# Patient Record
Sex: FEMALE | Race: WHITE | NOT HISPANIC OR LATINO | Employment: OTHER | URBAN - METROPOLITAN AREA
[De-identification: names, ages, dates, MRNs, and addresses within clinical notes are randomized per-mention and may not be internally consistent; named-entity substitution may affect disease eponyms.]

---

## 2023-04-14 DIAGNOSIS — I42.8 NON-ISCHEMIC CARDIOMYOPATHY (MULTI): Primary | ICD-10-CM

## 2023-04-14 RX ORDER — FUROSEMIDE 20 MG/1
TABLET ORAL
Qty: 90 TABLET | Refills: 1 | Status: SHIPPED | OUTPATIENT
Start: 2023-04-14 | End: 2023-10-11

## 2023-05-15 ENCOUNTER — LAB (OUTPATIENT)
Dept: LAB | Facility: LAB | Age: 88
End: 2023-05-15
Payer: MEDICARE

## 2023-05-15 ENCOUNTER — OFFICE VISIT (OUTPATIENT)
Dept: PRIMARY CARE | Facility: CLINIC | Age: 88
End: 2023-05-15
Payer: MEDICARE

## 2023-05-15 VITALS
HEIGHT: 64 IN | SYSTOLIC BLOOD PRESSURE: 140 MMHG | DIASTOLIC BLOOD PRESSURE: 58 MMHG | WEIGHT: 139 LBS | HEART RATE: 70 BPM | BODY MASS INDEX: 23.73 KG/M2

## 2023-05-15 DIAGNOSIS — B35.1 ONYCHOMYCOSIS: ICD-10-CM

## 2023-05-15 DIAGNOSIS — E78.5 DYSLIPIDEMIA, GOAL LDL BELOW 70: ICD-10-CM

## 2023-05-15 DIAGNOSIS — Z00.00 ROUTINE GENERAL MEDICAL EXAMINATION AT HEALTH CARE FACILITY: Primary | ICD-10-CM

## 2023-05-15 DIAGNOSIS — E78.00 PURE HYPERCHOLESTEROLEMIA: ICD-10-CM

## 2023-05-15 DIAGNOSIS — M80.08XA AGE-RELATED OSTEOPOROSIS WITH CURRENT PATHOL FRACTURE OF VERTEBRA, INITIAL ENCOUNTER (MULTI): ICD-10-CM

## 2023-05-15 DIAGNOSIS — D45 POLYCYTHEMIA VERA (MULTI): ICD-10-CM

## 2023-05-15 DIAGNOSIS — I72.2 ANEURYSM ARTERY, RENAL (CMS-HCC): ICD-10-CM

## 2023-05-15 DIAGNOSIS — I42.8 NONISCHEMIC CARDIOMYOPATHY (MULTI): ICD-10-CM

## 2023-05-15 DIAGNOSIS — R25.2 LEG CRAMPS: ICD-10-CM

## 2023-05-15 DIAGNOSIS — I44.2 COMPLETE ATRIOVENTRICULAR BLOCK (MULTI): ICD-10-CM

## 2023-05-15 DIAGNOSIS — I10 PRIMARY HYPERTENSION: ICD-10-CM

## 2023-05-15 PROBLEM — I25.2 HISTORY OF HEART ATTACK: Status: ACTIVE | Noted: 2023-05-15

## 2023-05-15 PROBLEM — G47.00 INSOMNIA: Status: ACTIVE | Noted: 2023-05-15

## 2023-05-15 PROBLEM — G47.33 OSA (OBSTRUCTIVE SLEEP APNEA): Status: ACTIVE | Noted: 2023-05-15

## 2023-05-15 PROBLEM — M48.061 LUMBAR CANAL STENOSIS: Status: ACTIVE | Noted: 2023-05-15

## 2023-05-15 PROBLEM — Z66 DNR NO CODE (DO NOT RESUSCITATE): Status: ACTIVE | Noted: 2023-05-15

## 2023-05-15 PROBLEM — Z95.0 BIVENTRICULAR CARDIAC PACEMAKER IN SITU: Status: ACTIVE | Noted: 2023-05-15

## 2023-05-15 PROBLEM — G47.8 UNREFRESHED BY SLEEP: Status: ACTIVE | Noted: 2023-05-15

## 2023-05-15 PROBLEM — Z95.810 BIVENTRICULAR IMPLANTABLE CARDIOVERTER-DEFIBRILLATOR (ICD) IN SITU: Status: ACTIVE | Noted: 2023-05-15

## 2023-05-15 PROBLEM — M25.571 ACUTE RIGHT ANKLE PAIN: Status: ACTIVE | Noted: 2023-05-15

## 2023-05-15 PROBLEM — K22.70 BARRETT'S ESOPHAGUS: Status: ACTIVE | Noted: 2023-05-15

## 2023-05-15 PROBLEM — L29.9 PRURITIC DISORDER: Status: ACTIVE | Noted: 2023-05-15

## 2023-05-15 PROBLEM — R20.0 NUMBNESS: Status: ACTIVE | Noted: 2023-05-15

## 2023-05-15 PROBLEM — M79.606 LEG PAIN: Status: ACTIVE | Noted: 2023-05-15

## 2023-05-15 PROBLEM — H69.92 DYSFUNCTION OF LEFT EUSTACHIAN TUBE: Status: ACTIVE | Noted: 2023-05-15

## 2023-05-15 PROBLEM — S93.401A SPRAIN OF RIGHT ANKLE: Status: ACTIVE | Noted: 2023-05-15

## 2023-05-15 PROBLEM — G62.9 PERIPHERAL NEUROPATHY: Status: ACTIVE | Noted: 2023-05-15

## 2023-05-15 PROBLEM — G25.81 RLS (RESTLESS LEGS SYNDROME): Status: ACTIVE | Noted: 2023-05-15

## 2023-05-15 PROBLEM — R06.3 CHEYNE-STOKES RESPIRATION: Status: ACTIVE | Noted: 2023-05-15

## 2023-05-15 PROBLEM — M47.812 DEGENERATIVE JOINT DISEASE OF CERVICAL SPINE: Status: ACTIVE | Noted: 2023-05-15

## 2023-05-15 PROBLEM — G89.4 CHRONIC PAIN SYNDROME: Status: ACTIVE | Noted: 2023-05-15

## 2023-05-15 PROBLEM — R39.9 SYMPTOMS INVOLVING URINARY SYSTEM: Status: ACTIVE | Noted: 2023-05-15

## 2023-05-15 PROBLEM — S32.000A LUMBAR COMPRESSION FRACTURE (MULTI): Status: ACTIVE | Noted: 2023-05-15

## 2023-05-15 PROBLEM — E04.2 NONTOXIC MULTINODULAR GOITER: Status: ACTIVE | Noted: 2023-05-15

## 2023-05-15 PROBLEM — M48.062 NEUROGENIC CLAUDICATION DUE TO LUMBAR SPINAL STENOSIS: Status: ACTIVE | Noted: 2023-05-15

## 2023-05-15 PROBLEM — Z95.0 S/P CARDIAC PACEMAKER PROCEDURE: Status: ACTIVE | Noted: 2023-05-15

## 2023-05-15 PROBLEM — N95.2 POSTMENOPAUSAL ATROPHIC VAGINITIS: Status: ACTIVE | Noted: 2023-05-15

## 2023-05-15 PROBLEM — R53.83 FATIGUE: Status: ACTIVE | Noted: 2023-05-15

## 2023-05-15 PROBLEM — G24.5 BLEPHAROSPASM: Status: ACTIVE | Noted: 2023-05-15

## 2023-05-15 PROBLEM — G51.0 FACIAL NERVE PARESIS: Status: ACTIVE | Noted: 2023-05-15

## 2023-05-15 PROBLEM — G89.29 NECK PAIN, CHRONIC: Status: ACTIVE | Noted: 2023-05-15

## 2023-05-15 PROBLEM — B35.6 TINEA CRURIS: Status: ACTIVE | Noted: 2023-05-15

## 2023-05-15 PROBLEM — M79.605 PAIN OF LEFT LOWER EXTREMITY: Status: ACTIVE | Noted: 2023-05-15

## 2023-05-15 PROBLEM — M54.16 LUMBAR RADICULITIS: Status: ACTIVE | Noted: 2023-05-15

## 2023-05-15 PROBLEM — I44.7 LBBB (LEFT BUNDLE BRANCH BLOCK): Status: ACTIVE | Noted: 2023-05-15

## 2023-05-15 PROBLEM — I35.0 MODERATE AORTIC STENOSIS: Status: ACTIVE | Noted: 2023-05-15

## 2023-05-15 PROBLEM — N39.0 ACUTE LOWER UTI: Status: ACTIVE | Noted: 2023-05-15

## 2023-05-15 PROBLEM — J30.89 OTHER ALLERGIC RHINITIS: Status: ACTIVE | Noted: 2023-05-15

## 2023-05-15 PROBLEM — E55.9 VITAMIN D DEFICIENCY: Status: ACTIVE | Noted: 2023-05-15

## 2023-05-15 PROBLEM — H90.6 MIXED CONDUCTIVE AND SENSORINEURAL HEARING LOSS, BILATERAL: Status: ACTIVE | Noted: 2023-05-15

## 2023-05-15 PROBLEM — M25.69 BACK STIFFNESS: Status: ACTIVE | Noted: 2023-05-15

## 2023-05-15 PROBLEM — K21.9 GERD WITHOUT ESOPHAGITIS: Status: ACTIVE | Noted: 2023-05-15

## 2023-05-15 PROBLEM — R51.9 HEADACHE: Status: ACTIVE | Noted: 2023-05-15

## 2023-05-15 PROBLEM — H40.9 GLAUCOMA: Status: ACTIVE | Noted: 2023-05-15

## 2023-05-15 PROBLEM — F51.04 CHRONIC INSOMNIA: Status: ACTIVE | Noted: 2023-05-15

## 2023-05-15 PROBLEM — R25.8: Status: ACTIVE | Noted: 2023-05-15

## 2023-05-15 PROBLEM — Z86.03 H/O POLYCYTHEMIA VERA: Status: ACTIVE | Noted: 2023-05-15

## 2023-05-15 PROBLEM — M54.2 NECK PAIN, CHRONIC: Status: ACTIVE | Noted: 2023-05-15

## 2023-05-15 PROBLEM — T82.110A PACEMAKER LEAD MALFUNCTION: Status: ACTIVE | Noted: 2023-05-15

## 2023-05-15 PROBLEM — I35.8 AORTIC VALVE SCLEROSIS: Status: ACTIVE | Noted: 2023-05-15

## 2023-05-15 PROBLEM — R01.1 HEART MURMUR, SYSTOLIC: Status: ACTIVE | Noted: 2023-05-15

## 2023-05-15 PROBLEM — M47.816 LUMBAR SPONDYLOSIS: Status: ACTIVE | Noted: 2023-05-15

## 2023-05-15 PROBLEM — M17.9 KNEE OSTEOARTHRITIS: Status: ACTIVE | Noted: 2023-05-15

## 2023-05-15 PROBLEM — Z86.69 HISTORY OF BELL'S PALSY: Status: ACTIVE | Noted: 2023-05-15

## 2023-05-15 PROBLEM — M79.673 HEEL PAIN: Status: ACTIVE | Noted: 2023-05-15

## 2023-05-15 PROBLEM — S32.020A COMPRESSION FRACTURE OF L2 LUMBAR VERTEBRA (MULTI): Status: ACTIVE | Noted: 2023-05-15

## 2023-05-15 PROBLEM — M81.0 OSTEOPOROSIS: Status: ACTIVE | Noted: 2023-05-15

## 2023-05-15 PROBLEM — J30.2 SEASONAL ALLERGIES: Status: ACTIVE | Noted: 2023-05-15

## 2023-05-15 PROBLEM — G47.31 COMPLEX SLEEP APNEA SYNDROME: Status: ACTIVE | Noted: 2023-05-15

## 2023-05-15 PROBLEM — G47.39 COMPLEX SLEEP APNEA SYNDROME: Status: ACTIVE | Noted: 2023-05-15

## 2023-05-15 PROBLEM — N32.81 OVERACTIVE BLADDER: Status: ACTIVE | Noted: 2023-05-15

## 2023-05-15 PROBLEM — M79.604 PAIN OF RIGHT LOWER EXTREMITY: Status: ACTIVE | Noted: 2023-05-15

## 2023-05-15 PROBLEM — G47.30 SLEEP APNEA: Status: ACTIVE | Noted: 2023-05-15

## 2023-05-15 PROBLEM — K22.5 ZENKER'S DIVERTICULUM: Status: ACTIVE | Noted: 2023-05-15

## 2023-05-15 PROBLEM — I83.813 VARICOSE VEINS OF BOTH LOWER EXTREMITIES WITH PAIN: Status: ACTIVE | Noted: 2023-05-15

## 2023-05-15 PROBLEM — M53.82 WEAKNESS OF NECK: Status: ACTIVE | Noted: 2023-05-15

## 2023-05-15 PROBLEM — R53.1 WEAKNESS GENERALIZED: Status: ACTIVE | Noted: 2023-05-15

## 2023-05-15 PROBLEM — M25.50 ARTHRALGIA OF MULTIPLE SITES: Status: ACTIVE | Noted: 2023-05-15

## 2023-05-15 PROBLEM — M54.9 BACK PAIN: Status: ACTIVE | Noted: 2023-05-15

## 2023-05-15 PROBLEM — J30.0 VASOMOTOR RHINITIS: Status: ACTIVE | Noted: 2023-05-15

## 2023-05-15 PROBLEM — R06.02 SOB (SHORTNESS OF BREATH): Status: ACTIVE | Noted: 2023-05-15

## 2023-05-15 PROBLEM — R13.10 DYSPHAGIA: Status: ACTIVE | Noted: 2023-05-15

## 2023-05-15 PROBLEM — Q89.2 THYROGLOSSAL DUCT CYST: Status: ACTIVE | Noted: 2023-05-15

## 2023-05-15 PROBLEM — H90.3 ASYMMETRIC SNHL (SENSORINEURAL HEARING LOSS): Status: ACTIVE | Noted: 2023-05-15

## 2023-05-15 PROBLEM — N63.20 LEFT BREAST MASS: Status: ACTIVE | Noted: 2023-05-15

## 2023-05-15 PROBLEM — E78.2: Status: ACTIVE | Noted: 2023-05-15

## 2023-05-15 PROBLEM — F32.A DEPRESSION: Status: ACTIVE | Noted: 2023-05-15

## 2023-05-15 PROBLEM — I50.22 CHRONIC SYSTOLIC HEART FAILURE (MULTI): Status: ACTIVE | Noted: 2023-05-15

## 2023-05-15 PROBLEM — H91.90 HEARING IMPAIRMENT: Status: ACTIVE | Noted: 2023-05-15

## 2023-05-15 LAB
ALANINE AMINOTRANSFERASE (SGPT) (U/L) IN SER/PLAS: 16 U/L (ref 7–45)
ALBUMIN (G/DL) IN SER/PLAS: 4.1 G/DL (ref 3.4–5)
ALKALINE PHOSPHATASE (U/L) IN SER/PLAS: 51 U/L (ref 33–136)
ANION GAP IN SER/PLAS: 16 MMOL/L (ref 10–20)
ASPARTATE AMINOTRANSFERASE (SGOT) (U/L) IN SER/PLAS: 25 U/L (ref 9–39)
BILIRUBIN TOTAL (MG/DL) IN SER/PLAS: 0.6 MG/DL (ref 0–1.2)
CALCIUM (MG/DL) IN SER/PLAS: 9.9 MG/DL (ref 8.6–10.3)
CARBON DIOXIDE, TOTAL (MMOL/L) IN SER/PLAS: 26 MMOL/L (ref 21–32)
CHLORIDE (MMOL/L) IN SER/PLAS: 104 MMOL/L (ref 98–107)
CHOLESTEROL (MG/DL) IN SER/PLAS: 150 MG/DL (ref 0–199)
CHOLESTEROL IN HDL (MG/DL) IN SER/PLAS: 44.1 MG/DL
CHOLESTEROL/HDL RATIO: 3.4
CREATINE KINASE (U/L) IN SER/PLAS: 70 U/L (ref 0–215)
CREATININE (MG/DL) IN SER/PLAS: 0.84 MG/DL (ref 0.5–1.05)
GFR FEMALE: 67 ML/MIN/1.73M2
GLUCOSE (MG/DL) IN SER/PLAS: 100 MG/DL (ref 74–99)
LDL: 77 MG/DL (ref 0–99)
MAGNESIUM (MG/DL) IN SER/PLAS: 2.04 MG/DL (ref 1.6–2.4)
POTASSIUM (MMOL/L) IN SER/PLAS: 4.6 MMOL/L (ref 3.5–5.3)
PROTEIN TOTAL: 6.7 G/DL (ref 6.4–8.2)
SODIUM (MMOL/L) IN SER/PLAS: 141 MMOL/L (ref 136–145)
THYROTROPIN (MIU/L) IN SER/PLAS BY DETECTION LIMIT <= 0.05 MIU/L: 1.48 MIU/L (ref 0.44–3.98)
TRIGLYCERIDE (MG/DL) IN SER/PLAS: 146 MG/DL (ref 0–149)
UREA NITROGEN (MG/DL) IN SER/PLAS: 34 MG/DL (ref 6–23)
VLDL: 29 MG/DL (ref 0–40)

## 2023-05-15 PROCEDURE — 3077F SYST BP >= 140 MM HG: CPT | Performed by: INTERNAL MEDICINE

## 2023-05-15 PROCEDURE — 1157F ADVNC CARE PLAN IN RCRD: CPT | Performed by: INTERNAL MEDICINE

## 2023-05-15 PROCEDURE — 84443 ASSAY THYROID STIM HORMONE: CPT

## 2023-05-15 PROCEDURE — 36415 COLL VENOUS BLD VENIPUNCTURE: CPT

## 2023-05-15 PROCEDURE — 1170F FXNL STATUS ASSESSED: CPT | Performed by: INTERNAL MEDICINE

## 2023-05-15 PROCEDURE — 3078F DIAST BP <80 MM HG: CPT | Performed by: INTERNAL MEDICINE

## 2023-05-15 PROCEDURE — 1159F MED LIST DOCD IN RCRD: CPT | Performed by: INTERNAL MEDICINE

## 2023-05-15 PROCEDURE — 1036F TOBACCO NON-USER: CPT | Performed by: INTERNAL MEDICINE

## 2023-05-15 PROCEDURE — 80053 COMPREHEN METABOLIC PANEL: CPT

## 2023-05-15 PROCEDURE — 80061 LIPID PANEL: CPT

## 2023-05-15 PROCEDURE — 99214 OFFICE O/P EST MOD 30 MIN: CPT | Performed by: INTERNAL MEDICINE

## 2023-05-15 PROCEDURE — 82550 ASSAY OF CK (CPK): CPT

## 2023-05-15 PROCEDURE — 83735 ASSAY OF MAGNESIUM: CPT

## 2023-05-15 PROCEDURE — G0439 PPPS, SUBSEQ VISIT: HCPCS | Performed by: INTERNAL MEDICINE

## 2023-05-15 RX ORDER — LATANOPROST 50 UG/ML
2 SOLUTION/ DROPS OPHTHALMIC NIGHTLY
COMMUNITY
Start: 2022-08-17

## 2023-05-15 RX ORDER — OMEGA-3 FATTY ACIDS/FISH OIL 340-1000MG
1 CAPSULE ORAL DAILY
COMMUNITY

## 2023-05-15 RX ORDER — MELATONIN 10 MG
CAPSULE ORAL
COMMUNITY
End: 2023-12-06 | Stop reason: ALTCHOICE

## 2023-05-15 RX ORDER — IPRATROPIUM BROMIDE 42 UG/1
2 SPRAY, METERED NASAL 3 TIMES DAILY
Status: ON HOLD | COMMUNITY
Start: 2020-05-15 | End: 2023-12-08 | Stop reason: ALTCHOICE

## 2023-05-15 RX ORDER — DENOSUMAB 60 MG/ML
INJECTION SUBCUTANEOUS
Status: ON HOLD | COMMUNITY
Start: 2019-02-05 | End: 2023-12-08 | Stop reason: ALTCHOICE

## 2023-05-15 RX ORDER — ROSUVASTATIN CALCIUM 20 MG/1
1 TABLET, COATED ORAL DAILY
COMMUNITY
Start: 2018-06-01 | End: 2023-05-23

## 2023-05-15 RX ORDER — LIDOCAINE 50 MG/G
PATCH TOPICAL
COMMUNITY
Start: 2020-01-06 | End: 2023-08-21

## 2023-05-15 RX ORDER — METOPROLOL SUCCINATE 25 MG/1
1 TABLET, EXTENDED RELEASE ORAL DAILY
COMMUNITY
Start: 2016-08-24 | End: 2023-10-10

## 2023-05-15 RX ORDER — ACETAMINOPHEN 500 MG
500 TABLET ORAL EVERY 6 HOURS
COMMUNITY
Start: 2022-04-27

## 2023-05-15 RX ORDER — SPIRONOLACTONE 25 MG/1
1 TABLET ORAL DAILY
COMMUNITY
Start: 2020-12-14 | End: 2023-12-05

## 2023-05-15 RX ORDER — ARTIFICIAL TEARS 1; 2; 3 MG/ML; MG/ML; MG/ML
SOLUTION/ DROPS OPHTHALMIC 4 TIMES DAILY
COMMUNITY

## 2023-05-15 RX ORDER — GINSENG 100 MG
1 CAPSULE ORAL DAILY
COMMUNITY

## 2023-05-15 RX ORDER — MULTIVIT-MIN/IRON/FOLIC ACID/K 18-600-40
1000 CAPSULE ORAL DAILY
COMMUNITY

## 2023-05-15 RX ORDER — OMEPRAZOLE 20 MG/1
CAPSULE, DELAYED RELEASE ORAL
COMMUNITY
Start: 2021-04-01 | End: 2023-08-01

## 2023-05-15 RX ORDER — LOSARTAN POTASSIUM 25 MG/1
1 TABLET ORAL DAILY
COMMUNITY
Start: 2022-04-27 | End: 2023-10-03

## 2023-05-15 RX ORDER — VIT C/E/ZN/COPPR/LUTEIN/ZEAXAN 250MG-90MG
50 CAPSULE ORAL DAILY
COMMUNITY
Start: 2018-02-27

## 2023-05-15 ASSESSMENT — ACTIVITIES OF DAILY LIVING (ADL)
TAKING_MEDICATION: INDEPENDENT
DOING_HOUSEWORK: TOTAL CARE
DRESSING: INDEPENDENT
GROCERY_SHOPPING: TOTAL CARE
MANAGING_FINANCES: INDEPENDENT
BATHING: INDEPENDENT

## 2023-05-15 ASSESSMENT — PATIENT HEALTH QUESTIONNAIRE - PHQ9
1. LITTLE INTEREST OR PLEASURE IN DOING THINGS: NOT AT ALL
2. FEELING DOWN, DEPRESSED OR HOPELESS: NOT AT ALL
SUM OF ALL RESPONSES TO PHQ9 QUESTIONS 1 AND 2: 0

## 2023-05-15 NOTE — PATIENT INSTRUCTIONS
Hypertension, bp is well controlled    Cholesterol, get fasting labs    Leg cramps- discussed that being on the water pill can make this worse but you need diuretic due to history of heart failure  Will check magnesium level    Osteoporosis, on prolia- every 6 months, bone density due in the fall, will do at same time as mammogram, in December    For nail fungus, try vicks on the nail nightly and cover, continue until the nail grows out normally    Pain management, get second opinion on options from Dr. Mj Stevens, call [phone] to schedule    Recheck in 6months

## 2023-05-15 NOTE — PROGRESS NOTES
Subjective   Patient ID: Neris Mccall is a 88 y.o. female who presents for Medicare Annual Wellness Visit Subsequent.  Still having a lot of pain  Had a 3rd injection about 9 months ago, helped only for a few days.   After she is up for a few minutes, her back hurts and her knees and left heel hurt a lot.   Had an injection in her knee, now her right knee gives out on her.     Pt is concerned her mag level is low because she gets rest leg cramps    Breathing has been good  Gets occasional chest pain, doesn't last and happens when she is sitting.   Bowels are regular  She has fungus on her toenails,         Review of Systems    Objective     Visit Vitals  /58   Pulse 70        Physical Exam  Constitutional:       Appearance: Normal appearance.   Neck:      Vascular: No carotid bruit.   Cardiovascular:      Rate and Rhythm: Normal rate and regular rhythm.      Heart sounds: Murmur (grade II rusb systolic) heard.   Pulmonary:      Effort: Pulmonary effort is normal.      Breath sounds: Normal breath sounds. No wheezing.   Musculoskeletal:      Right lower leg: No edema.      Left lower leg: No edema.   Lymphadenopathy:      Cervical: No cervical adenopathy.   Skin:     Coloration: Skin is not jaundiced or pale.   Neurological:      Mental Status: She is alert.         Assessment/Plan     Problem List Items Addressed This Visit    None          Patient Instructions   Hypertension, bp is well controlled    Cholesterol, get fasting labs    Leg cramps- discussed that being on the water pill can make this worse but you need diuretic due to history of heart failure  Will check magnesium level    Osteoporosis, on prolia- every 6 months, bone density due in the fall, will do at same time as mammogram, in December    For nail fungus, try vicks on the nail nightly and cover, continue until the nail grows out normally    Pain management, get second opinion on options from Dr. Mj Stevens, call [phone] to  schedule    Recheck in 6months      There are no Patient Instructions on file for this visit.    For all testing, if you have not received results within 5 business days, please call the office.    All results will be available as well on [email or web address]. Make sure you have signed up.

## 2023-05-15 NOTE — PROGRESS NOTES
"Subjective   Patient ID: Neris Mccall is a 88 y.o. female who presents for Medicare Annual Wellness Visit Subsequent.    HPI     Review of Systems    Objective   /58   Pulse 70   Ht 1.626 m (5' 4\")   Wt 63 kg (139 lb)   BMI 23.86 kg/m²     Physical Exam    Assessment/Plan          "

## 2023-05-18 DIAGNOSIS — G89.29 NECK PAIN, CHRONIC: ICD-10-CM

## 2023-05-18 DIAGNOSIS — R25.2 LEG CRAMPS: ICD-10-CM

## 2023-05-18 DIAGNOSIS — M54.2 NECK PAIN, CHRONIC: ICD-10-CM

## 2023-05-23 DIAGNOSIS — E78.00 PURE HYPERCHOLESTEROLEMIA: Primary | ICD-10-CM

## 2023-05-23 RX ORDER — ROSUVASTATIN CALCIUM 20 MG/1
TABLET, COATED ORAL
Qty: 90 TABLET | Refills: 3 | Status: SHIPPED | OUTPATIENT
Start: 2023-05-23 | End: 2024-05-17

## 2023-07-31 DIAGNOSIS — K22.719 BARRETT'S ESOPHAGUS WITH DYSPLASIA: Primary | ICD-10-CM

## 2023-08-01 RX ORDER — OMEPRAZOLE 20 MG/1
CAPSULE, DELAYED RELEASE ORAL
Qty: 90 CAPSULE | Refills: 3 | Status: SHIPPED | OUTPATIENT
Start: 2023-08-01 | End: 2024-01-14

## 2023-08-21 DIAGNOSIS — M80.08XA AGE-RELATED OSTEOPOROSIS WITH CURRENT PATHOL FRACTURE OF VERTEBRA, INITIAL ENCOUNTER (MULTI): ICD-10-CM

## 2023-08-21 RX ORDER — LIDOCAINE 50 MG/G
PATCH TOPICAL
Qty: 90 PATCH | Refills: 3 | Status: SHIPPED | OUTPATIENT
Start: 2023-08-21

## 2023-08-21 NOTE — TELEPHONE ENCOUNTER
Requested Prescriptions     Pending Prescriptions Disp Refills    lidocaine (Lidoderm) 5 % patch [Pharmacy Med Name: LIDOCAINE PATCH 1'S 5%] 90 patch 3     Sig: APPLY 1 PATCH TO THE AFFECTED AREA AND LEAVE IN PLACE FOR 12 HOURS, THEN REMOVE AND LEAVE OFF FOR 12 HOURS

## 2023-10-03 DIAGNOSIS — I50.22 CHRONIC SYSTOLIC HEART FAILURE (MULTI): Primary | ICD-10-CM

## 2023-10-03 RX ORDER — LOSARTAN POTASSIUM 25 MG/1
25 TABLET ORAL DAILY
Qty: 90 TABLET | Refills: 3 | Status: SHIPPED | OUTPATIENT
Start: 2023-10-03

## 2023-10-06 DIAGNOSIS — I10 ESSENTIAL HYPERTENSION: Primary | ICD-10-CM

## 2023-10-10 RX ORDER — METOPROLOL SUCCINATE 25 MG/1
25 TABLET, EXTENDED RELEASE ORAL DAILY
Qty: 90 TABLET | Refills: 3 | Status: SHIPPED | OUTPATIENT
Start: 2023-10-10 | End: 2024-04-09 | Stop reason: SDUPTHER

## 2023-10-11 DIAGNOSIS — I42.8 NON-ISCHEMIC CARDIOMYOPATHY (MULTI): ICD-10-CM

## 2023-10-11 RX ORDER — FUROSEMIDE 20 MG/1
TABLET ORAL
Qty: 90 TABLET | Refills: 3 | Status: SHIPPED | OUTPATIENT
Start: 2023-10-11

## 2023-10-12 ENCOUNTER — PREP FOR PROCEDURE (OUTPATIENT)
Dept: PAIN MEDICINE | Facility: CLINIC | Age: 88
End: 2023-10-12
Payer: MEDICARE

## 2023-10-12 DIAGNOSIS — M48.062 SPINAL STENOSIS, LUMBAR REGION, WITH NEUROGENIC CLAUDICATION: Primary | ICD-10-CM

## 2023-10-12 RX ORDER — CEFAZOLIN SODIUM 2 G/100ML
2 INJECTION, SOLUTION INTRAVENOUS ONCE
Status: CANCELLED | OUTPATIENT
Start: 2023-12-08 | End: 2023-10-12

## 2023-11-09 PROBLEM — M48.062 SPINAL STENOSIS, LUMBAR REGION, WITH NEUROGENIC CLAUDICATION: Status: ACTIVE | Noted: 2023-10-12

## 2023-11-10 ASSESSMENT — ENCOUNTER SYMPTOMS
HEADACHES: 0
HYPERTENSION: 1
ORTHOPNEA: 0
PALPITATIONS: 0
SHORTNESS OF BREATH: 0
PND: 0
NECK PAIN: 0
SWEATS: 0
BLURRED VISION: 0

## 2023-11-13 ENCOUNTER — LAB (OUTPATIENT)
Dept: LAB | Facility: LAB | Age: 88
End: 2023-11-13
Payer: MEDICARE

## 2023-11-13 ENCOUNTER — OFFICE VISIT (OUTPATIENT)
Dept: PRIMARY CARE | Facility: CLINIC | Age: 88
End: 2023-11-13
Payer: MEDICARE

## 2023-11-13 VITALS
HEART RATE: 75 BPM | WEIGHT: 140 LBS | BODY MASS INDEX: 23.9 KG/M2 | HEIGHT: 64 IN | SYSTOLIC BLOOD PRESSURE: 139 MMHG | DIASTOLIC BLOOD PRESSURE: 74 MMHG

## 2023-11-13 DIAGNOSIS — Z12.11 ENCOUNTER FOR SCREENING FOR MALIGNANT NEOPLASM OF COLON: ICD-10-CM

## 2023-11-13 DIAGNOSIS — L60.8 GREEN NAILS: ICD-10-CM

## 2023-11-13 DIAGNOSIS — E55.9 VITAMIN D DEFICIENCY: ICD-10-CM

## 2023-11-13 DIAGNOSIS — E26.1 SECONDARY HYPERALDOSTERONISM (MULTI): ICD-10-CM

## 2023-11-13 DIAGNOSIS — Z12.31 VISIT FOR SCREENING MAMMOGRAM: Primary | ICD-10-CM

## 2023-11-13 DIAGNOSIS — M81.0 POSTMENOPAUSAL BONE LOSS: ICD-10-CM

## 2023-11-13 DIAGNOSIS — E78.00 PURE HYPERCHOLESTEROLEMIA: ICD-10-CM

## 2023-11-13 DIAGNOSIS — N39.0 ACUTE LOWER UTI: ICD-10-CM

## 2023-11-13 PROBLEM — Z96.652 STATUS POST LEFT KNEE REPLACEMENT: Status: ACTIVE | Noted: 2023-11-13

## 2023-11-13 PROBLEM — M47.817 SPONDYLOSIS WITHOUT MYELOPATHY OR RADICULOPATHY, LUMBOSACRAL REGION: Status: ACTIVE | Noted: 2023-11-13

## 2023-11-13 LAB
25(OH)D3 SERPL-MCNC: 75 NG/ML (ref 30–100)
ALBUMIN SERPL BCP-MCNC: 4.5 G/DL (ref 3.4–5)
ALP SERPL-CCNC: 46 U/L (ref 33–136)
ALT SERPL W P-5'-P-CCNC: 15 U/L (ref 7–45)
ANION GAP SERPL CALC-SCNC: 15 MMOL/L (ref 10–20)
AST SERPL W P-5'-P-CCNC: 26 U/L (ref 9–39)
BASOPHILS # BLD AUTO: 0.08 X10*3/UL (ref 0–0.1)
BASOPHILS NFR BLD AUTO: 1 %
BILIRUB SERPL-MCNC: 0.6 MG/DL (ref 0–1.2)
BUN SERPL-MCNC: 46 MG/DL (ref 6–23)
CALCIUM SERPL-MCNC: 9.9 MG/DL (ref 8.6–10.3)
CHLORIDE SERPL-SCNC: 105 MMOL/L (ref 98–107)
CHOLEST SERPL-MCNC: 153 MG/DL (ref 0–199)
CHOLESTEROL/HDL RATIO: 3.4
CK SERPL-CCNC: 99 U/L (ref 0–215)
CO2 SERPL-SCNC: 25 MMOL/L (ref 21–32)
CREAT SERPL-MCNC: 1.03 MG/DL (ref 0.5–1.05)
EOSINOPHIL # BLD AUTO: 0.11 X10*3/UL (ref 0–0.4)
EOSINOPHIL NFR BLD AUTO: 1.4 %
ERYTHROCYTE [DISTWIDTH] IN BLOOD BY AUTOMATED COUNT: 12 % (ref 11.5–14.5)
GFR SERPL CREATININE-BSD FRML MDRD: 52 ML/MIN/1.73M*2
GLUCOSE SERPL-MCNC: 95 MG/DL (ref 74–99)
HCT VFR BLD AUTO: 44.4 % (ref 36–46)
HDLC SERPL-MCNC: 44.8 MG/DL
HGB BLD-MCNC: 14.5 G/DL (ref 12–16)
IMM GRANULOCYTES # BLD AUTO: 0.03 X10*3/UL (ref 0–0.5)
IMM GRANULOCYTES NFR BLD AUTO: 0.4 % (ref 0–0.9)
LDLC SERPL CALC-MCNC: 77 MG/DL
LYMPHOCYTES # BLD AUTO: 2.38 X10*3/UL (ref 0.8–3)
LYMPHOCYTES NFR BLD AUTO: 30.2 %
MCH RBC QN AUTO: 32.2 PG (ref 26–34)
MCHC RBC AUTO-ENTMCNC: 32.7 G/DL (ref 32–36)
MCV RBC AUTO: 99 FL (ref 80–100)
MONOCYTES # BLD AUTO: 0.71 X10*3/UL (ref 0.05–0.8)
MONOCYTES NFR BLD AUTO: 9 %
NEUTROPHILS # BLD AUTO: 4.56 X10*3/UL (ref 1.6–5.5)
NEUTROPHILS NFR BLD AUTO: 58 %
NON HDL CHOLESTEROL: 108 MG/DL (ref 0–149)
NRBC BLD-RTO: 0 /100 WBCS (ref 0–0)
PLATELET # BLD AUTO: 229 X10*3/UL (ref 150–450)
POC APPEARANCE, URINE: CLEAR
POC BILIRUBIN, URINE: NEGATIVE
POC BLOOD, URINE: ABNORMAL
POC COLOR, URINE: YELLOW
POC GLUCOSE, URINE: NEGATIVE MG/DL
POC KETONES, URINE: NEGATIVE MG/DL
POC LEUKOCYTES, URINE: ABNORMAL
POC NITRITE,URINE: POSITIVE
POC PH, URINE: 5.5 PH
POC PROTEIN, URINE: NEGATIVE MG/DL
POC SPECIFIC GRAVITY, URINE: 1.01
POC UROBILINOGEN, URINE: 0.2 EU/DL
POTASSIUM SERPL-SCNC: 4.6 MMOL/L (ref 3.5–5.3)
PROT SERPL-MCNC: 6.9 G/DL (ref 6.4–8.2)
RBC # BLD AUTO: 4.5 X10*6/UL (ref 4–5.2)
SODIUM SERPL-SCNC: 140 MMOL/L (ref 136–145)
TRIGL SERPL-MCNC: 158 MG/DL (ref 0–149)
TSH SERPL-ACNC: 0.93 MIU/L (ref 0.44–3.98)
VLDL: 32 MG/DL (ref 0–40)
WBC # BLD AUTO: 7.9 X10*3/UL (ref 4.4–11.3)

## 2023-11-13 PROCEDURE — 84443 ASSAY THYROID STIM HORMONE: CPT

## 2023-11-13 PROCEDURE — 87186 SC STD MICRODIL/AGAR DIL: CPT

## 2023-11-13 PROCEDURE — 87086 URINE CULTURE/COLONY COUNT: CPT

## 2023-11-13 PROCEDURE — 81001 URINALYSIS AUTO W/SCOPE: CPT

## 2023-11-13 PROCEDURE — 36415 COLL VENOUS BLD VENIPUNCTURE: CPT

## 2023-11-13 PROCEDURE — 1160F RVW MEDS BY RX/DR IN RCRD: CPT | Performed by: INTERNAL MEDICINE

## 2023-11-13 PROCEDURE — 3078F DIAST BP <80 MM HG: CPT | Performed by: INTERNAL MEDICINE

## 2023-11-13 PROCEDURE — 82306 VITAMIN D 25 HYDROXY: CPT

## 2023-11-13 PROCEDURE — 99214 OFFICE O/P EST MOD 30 MIN: CPT | Performed by: INTERNAL MEDICINE

## 2023-11-13 PROCEDURE — 80061 LIPID PANEL: CPT

## 2023-11-13 PROCEDURE — 1159F MED LIST DOCD IN RCRD: CPT | Performed by: INTERNAL MEDICINE

## 2023-11-13 PROCEDURE — 1036F TOBACCO NON-USER: CPT | Performed by: INTERNAL MEDICINE

## 2023-11-13 PROCEDURE — 81003 URINALYSIS AUTO W/O SCOPE: CPT | Performed by: INTERNAL MEDICINE

## 2023-11-13 PROCEDURE — 80053 COMPREHEN METABOLIC PANEL: CPT

## 2023-11-13 PROCEDURE — 85025 COMPLETE CBC W/AUTO DIFF WBC: CPT

## 2023-11-13 PROCEDURE — 82550 ASSAY OF CK (CPK): CPT

## 2023-11-13 PROCEDURE — 3075F SYST BP GE 130 - 139MM HG: CPT | Performed by: INTERNAL MEDICINE

## 2023-11-13 PROCEDURE — 82274 ASSAY TEST FOR BLOOD FECAL: CPT

## 2023-11-13 RX ORDER — BEVACIZUMAB 100 MG/4ML
INJECTION, SOLUTION INTRAVENOUS
COMMUNITY
Start: 2022-08-17 | End: 2023-12-06 | Stop reason: ALTCHOICE

## 2023-11-13 ASSESSMENT — ENCOUNTER SYMPTOMS
BLURRED VISION: 0
HEADACHES: 0
PND: 0
PALPITATIONS: 0
SWEATS: 0
ORTHOPNEA: 0
NECK PAIN: 0
HYPERTENSION: 1
SHORTNESS OF BREATH: 0

## 2023-11-13 NOTE — PATIENT INSTRUCTIONS
Hypertension, bp is well controlled    Cholesterol, get fasting labs    Osteoporosis, on prolia- every 6 months, bone density due in the fall, will do at same time as mammogram, in December, call [phone] to schedule mammogram and bone density    For green nail syndrome, did not clear with clorox/water mix, I need to call the pharmacy and ssee if they will do acetic acid in 70% alcohol mix for you to put under the nail.    Pain management, seeing Dr. Stevens    History of polyps, will do annual stool test, given today    Recommend RSV/Arexvy shot at the pharmacy    Weak heart and aortic stenosis, valve issue, last echo looked better    Recheck in 6months

## 2023-11-13 NOTE — PROGRESS NOTES
I spoke to mother and gave her the negative chest xray results. I will start albuterol MDI 2-4 puffs via spacer every 4 hours as needed for severe cough or wheezing. Prescription sent electronically to the pharmacy on file. Mother will call or return to the clinic if his cough has not resolved in one week.   Subjective   Patient ID: Neris Mccall is a 88 y.o. female who presents for 6 month follow up.    She had a back injection and it didn't help,   She is getting MILD procedure, in December, her pain is getting worse.   She takes acetominophen, wants to know how much she can take  Takes APAP 500 and ibu 200 3 times a day, not sure if it is helping    Her right thumb nail is green , she dipped in full strength clorox for 2 months and not better  She had surgery on that thumb, there was a growth, they took of her nail in the past.     How can she tell if she has a UTI, she is having a lot of leakage, she has been leaking for a long time, it leaks all the time, not with cough or sneeze, but when she needs to get to the bathroom  Is occasionally constipated, but regular,   Nocturia, none, she is dry during the day. In the day time she urinates more than every 2 hours.     Her breathing is okay, but after she does a lot of work, she gets winded but she thinks it is from her pain, is fine once she sits down.     No cp or pressure    Hypertension  This is a chronic problem. The current episode started more than 1 year ago. The problem has been resolved since onset. The problem is controlled. Associated symptoms include malaise/fatigue. Pertinent negatives include no anxiety, blurred vision, chest pain, headaches, neck pain, orthopnea, palpitations, peripheral edema, PND, shortness of breath or sweats. Agents associated with hypertension include NSAIDs. Risk factors for coronary artery disease include dyslipidemia and family history. There are no compliance problems.         Review of Systems   Constitutional:  Positive for malaise/fatigue.   Eyes:  Negative for blurred vision.   Respiratory:  Negative for shortness of breath.    Cardiovascular:  Negative for chest pain, palpitations, orthopnea and PND.   Musculoskeletal:  Negative for neck pain.   Neurological:  Negative for headaches.       Objective   /73   Pulse  "75   Ht 1.626 m (5' 4\")   Wt 63.5 kg (140 lb)   BMI 24.03 kg/m²     Physical Exam  Constitutional:       Appearance: Normal appearance.   Neck:      Vascular: Carotid bruit (radiated from murmur/chest) present.   Cardiovascular:      Rate and Rhythm: Normal rate and regular rhythm.      Heart sounds: Murmur (grade III rusb/syst, s2 preserved) heard.   Pulmonary:      Effort: Pulmonary effort is normal.      Breath sounds: Normal breath sounds.   Musculoskeletal:      Right lower leg: No edema.      Left lower leg: No edema.   Skin:     Comments: Right thumb nail green distal 2/3rds distal nail lifted   Neurological:      Mental Status: She is alert and oriented to person, place, and time.   Psychiatric:         Mood and Affect: Mood normal.         Assessment/Plan          Patient Instructions   Hypertension, bp is well controlled    Cholesterol, get fasting labs    Osteoporosis, on prolia- every 6 months, bone density due in the fall, will do at same time as mammogram, in December, call [phone] to schedule mammogram and bone density    For green nail syndrome, did not clear with clorox/water mix, I need to call the pharmacy and ssee if they will do acetic acid in 70% alcohol mix for you to put under the nail.    Pain management, seeing Dr. Stevens    History of polyps, will do annual stool test, given today    Recommend RSV/Arexvy shot at the pharmacy    Weak heart and aortic stenosis, valve issue, last echo looked better    Recheck in 6months    "

## 2023-11-13 NOTE — PROGRESS NOTES
"Subjective   Patient ID: Neris Mccall is a 88 y.o. female who presents for 6 month follow up.    HPI     Review of Systems    Objective   /73   Pulse 75   Ht 1.626 m (5' 4\")   Wt 63.5 kg (140 lb)   BMI 24.03 kg/m²     Physical Exam    Assessment/Plan          "

## 2023-11-14 DIAGNOSIS — L60.8 GREEN NAILS: Primary | ICD-10-CM

## 2023-11-14 LAB
APPEARANCE UR: ABNORMAL
BILIRUB UR STRIP.AUTO-MCNC: NEGATIVE MG/DL
COLOR UR: YELLOW
GLUCOSE UR STRIP.AUTO-MCNC: NEGATIVE MG/DL
KETONES UR STRIP.AUTO-MCNC: NEGATIVE MG/DL
LEUKOCYTE ESTERASE UR QL STRIP.AUTO: ABNORMAL
MUCOUS THREADS #/AREA URNS AUTO: ABNORMAL /LPF
NITRITE UR QL STRIP.AUTO: POSITIVE
PH UR STRIP.AUTO: 5 [PH]
PROT UR STRIP.AUTO-MCNC: NEGATIVE MG/DL
RBC # UR STRIP.AUTO: NEGATIVE /UL
RBC #/AREA URNS AUTO: ABNORMAL /HPF
SP GR UR STRIP.AUTO: 1.01
UROBILINOGEN UR STRIP.AUTO-MCNC: <2 MG/DL
WBC #/AREA URNS AUTO: >50 /HPF

## 2023-11-14 RX ORDER — GENTAMICIN SULFATE 3 MG/ML
SOLUTION/ DROPS OPHTHALMIC
Qty: 15 ML | Refills: 0 | Status: SHIPPED | OUTPATIENT
Start: 2023-11-14

## 2023-11-14 RX ORDER — GENTAMICIN SULFATE 3 MG/ML
SOLUTION/ DROPS OPHTHALMIC
Qty: 15 ML | Refills: 0 | Status: SHIPPED | OUTPATIENT
Start: 2023-11-14 | End: 2023-11-14 | Stop reason: SDUPTHER

## 2023-11-16 LAB — BACTERIA UR CULT: ABNORMAL

## 2023-11-25 LAB — HEMOCCULT STL QL IA: NEGATIVE

## 2023-11-28 DIAGNOSIS — N39.0 ACUTE LOWER UTI: Primary | ICD-10-CM

## 2023-11-28 RX ORDER — NITROFURANTOIN 25; 75 MG/1; MG/1
100 CAPSULE ORAL 2 TIMES DAILY
Qty: 14 CAPSULE | Refills: 0 | Status: SHIPPED | OUTPATIENT
Start: 2023-11-28 | End: 2023-12-05

## 2023-12-05 ENCOUNTER — HOSPITAL ENCOUNTER (OUTPATIENT)
Dept: CARDIOLOGY | Facility: CLINIC | Age: 88
Discharge: HOME | End: 2023-12-05
Payer: MEDICARE

## 2023-12-05 DIAGNOSIS — Z95.0 PACEMAKER: ICD-10-CM

## 2023-12-05 DIAGNOSIS — I44.2 ATRIOVENTRICULAR BLOCK, COMPLETE (MULTI): ICD-10-CM

## 2023-12-05 DIAGNOSIS — I50.22 CHRONIC SYSTOLIC HEART FAILURE (MULTI): Primary | ICD-10-CM

## 2023-12-05 PROCEDURE — 93294 REM INTERROG EVL PM/LDLS PM: CPT | Performed by: INTERNAL MEDICINE

## 2023-12-05 PROCEDURE — 93296 REM INTERROG EVL PM/IDS: CPT

## 2023-12-05 RX ORDER — SPIRONOLACTONE 25 MG/1
25 TABLET ORAL DAILY
Qty: 90 TABLET | Refills: 1 | Status: SHIPPED | OUTPATIENT
Start: 2023-12-05 | End: 2024-06-03

## 2023-12-06 ENCOUNTER — PRE-ADMISSION TESTING (OUTPATIENT)
Dept: PREADMISSION TESTING | Facility: HOSPITAL | Age: 88
End: 2023-12-06
Payer: MEDICARE

## 2023-12-06 VITALS
OXYGEN SATURATION: 95 % | DIASTOLIC BLOOD PRESSURE: 74 MMHG | WEIGHT: 141.09 LBS | BODY MASS INDEX: 24.09 KG/M2 | RESPIRATION RATE: 16 BRPM | HEART RATE: 48 BPM | TEMPERATURE: 97.3 F | SYSTOLIC BLOOD PRESSURE: 149 MMHG | HEIGHT: 64 IN

## 2023-12-06 DIAGNOSIS — Z01.818 PREOPERATIVE TESTING: Primary | ICD-10-CM

## 2023-12-06 PROCEDURE — 87081 CULTURE SCREEN ONLY: CPT | Mod: TRILAB

## 2023-12-06 RX ORDER — TROLAMINE SALICYLATE 10 G/100G
1 CREAM TOPICAL AS NEEDED
COMMUNITY

## 2023-12-06 RX ORDER — MELATONIN 5 MG
5 CAPSULE ORAL NIGHTLY
COMMUNITY

## 2023-12-06 RX ORDER — AFLIBERCEPT 40 MG/ML
2 INJECTION, SOLUTION INTRAVITREAL ONCE
COMMUNITY

## 2023-12-06 RX ORDER — IBUPROFEN 200 MG
200 TABLET ORAL 4 TIMES DAILY
COMMUNITY

## 2023-12-06 RX ORDER — VITAMIN E (DL,TOCOPHERYL ACET) 45 MG/0.25
1 DROPS ORAL DAILY
COMMUNITY

## 2023-12-06 ASSESSMENT — CHADS2 SCORE
DIABETES: NO
PRIOR STROKE OR TIA OR THROMBOEMBOLISM: NO
CHADS2 SCORE: 3
AGE GREATER THAN OR EQUAL TO 75: YES
CHF: YES
HYPERTENSION: YES

## 2023-12-06 ASSESSMENT — PAIN - FUNCTIONAL ASSESSMENT: PAIN_FUNCTIONAL_ASSESSMENT: 0-10

## 2023-12-06 ASSESSMENT — DUKE ACTIVITY SCORE INDEX (DASI)
TOTAL_SCORE: 9.95
CAN YOU PARTICIPATE IN STRENOUS SPORTS LIKE SWIMMING, SINGLES TENNIS, FOOTBALL, BASKETBALL, OR SKIING: NO
CAN YOU RUN A SHORT DISTANCE: NO
CAN YOU DO YARD WORK LIKE RAKING LEAVES, WEEDING OR PUSHING A MOWER: NO
CAN YOU DO LIGHT WORK AROUND THE HOUSE LIKE DUSTING OR WASHING DISHES: YES
DASI METS SCORE: 4
CAN YOU DO MODERATE WORK AROUND THE HOUSE LIKE VACUUMING, SWEEPING FLOORS OR CARRYING GROCERIES: NO
CAN YOU PARTICIPATE IN MODERATE RECREATIONAL ACTIVITIES LIKE GOLF, BOWLING, DANCING, DOUBLES TENNIS OR THROWING A BASEBALL OR FOOTBALL: NO
CAN YOU CLIMB A FLIGHT OF STAIRS OR WALK UP A HILL: NO
CAN YOU TAKE CARE OF YOURSELF (EAT, DRESS, BATHE, OR USE TOILET): YES
CAN YOU HAVE SEXUAL RELATIONS: NO
CAN YOU WALK A BLOCK OR TWO ON LEVEL GROUND: YES
CAN YOU WALK INDOORS, SUCH AS AROUND YOUR HOUSE: YES
CAN YOU DO HEAVY WORK AROUND THE HOUSE LIKE SCRUBBING FLOORS OR LIFTING AND MOVING HEAVY FURNITURE: NO

## 2023-12-06 ASSESSMENT — PAIN SCALES - GENERAL
PAINLEVEL_OUTOF10: 3
PAINLEVEL_OUTOF10: 3

## 2023-12-06 ASSESSMENT — ENCOUNTER SYMPTOMS
BACK PAIN: 1
ENDOCRINE NEGATIVE: 1
PSYCHIATRIC NEGATIVE: 1
RESPIRATORY NEGATIVE: 1
FACIAL ASYMMETRY: 1
ACTIVITY CHANGE: 1
GASTROINTESTINAL NEGATIVE: 1
EYES NEGATIVE: 1
CARDIOVASCULAR NEGATIVE: 1

## 2023-12-06 ASSESSMENT — PAIN DESCRIPTION - DESCRIPTORS: DESCRIPTORS: BURNING

## 2023-12-06 NOTE — H&P (VIEW-ONLY)
"CPM/PAT Evaluation       Name: Neris Mccall (Neris Mccall)  /Age: 1935/88 y.o.     In-Person       Chief Complaint: Spinal stenosis, lumbar region, with neurogenic claudication     HPI  An 88-year-old female with multiple comorbidities and spinal stenosis, lumbar region with neurogenic claudication here with her family (Nisa).  History of chronic back pain for \"many years\" that became much more severe since sustaining a compression fracture 3/2022.  Pain radiates from lower back down bilateral hips and legs and increases with standing and ambulating with her walker.  Symptoms interfere with sleep and ADLs.  Endorses bilateral lower extremity tingling and burning. Denies fever, chills, or loss of bladder or bowel function.  She is scheduled for mild L3/4, L4/5 lumbar laminectomy endoscopy.  Past Medical History:   Diagnosis Date    Acute upper respiratory infection, unspecified 01/15/2018    Acute URI    Aneurysm of unspecified site (CMS/[facility])     Aneurysm    Arrhythmia 1975    Arthritis     BiPAP (biphasic positive airway pressure) dependence     Cataract     Chronic pain disorder 1960    Coronary artery disease     CPAP (continuous positive airway pressure) dependence     Dependence on other enabling machines and devices     CPAP (continuous positive airway pressure) dependence    Dysphagia     Fractures     GERD (gastroesophageal reflux disease) 1985    No longer problem    Glaucoma     Hearing aid worn 1985    Heart disease 1980    Heart failure (CMS/[facility]) 2019    Heart valve disease     History of blood transfusion     HL (hearing loss) 1970    Hypertension 1972    Low back pain 1950    Lumbar disc disease 2016    Old myocardial infarction     History of myocardial infarction    Other nail disorders 2017    Green nails    Pacemaker 2019    Personal history of diseases of the skin and subcutaneous tissue 2016    History of folliculitis    Personal history " of other diseases of the circulatory system 05/16/2022    History of intermittent claudication    Personal history of other diseases of the circulatory system     History of high blood pressure    Personal history of other diseases of the musculoskeletal system and connective tissue     History of arthritis    Personal history of other diseases of the nervous system and sense organs     History of sleep apnea    Personal history of other endocrine, nutritional and metabolic disease     History of high cholesterol    Personal history of other venous thrombosis and embolism     H/O blood clots    Personal history of transient ischemic attack (TIA), and cerebral infarction without residual deficits     History of stroke    Platelet disorder (CMS/[facility]) 1998    PCV    Pregnant 1957    Scoliosis 2016    Secondary polycythemia     Polycythemia    Shingles 1996    Sleep apnea     Spinal stenosis, lumbar region with neurogenic claudication 11/01/2022    Neurogenic claudication due to lumbar spinal stenosis    Stroke (CMS/[facility]) 1967    Urinary tract infection 2019    Vision loss 2017       Past Surgical History:   Procedure Laterality Date    BLEPHAROPLASTY  2002    COLONOSCOPY  2014    CORRECTION HAMMER TOE  2004    DILATION AND CURETTAGE OF UTERUS  04/20/2016    Dilation And Curettage    FRACTURE SURGERY  2015    INSERT / REPLACE / REMOVE PACEMAKER  2019    KNEE SURGERY  04/20/2016    Knee Surgery Left    ORIF WRIST FRACTURE  1955    OTHER SURGICAL HISTORY  04/20/2016    Ear Surgery    OTHER SURGICAL HISTORY  04/20/2016    Hammertoe Operation Left Toe No. ___    OTHER SURGICAL HISTORY  07/29/2022    Pacemaker insertion    ROTATOR CUFF REPAIR  04/20/2016    Rotator Cuff Repair    STAPEDECTOMY  1979    TOE SURGERY  2004    TONSILLECTOMY  04/20/2016    Tonsillectomy    UPPER GASTROINTESTINAL ENDOSCOPY  2013    VARICOSE VEIN SURGERY  04/20/2016    Varicose Vein Ligation         Allergies   Allergen Reactions    Sulfa (Sulfonamide  Antibiotics) Other and Rash    Cephalexin Diarrhea    Duloxetine Diarrhea and Other     Reaction from Community: Diarrhea    House Dust Other    Tramadol Hallucinations and Other     Reaction from Community: Hallucinations       Current Outpatient Medications   Medication Sig Dispense Refill    acetaminophen (Tylenol) 500 mg tablet Take 1 tablet (500 mg) by mouth every 6 hours.      aflibercept (Eylea) 2 mg/0.05 mL intra-ocular injection Administer 0.05 mL (2 mg) into the left eye 1 time. EVERY 10 WEEKS. NO DOSE LISTED      artificial tears, dextran-hypomel-glycerin, 0.1-0.3-0.2 % ophthalmic solution Administer into affected eye(s) 4 times a day.      ascorbic acid, vitamin C, 500 mg capsule Take 1,000 mg by mouth once daily.      aspirin 81 mg capsule Take 81 mg by mouth once daily.      calcium carb and citrate-vitD3 (Citracal-D3 Slow Release) 600 mg-12.5 mcg (500 unit) tablet extended release Take 1 tablet by mouth once daily.      cholecalciferol (Vitamin D-3) 25 MCG (1000 UT) capsule Take 2 capsules (50 mcg) by mouth once daily.      cranberry extract 200 mg capsule Take 1 tablet by mouth once daily. 30,000MG      cyanocobalamin (Vitamin B-12) 50 mcg tablet Take 1 tablet (50 mcg) by mouth once daily.      denosumab (Prolia) 60 mg/mL syringe Inject under the skin.      furosemide (Lasix) 20 mg tablet TAKE 1 TABLET DAILY 90 tablet 3    gentamicin (Garamycin) 0.3 % ophthalmic solution Apply 1 drop under the nail and 1 drop on the nail topically twice daily and cover twice daily for 4 weeks (Patient taking differently: Apply 1 drop under the nail and 1 drop on the nail topically twice daily and cover twice daily for 4 weeks  **FOR NAIL FUNGUS NOT EYES**) 15 mL 0    ibuprofen 200 mg tablet Take 1 tablet (200 mg) by mouth 4 times a day.      ipratropium (Atrovent) 42 mcg (0.06 %) nasal spray Administer 2 sprays into affected nostril(s) 3 times a day.      latanoprost (Xalatan) 0.005 % ophthalmic solution Administer 2  drops into the left eye once daily at bedtime.      lidocaine (Lidoderm) 5 % patch APPLY 1 PATCH TO THE AFFECTED AREA AND LEAVE IN PLACE FOR 12 HOURS, THEN REMOVE AND LEAVE OFF FOR 12 HOURS 90 patch 3    losartan (Cozaar) 25 mg tablet TAKE 1 TABLET DAILY 90 tablet 3    melatonin 5 mg capsule Take 1 capsule (5 mg) by mouth once daily at bedtime.      metoprolol succinate XL (Toprol-XL) 25 mg 24 hr tablet TAKE 1 TABLET DAILY 90 tablet 3    multivit,calc,mins/iron/folic (WOMEN'S ONE DAILY ORAL) Take by mouth.      omega-3 fatty acids-fish oil (Fish OiL) 340-1,000 mg capsule Take by mouth.      omeprazole (PriLOSEC) 20 mg DR capsule  (Patient taking differently: Take 1 capsule (20 mg) by mouth 2 times a day.) 90 capsule 3    rosuvastatin (Crestor) 20 mg tablet TAKE 1 TABLET DAILY 90 tablet 3    spironolactone (Aldactone) 25 mg tablet TAKE 1 TABLET DAILY 90 tablet 1    trolamine salicylate (Aspercreme) 10 % cream Apply 1 Application topically if needed for muscle/joint pain.      vit A/vit C/vit E/zinc/copper (PRESERVISION AREDS ORAL) Take 1 tablet by mouth 2 times a day.       No current facility-administered medications for this visit.          Review of Systems   Constitutional:  Positive for activity change.   HENT: Negative.     Eyes: Negative.    Respiratory: Negative.     Cardiovascular: Negative.         History of heart murmur, left chest pacemaker   Gastrointestinal: Negative.    Endocrine: Negative.    Genitourinary: Negative.    Musculoskeletal:  Positive for back pain and gait problem.   Skin: Negative.    Neurological:  Positive for facial asymmetry (Left-sided facial droop s/p Bell's palsy).   Psychiatric/Behavioral: Negative.          Physical Exam  Vitals reviewed.   HENT:      Head: Normocephalic and atraumatic.      Ears:      Comments: Bilateral hearing aids     Mouth/Throat:      Mouth: Mucous membranes are moist.      Comments: Upper and lower dentures  Eyes:      Pupils: Pupils are equal, round,  "and reactive to light.      Comments: Glasses   Cardiovascular:      Rate and Rhythm: Normal rate and regular rhythm.      Heart sounds: Murmur (Grade III/IV) heard.      Comments: HR 62 auscultated  Pulmonary:      Effort: Pulmonary effort is normal.      Breath sounds: Normal breath sounds.   Abdominal:      Palpations: Abdomen is soft.   Musculoskeletal:      Cervical back: Normal range of motion.      Comments: Chronic back pain, hunched gait, uses walker   Skin:     General: Skin is warm and dry.      Comments: Left chest pacemaker present   Neurological:      Mental Status: She is alert and oriented to person, place, and time.   Psychiatric:         Mood and Affect: Mood normal.          PAT AIRWAY:   Airway:     Mallampati::  II    Neck ROM::  Full   upper dentures and lower dentures      /74   Pulse (!) 48   Temp 36.3 °C (97.3 °F) (Temporal)   Resp 16   Ht 1.626 m (5' 4\")   Wt 64 kg (141 lb 1.5 oz)   SpO2 95%   BMI 24.22 kg/m²     Stop Bang Score 4     CHADS 2 score: 5.9%  DASI score: 9.95  METS score: 4  Revised cardiac risk index: 6.6%  ASA III  ARISCAT 1.6% score 16      Assessment and Plan:     Spinal stenosis, lumbar region, with neurogenic claudication Plan: Mild L3/4, L4/5 lumbar laminectomy endoscopy  Central sleep apnea wears CPAP  History of heart block status post pacemaker [place] placement 3/13/2019  Hypertension  Dyslipidemia  Bell's palsy in mid 20s-residual left facial droop present  History of CVA  History of aneurysm  History of blood clots  Arthritis  History of MI  Glaucoma  CHF/cardiomyopathy  Chronic back pain  Hard of hearing        "

## 2023-12-06 NOTE — PREPROCEDURE INSTRUCTIONS
Medication List            Accurate as of December 6, 2023  2:56 PM. Always use your most recent med list.              Some of the medications listed here do not show instructions, such as how often to take the medication. Ask your doctor or nurse how to use these medications.  Specifically ask about this and similar medications: omeprazole (PriLOSEC) 20 mg DR capsule       acetaminophen 500 mg tablet  Commonly known as: Tylenol  Notes to patient: CONTINUE     artificial tears (dextran-hypomel-glycerin) 0.1-0.3-0.2 % ophthalmic solution  Notes to patient: CONTINUE     ascorbic acid (vitamin C) 500 mg capsule  Notes to patient: STOP TODAY     aspirin 81 mg capsule  Notes to patient: STOP TODAY     cholecalciferol 25 MCG (1000 UT) capsule  Commonly known as: Vitamin D-3  Notes to patient: STOP TODAY     Citracal-D3 Slow Release 600 mg-12.5 mcg (500 unit) tablet extended release  Generic drug: calcium carb and citrate-vitD3  Notes to patient: STOP TODAY     cranberry extract 200 mg capsule  Notes to patient: STOP TODAY     cyanocobalamin 50 mcg tablet  Commonly known as: Vitamin B-12  Notes to patient: STOP TODAY     Eylea 2 mg/0.05 mL intra-ocular injection  Generic drug: aflibercept     Fish OiL 340-1,000 mg capsule  Generic drug: omega-3 fatty acids-fish oil  Notes to patient: STOP TODAY     furosemide 20 mg tablet  Commonly known as: Lasix  TAKE 1 TABLET DAILY  Medication Adjustments for Surgery: Take morning of surgery with sip of water, no other fluids     gentamicin 0.3 % ophthalmic solution  Commonly known as: Garamycin  Apply 1 drop under the nail and 1 drop on the nail topically twice daily and cover twice daily for 4 weeks  Notes to patient: CONTINUE     ibuprofen 200 mg tablet  Notes to patient: STOP TODAY     ipratropium 42 mcg (0.06 %) nasal spray  Commonly known as: Atrovent  Notes to patient: CONTINUE     latanoprost 0.005 % ophthalmic solution  Commonly known as: Xalatan  Notes to patient: Take  evening dose before surgery.     lidocaine 5 % patch  Commonly known as: Lidoderm  APPLY 1 PATCH TO THE AFFECTED AREA AND LEAVE IN PLACE FOR 12 HOURS, THEN REMOVE AND LEAVE OFF FOR 12 HOURS  Notes to patient: STOP TODAY     losartan 25 mg tablet  Commonly known as: Cozaar  TAKE 1 TABLET DAILY  Medication Adjustments for Surgery: Stop 1 day before surgery     melatonin 5 mg capsule  Notes to patient: STOP TODAY     metoprolol succinate XL 25 mg 24 hr tablet  Commonly known as: Toprol-XL  TAKE 1 TABLET DAILY  Medication Adjustments for Surgery: Take morning of surgery with sip of water, no other fluids     omeprazole 20 mg DR capsule  Commonly known as: PriLOSEC  __________________________  Medication Adjustments for Surgery: Take morning of surgery with sip of water, no other fluids     PRESERVISION AREDS ORAL  Notes to patient: STOP TODAY     Prolia 60 mg/mL syringe  Generic drug: denosumab     rosuvastatin 20 mg tablet  Commonly known as: Crestor  TAKE 1 TABLET DAILY  Notes to patient: Take evening dose before surgery.     spironolactone 25 mg tablet  Commonly known as: Aldactone  TAKE 1 TABLET DAILY  Medication Adjustments for Surgery: Take morning of surgery with sip of water, no other fluids     trolamine salicylate 10 % cream  Commonly known as: Aspercreme  Notes to patient: STOP TODAY     WOMEN'S ONE DAILY ORAL  Notes to patient: STOP TODAY                     PAT DISCHARGE INSTRUCTIONS    Please call the Same Day Surgery (SDS) Department of the hospital where your procedure will be performed after 2:00 PM the day before your surgery. If you are scheduled on a Monday, or a Tuesday following a Monday holiday, you will need to call on the last business day prior to your surgery.    [facility]  [id number] [place] [address], [address] [phone]    [address] [address] [phone]    [facility]  [address] [place]  [address]  [phone]    Please let your surgeon know if:      You develop any open sores, shingles, burning or painful urination as these may increase your risk of an infection.   You no longer wish to have the surgery.   Any other personal circumstances change that may lead to the need to cancel or defer this surgery-such as being sick or getting admitted to any hospital within one week of your planned procedure.    Your contact details change, such as a change of address or phone number.    Starting now:     Please DO NOT drink alcohol or smoke for 24 hours before surgery. It is well known that quitting smoking can make a huge difference to your health and recovery from surgery. The longer you abstain from smoking, the better your chances of a healthy recovery. If you need help with quitting, call 4-800QUIT-NOW to be connected to a trained counselor who will discuss the best methods to help you quit.     Before your surgery:    Please stop all supplements 7 days prior to surgery. Or as directed by your surgeon.   Please stop taking NSAID pain medicine such as Advil and Motrin 7 days before surgery.    If you develop any fever, cough, cold, rashes, cuts, scratches, scrapes, urinary symptoms or infection anywhere on your body (including teeth and gums) prior to surgery, please call your surgeon’s office as soon as possible. This may require treatment to reduce the chance of cancellation on the day of surgery.    The day before your surgery:   DIET- Do not eat any food after MIDNIGHT. May have 10 ounces of CLEAR LIQUIDS until TWO HOURS before your arrival time. This includes water, black tea or coffee (no milk ir cream), apple juice and electrolyte drinks (Gatorade). May chew gum until TWO hours before your surgery time.   Get a good night’s rest.  Use the special soap for bathing if you have been instructed to use one.    Scheduled surgery times may change and you will be notified if this occurs - please check your  personal voicemail for any updates.     On the morning of surgery:   Wear comfortable, loose fitting clothes which open in the front. Please do not wear moisturizers, creams, lotions, makeup or perfume.    Please bring with you to surgery:   Photo ID and insurance card   Current list of medicines and allergies   Pacemaker/ Defibrillator/Heart stent cards   CPAP machine and mask    Slings/ splints/ crutches   A copy of your complete advanced directive/DHPOA.    Please do NOT bring with you to surgery:   All jewelry and valuables should be left at home.   Prosthetic devices such as contact lenses, hearing aids, dentures, eyelash extensions, hairpins and body piercings must be removed prior to going in to the surgical suite.    After outpatient surgery:   A responsible adult MUST accompany you at the time of discharge and stay with you for 24 hours after your surgery. You may NOT drive yourself home after surgery.    Do not drive, operate machinery, make critical decisions or do activities that require co-ordination or balance until after a night’s sleep.   Do not drink alcoholic beverages for 24 hours.   Instructions for resuming your medications will be provided by your surgeon.    CALL YOUR DOCTOR AFTER SURGERY IF YOU HAVE:     Chills and/or a fever of 101° F or higher.    Redness, swelling, pus or drainage from your surgical wound or a bad smell from the wound.    Lightheadedness, fainting or confusion.    Persistent vomiting (throwing up) and are not able to eat or drink for 12 hours.    Three or more loose, watery bowel movements in 24 hours (diarrhea).   Difficulty or pain while urinating( after non-urological surgery)    Pain and swelling in your legs, especially if it is only on one side.    Difficulty breathing or are breathing faster than normal.    Any new concerning symptoms.     Home Preoperative Antibacterial Shower    What is a home preoperative antibacterial shower?  This shower is a way of cleaning  the skin with a germ killing solution before surgery. The solution contains chlorhexidine, commonly known as CHG. CHG is a skin cleanser with germ killing ability. Let your doctor know if you are allergic to chlorhexidine.      Why do I need to take a preoperative antibacterial shower?  Skin is not sterile. It is best to try to make your skin as free of germs as possible before surgery. Proper cleansing with a germ killing soap before surgery can lower the number of germs on your skin. This helps to reduce the risk of infection at the surgical site. Following the instructions listed below will help you prepare your skin for surgery.    How do I use the solution?      Steps: Begin using your CHG soap FIVE DAYS BEFORE your scheduled surgery on __________________________________________________.  First, wash and rinse your hair using the CHG soap. Keep CHG away soap away from ear canals and eyes.  Rinse completely, do not condition. Hair extensions should be removed.  Wash your face with your normal soap and rinse.  Apply the CHG solution to a clean wet washcloth. Turn the water off or move away from the water spray to avoid premature rinsing of the CHG soap as you are applying.  Firmly lather your entire body from neck down. Do not use on face.  Pay special attention to the areas(s) where your incision(s) will be located unless they are on your face.  Avoid scrubbing your skin too hard.  The important point is to have the CHG soap sit on your skin for 3 minutes.  DO NOT wash with regular soap after you have used the CHG soap solution.  Pat yourself dry with a clean, freshly laundered towel.  DO NOT apply powders, deodorants or lotions.  Dress in clean, freshly laundered night clothes.  Be sure to sleep with clean, freshly laundered sheets.  Be aware that CHG will cause stains on fabrics; if you wash them with bleach after use. Rinse your washcloth and other linens that have contact with CHG completely. Use only  non-chlorine detergents to launder the items used.  The morning of surgery is the fifth day. Repeat the above steps and dress in clean comfortable clothing.      Who should I call if I have any questions regarding the use of CHG soap?  Call the [facility] at [phone] if you have any questions.     Patient Information: Oral/Dental Rinse    What is oral/dental rinse?  It is a mouthwash. It is a way of cleaning the mouth with a germ killing solution before your surgery. The solution contains chlorhexidine, commonly know as CHG.  It is used inside the mouth to kill bacteria known as Staphylococcus aureus.  Let your doctor know if you are allergic to chlorhexidine.    Why do I need to use CHG oral/dental rinse?  The CHG oral/dental rinse helps to kill bacteria in your mouth known as Staphylococcus aureus. This reduces the risk of infection at the surgical site.    Using your CHG oral/dental rinse.  STEPS: use your CHG oral/dental rinse after you brush your teeth the night before (at bedtime) and the morning of your surgery. Follow the directions on your prescription label.  Use the cap on the container to measure 15ml (fill cap to fill line)  Swish (gargle if you can) the mouthwash in your mouth for at least 30 seconds, (do not swallow) spit out.  After you use your CHG rinse, do not rinse your mouth with water, drink or eat. Please refer to prescription label for the appropriate time to resume oral intake.    What side effects might I have using the CHG oral/dental rinse?  CHG rinse will stick to the plaque on the teeth. Brush and floss just before use. Teeth brushing will help avoid staining of plaque during use.    Who should I contact if I have questions about the CHG oral/dental rinse?  Please call [facility], [facility] at [phone] if you have any  questions.

## 2023-12-06 NOTE — CPM/PAT H&P
"CPM/PAT Evaluation       Name: Neris Mccall (Neris Mccall)  /Age: 1935/88 y.o.     In-Person       Chief Complaint: Spinal stenosis, lumbar region, with neurogenic claudication     HPI  An 88-year-old female with multiple comorbidities and spinal stenosis, lumbar region with neurogenic claudication here with her family (Nisa).  History of chronic back pain for \"many years\" that became much more severe since sustaining a compression fracture 3/2022.  Pain radiates from lower back down bilateral hips and legs and increases with standing and ambulating with her walker.  Symptoms interfere with sleep and ADLs.  Endorses bilateral lower extremity tingling and burning. Denies fever, chills, or loss of bladder or bowel function.  She is scheduled for mild L3/4, L4/5 lumbar laminectomy endoscopy.  Past Medical History:   Diagnosis Date    Acute upper respiratory infection, unspecified 01/15/2018    Acute URI    Aneurysm of unspecified site (CMS/[facility])     Aneurysm    Arrhythmia 1975    Arthritis     BiPAP (biphasic positive airway pressure) dependence     Cataract     Chronic pain disorder 1960    Coronary artery disease     CPAP (continuous positive airway pressure) dependence     Dependence on other enabling machines and devices     CPAP (continuous positive airway pressure) dependence    Dysphagia     Fractures     GERD (gastroesophageal reflux disease) 1985    No longer problem    Glaucoma     Hearing aid worn 1985    Heart disease 1980    Heart failure (CMS/[facility]) 2019    Heart valve disease     History of blood transfusion     HL (hearing loss) 1970    Hypertension 1972    Low back pain 1950    Lumbar disc disease 2016    Old myocardial infarction     History of myocardial infarction    Other nail disorders 2017    Green nails    Pacemaker 2019    Personal history of diseases of the skin and subcutaneous tissue 2016    History of folliculitis    Personal history " of other diseases of the circulatory system 05/16/2022    History of intermittent claudication    Personal history of other diseases of the circulatory system     History of high blood pressure    Personal history of other diseases of the musculoskeletal system and connective tissue     History of arthritis    Personal history of other diseases of the nervous system and sense organs     History of sleep apnea    Personal history of other endocrine, nutritional and metabolic disease     History of high cholesterol    Personal history of other venous thrombosis and embolism     H/O blood clots    Personal history of transient ischemic attack (TIA), and cerebral infarction without residual deficits     History of stroke    Platelet disorder (CMS/[facility]) 1998    PCV    Pregnant 1957    Scoliosis 2016    Secondary polycythemia     Polycythemia    Shingles 1996    Sleep apnea     Spinal stenosis, lumbar region with neurogenic claudication 11/01/2022    Neurogenic claudication due to lumbar spinal stenosis    Stroke (CMS/[facility]) 1967    Urinary tract infection 2019    Vision loss 2017       Past Surgical History:   Procedure Laterality Date    BLEPHAROPLASTY  2002    COLONOSCOPY  2014    CORRECTION HAMMER TOE  2004    DILATION AND CURETTAGE OF UTERUS  04/20/2016    Dilation And Curettage    FRACTURE SURGERY  2015    INSERT / REPLACE / REMOVE PACEMAKER  2019    KNEE SURGERY  04/20/2016    Knee Surgery Left    ORIF WRIST FRACTURE  1955    OTHER SURGICAL HISTORY  04/20/2016    Ear Surgery    OTHER SURGICAL HISTORY  04/20/2016    Hammertoe Operation Left Toe No. ___    OTHER SURGICAL HISTORY  07/29/2022    Pacemaker insertion    ROTATOR CUFF REPAIR  04/20/2016    Rotator Cuff Repair    STAPEDECTOMY  1979    TOE SURGERY  2004    TONSILLECTOMY  04/20/2016    Tonsillectomy    UPPER GASTROINTESTINAL ENDOSCOPY  2013    VARICOSE VEIN SURGERY  04/20/2016    Varicose Vein Ligation         Allergies   Allergen Reactions    Sulfa (Sulfonamide  Antibiotics) Other and Rash    Cephalexin Diarrhea    Duloxetine Diarrhea and Other     Reaction from Community: Diarrhea    House Dust Other    Tramadol Hallucinations and Other     Reaction from Community: Hallucinations       Current Outpatient Medications   Medication Sig Dispense Refill    acetaminophen (Tylenol) 500 mg tablet Take 1 tablet (500 mg) by mouth every 6 hours.      aflibercept (Eylea) 2 mg/0.05 mL intra-ocular injection Administer 0.05 mL (2 mg) into the left eye 1 time. EVERY 10 WEEKS. NO DOSE LISTED      artificial tears, dextran-hypomel-glycerin, 0.1-0.3-0.2 % ophthalmic solution Administer into affected eye(s) 4 times a day.      ascorbic acid, vitamin C, 500 mg capsule Take 1,000 mg by mouth once daily.      aspirin 81 mg capsule Take 81 mg by mouth once daily.      calcium carb and citrate-vitD3 (Citracal-D3 Slow Release) 600 mg-12.5 mcg (500 unit) tablet extended release Take 1 tablet by mouth once daily.      cholecalciferol (Vitamin D-3) 25 MCG (1000 UT) capsule Take 2 capsules (50 mcg) by mouth once daily.      cranberry extract 200 mg capsule Take 1 tablet by mouth once daily. 30,000MG      cyanocobalamin (Vitamin B-12) 50 mcg tablet Take 1 tablet (50 mcg) by mouth once daily.      denosumab (Prolia) 60 mg/mL syringe Inject under the skin.      furosemide (Lasix) 20 mg tablet TAKE 1 TABLET DAILY 90 tablet 3    gentamicin (Garamycin) 0.3 % ophthalmic solution Apply 1 drop under the nail and 1 drop on the nail topically twice daily and cover twice daily for 4 weeks (Patient taking differently: Apply 1 drop under the nail and 1 drop on the nail topically twice daily and cover twice daily for 4 weeks  **FOR NAIL FUNGUS NOT EYES**) 15 mL 0    ibuprofen 200 mg tablet Take 1 tablet (200 mg) by mouth 4 times a day.      ipratropium (Atrovent) 42 mcg (0.06 %) nasal spray Administer 2 sprays into affected nostril(s) 3 times a day.      latanoprost (Xalatan) 0.005 % ophthalmic solution Administer 2  drops into the left eye once daily at bedtime.      lidocaine (Lidoderm) 5 % patch APPLY 1 PATCH TO THE AFFECTED AREA AND LEAVE IN PLACE FOR 12 HOURS, THEN REMOVE AND LEAVE OFF FOR 12 HOURS 90 patch 3    losartan (Cozaar) 25 mg tablet TAKE 1 TABLET DAILY 90 tablet 3    melatonin 5 mg capsule Take 1 capsule (5 mg) by mouth once daily at bedtime.      metoprolol succinate XL (Toprol-XL) 25 mg 24 hr tablet TAKE 1 TABLET DAILY 90 tablet 3    multivit,calc,mins/iron/folic (WOMEN'S ONE DAILY ORAL) Take by mouth.      omega-3 fatty acids-fish oil (Fish OiL) 340-1,000 mg capsule Take by mouth.      omeprazole (PriLOSEC) 20 mg DR capsule  (Patient taking differently: Take 1 capsule (20 mg) by mouth 2 times a day.) 90 capsule 3    rosuvastatin (Crestor) 20 mg tablet TAKE 1 TABLET DAILY 90 tablet 3    spironolactone (Aldactone) 25 mg tablet TAKE 1 TABLET DAILY 90 tablet 1    trolamine salicylate (Aspercreme) 10 % cream Apply 1 Application topically if needed for muscle/joint pain.      vit A/vit C/vit E/zinc/copper (PRESERVISION AREDS ORAL) Take 1 tablet by mouth 2 times a day.       No current facility-administered medications for this visit.          Review of Systems   Constitutional:  Positive for activity change.   HENT: Negative.     Eyes: Negative.    Respiratory: Negative.     Cardiovascular: Negative.         History of heart murmur, left chest pacemaker   Gastrointestinal: Negative.    Endocrine: Negative.    Genitourinary: Negative.    Musculoskeletal:  Positive for back pain and gait problem.   Skin: Negative.    Neurological:  Positive for facial asymmetry (Left-sided facial droop s/p Bell's palsy).   Psychiatric/Behavioral: Negative.          Physical Exam  Vitals reviewed.   HENT:      Head: Normocephalic and atraumatic.      Ears:      Comments: Bilateral hearing aids     Mouth/Throat:      Mouth: Mucous membranes are moist.      Comments: Upper and lower dentures  Eyes:      Pupils: Pupils are equal, round,  "and reactive to light.      Comments: Glasses   Cardiovascular:      Rate and Rhythm: Normal rate and regular rhythm.      Heart sounds: Murmur (Grade III/IV) heard.      Comments: HR 62 auscultated  Pulmonary:      Effort: Pulmonary effort is normal.      Breath sounds: Normal breath sounds.   Abdominal:      Palpations: Abdomen is soft.   Musculoskeletal:      Cervical back: Normal range of motion.      Comments: Chronic back pain, hunched gait, uses walker   Skin:     General: Skin is warm and dry.      Comments: Left chest pacemaker present   Neurological:      Mental Status: She is alert and oriented to person, place, and time.   Psychiatric:         Mood and Affect: Mood normal.          PAT AIRWAY:   Airway:     Mallampati::  II    Neck ROM::  Full   upper dentures and lower dentures      /74   Pulse (!) 48   Temp 36.3 °C (97.3 °F) (Temporal)   Resp 16   Ht 1.626 m (5' 4\")   Wt 64 kg (141 lb 1.5 oz)   SpO2 95%   BMI 24.22 kg/m²     Stop Bang Score 4     CHADS 2 score: 5.9%  DASI score: 9.95  METS score: 4  Revised cardiac risk index: 6.6%  ASA III  ARISCAT 1.6% score 16      Assessment and Plan:     Spinal stenosis, lumbar region, with neurogenic claudication Plan: Mild L3/4, L4/5 lumbar laminectomy endoscopy  Central sleep apnea wears CPAP  History of heart block status post pacemaker [place] placement 3/13/2019  Hypertension  Dyslipidemia  Bell's palsy in mid 20s-residual left facial droop present  History of CVA  History of aneurysm  History of blood clots  Arthritis  History of MI  Glaucoma  CHF/cardiomyopathy  Chronic back pain  Hard of hearing        "

## 2023-12-07 PROBLEM — G89.29 OTHER CHRONIC PAIN: Status: ACTIVE | Noted: 2023-12-07

## 2023-12-07 RX ORDER — HYDRALAZINE HYDROCHLORIDE 10 MG/1
10 TABLET, FILM COATED ORAL
Status: ON HOLD | COMMUNITY
End: 2023-12-08 | Stop reason: ALTCHOICE

## 2023-12-07 RX ORDER — EPINEPHRINE 0.22MG
100 AEROSOL WITH ADAPTER (ML) INHALATION DAILY
Status: ON HOLD | COMMUNITY
End: 2023-12-08 | Stop reason: ALTCHOICE

## 2023-12-07 RX ORDER — CELECOXIB 100 MG/1
100 CAPSULE ORAL 2 TIMES DAILY
Status: ON HOLD | COMMUNITY
Start: 2018-12-14 | End: 2023-12-08 | Stop reason: ALTCHOICE

## 2023-12-07 RX ORDER — NABUMETONE 750 MG/1
750 TABLET, FILM COATED ORAL
Status: ON HOLD | COMMUNITY
End: 2023-12-08 | Stop reason: ALTCHOICE

## 2023-12-07 RX ORDER — SERTRALINE HYDROCHLORIDE 50 MG/1
50 TABLET, FILM COATED ORAL
Status: ON HOLD | COMMUNITY
End: 2023-12-08 | Stop reason: ALTCHOICE

## 2023-12-07 RX ORDER — METHYLPREDNISOLONE 4 MG/1
TABLET ORAL
Status: ON HOLD | COMMUNITY
End: 2023-12-08 | Stop reason: ALTCHOICE

## 2023-12-07 RX ORDER — MELOXICAM 7.5 MG/1
7.5 TABLET ORAL
Status: ON HOLD | COMMUNITY
End: 2023-12-08 | Stop reason: ALTCHOICE

## 2023-12-07 RX ORDER — CHLORHEXIDINE GLUCONATE ORAL RINSE 1.2 MG/ML
SOLUTION DENTAL
Qty: 473 ML | Refills: 0 | Status: SHIPPED | OUTPATIENT
Start: 2023-12-07 | End: 2024-01-04 | Stop reason: WASHOUT

## 2023-12-07 RX ORDER — RABEPRAZOLE SODIUM 20 MG/1
20 TABLET, DELAYED RELEASE ORAL
Status: ON HOLD | COMMUNITY
End: 2023-12-08 | Stop reason: ALTCHOICE

## 2023-12-07 RX ORDER — LIFITEGRAST 50 MG/ML
1 SOLUTION/ DROPS OPHTHALMIC 2 TIMES DAILY
Status: ON HOLD | COMMUNITY
End: 2023-12-08 | Stop reason: ALTCHOICE

## 2023-12-07 RX ORDER — FLUTICASONE PROPIONATE 50 MCG
SPRAY, SUSPENSION (ML) NASAL
Status: ON HOLD | COMMUNITY
End: 2023-12-08 | Stop reason: ALTCHOICE

## 2023-12-07 RX ORDER — PREDNISOLONE ACETATE 10 MG/ML
SUSPENSION/ DROPS OPHTHALMIC
Status: ON HOLD | COMMUNITY
End: 2023-12-08 | Stop reason: ALTCHOICE

## 2023-12-07 RX ORDER — AMITRIPTYLINE HYDROCHLORIDE 10 MG/1
10 TABLET, FILM COATED ORAL NIGHTLY
Status: ON HOLD | COMMUNITY
End: 2023-12-08 | Stop reason: ALTCHOICE

## 2023-12-07 RX ORDER — ISOSORBIDE MONONITRATE 30 MG/1
30 TABLET, EXTENDED RELEASE ORAL
Status: ON HOLD | COMMUNITY
End: 2023-12-08 | Stop reason: ALTCHOICE

## 2023-12-07 RX ORDER — GABAPENTIN 100 MG/1
CAPSULE ORAL
Status: ON HOLD | COMMUNITY
Start: 2022-11-01 | End: 2023-12-08 | Stop reason: ALTCHOICE

## 2023-12-07 RX ORDER — CALCIUM CARBONATE 200(500)MG
1 TABLET,CHEWABLE ORAL DAILY
COMMUNITY
Start: 2020-01-06

## 2023-12-07 RX ORDER — DULOXETIN HYDROCHLORIDE 20 MG/1
20 CAPSULE, DELAYED RELEASE ORAL
Status: ON HOLD | COMMUNITY
End: 2023-12-08 | Stop reason: ALTCHOICE

## 2023-12-07 RX ORDER — CHOLECALCIFEROL (VITAMIN D3) 125 MCG
1 CAPSULE ORAL EVERY MORNING
Status: ON HOLD | COMMUNITY
End: 2023-12-08 | Stop reason: ALTCHOICE

## 2023-12-07 RX ORDER — OXYCODONE AND ACETAMINOPHEN 5; 325 MG/1; MG/1
1 TABLET ORAL
Status: ON HOLD | COMMUNITY
Start: 2022-03-21 | End: 2023-12-08 | Stop reason: ALTCHOICE

## 2023-12-08 ENCOUNTER — HOSPITAL ENCOUNTER (OUTPATIENT)
Facility: HOSPITAL | Age: 88
Setting detail: OUTPATIENT SURGERY
Discharge: HOME | End: 2023-12-08
Attending: ANESTHESIOLOGY | Admitting: ANESTHESIOLOGY
Payer: MEDICARE

## 2023-12-08 ENCOUNTER — APPOINTMENT (OUTPATIENT)
Dept: RADIOLOGY | Facility: HOSPITAL | Age: 88
End: 2023-12-08
Payer: MEDICARE

## 2023-12-08 ENCOUNTER — ANESTHESIA (OUTPATIENT)
Dept: OPERATING ROOM | Facility: HOSPITAL | Age: 88
End: 2023-12-08
Payer: MEDICARE

## 2023-12-08 ENCOUNTER — ANESTHESIA EVENT (OUTPATIENT)
Dept: OPERATING ROOM | Facility: HOSPITAL | Age: 88
End: 2023-12-08
Payer: MEDICARE

## 2023-12-08 VITALS
HEART RATE: 62 BPM | DIASTOLIC BLOOD PRESSURE: 61 MMHG | TEMPERATURE: 97.2 F | RESPIRATION RATE: 20 BRPM | OXYGEN SATURATION: 96 % | SYSTOLIC BLOOD PRESSURE: 120 MMHG

## 2023-12-08 DIAGNOSIS — M48.062 SPINAL STENOSIS, LUMBAR REGION, WITH NEUROGENIC CLAUDICATION: Primary | ICD-10-CM

## 2023-12-08 LAB
GLUCOSE BLD MANUAL STRIP-MCNC: 104 MG/DL (ref 74–99)
STAPHYLOCOCCUS SPEC CULT: ABNORMAL

## 2023-12-08 PROCEDURE — 7100000010 HC PHASE TWO TIME - EACH INCREMENTAL 1 MINUTE: Performed by: ANESTHESIOLOGY

## 2023-12-08 PROCEDURE — 3600000003 HC OR TIME - INITIAL BASE CHARGE - PROCEDURE LEVEL THREE: Performed by: ANESTHESIOLOGY

## 2023-12-08 PROCEDURE — 2500000004 HC RX 250 GENERAL PHARMACY W/ HCPCS (ALT 636 FOR OP/ED): Performed by: ANESTHESIOLOGY

## 2023-12-08 PROCEDURE — 7100000001 HC RECOVERY ROOM TIME - INITIAL BASE CHARGE: Performed by: ANESTHESIOLOGY

## 2023-12-08 PROCEDURE — 3600000008 HC OR TIME - EACH INCREMENTAL 1 MINUTE - PROCEDURE LEVEL THREE: Performed by: ANESTHESIOLOGY

## 2023-12-08 PROCEDURE — 7100000002 HC RECOVERY ROOM TIME - EACH INCREMENTAL 1 MINUTE: Performed by: ANESTHESIOLOGY

## 2023-12-08 PROCEDURE — 3700000001 HC GENERAL ANESTHESIA TIME - INITIAL BASE CHARGE: Performed by: ANESTHESIOLOGY

## 2023-12-08 PROCEDURE — 7100000009 HC PHASE TWO TIME - INITIAL BASE CHARGE: Performed by: ANESTHESIOLOGY

## 2023-12-08 PROCEDURE — 3700000002 HC GENERAL ANESTHESIA TIME - EACH INCREMENTAL 1 MINUTE: Performed by: ANESTHESIOLOGY

## 2023-12-08 PROCEDURE — 2720000007 HC OR 272 NO HCPCS: Performed by: ANESTHESIOLOGY

## 2023-12-08 PROCEDURE — 0275T PR PERC LAMINO-/LAMINECTOMY INDIR IMAG GUIDE LUMBAR: CPT | Performed by: ANESTHESIOLOGY

## 2023-12-08 PROCEDURE — 76000 FLUOROSCOPY <1 HR PHYS/QHP: CPT

## 2023-12-08 PROCEDURE — 82947 ASSAY GLUCOSE BLOOD QUANT: CPT

## 2023-12-08 PROCEDURE — 2500000005 HC RX 250 GENERAL PHARMACY W/O HCPCS: Performed by: ANESTHESIOLOGY

## 2023-12-08 RX ORDER — FENTANYL CITRATE 50 UG/ML
INJECTION, SOLUTION INTRAMUSCULAR; INTRAVENOUS AS NEEDED
Status: DISCONTINUED | OUTPATIENT
Start: 2023-12-08 | End: 2023-12-08

## 2023-12-08 RX ORDER — ONDANSETRON HYDROCHLORIDE 2 MG/ML
4 INJECTION, SOLUTION INTRAVENOUS ONCE AS NEEDED
Status: DISCONTINUED | OUTPATIENT
Start: 2023-12-08 | End: 2023-12-08 | Stop reason: HOSPADM

## 2023-12-08 RX ORDER — BUPIVACAINE HYDROCHLORIDE 2.5 MG/ML
INJECTION, SOLUTION INFILTRATION; PERINEURAL AS NEEDED
Status: DISCONTINUED | OUTPATIENT
Start: 2023-12-08 | End: 2023-12-08 | Stop reason: HOSPADM

## 2023-12-08 RX ORDER — BUPIVACAINE HYDROCHLORIDE 5 MG/ML
INJECTION, SOLUTION PERINEURAL AS NEEDED
Status: DISCONTINUED | OUTPATIENT
Start: 2023-12-08 | End: 2023-12-08 | Stop reason: HOSPADM

## 2023-12-08 RX ORDER — PROPOFOL 10 MG/ML
INJECTION, EMULSION INTRAVENOUS AS NEEDED
Status: DISCONTINUED | OUTPATIENT
Start: 2023-12-08 | End: 2023-12-08

## 2023-12-08 RX ORDER — SODIUM CHLORIDE, SODIUM LACTATE, POTASSIUM CHLORIDE, CALCIUM CHLORIDE 600; 310; 30; 20 MG/100ML; MG/100ML; MG/100ML; MG/100ML
100 INJECTION, SOLUTION INTRAVENOUS CONTINUOUS
Status: DISCONTINUED | OUTPATIENT
Start: 2023-12-08 | End: 2023-12-08 | Stop reason: HOSPADM

## 2023-12-08 RX ORDER — HYDRALAZINE HYDROCHLORIDE 20 MG/ML
5 INJECTION INTRAMUSCULAR; INTRAVENOUS EVERY 30 MIN PRN
Status: DISCONTINUED | OUTPATIENT
Start: 2023-12-08 | End: 2023-12-08 | Stop reason: HOSPADM

## 2023-12-08 RX ORDER — CEFAZOLIN SODIUM 2 G/100ML
2 INJECTION, SOLUTION INTRAVENOUS ONCE
Status: COMPLETED | OUTPATIENT
Start: 2023-12-08 | End: 2023-12-08

## 2023-12-08 RX ORDER — ALBUTEROL SULFATE 0.83 MG/ML
2.5 SOLUTION RESPIRATORY (INHALATION) ONCE AS NEEDED
Status: DISCONTINUED | OUTPATIENT
Start: 2023-12-08 | End: 2023-12-08 | Stop reason: HOSPADM

## 2023-12-08 RX ORDER — MIDAZOLAM HYDROCHLORIDE 1 MG/ML
1 INJECTION, SOLUTION INTRAMUSCULAR; INTRAVENOUS ONCE AS NEEDED
Status: DISCONTINUED | OUTPATIENT
Start: 2023-12-08 | End: 2023-12-08 | Stop reason: HOSPADM

## 2023-12-08 RX ORDER — FENTANYL CITRATE 50 UG/ML
25 INJECTION, SOLUTION INTRAMUSCULAR; INTRAVENOUS EVERY 5 MIN PRN
Status: DISCONTINUED | OUTPATIENT
Start: 2023-12-08 | End: 2023-12-08 | Stop reason: HOSPADM

## 2023-12-08 RX ORDER — LIDOCAINE HYDROCHLORIDE 10 MG/ML
INJECTION INFILTRATION; PERINEURAL AS NEEDED
Status: DISCONTINUED | OUTPATIENT
Start: 2023-12-08 | End: 2023-12-08 | Stop reason: HOSPADM

## 2023-12-08 RX ORDER — FENTANYL CITRATE 50 UG/ML
50 INJECTION, SOLUTION INTRAMUSCULAR; INTRAVENOUS EVERY 5 MIN PRN
Status: DISCONTINUED | OUTPATIENT
Start: 2023-12-08 | End: 2023-12-08 | Stop reason: HOSPADM

## 2023-12-08 RX ADMIN — FENTANYL CITRATE 25 MCG: 50 INJECTION INTRAMUSCULAR; INTRAVENOUS at 10:23

## 2023-12-08 RX ADMIN — FENTANYL CITRATE 50 MCG: 0.05 INJECTION, SOLUTION INTRAMUSCULAR; INTRAVENOUS at 09:21

## 2023-12-08 RX ADMIN — CEFAZOLIN SODIUM 2 G: 2 INJECTION, SOLUTION INTRAVENOUS at 09:22

## 2023-12-08 RX ADMIN — PROPOFOL 25 MG: 10 INJECTION, EMULSION INTRAVENOUS at 09:21

## 2023-12-08 SDOH — HEALTH STABILITY: MENTAL HEALTH: CURRENT SMOKER: 0

## 2023-12-08 ASSESSMENT — PAIN - FUNCTIONAL ASSESSMENT
PAIN_FUNCTIONAL_ASSESSMENT: 0-10
PAIN_FUNCTIONAL_ASSESSMENT: FLACC (FACE, LEGS, ACTIVITY, CRY, CONSOLABILITY)
PAIN_FUNCTIONAL_ASSESSMENT: 0-10

## 2023-12-08 ASSESSMENT — COLUMBIA-SUICIDE SEVERITY RATING SCALE - C-SSRS
6. HAVE YOU EVER DONE ANYTHING, STARTED TO DO ANYTHING, OR PREPARED TO DO ANYTHING TO END YOUR LIFE?: NO
1. IN THE PAST MONTH, HAVE YOU WISHED YOU WERE DEAD OR WISHED YOU COULD GO TO SLEEP AND NOT WAKE UP?: NO
2. HAVE YOU ACTUALLY HAD ANY THOUGHTS OF KILLING YOURSELF?: NO

## 2023-12-08 ASSESSMENT — PAIN SCALES - GENERAL
PAINLEVEL_OUTOF10: 0 - NO PAIN
PAINLEVEL_OUTOF10: 0 - NO PAIN
PAINLEVEL_OUTOF10: 3
PAINLEVEL_OUTOF10: 2
PAINLEVEL_OUTOF10: 6
PAIN_LEVEL: 2
PAINLEVEL_OUTOF10: 0 - NO PAIN

## 2023-12-08 NOTE — ANESTHESIA POSTPROCEDURE EVALUATION
Patient: Neris Mccall    Procedure Summary       Date: 12/08/23 Room / Location: [facility] OR 02 / [facility] OR    Anesthesia Start: 0916 Anesthesia Stop: 1311    Procedure: MILD L3/4 L4/5 Diagnosis:       Spinal stenosis, lumbar region, with neurogenic claudication      (Spinal stenosis, lumbar region, with neurogenic claudication [M48.062])    Surgeons: Mj Stevens MD Responsible Provider: Ford Swanson MD    Anesthesia Type: MAC ASA Status: 3            Anesthesia Type: MAC    Vitals Value Taken Time   /61 12/08/23 1056   Temp 36.2 °C (97.2 °F) 12/08/23 1050   Pulse 65 12/08/23 1056   Resp 17 12/08/23 1056   SpO2 93 % 12/08/23 1056   Vitals shown include unvalidated device data.    Anesthesia Post Evaluation    Patient location during evaluation: PACU  Patient participation: complete - patient participated  Level of consciousness: sleepy but conscious  Pain score: 2  Pain management: adequate  Multimodal analgesia pain management approach  Airway patency: patent  Cardiovascular status: acceptable  Respiratory status: acceptable  Hydration status: acceptable  Postoperative Nausea and Vomiting: none        There were no known notable events for this encounter.

## 2023-12-08 NOTE — POST-PROCEDURE NOTE
PT ARRIVED TO [facility] VIA CART FROM PACU IN NO ACUTE DISTRESS.   1100 UP TO BATHROOM WITH ONE ASSIST TO VOID  TOLERATED WELL.. CPAP WENT HOME WITH PT

## 2023-12-08 NOTE — OP NOTE
MILD L3/4 L4/5 Operative Note     Date: 2023  OR Location: [facility]    Name: Neris Mccall, : 1935, Age: 88 y.o., MRN: [id number], Sex: female    Diagnosis  Pre-op Diagnosis     * Spinal stenosis, lumbar region, with neurogenic claudication [M48.062] Post-op Diagnosis     * Spinal stenosis, lumbar region, with neurogenic claudication [M48.062]     Procedures  MILD L3/4 L4/5  0275T - UT PERC LAMINO-/LAMINECTOMY INDIR IMAG GUIDE LUMBAR    UT PERC LAMINO-/LAMINECTOMY INDIR IMAG GUIDE LUMBAR [0275T]  Surgeons      * Mj Stevens - Primary    Resident/Fellow/Other Assistant:  Surgeon(s) and Role:    Procedure Summary  Anesthesia: Monitor Anesthesia Care  ASA: III  Anesthesia Staff: Anesthesiologist: Ford Swanson MD  Estimated Blood Loss: 5mL  Intra-op Medications: * No intraprocedure medications in log *           Anesthesia Record               Intraprocedure I/O Totals       None           Specimen: No specimens collected     Staff:   Circulator: LINDA Shaikh; Smith Saldaña RN  Scrub Person: Petra Savage         Drains and/or Catheters: * None in log *    Tourniquet Times:         Implants:     Findings:     Indications: Neris Mccall is an 88 y.o. female who is having surgery for Spinal stenosis, lumbar region, with neurogenic claudication [M48.062].     The patient was seen in the preoperative area. The risks, benefits, complications, treatment options, non-operative alternatives, expected recovery and outcomes were discussed with the patient. The possibilities of reaction to medication, pulmonary aspiration, injury to surrounding structures, bleeding, recurrent infection, the need for additional procedures, failure to diagnose a condition, and creating a complication requiring transfusion or operation were discussed with the patient. The patient concurred with the proposed plan, giving informed consent.  The site of surgery was properly noted/marked if necessary per policy. The patient has  been actively warmed in preoperative area. Preoperative antibiotics are indicated were given within 1 hour of incision.  Venous thrombosis prophylaxis have been ordered including bilateral sequential compression devices    Procedure Details: The patient was brought back to operating room to the [facility] where she was placed prone onto the procedure room table to pillar through abdomen to decrease lumbar lordosis.  Following that surgical timeout was completed, perioperative antibiotics were infused, monitored anesthesia care was initiated.  Under fluoroscopic guidance in the AP view the L3-L4-L5 vertebral bodies were identified.  The intended surgical puncture site was then marked out to the skin.  A total volume of 10 mL of a 50-50 mixture of 0.5% bupivacaine with 1% lidocaine was injected via 25-gauge needle.  Following that a small skin puncture was made with 11 blade scalpel.  After that using a trocar and introducer from the mild kit was introduced to land on the right L5 lamina.  Targeting L4-5 level.  Under fluoroscopic guidance in the contralateral oblique view a bone rongeur was then introduced and decompression of the inferior and superior lamina was completed.  After satisfactory decompression of the lamina was completed a tissue sculptor was advanced through the introducer and additional decompression of the ligamentum flavum was completed until satisfactory.  Following that everything was brought out to the skin level again.  The trocar and introducer were then advanced onto the left L5 lamina.  The trocar was removed.  A bone rongeur was advanced and decompression of the inferior and superior lamina was completed.  After adequate decompression was completed a tissue sculptor was advanced through the introducer and decompression of the ligamentum flavum was completed until satisfactory.  Everything was then brought out to the tissue level again and the trocar and  introducer were then advanced to ride on the right L4 lamina just lateral to the spinous process.  In the contralateral oblique view a bone rongeur was then advanced forward and decompression of the inferior and superior lamina was completed until satisfactory.  A tissue sculptor was then advanced through the introducer and decompression of the flavum was completed.  Until satisfactory.  Everything was brought out to the skin level again and the trocar and introducer were then advanced to the left side left L4 lamina targeting the L3-4 level.  In the contralateral oblique view a bone rongeur was advanced through the introducer and decompression of the inferior and superior lamina were then completed until satisfactory.  After that a tissue sculptor was then advanced through the introducer decompression of the ligamentum flavum was completed until satisfactory.  Everything was brought out to the skin level again and removed from the body.  Excessive blood was removed from the wound.  A small amount of Exofin was placed over the puncture site.  A 2 x 2 and Tegaderm was a final dressing.  The patient was then woken up in stable condition where she was taken out to recovery room or vital signs were monitored, pain was controlled, patient was then be discharged home with her family.  Complications:  None; patient tolerated the procedure well.    Disposition: PACU - hemodynamically stable.  Condition: stable         Additional Details:     Attending Attestation: I was present and scrubbed for the entire procedure.    Mj Stevens  Phone Number: [phone]

## 2023-12-08 NOTE — ANESTHESIA PREPROCEDURE EVALUATION
Patient: Neris Mccall    Procedure Information       Date/Time: 12/08/23 0930    Procedure: MILD L3/4 L4/5 - C-ARM, VERTOS    Location: [facility] / [facility]    Surgeons: Mj Stevens MD            Relevant Problems   Cardiovascular   (+) Aortic valve sclerosis   (+) Biventricular cardiac pacemaker in situ   (+) Chronic systolic heart failure (CMS/HCC)   (+) Complete atrioventricular block (CMS/HCC)   (+) Heart murmur, systolic   (+) Hyperlipidemia   (+) Hypertension   (+) LBBB (left bundle branch block)   (+) Moderate aortic stenosis      Endocrine   (+) Nontoxic multinodular goiter      GI   (+) GERD without esophagitis      /Renal   (+) Acute lower UTI      Neuro/Psych   (+) Depression   (+) Facial nerve paresis   (+) Lumbar radiculitis   (+) Peripheral neuropathy      Pulmonary   (+) Complex sleep apnea syndrome   (+) LISA (obstructive sleep apnea)      Hematology   (+) Polycythemia vera (CMS/HCC)      Musculoskeletal   (+) Chronic pain syndrome   (+) Degenerative joint disease of cervical spine   (+) Knee osteoarthritis   (+) Lumbar canal stenosis   (+) Lumbar spondylosis   (+) Neck pain, chronic   (+) Spinal stenosis, lumbar region, with neurogenic claudication   (+) Spondylosis without myelopathy or radiculopathy, lumbosacral region      Eyes, Ears, Nose, and Throat   (+) Asymmetric SNHL (sensorineural hearing loss)   (+) Glaucoma   (+) Hearing impairment   (+) Mixed conductive and sensorineural hearing loss, bilateral      Infectious Disease   (+) Acute lower UTI   (+) Tinea cruris      Other   (+) Polycythemia vera (CMS/HCC)     Past Medical History:   Diagnosis Date    Acute upper respiratory infection, unspecified 01/15/2018    Acute URI    Aneurysm of unspecified site (CMS/HCC)     Aneurysm    Arrhythmia 1975    Arthritis 2005    BiPAP (biphasic positive airway pressure) dependence 2023    Cataract 2005    Chronic pain disorder 1960    Coronary artery disease 2021    CPAP (continuous positive  airway pressure) dependence 2014    Dependence on other enabling machines and devices     CPAP (continuous positive airway pressure) dependence    Dysphagia 2021    Fractures 1955    GERD (gastroesophageal reflux disease) 1985    No longer problem    Glaucoma 2021    Hearing aid worn 1985    Heart disease 1980    Heart failure (CMS/[facility]) 2019    Heart valve disease 2021    History of blood transfusion 1958    HL (hearing loss) 1970    Hypertension 1972    Low back pain 1950    Lumbar disc disease 2016    Old myocardial infarction     History of myocardial infarction    Other nail disorders 03/14/2017    Green nails    Pacemaker 2019    Personal history of diseases of the skin and subcutaneous tissue 12/14/2016    History of folliculitis    Personal history of other diseases of the circulatory system 05/16/2022    History of intermittent claudication    Personal history of other diseases of the circulatory system     History of high blood pressure    Personal history of other diseases of the musculoskeletal system and connective tissue     History of arthritis    Personal history of other diseases of the nervous system and sense organs     History of sleep apnea    Personal history of other endocrine, nutritional and metabolic disease     History of high cholesterol    Personal history of other venous thrombosis and embolism     H/O blood clots    Personal history of transient ischemic attack (TIA), and cerebral infarction without residual deficits     History of stroke    Platelet disorder (CMS/[facility]) 1998    PCV    Pregnant 1957    Scoliosis 2016    Secondary polycythemia     Polycythemia    Shingles 1996    Sleep apnea     Spinal stenosis, lumbar region with neurogenic claudication 11/01/2022    Neurogenic claudication due to lumbar spinal stenosis    Stroke (CMS/[facility]) 1967    Urinary tract infection 2019    Vision loss 2017       Clinical information reviewed:   Tobacco  Allergies  Meds   Med Hx  Surg Hx   Fam  Hx  Soc Hx        NPO Detail:  NPO/Void Status  Date of Last Liquid: 12/07/23  Time of Last Liquid: 2300  Date of Last Solid: 12/07/23  Time of Last Solid: 1800  Time of Last Void: 0730         Physical Exam    Airway  Mallampati: II  TM distance: >3 FB  Neck ROM: full     Cardiovascular - normal exam     Dental - normal exam     Pulmonary - normal exam     Abdominal - normal exam             Anesthesia Plan    ASA 3     general     The patient is not a current smoker.  Patient was not previously instructed to abstain from smoking on day of procedure.  Patient did not smoke on day of procedure.  Education provided regarding risk of obstructive sleep apnea.  intravenous induction   Postoperative administration of opioids is intended.  Trial extubation is planned.  Anesthetic plan and risks discussed with patient.  Use of blood products discussed with patient who consented to blood products.    Plan discussed with CAA, attending and CRNA.

## 2023-12-12 ENCOUNTER — HOSPITAL ENCOUNTER (OUTPATIENT)
Dept: CARDIOLOGY | Facility: CLINIC | Age: 88
Discharge: HOME | End: 2023-12-12
Payer: MEDICARE

## 2023-12-12 DIAGNOSIS — I44.2 CHB (COMPLETE HEART BLOCK) (MULTI): ICD-10-CM

## 2023-12-14 ENCOUNTER — HOSPITAL ENCOUNTER (OUTPATIENT)
Dept: RADIOLOGY | Facility: HOSPITAL | Age: 88
Discharge: HOME | End: 2023-12-14
Payer: MEDICARE

## 2023-12-14 DIAGNOSIS — Z12.31 VISIT FOR SCREENING MAMMOGRAM: ICD-10-CM

## 2023-12-14 DIAGNOSIS — M81.0 POSTMENOPAUSAL BONE LOSS: ICD-10-CM

## 2023-12-14 PROCEDURE — 77063 BREAST TOMOSYNTHESIS BI: CPT | Performed by: RADIOLOGY

## 2023-12-14 PROCEDURE — 77085 DXA BONE DENSITY AXL VRT FX: CPT

## 2023-12-14 PROCEDURE — 77063 BREAST TOMOSYNTHESIS BI: CPT

## 2023-12-14 PROCEDURE — 77085 DXA BONE DENSITY AXL VRT FX: CPT | Performed by: RADIOLOGY

## 2023-12-14 PROCEDURE — 77067 SCR MAMMO BI INCL CAD: CPT | Performed by: RADIOLOGY

## 2023-12-18 ENCOUNTER — APPOINTMENT (OUTPATIENT)
Dept: PAIN MEDICINE | Facility: CLINIC | Age: 88
End: 2023-12-18
Payer: MEDICARE

## 2023-12-18 ENCOUNTER — HOSPITAL ENCOUNTER (OUTPATIENT)
Dept: CARDIOLOGY | Facility: CLINIC | Age: 88
Discharge: HOME | End: 2023-12-18
Payer: MEDICARE

## 2023-12-18 DIAGNOSIS — I44.2 CHB (COMPLETE HEART BLOCK) (MULTI): ICD-10-CM

## 2024-01-01 DIAGNOSIS — J30.0 VASOMOTOR RHINITIS: Primary | ICD-10-CM

## 2024-01-02 RX ORDER — IPRATROPIUM BROMIDE 42 UG/1
SPRAY, METERED NASAL
Qty: 45 ML | Refills: 7 | Status: SHIPPED | OUTPATIENT
Start: 2024-01-02

## 2024-01-04 ENCOUNTER — OFFICE VISIT (OUTPATIENT)
Dept: PAIN MEDICINE | Facility: CLINIC | Age: 89
End: 2024-01-04
Payer: MEDICARE

## 2024-01-04 VITALS
SYSTOLIC BLOOD PRESSURE: 122 MMHG | HEIGHT: 64 IN | WEIGHT: 137 LBS | BODY MASS INDEX: 23.39 KG/M2 | RESPIRATION RATE: 16 BRPM | DIASTOLIC BLOOD PRESSURE: 82 MMHG

## 2024-01-04 DIAGNOSIS — M17.11 PRIMARY OSTEOARTHRITIS OF RIGHT KNEE: ICD-10-CM

## 2024-01-04 DIAGNOSIS — M48.062 NEUROGENIC CLAUDICATION DUE TO LUMBAR SPINAL STENOSIS: Primary | ICD-10-CM

## 2024-01-04 DIAGNOSIS — G89.29 OTHER CHRONIC PAIN: ICD-10-CM

## 2024-01-04 PROCEDURE — 1159F MED LIST DOCD IN RCRD: CPT | Performed by: ANESTHESIOLOGY

## 2024-01-04 PROCEDURE — 3079F DIAST BP 80-89 MM HG: CPT | Performed by: ANESTHESIOLOGY

## 2024-01-04 PROCEDURE — 99214 OFFICE O/P EST MOD 30 MIN: CPT | Performed by: ANESTHESIOLOGY

## 2024-01-04 PROCEDURE — 3074F SYST BP LT 130 MM HG: CPT | Performed by: ANESTHESIOLOGY

## 2024-01-04 PROCEDURE — 1036F TOBACCO NON-USER: CPT | Performed by: ANESTHESIOLOGY

## 2024-01-04 PROCEDURE — 1160F RVW MEDS BY RX/DR IN RCRD: CPT | Performed by: ANESTHESIOLOGY

## 2024-01-04 PROCEDURE — 1125F AMNT PAIN NOTED PAIN PRSNT: CPT | Performed by: ANESTHESIOLOGY

## 2024-01-04 RX ORDER — GABAPENTIN 300 MG/1
300 CAPSULE ORAL NIGHTLY
Qty: 14 CAPSULE | Refills: 0 | Status: SHIPPED | OUTPATIENT
Start: 2024-01-04 | End: 2024-03-14 | Stop reason: ALTCHOICE

## 2024-01-04 RX ORDER — DICLOFENAC SODIUM 10 MG/G
4 GEL TOPICAL 4 TIMES DAILY
Qty: 480 G | Refills: 0 | Status: SHIPPED | OUTPATIENT
Start: 2024-01-04 | End: 2024-02-03

## 2024-01-04 ASSESSMENT — ENCOUNTER SYMPTOMS
ALLERGIC/IMMUNOLOGIC NEGATIVE: 1
HEMATOLOGIC/LYMPHATIC NEGATIVE: 1
NEUROLOGICAL NEGATIVE: 1
CARDIOVASCULAR NEGATIVE: 1
CONSTITUTIONAL NEGATIVE: 1
RESPIRATORY NEGATIVE: 1
ENDOCRINE NEGATIVE: 1
EYES NEGATIVE: 1
PSYCHIATRIC NEGATIVE: 1
MUSCULOSKELETAL NEGATIVE: 1
GASTROINTESTINAL NEGATIVE: 1

## 2024-01-04 ASSESSMENT — PAIN SCALES - GENERAL: PAINLEVEL_OUTOF10: 5 - MODERATE PAIN

## 2024-01-04 ASSESSMENT — PATIENT HEALTH QUESTIONNAIRE - PHQ9
1. LITTLE INTEREST OR PLEASURE IN DOING THINGS: NOT AT ALL
SUM OF ALL RESPONSES TO PHQ9 QUESTIONS 1 AND 2: 0
2. FEELING DOWN, DEPRESSED OR HOPELESS: NOT AT ALL

## 2024-01-04 ASSESSMENT — PAIN - FUNCTIONAL ASSESSMENT: PAIN_FUNCTIONAL_ASSESSMENT: 0-10

## 2024-01-04 ASSESSMENT — LIFESTYLE VARIABLES: TOTAL SCORE: 0

## 2024-01-04 NOTE — PROGRESS NOTES
Subjective   Patient ID: Neris Mccall is a 88 y.o. female who presents for No chief complaint on file..  HPI  Patient here today for 1 month follow-up from her L3-4 and L4-5 mild procedure.  Procedure was completed on 12/8/2023.  Patient reports that she had some procedure tenderness after the procedure and that got better.  She has not noticed any changes in her back pain at this time.  She is reporting more knee now.  She states that the knee pain is the worse for her at this time.  She states that the knee pain will make her wake up at night.  She had a left TKA 5 years ago and she has had severe pain in the knee since.  The pain is present all the time and will wake her up at night.  She will also have pain in the left heal and calf at night as well.  She would like to know what options there are to help her with her knee pain so she can get a better night sleep.  She is been using melatonin 5 mg at night.  She would like to know if there is any topicals or injections or ablations that could also be helpful for her.  Review of Systems   Constitutional: Negative.    HENT: Negative.     Eyes: Negative.    Respiratory: Negative.     Cardiovascular: Negative.    Gastrointestinal: Negative.    Endocrine: Negative.    Genitourinary: Negative.    Musculoskeletal: Negative.    Skin: Negative.    Allergic/Immunologic: Negative.    Neurological: Negative.    Hematological: Negative.    Psychiatric/Behavioral: Negative.         Objective   Physical Exam  Vitals and nursing note reviewed.   Constitutional:       Appearance: Normal appearance.   HENT:      Head: Normocephalic and atraumatic.      Right Ear: Ear canal and external ear normal.      Left Ear: Ear canal and external ear normal.      Nose: Nose normal.      Mouth/Throat:      Mouth: Mucous membranes are moist.      Pharynx: Oropharynx is clear.   Eyes:      Conjunctiva/sclera: Conjunctivae normal.      Pupils: Pupils are equal, round, and reactive to light.    Cardiovascular:      Rate and Rhythm: Normal rate.   Pulmonary:      Effort: Pulmonary effort is normal. No respiratory distress.   Musculoskeletal:      Cervical back: Normal range of motion and neck supple.      Lumbar back: Tenderness present. Decreased range of motion.      Right knee: Crepitus present. Decreased range of motion. Tenderness present over the medial joint line.      Left knee: Decreased range of motion. Tenderness (medial Infra-patellar area over infrapatellar saphenous nerve) present.        Legs:    Skin:     General: Skin is warm and dry.   Neurological:      Mental Status: She is alert and oriented to person, place, and time.      Motor: Weakness present.      Gait: Gait abnormal.      Comments: Patient in wheelchair today.   Psychiatric:         Mood and Affect: Mood normal.         Thought Content: Thought content normal.         Assessment/Plan   Problem List Items Addressed This Visit             ICD-10-CM       Musculoskeletal and Injuries    Neurogenic claudication due to lumbar spinal stenosis - Primary M48.062       Neuro    Other chronic pain G89.29          I nice discussion with the patient today our plan will be as follows.    Radiology: All available imaging was reviewed today.    Physically: Continue home exercise program.    Psychologically: No issues at this time.    Medication: I will start the patient on gabapentin 300 mg nightly for the next 2 weeks to see if this helps with her sleep pain issues that seem to be partially neuropathic.  A PDMP report was checked today, and was consistent with reported prescribing.  I will start the patient on diclofenac 1% topical for her right primary osteoarthritis of the right knee.    Duration: Greater than 1 year.    Intervention: Patient approximately 3 weeks out from mild procedure.  She does not perceive any change in her pain however she does notice that her knee pain has now become worse than her back pain.  This may be evidence  that she is getting some back pain relief.  We will follow-up in 2 months when she is 3 months out.    We did discuss different options for her left knee.  I do.  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  Her physical exam would indicate this.  We could do diagnostic block versus radiofrequency ablation versus peripheral nerve stimulation.   is a medicine  Mj Stevens MD 01/04/24 9:10 AM

## 2024-01-12 DIAGNOSIS — K22.719 BARRETT'S ESOPHAGUS WITH DYSPLASIA: ICD-10-CM

## 2024-01-14 RX ORDER — OMEPRAZOLE 20 MG/1
CAPSULE, DELAYED RELEASE ORAL
Qty: 180 CAPSULE | Refills: 3 | Status: SHIPPED | OUTPATIENT
Start: 2024-01-14

## 2024-01-22 ENCOUNTER — HOSPITAL ENCOUNTER (OUTPATIENT)
Dept: CARDIOLOGY | Facility: CLINIC | Age: 89
Discharge: HOME | End: 2024-01-22
Payer: MEDICARE

## 2024-01-22 DIAGNOSIS — I44.2 CHB (COMPLETE HEART BLOCK) (MULTI): ICD-10-CM

## 2024-02-07 ENCOUNTER — OFFICE VISIT (OUTPATIENT)
Dept: SLEEP MEDICINE | Facility: CLINIC | Age: 89
End: 2024-02-07
Payer: MEDICARE

## 2024-02-07 VITALS
SYSTOLIC BLOOD PRESSURE: 125 MMHG | BODY MASS INDEX: 23.82 KG/M2 | HEART RATE: 68 BPM | HEIGHT: 64 IN | WEIGHT: 139.5 LBS | OXYGEN SATURATION: 98 % | RESPIRATION RATE: 16 BRPM | DIASTOLIC BLOOD PRESSURE: 76 MMHG

## 2024-02-07 DIAGNOSIS — J30.2 SEASONAL ALLERGIES: ICD-10-CM

## 2024-02-07 DIAGNOSIS — I50.22 CHRONIC SYSTOLIC HEART FAILURE (MULTI): ICD-10-CM

## 2024-02-07 DIAGNOSIS — I10 PRIMARY HYPERTENSION: ICD-10-CM

## 2024-02-07 DIAGNOSIS — G47.33 OSA TREATED WITH BIPAP: Primary | ICD-10-CM

## 2024-02-07 DIAGNOSIS — G47.39 TREATMENT-EMERGENT CENTRAL SLEEP APNEA: ICD-10-CM

## 2024-02-07 DIAGNOSIS — R06.3 CENTRAL SLEEP APNEA DUE TO CHEYNE-STOKES RESPIRATION: ICD-10-CM

## 2024-02-07 PROCEDURE — 1160F RVW MEDS BY RX/DR IN RCRD: CPT | Performed by: INTERNAL MEDICINE

## 2024-02-07 PROCEDURE — 1159F MED LIST DOCD IN RCRD: CPT | Performed by: INTERNAL MEDICINE

## 2024-02-07 PROCEDURE — 99214 OFFICE O/P EST MOD 30 MIN: CPT | Performed by: INTERNAL MEDICINE

## 2024-02-07 PROCEDURE — 3074F SYST BP LT 130 MM HG: CPT | Performed by: INTERNAL MEDICINE

## 2024-02-07 PROCEDURE — 1036F TOBACCO NON-USER: CPT | Performed by: INTERNAL MEDICINE

## 2024-02-07 PROCEDURE — 1157F ADVNC CARE PLAN IN RCRD: CPT | Performed by: INTERNAL MEDICINE

## 2024-02-07 PROCEDURE — 3078F DIAST BP <80 MM HG: CPT | Performed by: INTERNAL MEDICINE

## 2024-02-07 PROCEDURE — 1125F AMNT PAIN NOTED PAIN PRSNT: CPT | Performed by: INTERNAL MEDICINE

## 2024-02-07 ASSESSMENT — SLEEP AND FATIGUE QUESTIONNAIRES
SITING INACTIVE IN A PUBLIC PLACE LIKE A CLASS ROOM OR A MOVIE THEATER: WOULD NEVER DOZE
HOW LIKELY ARE YOU TO NOD OFF OR FALL ASLEEP WHILE LYING DOWN TO REST IN THE AFTERNOON WHEN CIRCUMSTANCES PERMIT: WOULD NEVER DOZE
HOW LIKELY ARE YOU TO NOD OFF OR FALL ASLEEP WHEN YOU ARE A PASSENGER IN A CAR FOR AN HOUR WITHOUT A BREAK: WOULD NEVER DOZE
HOW LIKELY ARE YOU TO NOD OFF OR FALL ASLEEP WHILE WATCHING TV: SLIGHT CHANCE OF DOZING
HOW LIKELY ARE YOU TO NOD OFF OR FALL ASLEEP WHILE SITTING QUIETLY AFTER LUNCH WITHOUT ALCOHOL: WOULD NEVER DOZE
HOW LIKELY ARE YOU TO NOD OFF OR FALL ASLEEP WHILE SITTING AND READING: SLIGHT CHANCE OF DOZING
HOW LIKELY ARE YOU TO NOD OFF OR FALL ASLEEP WHILE SITTING AND TALKING TO SOMEONE: WOULD NEVER DOZE
ESS-CHAD TOTAL SCORE: 2
HOW LIKELY ARE YOU TO NOD OFF OR FALL ASLEEP IN A CAR, WHILE STOPPED FOR A FEW MINUTES IN TRAFFIC: WOULD NEVER DOZE

## 2024-02-07 ASSESSMENT — PATIENT HEALTH QUESTIONNAIRE - PHQ9
2. FEELING DOWN, DEPRESSED OR HOPELESS: NOT AT ALL
SUM OF ALL RESPONSES TO PHQ9 QUESTIONS 1 AND 2: 0
1. LITTLE INTEREST OR PLEASURE IN DOING THINGS: NOT AT ALL

## 2024-02-07 ASSESSMENT — PAIN SCALES - GENERAL: PAINLEVEL: 4

## 2024-02-07 NOTE — PROGRESS NOTES
[facility]  FOLLOW-UP VISIT NOTE    PCP: Leighann Giron DO    HISTORY OF PRESENT ILLNESS     Patient ID: Neris Mccall is a 89 y.o. female who presents for follow-up of LISA on PAP, yearly checkup.     Patient is here accompanied by son Alyson who adds to history.  To review, patient's medical history is notable for HTN, GERD, Blue's esophagus, LBBB, systolic HF (TTE 2/2021: LVEF 35%), s/p AICD implant, aortic stenosis, s/p MI, stroke, Bell's palsy, osteoporosis, chronic neck and back pain, knee osteoarthritis, polycythemia vera, and LISA on CPAP.     Interval History  Patient was last seen in 2/8/2023. Plan was to continue PAP and follow-up yearly.  Since last visit, patient has been using machine every night. Current mask interface being used is full face mask. Per records, patient is getting supplies thru [employer]  Patient denies machine problems, mask leak, air hunger, aerophagia, dry mouth, skin irritation, and nasal congestion.   Complains of dry mouth, nasal congestion, and skin irritation from mask.  The following are patient's perceived benefits of PAP: no snoring on PAP and better quality of sleep.    RLS Follow-up:   Denies    SLEEP STUDY HISTORY (personally reviewed raw data such as interpretation report, data sheet, hypnogram, and titration table if available and applicable)  split-night PSG 8/11/2014: AHI 28.9, supine AHI 33.3, non-supine AHI 27.7, SpO2 patrick 86% CPAP was started at 4-14 cm H2O then switched to BPAP due to persistent respiratory events. BPAP was started at 18/14 cm H2O and titrated to 20/16 cm H2O. No optimal pressures noted. Cheyne-Mchugh breathing pattern noted during titration only.   PAP titration 1/9/2015: CPAP was started at 4-10 cm H2O and switched to BPAP due to persistent respiratory events. BPAP was started at 8/4 cm H2O and gradually titrated to 16/12 cm H2O. No optimal pressures noted even on BiPAP due to central apneas;  "hence, ASV was started at EPAP 10 cm H2O and pressure support 3 to 15 cm H2O. Again, Cheyne-Mchugh respiration was noted during the entire titration study. Residual AHI on ASV was 0, while SpO2 patrick was 92%  PSG 3/11/2020: AHI 10.5, REM AHI 0, supine AHI 57.3, REM index 8.4, RDI 18.9, SPO2 patrick 83%, PLM index 14.3. Status post pacemaker implant. ASV was prescribed last 2015 in [place] but was discontinued due to LVEF of 25%.  PAP titration 11/2/2022: BPAP-S/T was started at 10/6 cm H2O with BUR 12. BUR was increased to 14 to promote sleep. Max pressure tested was 17/13 cm H2O. No central apneas seen during the entire study. Patient slept non-supine all the time. At BPAP-S/T 14/10 cm H2O and BUR 14, RDI 0, REM RDI 0, SpO2 patrick 93%.     SLEEP-WAKE SCHEDULE  Bedtime:  10:30 PM daily  Subjective sleep latency: 15 minutes  Number of awakenings: 1-2 times per night spontaneously due to leg pain  Nocturia: No  Falls back asleep in 2 hours, sometimes she don't   Final wake time:  7 AM daily  Out of bed time:  7 AM daily  Shift work: No   Naps: once daily after breakfast she doze off and on for 1 hour  Feels rested after a nap: No   Average sleep duration (excluding naps): 5-8 hours    SLEEP ENVIRONMENT  Sleep location: bed  Sleep status: sleeps alone  Preferred sleep position: side    SOCIAL HISTORY  Smoking: no  ETOH: no  Marijuana: no  Caffeine: no  Sleep aids: no    WEIGHT: stable    Claustrophobia: No     Active Problems, Allergy List, Medication List, and PMH/PSH/FH/Social Hx have been reviewed and reconciled in chart. No significant changes unless documented in the pertinent chart section. Updates made when necessary.     PHYSICAL EXAM     VITAL SIGNS: /76   Pulse 68   Resp 16   Ht 1.626 m (5' 4\")   Wt 63.3 kg (139 lb 8 oz)   SpO2 98%   BMI 23.95 kg/m²     NECK CIRCUMFERENCE: not measured    Today ESS: 2  Last visit ESS: 0  ANTONIO: 4    Physical Exam  Constitutional: Awake, not in distress  Head: " normocephalic, atraumatic  Skin: Warm, no rash  Neuro: No tremors, moves all extremities  Psych: alert and oriented to time, place, and person    RESULTS/DATA     Ferritin (ug/L)   Date Value   03/04/2020 144   11/05/2019 57   06/04/2019 45       Bicarbonate   Date Value Ref Range Status   11/13/2023 25 21 - 32 mmol/L Final       PAP Adherence  A PAP adherence data was obtained and reviewed personally today in clinic. (see scanned document in EPIC)      ASSESSMENT/PLAN     Neris Mccall is a 89 y.o. female who returns in [facility] for the following problems:    OBSTRUCTIVE SLEEP APNEA, MILD positional (PSG AHI 10.5 )  TREATMENT-EMERGENT CENTRAL SLEEP APNEA with CHEYNE-PALACIOS BREATHING  - Now on BPAP-S/T 12/8 cm H2O and BUR 14.   - PAP adherence data reviewed today. Usage days 29/30, days> 4 hours at 97%, residual AHI 3.3.   - Doing well with improved LISA symptoms.   - Patient had history of chronic CHF (TTE 2021 showed LVEF 35%)  - Continue current machine settings. Continue using machine every night all night. Order placed to renew PAP supplies.   - Continue supportive management as follows: Lose weight. Stay off your back when sleeping. Avoid smoking, alcohol, and sedating medications if applicable. Don't drive when sleepy.    SEASONAL ALLERGIES   - Continue fluticasone nasal spray 2 sprays per nostril once daily 1 hour before bedtime.    HYPERTENSION  - BP stable today.  - Continue anti-hypertensive medications per PCP.  - Supportive management: low salt DASH diet (less than 2000 mg sodium intake daily), moderate intensity aerobic exercise at least 30 minutes 5 days per week, reduce stress, quit smoking, limit alcohol, lose weight, and monitor BP once daily  - PCP following. Defer management to PCP      All of patient's questions were answered. She verbalizes understanding and agreement with my assessment and plan. Refer to after-visit-summary (AVS) for patient education and if  applicable, for more details on troubleshooting issues with PAP usage, proper cleaning instructions of PAP supplies, and usual recommended replacement schedule for each of the PAP supplies.     Follow-up in 1 year.

## 2024-02-21 PROBLEM — R06.3 CHEYNE-STOKES RESPIRATION: Status: RESOLVED | Noted: 2023-05-15 | Resolved: 2024-02-21

## 2024-02-21 PROBLEM — R06.02 SOB (SHORTNESS OF BREATH): Status: RESOLVED | Noted: 2023-05-15 | Resolved: 2024-02-21

## 2024-02-21 PROBLEM — G25.81 RLS (RESTLESS LEGS SYNDROME): Status: RESOLVED | Noted: 2023-05-15 | Resolved: 2024-02-21

## 2024-02-21 PROBLEM — G47.39 TREATMENT-EMERGENT CENTRAL SLEEP APNEA: Status: ACTIVE | Noted: 2024-02-21

## 2024-02-21 PROBLEM — G47.8 UNREFRESHED BY SLEEP: Status: RESOLVED | Noted: 2023-05-15 | Resolved: 2024-02-21

## 2024-02-21 PROBLEM — R20.0 NUMBNESS: Status: RESOLVED | Noted: 2023-05-15 | Resolved: 2024-02-21

## 2024-02-21 PROBLEM — M47.816 LUMBAR SPONDYLOSIS: Status: RESOLVED | Noted: 2023-05-15 | Resolved: 2024-02-21

## 2024-02-21 PROBLEM — M54.16 LUMBAR RADICULITIS: Status: RESOLVED | Noted: 2023-05-15 | Resolved: 2024-02-21

## 2024-02-21 PROBLEM — G89.29 OTHER CHRONIC PAIN: Status: RESOLVED | Noted: 2023-12-07 | Resolved: 2024-02-21

## 2024-02-21 PROBLEM — G47.31 COMPLEX SLEEP APNEA SYNDROME: Status: RESOLVED | Noted: 2023-05-15 | Resolved: 2024-02-21

## 2024-02-21 PROBLEM — G47.30 SLEEP APNEA: Status: RESOLVED | Noted: 2023-05-15 | Resolved: 2024-02-21

## 2024-02-21 PROBLEM — J30.89 OTHER ALLERGIC RHINITIS: Status: RESOLVED | Noted: 2023-05-15 | Resolved: 2024-02-21

## 2024-02-21 PROBLEM — G47.39 COMPLEX SLEEP APNEA SYNDROME: Status: RESOLVED | Noted: 2023-05-15 | Resolved: 2024-02-21

## 2024-02-21 PROBLEM — G47.00 INSOMNIA: Status: RESOLVED | Noted: 2023-05-15 | Resolved: 2024-02-21

## 2024-02-22 NOTE — PATIENT INSTRUCTIONS
"Thank you for coming to the Sleep Medicine [facility] today! Your sleep medicine provider today was: Jackie Lyn MD Below is a summary of your treatment plan, patient education, other important information, and our contact numbers.      TREATMENT PLAN     - Continue current machine settings. Continue using machine every night all night. Order placed to renew PAP supplies.   - Please read the \"Patient Education\" section below for more detailed information. Try implementing tips, reminders, strategies, and supportive management.   - If not yet done, please sign up for Tutor to make a future schedule, send prescription requests, or send messages.    Follow-up Appointment:   Follow-up in 1 year.    PATIENT EDUCATION     Obstructive Sleep Apnea (LSIA) is a sleep disorder where your upper airway muscles relax during sleep and the airway intermittently and repetitively narrows and collapses leading to partially blocked airway (hypopnea) or completely blocked airway (apnea) which, in turn, can disrupt breathing in sleep, lower oxygen levels while you sleep and cause night time wakings. Because both apnea and hypopnea may cause higher carbon dioxide or low oxygen levels, untreated LISA can lead to heart arrhythmia, elevation of blood pressure, and make it harder for the body to consolidate memory and facilitate metabolism (leading to higher blood sugars at night). Frequent partial arousals occur during sleep resulting in sleep deprivation and daytime sleepiness. LISA is associated with an increased risk of cardiovascular disease, stroke, hypertension, and insulin resistance. Moreover, untreated LISA with excessive daytime sleepiness can increase the risk of motor vehicular accidents.    Some conservative strategies for LISA regardless of LISA severity are:   Positional therapy - Avoid sleeping on your back.   Healthy diet and regular exercise to optimize weight is highly encouraged.   Avoid alcohol late in the evening and " sedative-hypnotics as these substances can make sleep apnea worse.   Improve breathing through the nose with intranasal steroid spray, saline rinse, or antihistamines    Safety: Avoid driving vehicle and operating heavy equipment while sleepy. Drowsy driving may lead to life-threatening motor vehicle accidents. A person driving while sleepy is 5 times more likely to have an accident. If you feel sleepy, pull over and take a short power nap (sleep for less than 30 minutes). Otherwise, ask somebody to drive you.    Treatment options for sleep apnea include weight management, positional therapy, Positive Airway Therapy (PAP) therapy, oral appliance therapy, hypoglossal nerve stimulator (Inspire) and select airway surgeries.    Annual Reminders About Your Sleep Apnea    Your sleep apnea is well controlled based on reviewing your PAP Data Report.     General Reminders:  Continue current machine settings. Continue using machine every night. Need at least 4 hours daily usage.   Remember to clean your mask, tubings, and water chamber regularly as instructed.   Know the replacement schedule of your supplies/ accessories and contact your DME (durable medical equipment) provider if you are due for them.   Avoid driving or operating heavy machinery when drowsy. A person driving while sleepy is 5 times more likely to have an accident. If you feel sleepy, pull over and take a short power nap (sleep for less than 30 minutes). Otherwise, ask somebody to drive you.    [facility] or calling our office if you have any of the following symptoms:  Snoring or stopping breathing while using the machine  Recurrence of fatigue, sleepiness, insomnia, and other symptoms you had prior using machine  Persistent or worsening fatigue or sleepiness despite regular use of machine  Issues tolerating the machine like bloating sensation, air hunger, too much hot air, too much pressure, taking off mask without recall in the middle  of sleep, etc.     Other conservative measures to improve sleep apnea:  Losing weight can lead to some improvement of LISA which means you will need lower pressures in machine to control your LISA. In some patients, they don't need to use the machine after bariatric surgery. Hence, consider medical and/or surgical weight loss especially if your BMI is more than 35.  Avoiding alcohol, sedative-hypnotics, and sedating medications is imperative as these substances can worsen snoring and sleep apnea  If you have nasal congestion or seasonal allergies, improving your breathing through the nose is critical for treating sleep apnea, tolerating CPAP, and improving your sleep; hence, using intranasal steroid spray like Flonase might help. Make sure you know the proper way to use it.  Stay off your back when sleeping.    Common issues with PAP machine:  Mask gets dislodged when turning to the side: Consider getting a CPAP pillow or switching to a mask with hose on top.     Dry mouth:  Your machine has built-in humidifier that heats up the air to prevent dry mouth. It can be adjusted to your comfort. You can try that first and increase setting one level one night at a time to check which setting is comfortable and effective in lessening dry mouth. In some patients with heated hose, adjusting tube temperature to make air warmer can improve dry mouth. If dry mouth persists despite adjusting humidity or tube temperature setting, may apply OTC Biotene gel over the gums at bedtime.  If Biotene gel is not effective, consider trying XEROSTOM gel from [employer].  Also, eliminate or reduce dose of medications that can cause dry mouth if possible. Lastly, may try getting a separate room humidifier machine.    Airleaks: Please call DME as they may need to adjust your mask or refit you with a different kind or different size of mask. In addition, you can ask DME for tips on getting a good mask seal and mask fit.     Difficulty tolerating  "the mask: Contact your DME to try a different kind of mask and/or call office to get a referral to Sleep Psychologist for CPAP desensitization. CPAP desensitization technique is a set of strategies that helps patient cope with claustrophobia and anxiety related to wearing mask. Alternatively, we can do a daytime mini-sleep study called PAP-nap trial wherein you will try on different kinds of mask and the sleep technician will try different pressure settings on CPAP and BPAP machines to see which specific pressure is tolerable and comfortable for you.     Water droplets or moisture within the hose and/or mask: This is called rain-out and it is caused by condensation of too much heated humidity on the cooler walls of the hose. If you have rain-out, turn down humidity settings or get a heated hose. If you already have a heated hose, turn up the \"tube temperature\" of the heated hose. Alternatively, if you don't want to get a heated hose or warmer air, may wrap the CPAP hose with stockings to keep it somewhat warm. Also, you need to place the machine on the floor and lower the hose so that water won't travel upward towards your mask.     Maintaining your CPAP/BPAP device:    The humidification chamber (aka water tank or water chamber) needs to be filled with distilled water to prevent buildup of white deposits in the future. If you cannot find distilled water, you can use tap water but expect to have white deposits buildup seen after prolonged use with tap water. If you start seeing white deposits on the water chamber, you can clean it by filling it with equal parts of distilled white vinegar and water. Let the vinegar-water mixture sit for 2 hours, and then rinse it with running tap water. Clean with soap and water then let it dry.     You should try to keep your machine clean in order to work well. Here are some tips to clean PAP supplies / accessories:    Clean the humidification chamber (aka water tank) as well as " your mask and tubing at least once a week with soap and water.   Alternatively, you can fill a sink or basin with warm water and add a little mild detergent, like Ivory dish soap. Gently wipe your supplies with the soapy water to free all the oils and dirt that may have collected. Once that's done, rinse these items with clean water until the soap is gone and let them air dry. You can hang your tubing over the curtain surekha in your bathroom so that it dries.  The mask insert (part of the mask that has contact with your skin) needs to be cleaned with soap and water daily. Another option is to wipe them down with CPAP wipes or baby wipes.    You should replace your mask and tubing frequently in order to prevent bacteria buildup, machine damage, and mask seal issues. The older the mask and hose, the high likelihood that there is bacteria buildup in it especially if they are not cleaned regularly. Dirty filters damage machines because build-up of dust and contaminants can cause machine to over-heat, and in time, damage the motor of machine. Cushions lose their seal over time as most masks are made of plastic and silicone while headgear is made of neoprene. These materials will break down with age and frequent use. Here is the recommended replacement schedule for PAP supplies / accessories:    Twice a month- disposable filters and cushions for nasal mask or nasal pillows.  Once a month- cushion for full face mask  Every 3 months- mask with headgear and PAP tubing (standard or heated hose)  Every 6 months- reusable filter, water chamber, and chin strap     Other useful information:    Some people do not put water in the tank while other people prefers to put water in the tank to prevent mouth dryness. Try to experiment to determine which is more comfortable for you.   In general, new machines have 2 years warranty on parts while health insurance allows you to have a new machine once every 5 years.     You can also go to the  following EDUCATION WEBSITES for further information:   American Academy of Sleep Medicine http://sleepeducation.org  National Sleep Foundation: https://sleepfoundation.org  American Sleep Apnea Association: https://www.sleepapnea.org (for patients with sleep apnea)  Narcolepsy Network: https://www.narcolepsynetwork.org (for patients with narcolepsy)  [employer]: https://www.Artillerycolepsy.org (for patients with narcolepsy)  Hypersomnia Foundation: https://www.hypersomniafoundation.org (for patients with idiopathic hypersomnia)  RLS foundation: https://www.rls.org (for patients with restless leg syndrome)    IMPORTANT INFORMATION     Call 911 for medical emergencies.  Our offices are generally open from Monday-Friday, 8 am - 5 pm.   There are no supporting services by either the sleep doctors or their staff on weekends and Holidays, or after 5 PM on weekdays.   If you need to get in touch with me, you may either call my office number or you can use [employer] [facility] [employer].  If a referral for a test, for CPAP, or for another specialist was made, and you have not heard about scheduling this within a week, please call scheduling at [phone]).  If you are unable to make your appointment for clinic or an overnight study, kindly call the office or sleep testing center at least 48 hours in advance to cancel and reschedule.  If you are on CPAP, please bring your device's card and/or the device to each clinic appointment.   In case of problems with PAP machine or mask interface, please contact your [employer] (Durable Medical Equipment) company first. [employer] is the company who provides you the machine and/or PAP supplies.       PRESCRIPTIONS     We require 7 days advanced notice for prescription refills. If we do not receive the request in this time, we cannot guarantee that your medication will be refilled in time.    IMPORTANT PHONE NUMBERS     Sleep Medicine Clinic Fax: [phone]  Appointments (for Pediatric Sleep Clinic):  [phone] - option 1  Appointments (for Adult Sleep Clinic): [phone] - option 2  Appointments (For Sleep Studies): [phone] - option 3  Behavioral Sleep Medicine: [phone]  Sleep Surgery: [phone]  Nutrition Service: [phone]  Weight management clinics with endocrinology: [phone]  Bariatric Services: [phone] (includes weight loss medications and weight loss surgery)  [facility]: [phone] (offers holistic approaches to weight management)  ENT (Otolaryngology): [phone]  Headache Clinic (Neurology): [phone]  Neurology: [phone]  Psychiatry: [phone]  Pulmonary Function Testing (PFT) Center: [phone]  Pulmonary Medicine: [phone]  [employer]): [phone]      OUR SLEEP TESTING LOCATIONS     Our team will contact you to schedule your sleep study, however, you can contact us as follow:  Main Phone Line (scheduling only): [phone]), option 3  Adult and Pediatric Locations  [facility] (6 years and older): [facility] - 4th floor ([address] [place] [address]) After hours line: [phone]  [facility] (Main campus: All ages): [facility], 6th floor. After hours line: [phone]   [place] (5 years and older; younger considered on case-by-case basis): [address]; [address], [address]. Scheduling  After hours line: [phone]   [place] (6 years and older): [address]; [address]; [address]   [place] (6 years and older): [address] [facility], [address]  After hours line: [phone]   [facility] (13 years and older) in [place]: [address] [facility], 2nd floor  After hours line: [phone]   [place] (13 year and older): [address]  After hours line: [phone]     Adult Only Locations:   [place] (18 years and older): [address] [place] [address], 2nd floor   [place] (18  "years and older): [address] [facility]; 4th floor  After hours line: [phone]  [facility] (18 years and older) at [place]: [address] [facility]  After hours line: [phone]     CONTACTING YOUR SLEEP MEDICINE PROVIDER AND SLEEP TEAM      For issues with your machine or mask interface, please call your DME provider first. [employer] stands for durable medical company. DME is the company who provides you the machine and/or PAP supplies / accessories.   To schedule, cancel, or reschedule SLEEP STUDY APPOINTMENTS, please call the Main Phone Line at [phone] - option 3.   To schedule, cancel, or reschedule CLINIC APPOINTMENTS, you can do it in \"[facility]\", call [phone] to speak with my  (Roseann Quintana), or call the Main Phone Line at [phone] - option 2  For CLINICAL QUESTIONS or MEDICATION REFILLS, please call direct line for Adult Sleep Nurses at [phone].   Lastly, you can also send a message directly to your provider through \"My Chart\", which is the email service through your  Records Account: [email or web address]       Here at [facility], we wish you a restful sleep!    "

## 2024-03-11 ENCOUNTER — OFFICE VISIT (OUTPATIENT)
Dept: HEMATOLOGY/ONCOLOGY | Facility: CLINIC | Age: 89
End: 2024-03-11
Payer: MEDICARE

## 2024-03-11 ENCOUNTER — LAB (OUTPATIENT)
Dept: LAB | Facility: HOSPITAL | Age: 89
End: 2024-03-11
Payer: MEDICARE

## 2024-03-11 ENCOUNTER — INFUSION (OUTPATIENT)
Dept: HEMATOLOGY/ONCOLOGY | Facility: CLINIC | Age: 89
End: 2024-03-11
Payer: MEDICARE

## 2024-03-11 VITALS
DIASTOLIC BLOOD PRESSURE: 77 MMHG | WEIGHT: 139.77 LBS | RESPIRATION RATE: 16 BRPM | HEART RATE: 89 BPM | SYSTOLIC BLOOD PRESSURE: 124 MMHG | TEMPERATURE: 97.3 F | OXYGEN SATURATION: 95 % | BODY MASS INDEX: 23.86 KG/M2 | HEIGHT: 64 IN

## 2024-03-11 DIAGNOSIS — D75.1 SECONDARY POLYCYTHEMIA: ICD-10-CM

## 2024-03-11 DIAGNOSIS — M81.0 AGE-RELATED OSTEOPOROSIS WITHOUT CURRENT PATHOLOGICAL FRACTURE: Primary | ICD-10-CM

## 2024-03-11 DIAGNOSIS — M81.0 AGE-RELATED OSTEOPOROSIS WITHOUT CURRENT PATHOLOGICAL FRACTURE: ICD-10-CM

## 2024-03-11 DIAGNOSIS — Z86.03 H/O POLYCYTHEMIA VERA: Primary | ICD-10-CM

## 2024-03-11 LAB
BASOPHILS # BLD AUTO: 0.02 X10*3/UL (ref 0–0.1)
BASOPHILS NFR BLD AUTO: 0.3 %
EOSINOPHIL # BLD AUTO: 0.13 X10*3/UL (ref 0–0.4)
EOSINOPHIL NFR BLD AUTO: 1.7 %
ERYTHROCYTE [DISTWIDTH] IN BLOOD BY AUTOMATED COUNT: 11.7 % (ref 11.5–14.5)
HCT VFR BLD AUTO: 42.1 % (ref 36–46)
HGB BLD-MCNC: 14 G/DL (ref 12–16)
IMM GRANULOCYTES # BLD AUTO: 0.01 X10*3/UL (ref 0–0.5)
IMM GRANULOCYTES NFR BLD AUTO: 0.1 % (ref 0–0.9)
LYMPHOCYTES # BLD AUTO: 1.72 X10*3/UL (ref 0.8–3)
LYMPHOCYTES NFR BLD AUTO: 22.5 %
MCH RBC QN AUTO: 31.7 PG (ref 26–34)
MCHC RBC AUTO-ENTMCNC: 33.3 G/DL (ref 32–36)
MCV RBC AUTO: 96 FL (ref 80–100)
MONOCYTES # BLD AUTO: 0.81 X10*3/UL (ref 0.05–0.8)
MONOCYTES NFR BLD AUTO: 10.6 %
NEUTROPHILS # BLD AUTO: 4.95 X10*3/UL (ref 1.6–5.5)
NEUTROPHILS NFR BLD AUTO: 64.8 %
PLATELET # BLD AUTO: 229 X10*3/UL (ref 150–450)
RBC # BLD AUTO: 4.41 X10*6/UL (ref 4–5.2)
WBC # BLD AUTO: 7.6 X10*3/UL (ref 4.4–11.3)

## 2024-03-11 PROCEDURE — 99213 OFFICE O/P EST LOW 20 MIN: CPT | Performed by: PHYSICIAN ASSISTANT

## 2024-03-11 PROCEDURE — 1160F RVW MEDS BY RX/DR IN RCRD: CPT | Performed by: PHYSICIAN ASSISTANT

## 2024-03-11 PROCEDURE — 85025 COMPLETE CBC W/AUTO DIFF WBC: CPT

## 2024-03-11 PROCEDURE — 1159F MED LIST DOCD IN RCRD: CPT | Performed by: PHYSICIAN ASSISTANT

## 2024-03-11 PROCEDURE — 3078F DIAST BP <80 MM HG: CPT | Performed by: PHYSICIAN ASSISTANT

## 2024-03-11 PROCEDURE — 1036F TOBACCO NON-USER: CPT | Performed by: PHYSICIAN ASSISTANT

## 2024-03-11 PROCEDURE — 3074F SYST BP LT 130 MM HG: CPT | Performed by: PHYSICIAN ASSISTANT

## 2024-03-11 PROCEDURE — 1125F AMNT PAIN NOTED PAIN PRSNT: CPT | Performed by: PHYSICIAN ASSISTANT

## 2024-03-11 PROCEDURE — 1157F ADVNC CARE PLAN IN RCRD: CPT | Performed by: PHYSICIAN ASSISTANT

## 2024-03-11 PROCEDURE — 36415 COLL VENOUS BLD VENIPUNCTURE: CPT

## 2024-03-11 RX ORDER — FAMOTIDINE 10 MG/ML
20 INJECTION INTRAVENOUS ONCE AS NEEDED
Status: CANCELLED | OUTPATIENT
Start: 2024-03-11

## 2024-03-11 RX ORDER — HEPARIN SODIUM,PORCINE/PF 10 UNIT/ML
50 SYRINGE (ML) INTRAVENOUS AS NEEDED
OUTPATIENT
Start: 2024-03-11

## 2024-03-11 RX ORDER — ALBUTEROL SULFATE 0.83 MG/ML
3 SOLUTION RESPIRATORY (INHALATION) AS NEEDED
Status: CANCELLED | OUTPATIENT
Start: 2024-03-11

## 2024-03-11 RX ORDER — HEPARIN 100 UNIT/ML
500 SYRINGE INTRAVENOUS AS NEEDED
OUTPATIENT
Start: 2024-03-11

## 2024-03-11 RX ORDER — DIPHENHYDRAMINE HYDROCHLORIDE 50 MG/ML
50 INJECTION INTRAMUSCULAR; INTRAVENOUS AS NEEDED
Status: CANCELLED | OUTPATIENT
Start: 2024-03-11

## 2024-03-11 RX ORDER — EPINEPHRINE 0.3 MG/.3ML
0.3 INJECTION SUBCUTANEOUS EVERY 5 MIN PRN
Status: CANCELLED | OUTPATIENT
Start: 2024-03-11

## 2024-03-11 ASSESSMENT — PAIN SCALES - GENERAL: PAINLEVEL: 3

## 2024-03-11 NOTE — PROGRESS NOTES
Visit Type: Follow Up Visit      Cancer History:   Treatment Synopsis:    #1) history of polycythemia; non-smoker. Had been off and on phlebotomy sessions over the years. recently established care with us. Had briefly been on Hydrea in  the 1990s. JAK2 negative. Normal EPO level with us.     #2) osteoporosis.        History of Present Illness:   Patient presents for follow up of polycythemia. On assessment, reports arthritic pain. Otherwise, she is doing well. She denies fever, chills, night sweats, weight  loss, bleeding, or any other complaints at this time.     Review of Systems:   All of the systems have been reviewed and are negative for complaints except what is stated in the HPI and/or past medical history.    Allergies and Intolerances:       Allergies:         sulfa drugs: Drug Category, Rash, Active         tramadol: Drug, Unknown, Active         duloxetine: Drug, Unknown, Active         Cymbalta: Drug, Unknown, Active         Maxim, White: Environment, Unknown, Active         Dust: Environment, Unknown, Active     Current Outpatient Medications on File Prior to Visit   Medication Sig Dispense Refill    acetaminophen (Tylenol) 500 mg tablet Take 1 tablet (500 mg) by mouth every 6 hours.      aflibercept (Eylea) 2 mg/0.05 mL intra-ocular injection Administer 0.05 mL (2 mg) into the left eye 1 time. EVERY 10 WEEKS. NO DOSE LISTED      artificial tears, dextran-hypomel-glycerin, 0.1-0.3-0.2 % ophthalmic solution Administer into affected eye(s) 4 times a day.      ascorbic acid, vitamin C, 500 mg capsule Take 1,000 mg by mouth once daily.      aspirin 81 mg capsule Take 81 mg by mouth once daily.      calcium carb and citrate-vitD3 (Citracal-D3 Slow Release) 600 mg-12.5 mcg (500 unit) tablet extended release Take 1 tablet by mouth once daily.      calcium carbonate (Tums) 200 mg calcium chewable tablet Chew 1 tablet (500 mg) once daily.      cholecalciferol (Vitamin D-3) 25 MCG (1000 UT) capsule Take 2 capsules  (50 mcg) by mouth once daily.      cranberry extract 200 mg capsule Take 1 tablet by mouth once daily. 30,000MG      cyanocobalamin (Vitamin B-12) 50 mcg tablet Take 1 tablet (50 mcg) by mouth once daily.      furosemide (Lasix) 20 mg tablet TAKE 1 TABLET DAILY 90 tablet 3    gabapentin (Neurontin) 300 mg capsule Take 1 capsule (300 mg) by mouth once daily at bedtime for 14 days. 14 capsule 0    gentamicin (Garamycin) 0.3 % ophthalmic solution Apply 1 drop under the nail and 1 drop on the nail topically twice daily and cover twice daily for 4 weeks (Patient taking differently: Apply 1 drop under the nail and 1 drop on the nail topically twice daily and cover twice daily for 4 weeks  **FOR NAIL FUNGUS NOT EYES**) 15 mL 0    ibuprofen 200 mg tablet Take 1 tablet (200 mg) by mouth 4 times a day.      ipratropium (Atrovent) 42 mcg (0.06 %) nasal spray USE 2 SPRAYS IN EACH NOSTRIL TWO TO THREE TIMES DAILY 45 mL 7    latanoprost (Xalatan) 0.005 % ophthalmic solution Administer 2 drops into the left eye once daily at bedtime.      lidocaine (Lidoderm) 5 % patch APPLY 1 PATCH TO THE AFFECTED AREA AND LEAVE IN PLACE FOR 12 HOURS, THEN REMOVE AND LEAVE OFF FOR 12 HOURS 90 patch 3    losartan (Cozaar) 25 mg tablet TAKE 1 TABLET DAILY 90 tablet 3    melatonin 5 mg capsule Take 1 capsule (5 mg) by mouth once daily at bedtime.      metoprolol succinate XL (Toprol-XL) 25 mg 24 hr tablet TAKE 1 TABLET DAILY 90 tablet 3    multivit,calc,mins/iron/folic (WOMEN'S ONE DAILY ORAL) Take by mouth.      omega-3 fatty acids-fish oil (Fish OiL) 340-1,000 mg capsule Take by mouth.      omeprazole (PriLOSEC) 20 mg DR capsule TAKE 1 CAPSULE TWICE A DAY 30 MINUTES BEFORE MEALS 180 capsule 3    rosuvastatin (Crestor) 20 mg tablet TAKE 1 TABLET DAILY 90 tablet 3    spironolactone (Aldactone) 25 mg tablet TAKE 1 TABLET DAILY 90 tablet 1    trolamine salicylate (Aspercreme) 10 % cream Apply 1 Application topically if needed for muscle/joint pain.    "    No current facility-administered medications on file prior to visit.           12/8/2023    10:35 AM 12/8/2023    10:50 AM 12/8/2023    10:58 AM 12/8/2023    11:47 AM 1/4/2024     9:21 AM 2/7/2024    11:26 AM 3/11/2024     9:53 AM   Vitals   Systolic 130 126 120  122 125    Diastolic 70 66 61  82 76    Heart Rate 66 64 64 62  68    Temp  36.2 °C (97.2 °F)        Resp 20 15 20 20 16 16    Height (in)     1.626 m (5' 4\") 1.626 m (5' 4\") 1.616 m (5' 3.62\")   Weight (lb)     137 139.5 139.77   BMI     23.52 kg/m2 23.95 kg/m2 24.28 kg/m2   BSA (m2)     1.67 m2 1.69 m2 1.69 m2   Visit Report     Report Report Report      Physical Exam:   Constitutional: alert, awake and oriented, not in acute distress   HEENT: moist mucous membranes, normal nose   Neck: supple, no lymphadenopathy   EYES: PERRL, EOM intact, conjunctiva normal  Skin: no jaundice, rash or erythema  Neurological: AAOx3, no gross focal deficit   Psychiatric: normal mood and behavior        Assessment and Plan:    Patient with history of polycythemia. 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     Currently on PRN phlebotomy sessions with us, with goal to keep HCT below 48%.  Does not need parameter for phlebo today.     Prolia every 6 months done by PCP.     F/U w/PCP     RTC in 1 year for Phlebo     Patient verbalized understanding, and all her questions were answered to her satisfaction    20 min spent with patient greater than 50 % of which was spent in consultation and coordination of care.        "

## 2024-03-14 ENCOUNTER — OFFICE VISIT (OUTPATIENT)
Dept: PAIN MEDICINE | Facility: CLINIC | Age: 89
End: 2024-03-14
Payer: MEDICARE

## 2024-03-14 VITALS
SYSTOLIC BLOOD PRESSURE: 118 MMHG | WEIGHT: 139 LBS | RESPIRATION RATE: 16 BRPM | HEART RATE: 71 BPM | OXYGEN SATURATION: 95 % | HEIGHT: 64 IN | BODY MASS INDEX: 23.73 KG/M2 | DIASTOLIC BLOOD PRESSURE: 66 MMHG

## 2024-03-14 DIAGNOSIS — M48.062 SPINAL STENOSIS, LUMBAR REGION, WITH NEUROGENIC CLAUDICATION: Primary | ICD-10-CM

## 2024-03-14 DIAGNOSIS — M47.817 SPONDYLOSIS WITHOUT MYELOPATHY OR RADICULOPATHY, LUMBOSACRAL REGION: ICD-10-CM

## 2024-03-14 DIAGNOSIS — G89.29 OTHER CHRONIC PAIN: ICD-10-CM

## 2024-03-14 PROCEDURE — 99214 OFFICE O/P EST MOD 30 MIN: CPT | Performed by: ANESTHESIOLOGY

## 2024-03-14 PROCEDURE — 1125F AMNT PAIN NOTED PAIN PRSNT: CPT | Performed by: ANESTHESIOLOGY

## 2024-03-14 PROCEDURE — 1157F ADVNC CARE PLAN IN RCRD: CPT | Performed by: ANESTHESIOLOGY

## 2024-03-14 PROCEDURE — 1160F RVW MEDS BY RX/DR IN RCRD: CPT | Performed by: ANESTHESIOLOGY

## 2024-03-14 PROCEDURE — 3078F DIAST BP <80 MM HG: CPT | Performed by: ANESTHESIOLOGY

## 2024-03-14 PROCEDURE — 1159F MED LIST DOCD IN RCRD: CPT | Performed by: ANESTHESIOLOGY

## 2024-03-14 PROCEDURE — 3074F SYST BP LT 130 MM HG: CPT | Performed by: ANESTHESIOLOGY

## 2024-03-14 PROCEDURE — 1036F TOBACCO NON-USER: CPT | Performed by: ANESTHESIOLOGY

## 2024-03-14 RX ORDER — GABAPENTIN 100 MG/1
100 CAPSULE ORAL 2 TIMES DAILY
Qty: 60 CAPSULE | Refills: 0 | Status: SHIPPED | OUTPATIENT
Start: 2024-03-14 | End: 2024-04-05

## 2024-03-14 ASSESSMENT — ENCOUNTER SYMPTOMS
CARDIOVASCULAR NEGATIVE: 1
ENDOCRINE NEGATIVE: 1
HEMATOLOGIC/LYMPHATIC NEGATIVE: 1
PSYCHIATRIC NEGATIVE: 1
RESPIRATORY NEGATIVE: 1
WEAKNESS: 1
BACK PAIN: 1
GASTROINTESTINAL NEGATIVE: 1
EYES NEGATIVE: 1
CONSTITUTIONAL NEGATIVE: 1
ALLERGIC/IMMUNOLOGIC NEGATIVE: 1

## 2024-03-14 ASSESSMENT — PATIENT HEALTH QUESTIONNAIRE - PHQ9
2. FEELING DOWN, DEPRESSED OR HOPELESS: NOT AT ALL
1. LITTLE INTEREST OR PLEASURE IN DOING THINGS: NOT AT ALL
SUM OF ALL RESPONSES TO PHQ9 QUESTIONS 1 AND 2: 0

## 2024-03-14 ASSESSMENT — PAIN - FUNCTIONAL ASSESSMENT: PAIN_FUNCTIONAL_ASSESSMENT: 0-10

## 2024-03-14 ASSESSMENT — PAIN DESCRIPTION - DESCRIPTORS: DESCRIPTORS: ACHING;TINGLING

## 2024-03-14 ASSESSMENT — PAIN SCALES - GENERAL: PAINLEVEL_OUTOF10: 4

## 2024-03-14 NOTE — PROGRESS NOTES
Subjective   Patient ID: Neris Mccall is a 89 y.o. female who presents for Back Pain.  Back Pain  Associated symptoms include weakness.     Patient here today for follow up today.  She states that her back pain is getting worse for her.  She also will have leg pain now with sleeping at night she will get pain and numbness in her legs.  She can only stand for about 15 min at a time before she has to sit down.    She was tried on gabapentin 100 mg at night and it was too sleepy and she live independently.  She tried the topical for her knees and it was not helpful for her.  She is struggling as she does live independently.  She suffers with a lot of pain.  She states that she tries to get up every hour and walk through her apartment so she can maintain her strength.  She has been tried on Elavil and duloxetine in the past which gave her side effects.  She has been tried on tramadol and trazodone which made her hallucinate.  Review of Systems   Constitutional: Negative.    HENT: Negative.     Eyes: Negative.    Respiratory: Negative.     Cardiovascular: Negative.    Gastrointestinal: Negative.    Endocrine: Negative.    Genitourinary: Negative.    Musculoskeletal:  Positive for back pain and gait problem.   Skin: Negative.    Allergic/Immunologic: Negative.    Neurological:  Positive for weakness.   Hematological: Negative.    Psychiatric/Behavioral: Negative.         Objective   Physical Exam  Vitals and nursing note reviewed.   Constitutional:       Appearance: Normal appearance.   HENT:      Head: Normocephalic and atraumatic.      Right Ear: Ear canal and external ear normal.      Left Ear: Ear canal and external ear normal.      Nose: Nose normal.      Mouth/Throat:      Mouth: Mucous membranes are moist.      Pharynx: Oropharynx is clear.   Eyes:      Conjunctiva/sclera: Conjunctivae normal.      Pupils: Pupils are equal, round, and reactive to light.   Cardiovascular:      Rate and Rhythm: Normal rate.    Pulmonary:      Effort: Pulmonary effort is normal. No respiratory distress.   Musculoskeletal:      Cervical back: Normal range of motion and neck supple.      Lumbar back: Tenderness present. Decreased range of motion.      Right knee: Crepitus present. Decreased range of motion. Tenderness present over the medial joint line.      Left knee: Decreased range of motion. Tenderness (medial Infra-patellar area over infrapatellar saphenous nerve) present.        Legs:    Skin:     General: Skin is warm and dry.   Neurological:      Mental Status: She is alert and oriented to person, place, and time.      Motor: Weakness present.      Gait: Gait abnormal.      Comments: Patient in wheelchair today.   Psychiatric:         Mood and Affect: Mood normal.         Thought Content: Thought content normal.         Assessment/Plan   Problem List Items Addressed This Visit             ICD-10-CM       Neuro    Spinal stenosis, lumbar region, with neurogenic claudication - Primary M48.062    Relevant Medications    gabapentin (Neurontin) 100 mg capsule    DULoxetine 30 mg capsule, delayed rel sprinkle    Spondylosis without myelopathy or radiculopathy, lumbosacral region M47.817    Relevant Medications    gabapentin (Neurontin) 100 mg capsule    DULoxetine 30 mg capsule, delayed rel sprinkle     Other Visit Diagnoses         Codes    Other chronic pain     G89.29          I nice discussion with the patient today our plan will be as follows.    Radiology: All available imaging was reviewed today.     Physically: Patient will work with integrative medicine.    Psychologically:  No issues at this time.     Medication: We will drop the dose of the medication to Gabapentin to 100 mg twice per day.   A PDMP report was checked today, and was consistent with reported prescribing.      Duration:  > 1 year    Intervention: At this time there are no additional interventions to offer the patient.  She suffers with severe spinal stenosis  along with degenerative spondylosis and scoliosis.  Mild procedure was not effective for her.         Mj Stevens MD 03/14/24 11:25 AM

## 2024-03-18 ENCOUNTER — HOSPITAL ENCOUNTER (OUTPATIENT)
Dept: CARDIOLOGY | Facility: CLINIC | Age: 89
Discharge: HOME | End: 2024-03-18
Payer: MEDICARE

## 2024-03-18 DIAGNOSIS — I44.2 CHB (COMPLETE HEART BLOCK) (MULTI): ICD-10-CM

## 2024-03-18 PROCEDURE — 93296 REM INTERROG EVL PM/IDS: CPT

## 2024-03-25 ENCOUNTER — INFUSION (OUTPATIENT)
Dept: HEMATOLOGY/ONCOLOGY | Facility: CLINIC | Age: 89
End: 2024-03-25
Payer: MEDICARE

## 2024-03-25 ENCOUNTER — TELEPHONE (OUTPATIENT)
Dept: PAIN MEDICINE | Facility: CLINIC | Age: 89
End: 2024-03-25

## 2024-03-25 VITALS
DIASTOLIC BLOOD PRESSURE: 72 MMHG | HEIGHT: 64 IN | BODY MASS INDEX: 23.81 KG/M2 | SYSTOLIC BLOOD PRESSURE: 115 MMHG | OXYGEN SATURATION: 95 % | RESPIRATION RATE: 17 BRPM | TEMPERATURE: 98.1 F | HEART RATE: 72 BPM | WEIGHT: 139.44 LBS

## 2024-03-25 DIAGNOSIS — M81.0 AGE-RELATED OSTEOPOROSIS WITHOUT CURRENT PATHOLOGICAL FRACTURE: ICD-10-CM

## 2024-03-25 DIAGNOSIS — M48.062 SPINAL STENOSIS, LUMBAR REGION, WITH NEUROGENIC CLAUDICATION: Primary | ICD-10-CM

## 2024-03-25 DIAGNOSIS — D75.1 SECONDARY POLYCYTHEMIA: ICD-10-CM

## 2024-03-25 LAB
ALBUMIN SERPL BCP-MCNC: 4 G/DL (ref 3.4–5)
ALP SERPL-CCNC: 52 U/L (ref 33–136)
ALT SERPL W P-5'-P-CCNC: 15 U/L (ref 7–45)
ANION GAP SERPL CALC-SCNC: 13 MMOL/L (ref 10–20)
AST SERPL W P-5'-P-CCNC: 24 U/L (ref 9–39)
BILIRUB SERPL-MCNC: 0.6 MG/DL (ref 0–1.2)
BUN SERPL-MCNC: 56 MG/DL (ref 6–23)
CALCIUM SERPL-MCNC: 10.3 MG/DL (ref 8.6–10.3)
CHLORIDE SERPL-SCNC: 104 MMOL/L (ref 98–107)
CO2 SERPL-SCNC: 26 MMOL/L (ref 21–32)
CREAT SERPL-MCNC: 1.09 MG/DL (ref 0.5–1.05)
EGFRCR SERPLBLD CKD-EPI 2021: 49 ML/MIN/1.73M*2
GLUCOSE SERPL-MCNC: 92 MG/DL (ref 74–99)
POTASSIUM SERPL-SCNC: 4.1 MMOL/L (ref 3.5–5.3)
PROT SERPL-MCNC: 6.7 G/DL (ref 6.4–8.2)
SODIUM SERPL-SCNC: 139 MMOL/L (ref 136–145)

## 2024-03-25 PROCEDURE — 96372 THER/PROPH/DIAG INJ SC/IM: CPT

## 2024-03-25 PROCEDURE — 2500000004 HC RX 250 GENERAL PHARMACY W/ HCPCS (ALT 636 FOR OP/ED): Mod: JZ,JG | Performed by: INTERNAL MEDICINE

## 2024-03-25 PROCEDURE — 96372 THER/PROPH/DIAG INJ SC/IM: CPT | Performed by: INTERNAL MEDICINE

## 2024-03-25 RX ORDER — DIPHENHYDRAMINE HYDROCHLORIDE 50 MG/ML
50 INJECTION INTRAMUSCULAR; INTRAVENOUS AS NEEDED
OUTPATIENT
Start: 2024-09-07

## 2024-03-25 RX ORDER — DIPHENHYDRAMINE HYDROCHLORIDE 50 MG/ML
50 INJECTION INTRAMUSCULAR; INTRAVENOUS AS NEEDED
Status: DISCONTINUED | OUTPATIENT
Start: 2024-03-25 | End: 2024-03-25 | Stop reason: HOSPADM

## 2024-03-25 RX ORDER — FAMOTIDINE 10 MG/ML
20 INJECTION INTRAVENOUS ONCE AS NEEDED
Status: DISCONTINUED | OUTPATIENT
Start: 2024-03-25 | End: 2024-03-25 | Stop reason: HOSPADM

## 2024-03-25 RX ORDER — ALBUTEROL SULFATE 0.83 MG/ML
3 SOLUTION RESPIRATORY (INHALATION) AS NEEDED
OUTPATIENT
Start: 2024-09-07

## 2024-03-25 RX ORDER — EPINEPHRINE 0.3 MG/.3ML
0.3 INJECTION SUBCUTANEOUS EVERY 5 MIN PRN
Status: DISCONTINUED | OUTPATIENT
Start: 2024-03-25 | End: 2024-03-25 | Stop reason: HOSPADM

## 2024-03-25 RX ORDER — ALBUTEROL SULFATE 0.83 MG/ML
3 SOLUTION RESPIRATORY (INHALATION) AS NEEDED
Status: DISCONTINUED | OUTPATIENT
Start: 2024-03-25 | End: 2024-03-25 | Stop reason: HOSPADM

## 2024-03-25 RX ORDER — EPINEPHRINE 0.3 MG/.3ML
0.3 INJECTION SUBCUTANEOUS EVERY 5 MIN PRN
OUTPATIENT
Start: 2024-09-07

## 2024-03-25 RX ORDER — FAMOTIDINE 10 MG/ML
20 INJECTION INTRAVENOUS ONCE AS NEEDED
OUTPATIENT
Start: 2024-09-07

## 2024-03-25 RX ADMIN — DENOSUMAB 60 MG: 60 INJECTION SUBCUTANEOUS at 10:33

## 2024-03-25 ASSESSMENT — PAIN SCALES - GENERAL: PAINLEVEL: 5

## 2024-03-25 NOTE — TELEPHONE ENCOUNTER
Pt called stating she saw Dr. Stevens on 3/14/24 and they had discussed an xray of her spine.  At that time the pt declined the xray but now would like to move forward with an xray of her spine.  If agreeable please place an order for an xray.  Pt has a follow up with Dr. Stevens on 4/29/24.  Thanks!

## 2024-03-27 ENCOUNTER — HOSPITAL ENCOUNTER (OUTPATIENT)
Dept: RADIOLOGY | Facility: HOSPITAL | Age: 89
Discharge: HOME | End: 2024-03-27
Payer: MEDICARE

## 2024-03-27 DIAGNOSIS — M48.062 SPINAL STENOSIS, LUMBAR REGION, WITH NEUROGENIC CLAUDICATION: ICD-10-CM

## 2024-03-27 PROCEDURE — 72110 X-RAY EXAM L-2 SPINE 4/>VWS: CPT | Performed by: RADIOLOGY

## 2024-03-27 PROCEDURE — 72120 X-RAY BEND ONLY L-S SPINE: CPT

## 2024-04-05 DIAGNOSIS — M48.062 SPINAL STENOSIS, LUMBAR REGION, WITH NEUROGENIC CLAUDICATION: ICD-10-CM

## 2024-04-05 DIAGNOSIS — M47.817 SPONDYLOSIS WITHOUT MYELOPATHY OR RADICULOPATHY, LUMBOSACRAL REGION: ICD-10-CM

## 2024-04-05 RX ORDER — GABAPENTIN 100 MG/1
100 CAPSULE ORAL 2 TIMES DAILY
Qty: 60 CAPSULE | Refills: 3 | Status: SHIPPED | OUTPATIENT
Start: 2024-04-05 | End: 2024-05-29 | Stop reason: WASHOUT

## 2024-04-09 DIAGNOSIS — I10 ESSENTIAL HYPERTENSION: ICD-10-CM

## 2024-04-09 RX ORDER — METOPROLOL SUCCINATE 25 MG/1
25 TABLET, EXTENDED RELEASE ORAL DAILY
Qty: 90 TABLET | Refills: 1 | Status: SHIPPED | OUTPATIENT
Start: 2024-04-09

## 2024-04-29 ENCOUNTER — OFFICE VISIT (OUTPATIENT)
Dept: PAIN MEDICINE | Facility: CLINIC | Age: 89
End: 2024-04-29
Payer: MEDICARE

## 2024-04-29 ENCOUNTER — HOME HEALTH ADMISSION (OUTPATIENT)
Dept: HOME HEALTH SERVICES | Facility: HOME HEALTH | Age: 89
End: 2024-04-29
Payer: MEDICARE

## 2024-04-29 VITALS
OXYGEN SATURATION: 93 % | WEIGHT: 139 LBS | HEIGHT: 63 IN | BODY MASS INDEX: 24.63 KG/M2 | RESPIRATION RATE: 18 BRPM | SYSTOLIC BLOOD PRESSURE: 127 MMHG | DIASTOLIC BLOOD PRESSURE: 67 MMHG | HEART RATE: 68 BPM

## 2024-04-29 DIAGNOSIS — M51.36 DDD (DEGENERATIVE DISC DISEASE), LUMBAR: ICD-10-CM

## 2024-04-29 DIAGNOSIS — M48.062 SPINAL STENOSIS, LUMBAR REGION, WITH NEUROGENIC CLAUDICATION: Primary | ICD-10-CM

## 2024-04-29 DIAGNOSIS — M47.817 SPONDYLOSIS WITHOUT MYELOPATHY OR RADICULOPATHY, LUMBOSACRAL REGION: ICD-10-CM

## 2024-04-29 DIAGNOSIS — G89.29 OTHER CHRONIC PAIN: ICD-10-CM

## 2024-04-29 PROCEDURE — 99213 OFFICE O/P EST LOW 20 MIN: CPT | Performed by: ANESTHESIOLOGY

## 2024-04-29 PROCEDURE — 1159F MED LIST DOCD IN RCRD: CPT | Performed by: ANESTHESIOLOGY

## 2024-04-29 PROCEDURE — 3074F SYST BP LT 130 MM HG: CPT | Performed by: ANESTHESIOLOGY

## 2024-04-29 PROCEDURE — 1157F ADVNC CARE PLAN IN RCRD: CPT | Performed by: ANESTHESIOLOGY

## 2024-04-29 PROCEDURE — 1125F AMNT PAIN NOTED PAIN PRSNT: CPT | Performed by: ANESTHESIOLOGY

## 2024-04-29 PROCEDURE — 3078F DIAST BP <80 MM HG: CPT | Performed by: ANESTHESIOLOGY

## 2024-04-29 PROCEDURE — 1036F TOBACCO NON-USER: CPT | Performed by: ANESTHESIOLOGY

## 2024-04-29 PROCEDURE — 1160F RVW MEDS BY RX/DR IN RCRD: CPT | Performed by: ANESTHESIOLOGY

## 2024-04-29 ASSESSMENT — ENCOUNTER SYMPTOMS
CARDIOVASCULAR NEGATIVE: 1
GASTROINTESTINAL NEGATIVE: 1
CONSTITUTIONAL NEGATIVE: 1
HEMATOLOGIC/LYMPHATIC NEGATIVE: 1
PSYCHIATRIC NEGATIVE: 1
ALLERGIC/IMMUNOLOGIC NEGATIVE: 1
BACK PAIN: 1
ENDOCRINE NEGATIVE: 1
WEAKNESS: 1
RESPIRATORY NEGATIVE: 1
EYES NEGATIVE: 1

## 2024-04-29 ASSESSMENT — LIFESTYLE VARIABLES
HOW OFTEN DO YOU HAVE SIX OR MORE DRINKS ON ONE OCCASION: NEVER
SKIP TO QUESTIONS 9-10: 1
AUDIT-C TOTAL SCORE: 0
HOW OFTEN DO YOU HAVE A DRINK CONTAINING ALCOHOL: NEVER
HOW MANY STANDARD DRINKS CONTAINING ALCOHOL DO YOU HAVE ON A TYPICAL DAY: PATIENT DOES NOT DRINK

## 2024-04-29 ASSESSMENT — PAIN SCALES - GENERAL
PAINLEVEL_OUTOF10: 4
PAINLEVEL: 4

## 2024-04-29 ASSESSMENT — PATIENT HEALTH QUESTIONNAIRE - PHQ9
SUM OF ALL RESPONSES TO PHQ9 QUESTIONS 1 & 2: 0
2. FEELING DOWN, DEPRESSED OR HOPELESS: NOT AT ALL
1. LITTLE INTEREST OR PLEASURE IN DOING THINGS: NOT AT ALL

## 2024-04-29 ASSESSMENT — PAIN - FUNCTIONAL ASSESSMENT: PAIN_FUNCTIONAL_ASSESSMENT: 0-10

## 2024-04-29 NOTE — PROGRESS NOTES
Subjective   Patient ID: Neris Mccall is a 89 y.o. female who presents for Back Pain (Back and legs ).  Back Pain  Associated symptoms include weakness.   Patient here today for follow up today.  She was not able to tolerate the gabapentin as she had some neurocognitive deficits.  She rates her pain.  She continues to have severe pain with standing and walking.  Sleeping at night she will get severe knee pain that radiates down which will wake her up.  She spends most of her day sitting in a recliner.  She questions about physical therapy and wearing a back brace.  She questions about whether there are any other medications that can be helpful for her.  She wants to stay away from opioids as she does not tolerate them well.  She wonders if there is any other procedures that could help her.  She states that the moment she stands up she starts getting significant back pain.    Review of Systems   Constitutional: Negative.    HENT: Negative.     Eyes: Negative.    Respiratory: Negative.     Cardiovascular: Negative.    Gastrointestinal: Negative.    Endocrine: Negative.    Genitourinary: Negative.    Musculoskeletal:  Positive for back pain and gait problem.   Skin: Negative.    Allergic/Immunologic: Negative.    Neurological:  Positive for weakness.   Hematological: Negative.    Psychiatric/Behavioral: Negative.         Objective   Physical Exam  Vitals and nursing note reviewed.   Constitutional:       Appearance: Normal appearance.   HENT:      Head: Normocephalic and atraumatic.      Right Ear: Ear canal and external ear normal.      Left Ear: Ear canal and external ear normal.      Nose: Nose normal.      Mouth/Throat:      Mouth: Mucous membranes are moist.      Pharynx: Oropharynx is clear.   Eyes:      Conjunctiva/sclera: Conjunctivae normal.      Pupils: Pupils are equal, round, and reactive to light.   Cardiovascular:      Rate and Rhythm: Normal rate.   Pulmonary:      Effort: Pulmonary effort is normal. No  respiratory distress.   Musculoskeletal:      Cervical back: Normal range of motion and neck supple.      Lumbar back: Tenderness present. Decreased range of motion.      Right knee: Crepitus present. Decreased range of motion. Tenderness present over the medial joint line.      Left knee: Decreased range of motion. Tenderness (medial Infra-patellar area over infrapatellar saphenous nerve) present.        Legs:    Skin:     General: Skin is warm and dry.   Neurological:      Mental Status: She is alert and oriented to person, place, and time.      Motor: Weakness present.      Gait: Gait abnormal.      Comments: Patient in wheelchair today.   Psychiatric:         Mood and Affect: Mood normal.         Thought Content: Thought content normal.         Assessment/Plan   Problem List Items Addressed This Visit             ICD-10-CM    Spinal stenosis, lumbar region, with neurogenic claudication - Primary M48.062    Spondylosis without myelopathy or radiculopathy, lumbosacral region M47.817     Other Visit Diagnoses         Codes    Other chronic pain     G89.29    DDD (degenerative disc disease), lumbar     M51.36          I nice discussion with the patient today our plan will be as follows.    Radiology: I went over the patient's lumbar CT scan she does suffer with moderate central canal stenosis at L3-4 and L4-5.  There is moderate scoliosis.    Physically:  Patient to start [facility] PT for her back pain and for gait stability.      Psychologically:  No issues at this time.      Medication: Patient did not tolerate gabapentin she is also failed pregabalin as well as duloxetine.  She currently is on anti-inflammatories.  I did go over her most recent comprehensive blood panel and she does have slightly decreased kidney function which she should talk to her primary care doctor about.    Duration: Than 1 year.    Intervention: The patient has tried and failed numerous different interventions including epidural steroid  injections and the mild procedure.  We did briefly discuss the Vertiflex treatment but would be be a much more aggressive treatment.  There may be limitations secondary to her scoliosis.         Mj Stevens MD 04/29/24 1:55 PM

## 2024-05-07 ENCOUNTER — HOME CARE VISIT (OUTPATIENT)
Dept: HOME HEALTH SERVICES | Facility: HOME HEALTH | Age: 89
End: 2024-05-07
Payer: MEDICARE

## 2024-05-07 VITALS
WEIGHT: 135 LBS | SYSTOLIC BLOOD PRESSURE: 100 MMHG | HEART RATE: 74 BPM | RESPIRATION RATE: 18 BRPM | DIASTOLIC BLOOD PRESSURE: 50 MMHG | BODY MASS INDEX: 23.05 KG/M2 | TEMPERATURE: 98.4 F | OXYGEN SATURATION: 95 % | HEIGHT: 64 IN

## 2024-05-07 PROCEDURE — G0151 HHCP-SERV OF PT,EA 15 MIN: HCPCS | Mod: HHH

## 2024-05-07 PROCEDURE — 0023 HH SOC

## 2024-05-07 PROCEDURE — 1090000002 HH PPS REVENUE DEBIT

## 2024-05-07 PROCEDURE — 1090000001 HH PPS REVENUE CREDIT

## 2024-05-07 PROCEDURE — 169592 NO-PAY CLAIM PROCEDURE

## 2024-05-07 SDOH — HEALTH STABILITY: PHYSICAL HEALTH: EXERCISE TYPE: SEATED

## 2024-05-07 SDOH — HEALTH STABILITY: PHYSICAL HEALTH: PHYSICAL EXERCISE: SEATED

## 2024-05-07 SDOH — HEALTH STABILITY: PHYSICAL HEALTH: PHYSICAL EXERCISE: 10

## 2024-05-07 SDOH — HEALTH STABILITY: PHYSICAL HEALTH: EXERCISE ACTIVITY: AP

## 2024-05-07 SDOH — HEALTH STABILITY: PHYSICAL HEALTH: EXERCISE ACTIVITIES SETS: 2

## 2024-05-07 ASSESSMENT — ENCOUNTER SYMPTOMS
PAIN LOCATION - PAIN QUALITY: ACHE
HIGHEST PAIN SEVERITY IN PAST 24 HOURS: 6/10
PAIN LOCATION: RIGHT LEG
PAIN LOCATION - RELIEVING FACTORS: SITTING
OCCASIONAL FEELINGS OF UNSTEADINESS: 1
PAIN LOCATION - RELIEVING FACTORS: SITTING
PAIN LOCATION - PAIN FREQUENCY: INTERMITTENT
PAIN LOCATION - EXACERBATING FACTORS: WALKING
PAIN LOCATION - EXACERBATING FACTORS: WALKING
PAIN LOCATION - PAIN SEVERITY: 4/10
PAIN LOCATION - PAIN QUALITY: ACHE
PAIN LOCATION - PAIN SEVERITY: 4/10
LOWEST PAIN SEVERITY IN PAST 24 HOURS: 0/10
PAIN LOCATION - PAIN FREQUENCY: CONSTANT
PAIN LOCATION - PAIN QUALITY: ACHE
CLAUDICATION: 1
PAIN LOCATION: LEFT LEG
PAIN LOCATION - RELIEVING FACTORS: SITTING
PAIN LOCATION - PAIN FREQUENCY: CONSTANT
SUBJECTIVE PAIN PROGRESSION: UNCHANGED
PERSON REPORTING PAIN: PATIENT
PAIN LOCATION: BACK
PAIN: 1
PAIN LOCATION - PAIN SEVERITY: 4/10

## 2024-05-07 ASSESSMENT — ACTIVITIES OF DAILY LIVING (ADL)
PHYSICAL TRANSFERS ASSESSED: 1
AMBULATION ASSISTANCE: 1
OASIS_M1830: 03
AMBULATION ASSISTANCE ON FLAT SURFACES: 1
AMBULATION ASSISTANCE: SUPERVISION
CURRENT_FUNCTION: SUPERVISION
ENTERING_EXITING_HOME: MINIMUM ASSIST

## 2024-05-08 PROCEDURE — 1090000001 HH PPS REVENUE CREDIT

## 2024-05-08 PROCEDURE — 1090000002 HH PPS REVENUE DEBIT

## 2024-05-09 PROCEDURE — 1090000001 HH PPS REVENUE CREDIT

## 2024-05-09 PROCEDURE — 1090000002 HH PPS REVENUE DEBIT

## 2024-05-10 DIAGNOSIS — E78.5 DYSLIPIDEMIA, GOAL LDL BELOW 70: ICD-10-CM

## 2024-05-10 DIAGNOSIS — E55.9 VITAMIN D DEFICIENCY: ICD-10-CM

## 2024-05-10 DIAGNOSIS — E78.00 PURE HYPERCHOLESTEROLEMIA: ICD-10-CM

## 2024-05-10 DIAGNOSIS — R53.83 OTHER FATIGUE: ICD-10-CM

## 2024-05-10 DIAGNOSIS — I10 PRIMARY HYPERTENSION: ICD-10-CM

## 2024-05-10 PROCEDURE — 1090000002 HH PPS REVENUE DEBIT

## 2024-05-10 PROCEDURE — 1090000001 HH PPS REVENUE CREDIT

## 2024-05-10 NOTE — PROGRESS NOTES
Orders Placed This Encounter   Procedures    Vitamin D 25-Hydroxy,Total (for eval of Vitamin D levels)     Standing Status:   Future     Standing Expiration Date:   5/10/2025     Order Specific Question:   This patient has an active home care or hospice episode. Should this order be routed to home care or hospice?     Answer:   Yes     Order Specific Question:   Release result to MyChart     Answer:   Immediate [1]    Tsh With Reflex To Free T4 If Abnormal     Standing Status:   Future     Standing Expiration Date:   5/10/2025     Order Specific Question:   This patient has an active home care or hospice episode. Should this order be routed to home care or hospice?     Answer:   Yes     Order Specific Question:   Release result to MyChart     Answer:   Immediate [1]    Lipid panel     Standing Status:   Future     Standing Expiration Date:   5/10/2025     Order Specific Question:   This patient has an active home care or hospice episode. Should this order be routed to home care or hospice?     Answer:   Yes     Order Specific Question:   Release result to MyChart     Answer:   Immediate [1]    CK     Standing Status:   Future     Standing Expiration Date:   5/10/2025     Order Specific Question:   This patient has an active home care or hospice episode. Should this order be routed to home care or hospice?     Answer:   Yes     Order Specific Question:   Release result to MyChart     Answer:   Immediate [1]    Comprehensive metabolic panel     Standing Status:   Future     Standing Expiration Date:   5/10/2025     Order Specific Question:   This patient has an active home care or hospice episode. Should this order be routed to home care or hospice?     Answer:   Yes     Order Specific Question:   Release result to MyChart     Answer:   Immediate [1]    CBC and Auto Differential     Standing Status:   Future     Standing Expiration Date:   5/10/2025     Order Specific Question:   This patient has an active home care  or hospice episode. Should this order be routed to home care or hospice?     Answer:   Yes     Order Specific Question:   Release result to [facility]     Answer:   Immediate [1]

## 2024-05-11 PROCEDURE — 1090000001 HH PPS REVENUE CREDIT

## 2024-05-11 PROCEDURE — 1090000002 HH PPS REVENUE DEBIT

## 2024-05-12 PROCEDURE — 1090000002 HH PPS REVENUE DEBIT

## 2024-05-12 PROCEDURE — 1090000001 HH PPS REVENUE CREDIT

## 2024-05-13 PROCEDURE — 1090000001 HH PPS REVENUE CREDIT

## 2024-05-13 PROCEDURE — 1090000002 HH PPS REVENUE DEBIT

## 2024-05-14 ENCOUNTER — OFFICE VISIT (OUTPATIENT)
Dept: PRIMARY CARE | Facility: CLINIC | Age: 89
End: 2024-05-14
Payer: MEDICARE

## 2024-05-14 ENCOUNTER — HOME CARE VISIT (OUTPATIENT)
Dept: HOME HEALTH SERVICES | Facility: HOME HEALTH | Age: 89
End: 2024-05-14
Payer: MEDICARE

## 2024-05-14 VITALS
DIASTOLIC BLOOD PRESSURE: 59 MMHG | SYSTOLIC BLOOD PRESSURE: 127 MMHG | WEIGHT: 135 LBS | BODY MASS INDEX: 23.05 KG/M2 | HEIGHT: 64 IN | HEART RATE: 71 BPM

## 2024-05-14 VITALS — HEART RATE: 75 BPM | OXYGEN SATURATION: 93 % | TEMPERATURE: 98.4 F

## 2024-05-14 DIAGNOSIS — D45 POLYCYTHEMIA VERA (MULTI): ICD-10-CM

## 2024-05-14 DIAGNOSIS — I44.2 COMPLETE ATRIOVENTRICULAR BLOCK (MULTI): ICD-10-CM

## 2024-05-14 DIAGNOSIS — E78.5 DYSLIPIDEMIA, GOAL LDL BELOW 70: ICD-10-CM

## 2024-05-14 DIAGNOSIS — Z79.899 MEDICATION MANAGEMENT: ICD-10-CM

## 2024-05-14 DIAGNOSIS — N18.31 STAGE 3A CHRONIC KIDNEY DISEASE (MULTI): ICD-10-CM

## 2024-05-14 DIAGNOSIS — E26.1 SECONDARY HYPERALDOSTERONISM (MULTI): ICD-10-CM

## 2024-05-14 DIAGNOSIS — F51.04 CHRONIC INSOMNIA: ICD-10-CM

## 2024-05-14 DIAGNOSIS — Z00.00 ROUTINE GENERAL MEDICAL EXAMINATION AT HEALTH CARE FACILITY: Primary | ICD-10-CM

## 2024-05-14 DIAGNOSIS — D75.1 SECONDARY POLYCYTHEMIA: ICD-10-CM

## 2024-05-14 DIAGNOSIS — I72.2 ANEURYSM ARTERY, RENAL (CMS-HCC): ICD-10-CM

## 2024-05-14 DIAGNOSIS — H35.3221 EXUDATIVE AGE-RELATED MACULAR DEGENERATION, LEFT EYE, WITH ACTIVE CHOROIDAL NEOVASCULARIZATION (MULTI): ICD-10-CM

## 2024-05-14 DIAGNOSIS — R71.8 OTHER ABNORMALITY OF RED BLOOD CELLS: ICD-10-CM

## 2024-05-14 PROBLEM — L73.9 FOLLICULITIS: Status: ACTIVE | Noted: 2024-05-14

## 2024-05-14 PROBLEM — Z86.79 HISTORY OF HYPERTENSION: Status: ACTIVE | Noted: 2024-05-14

## 2024-05-14 PROBLEM — R25.2 CRAMPS OF LOWER EXTREMITY: Status: ACTIVE | Noted: 2023-05-15

## 2024-05-14 PROBLEM — Z99.89 DEPENDENCE ON CONTINUOUS POSITIVE AIRWAY PRESSURE VENTILATION: Status: ACTIVE | Noted: 2024-05-14

## 2024-05-14 PROBLEM — Z86.73 HISTORY OF CEREBROVASCULAR ACCIDENT: Status: ACTIVE | Noted: 2024-05-14

## 2024-05-14 LAB
AMPHETAMINES UR QL SCN: NORMAL
BARBITURATES UR QL SCN: NORMAL
BZE UR QL SCN: NORMAL
CANNABINOIDS UR QL SCN: NORMAL
CREAT UR-MCNC: 53 MG/DL (ref 20–320)
PCP UR QL SCN: NORMAL

## 2024-05-14 PROCEDURE — 1124F ACP DISCUSS-NO DSCNMKR DOCD: CPT | Performed by: INTERNAL MEDICINE

## 2024-05-14 PROCEDURE — G0439 PPPS, SUBSEQ VISIT: HCPCS | Performed by: INTERNAL MEDICINE

## 2024-05-14 PROCEDURE — 80354 DRUG SCREENING FENTANYL: CPT

## 2024-05-14 PROCEDURE — 1036F TOBACCO NON-USER: CPT | Performed by: INTERNAL MEDICINE

## 2024-05-14 PROCEDURE — 99214 OFFICE O/P EST MOD 30 MIN: CPT | Performed by: INTERNAL MEDICINE

## 2024-05-14 PROCEDURE — 80358 DRUG SCREENING METHADONE: CPT

## 2024-05-14 PROCEDURE — 1157F ADVNC CARE PLAN IN RCRD: CPT | Performed by: INTERNAL MEDICINE

## 2024-05-14 PROCEDURE — 80365 DRUG SCREENING OXYCODONE: CPT

## 2024-05-14 PROCEDURE — 82570 ASSAY OF URINE CREATININE: CPT

## 2024-05-14 PROCEDURE — 80307 DRUG TEST PRSMV CHEM ANLYZR: CPT

## 2024-05-14 PROCEDURE — 80373 DRUG SCREENING TRAMADOL: CPT

## 2024-05-14 PROCEDURE — 80346 BENZODIAZEPINES1-12: CPT

## 2024-05-14 PROCEDURE — 3074F SYST BP LT 130 MM HG: CPT | Performed by: INTERNAL MEDICINE

## 2024-05-14 PROCEDURE — 1090000001 HH PPS REVENUE CREDIT

## 2024-05-14 PROCEDURE — 3078F DIAST BP <80 MM HG: CPT | Performed by: INTERNAL MEDICINE

## 2024-05-14 PROCEDURE — 80368 SEDATIVE HYPNOTICS: CPT

## 2024-05-14 PROCEDURE — 1170F FXNL STATUS ASSESSED: CPT | Performed by: INTERNAL MEDICINE

## 2024-05-14 PROCEDURE — G0151 HHCP-SERV OF PT,EA 15 MIN: HCPCS | Mod: HHH

## 2024-05-14 PROCEDURE — 1159F MED LIST DOCD IN RCRD: CPT | Performed by: INTERNAL MEDICINE

## 2024-05-14 PROCEDURE — 1090000002 HH PPS REVENUE DEBIT

## 2024-05-14 PROCEDURE — 80361 OPIATES 1 OR MORE: CPT

## 2024-05-14 PROCEDURE — 1160F RVW MEDS BY RX/DR IN RCRD: CPT | Performed by: INTERNAL MEDICINE

## 2024-05-14 RX ORDER — ZOLPIDEM TARTRATE 5 MG/1
5 TABLET ORAL NIGHTLY PRN
Qty: 30 TABLET | Refills: 0 | Status: ON HOLD | OUTPATIENT
Start: 2024-05-14 | End: 2024-06-04 | Stop reason: SDUPTHER

## 2024-05-14 ASSESSMENT — ACTIVITIES OF DAILY LIVING (ADL)
TAKING_MEDICATION: INDEPENDENT
DOING_HOUSEWORK: INDEPENDENT
DRESSING: INDEPENDENT
MANAGING_FINANCES: INDEPENDENT
BATHING: INDEPENDENT
GROCERY_SHOPPING: INDEPENDENT

## 2024-05-14 ASSESSMENT — ENCOUNTER SYMPTOMS
PAIN LOCATION - PAIN SEVERITY: 4/10
PERSON REPORTING PAIN: PATIENT
HIGHEST PAIN SEVERITY IN PAST 24 HOURS: 4/10
PAIN LOCATION: BACK
PAIN LOCATION - PAIN QUALITY: ACHE
PAIN: 1

## 2024-05-14 NOTE — PROGRESS NOTES
"Subjective   Reason for Visit: Neris Mccall is an 89 y.o. female here for a Medicare Wellness visit.     Past Medical, Surgical, and Family History reviewed and updated in chart.    Reviewed all medications by prescribing practitioner or clinical pharmacist (such as prescriptions, OTCs, herbal therapies and supplements) and documented in the medical record.    She is tired all the time,   She does not sleep well at night  He spoke with Dr. Stevens about it  She takes melatonin 5mg it puts her to sleep but does not keep her to sleep,   Her legs still bother her at  night and during the day as well.   Legs are not restless.   Had a procedure and tried gabapentin, but doesn't feel she can safely take care of herself   It was not helping the pain/calista  She is getting home PT  Has a bipap machine and now gets a sore nose.   She does get a pressure in her chest, and feels like she cannot breath , in the daytime, sees cardiology. She gets when just sitting at the computer.   No acid taste in her mouth. No chest pressure when laying down.           Patient Care Team:  Leighann Giron DO as PCP - General  Leighann Giron, DO as PCP - [employer] PCP  Leighann Giron DO     Review of Systems    Objective   Vitals:  /59   Pulse 71   Ht 1.626 m (5' 4\")   Wt 61.2 kg (135 lb)   BMI 23.17 kg/m²       Physical Exam  Constitutional:       Appearance: Normal appearance.   Neck:      Vascular: Carotid bruit (slight b/l radiating from heart) present.   Cardiovascular:      Rate and Rhythm: Normal rate and regular rhythm.      Heart sounds: Murmur (grade II RUSB) heard.   Pulmonary:      Effort: Pulmonary effort is normal.      Breath sounds: Normal breath sounds.   Musculoskeletal:      Right lower leg: No edema.      Left lower leg: No edema.   Neurological:      Mental Status: She is alert and oriented to person, place, and time.         Assessment/Plan   Problem List Items Addressed This Visit  "   None  Visit Diagnoses       Routine general medical examination at health care facility    -  Primary            Chronic insomnia, due to chronic pain  Did not tolerate calista, amitriptyline, trazodone, cymbalta, narcotics  Trial of ambien 5mg 1/2 tab at bedtime (okay with melatonin) and if not sleeping increase to 1 tab  CSA and UDS done today    Hypertension, is controlled    Dyslipidemia, ldl goal under 70 due to CAD  On rosuva 20mg daily, get labs    CKD, d/w pt and family, likely due to lasix, due to bun/cr ratio high, less likely due to ibu    Chronic pain- no better with recent procedure  Taking tylenol/ibu 200 3 times a day  Pt not sure if helping, okay to hold to see if adding any pain relief, lft and cre stable.   Did not tolerate narcotics  Working with PT    LISA< on bipap, getting pressure /pain on nasal bridge, pt to call sleep doc to see if a different mask would prevent this. Requires full face mask.     Vaccines all current    Osteoporosis,   Vit d levels have been good  Getting prolia every 6 months.     Mamm 12/23 normal    Recheck in 6months

## 2024-05-14 NOTE — PROGRESS NOTES
Subjective   Patient ID: Neris Mccall is a 89 y.o. female who presents for Medicare Annual Wellness Visit Subsequent.    HPI     Review of Systems    Objective   There were no vitals taken for this visit.    Physical Exam    Assessment/Plan

## 2024-05-14 NOTE — PATIENT INSTRUCTIONS
Get fasting labs to check cholesterol, etc    Chest pressure and shortness of breath at rest, not with exertion or laying down, oxygen level good  Could be heart related and seeing cardiology tomorrow, but could be related to anxiety (since not exertional or when laying),     Chronic insomnia but side effects on elavil, trazodone and gabapentin in the past  Will do trial of ambien/zolpidem 5mg at bedtime, start with 1/2 tab at bedtime and can take with melatonin, if not sleeping still and not groggy in the morning, increase to 1 tablet, 5mg at bedtime,   Will do controlled substance agreement today and urine screen, as will need if going to be on long term ambien.    Recheck in 6 months and as needed

## 2024-05-14 NOTE — ASSESSMENT & PLAN NOTE
Has been long standing, did renal artery ultrasound in 2022, but could not visualize and pt had pain from exam. With lower GFR, would avoid dye test for this since has had for over 20 year. Bp and cholesterol control

## 2024-05-15 ENCOUNTER — OFFICE VISIT (OUTPATIENT)
Dept: CARDIOLOGY | Facility: HOSPITAL | Age: 89
End: 2024-05-15
Payer: MEDICARE

## 2024-05-15 VITALS
WEIGHT: 136.47 LBS | HEART RATE: 75 BPM | BODY MASS INDEX: 23.42 KG/M2 | DIASTOLIC BLOOD PRESSURE: 76 MMHG | OXYGEN SATURATION: 96 % | SYSTOLIC BLOOD PRESSURE: 129 MMHG

## 2024-05-15 DIAGNOSIS — E78.5 HYPERLIPIDEMIA, UNSPECIFIED HYPERLIPIDEMIA TYPE: ICD-10-CM

## 2024-05-15 DIAGNOSIS — I10 HYPERTENSION, UNSPECIFIED TYPE: ICD-10-CM

## 2024-05-15 DIAGNOSIS — I35.0 AORTIC VALVE STENOSIS, ETIOLOGY OF CARDIAC VALVE DISEASE UNSPECIFIED: Primary | ICD-10-CM

## 2024-05-15 PROCEDURE — 3074F SYST BP LT 130 MM HG: CPT | Performed by: NURSE PRACTITIONER

## 2024-05-15 PROCEDURE — 1159F MED LIST DOCD IN RCRD: CPT | Performed by: NURSE PRACTITIONER

## 2024-05-15 PROCEDURE — 1160F RVW MEDS BY RX/DR IN RCRD: CPT | Performed by: NURSE PRACTITIONER

## 2024-05-15 PROCEDURE — 99213 OFFICE O/P EST LOW 20 MIN: CPT | Performed by: NURSE PRACTITIONER

## 2024-05-15 PROCEDURE — 1157F ADVNC CARE PLAN IN RCRD: CPT | Performed by: NURSE PRACTITIONER

## 2024-05-15 PROCEDURE — 1090000002 HH PPS REVENUE DEBIT

## 2024-05-15 PROCEDURE — 1036F TOBACCO NON-USER: CPT | Performed by: NURSE PRACTITIONER

## 2024-05-15 PROCEDURE — 1090000001 HH PPS REVENUE CREDIT

## 2024-05-15 PROCEDURE — 3078F DIAST BP <80 MM HG: CPT | Performed by: NURSE PRACTITIONER

## 2024-05-15 ASSESSMENT — ENCOUNTER SYMPTOMS
PSYCHIATRIC NEGATIVE: 1
BACK PAIN: 1
ENDOCRINE NEGATIVE: 1
MYALGIAS: 1
HEMATOLOGIC/LYMPHATIC NEGATIVE: 1
RESPIRATORY NEGATIVE: 1
EYES NEGATIVE: 1
GASTROINTESTINAL NEGATIVE: 1
NEUROLOGICAL NEGATIVE: 1
DEPRESSION: 0
CONSTITUTIONAL NEGATIVE: 1

## 2024-05-15 NOTE — PROGRESS NOTES
Referred by Dr. Estevez ref. provider found for Chest Heaviness (States intermittent for 2 wks.) and Fatigue (Worsening.)     History Of Present Illness:    Neris Mccall is a very pleasant 89 year old female with a history of moderate aortic stenosis, moderate CAD (Cath 2018), HFrEF, and LBBB, she is here for a a follow up visit. Complains of back and knee pain, feels the pain is getting worse. Complains of intermittent chest heaviness occurs at any time. Denies shortness of breath or heart palpitations.     Review of Systems   Constitutional: Negative.   HENT: Negative.     Eyes: Negative.    Cardiovascular:  Positive for chest pain.   Respiratory: Negative.     Endocrine: Negative.    Hematologic/Lymphatic: Negative.    Skin: Negative.    Musculoskeletal:  Positive for arthritis, back pain, muscle weakness and myalgias.   Gastrointestinal: Negative.    Neurological: Negative.    Psychiatric/Behavioral: Negative.        Past Medical History:  She has a past medical history of Acute upper respiratory infection, unspecified (01/15/2018), Aneurysm of unspecified site (CMS-HCC), Arrhythmia (1975), Arthritis (2005), BiPAP (biphasic positive airway pressure) dependence (2023), Cataract (2005), Chronic pain disorder (1960), Coronary artery disease (2021), CPAP (continuous positive airway pressure) dependence (2014), Dependence on other enabling machines and devices, Dysphagia (2021), Fractures (1955), GERD (gastroesophageal reflux disease) (1985), Glaucoma (2021), Hearing aid worn (1985), Heart disease (1980), Heart failure (Multi) (2019), Heart valve disease (2021), History of blood transfusion (1958), HL (hearing loss) (1970), Hypertension (1972), Low back pain (1950), Lumbar disc disease (2016), Old myocardial infarction, Other nail disorders (03/14/2017), Pacemaker (2019), Personal history of diseases of the skin and subcutaneous tissue (12/14/2016), Personal history of other diseases of the circulatory system  (05/16/2022), Personal history of other diseases of the circulatory system, Personal history of other diseases of the musculoskeletal system and connective tissue, Personal history of other diseases of the nervous system and sense organs, Personal history of other endocrine, nutritional and metabolic disease, Personal history of other venous thrombosis and embolism, Personal history of transient ischemic attack (TIA), and cerebral infarction without residual deficits, Platelet disorder (Multi) (1998), Pregnant ([facility]) (1957), Scoliosis (2016), Secondary polycythemia, Shingles (1996), Sleep apnea, Spinal stenosis, lumbar region with neurogenic claudication (11/01/2022), Stroke (Multi) (1967), Urinary tract infection (2019), and Vision loss (2017).    She has no past medical history of ADD (attention deficit disorder), ADHD (attention deficit hyperactivity disorder), ALKA (acute kidney injury) (CMS-HCC), Anxiety, Autism ([facility]), Autoimmune disorder (Multi), Biliary disease, Bipolar disorder (Multi), Bladder cancer (Multi), BPH (benign prostatic hyperplasia), Cancer of neurologic system ([facility]), Celiac disease ([facility]), Cerebral aneurysm ([facility]), Cerebral palsy (Multi), Cerebral vascular accident (Multi), Cervical cancer (Multi), Cervical disc disease, Cholelithiasis, Chronic kidney disease, Cirrhosis (Multi), CKD (chronic kidney disease), Cognitive decline, Cognitive disorder, Colon polyp, Colorectal cancer (Multi), Crohn's disease (Multi), Dementia (Multi), Diverticulosis, Dizziness, Dysfunctional uterine bleeding, Endometrial cancer (Multi), Esophageal cancer (Multi), Esophageal disease, ESRD (end stage renal disease) (Multi), Fibroid, Fibromyalgia, primary, Gastric cancer (Multi), Gender dysphoria, Gestational diabetes ([facility]), Gestational hypertension ([facility]), GI (gastrointestinal bleed), Hemodialysis status (CMS-HCC), Hernia, internal, Hiatal hernia, History of peritoneal dialysis, HIV disease (Multi),  Hydrocephalus (Multi), Immunocompromised (Multi), Intellectual disability, Irritable bowel syndrome, Liver disease, Lupus (Multi), Mastocytosis, MS (multiple sclerosis) (Multi), Muscular dystrophy (Multi), Myasthenia gravis (Multi), Myoneural disorder (Multi), Myotonia, VILLANUEVA (nonalcoholic steatohepatitis), Nephrolithiasis, Neuromuscular disorder (Multi), Ovarian cancer (Multi), Pancreatic cancer (Multi), Pancreatitis (HHS-HCC), Pelvic mass, Peptic ulcer disease, Personal history of other mental and behavioral disorders, Polycystic ovarian disease, Prematurity (HHS-HCC), Prostate cancer (Multi), PTSD (post-traumatic stress disorder), Pyelonephritis, Renal cancer (Multi), Renal disease due to hypertension, Schizophrenia (Multi), Seizure disorder (Multi), Substance addiction (Multi), Syncope, Thoracic spinal cord injury (Multi), TIA (transient ischemic attack), Ulcerative colitis (Multi), Uterine cancer (Multi), or Vertigo.    Past Surgical History:  She has a past surgical history that includes Dilation and curettage of uterus (04/20/2016); Other surgical history (04/20/2016); Varicose vein surgery (04/20/2016); Other surgical history (04/20/2016); Rotator cuff repair (04/20/2016); Tonsillectomy (04/20/2016); Knee surgery (04/20/2016); Other surgical history (07/29/2022); Fracture surgery (2015); Insert / replace / remove pacemaker (2019); Stapedectomy (1979); Colonoscopy (2014); Upper gastrointestinal endoscopy (2013); ORIF wrist fracture (1955); Toe Surgery (2004); Blepharoplasty (2002); Correction hammer toe (2004); and Back surgery.      Social History:  She reports that she has never smoked. She has never used smokeless tobacco. She reports that she does not currently use alcohol. She reports that she does not use drugs.    Family History:  Family History   Problem Relation Name Age of Onset    Cancer Mother GIBSON SALINAS     Hypertension Sister KELSIE RENTERIA     Breast cancer Sister KELSIE RENTERIA 87    Coronary  artery disease Brother TIN SALINAS     Hearing loss Brother SERINA SALINAS     Vision loss Brother SERINA SALINAS     Heart disease Brother AUDREY SALINAS     Breast cancer Paternal Grandmother  30        Allergies:  Duloxetine, Sulfa (sulfonamide antibiotics), Atenolol, Cephalexin, Cholestyramine, Gemfibrozil, House dust, Niacin, Pravastatin, Tramadol, and Tree pollen-white susanna    Outpatient Medications:  Current Outpatient Medications   Medication Instructions    acetaminophen (TYLENOL) 500 mg, oral, Every 6 hours    artificial tears, dextran-hypomel-glycerin, 0.1-0.3-0.2 % ophthalmic solution ophthalmic (eye), 4 times daily    ascorbic acid (vitamin C) 1,000 mg, oral, Daily    aspirin 81 mg, oral, Daily    calcium carb and citrate-vitD3 (Citracal-D3 Slow Release) 600 mg-12.5 mcg (500 unit) tablet extended release 1 tablet, oral, Daily    calcium carbonate (Tums) 200 mg calcium chewable tablet 1 tablet, oral, Daily    cholecalciferol (VITAMIN D-3) 50 mcg, oral, Daily    cranberry extract 200 mg capsule 1 tablet, oral, Daily, 30,000MG    cyanocobalamin (Vitamin B-12) 50 mcg tablet 1 tablet, oral, Daily    Eylea 2 mg, Left Eye, Once, EVERY 10 WEEKS. NO DOSE LISTED    furosemide (Lasix) 20 mg tablet TAKE 1 TABLET DAILY    gabapentin (NEURONTIN) 100 mg, oral, 2 times daily    gentamicin (Garamycin) 0.3 % ophthalmic solution Apply 1 drop under the nail and 1 drop on the nail topically twice daily and cover twice daily for 4 weeks    ibuprofen 200 mg, oral, 4 times daily    ipratropium (Atrovent) 42 mcg (0.06 %) nasal spray USE 2 SPRAYS IN EACH NOSTRIL TWO TO THREE TIMES DAILY    latanoprost (Xalatan) 0.005 % ophthalmic solution 2 drops, Left Eye, Nightly    lidocaine (Lidoderm) 5 % patch APPLY 1 PATCH TO THE AFFECTED AREA AND LEAVE IN PLACE FOR 12 HOURS, THEN REMOVE AND LEAVE OFF FOR 12 HOURS    losartan (COZAAR) 25 mg, oral, Daily    melatonin 5 mg, oral, Nightly    metoprolol succinate XL (TOPROL-XL) 25 mg, oral, Daily     multivit,calc,mins/iron/folic (WOMEN'S ONE DAILY ORAL) 1 tablet, oral, Daily    omega-3 fatty acids-fish oil (Fish OiL) 340-1,000 mg capsule 1 capsule, oral, Daily    omeprazole (PriLOSEC) 20 mg DR capsule TAKE 1 CAPSULE TWICE A DAY 30 MINUTES BEFORE MEALS    rosuvastatin (Crestor) 20 mg tablet TAKE 1 TABLET DAILY    spironolactone (ALDACTONE) 25 mg, oral, Daily    trolamine salicylate (Aspercreme) 10 % cream 1 Application, Topical, As needed    zolpidem (AMBIEN) 5 mg, oral, Nightly PRN        Last Recorded Vitals:  Vitals:    05/15/24 1508   BP: 129/76   Pulse: 75   SpO2: 96%   Weight: 61.9 kg (136 lb 7.4 oz)       Physical Exam:  Physical Exam  Vitals reviewed.   HENT:      Head: Normocephalic.      Nose: Nose normal.   Eyes:      Pupils: Pupils are equal, round, and reactive to light.   Cardiovascular:      Rate and Rhythm: Normal rate and regular rhythm. 3/6 DEREK   Pulmonary:      Effort: Pulmonary effort is normal.      Breath sounds: Normal breath sounds.   Abdominal:      General: Abdomen is flat.      Palpations: Abdomen is soft.   Musculoskeletal:         General: Normal range of motion.      Cervical back: Normal range of motion.   Skin:     General: Skin is warm and dry.   Neurological:      General: No focal deficit present.      Mental Status: He is alert and oriented to person, place, and time.   Psychiatric:         Mood and Affect: Mood normal.            Last Labs:  CBC -  Lab Results   Component Value Date    WBC 7.6 03/11/2024    HGB 14.0 03/11/2024    HCT 42.1 03/11/2024    MCV 96 03/11/2024     03/11/2024       CMP -  Lab Results   Component Value Date    CALCIUM 10.3 03/25/2024    PHOS 3.0 03/21/2022    PROT 6.7 03/25/2024    ALBUMIN 4.0 03/25/2024    AST 24 03/25/2024    ALT 15 03/25/2024    ALKPHOS 52 03/25/2024    BILITOT 0.6 03/25/2024       LIPID PANEL -   Lab Results   Component Value Date    CHOL 153 11/13/2023    TRIG 158 (H) 11/13/2023    HDL 44.8 11/13/2023    CHHDL 3.4  11/13/2023    LDLF 77 05/15/2023    VLDL 32 11/13/2023    NHDL 108 11/13/2023       RENAL FUNCTION PANEL -   Lab Results   Component Value Date    GLUCOSE 92 03/25/2024     03/25/2024    K 4.1 03/25/2024     03/25/2024    CO2 26 03/25/2024    ANIONGAP 13 03/25/2024    BUN 56 (H) 03/25/2024    CREATININE 1.09 (H) 03/25/2024    CALCIUM 10.3 03/25/2024    PHOS 3.0 03/21/2022    ALBUMIN 4.0 03/25/2024        Lab Results   Component Value Date    BNP 88 12/20/2021    HGBA1C 5.7 (A) 12/20/2021       Last Cardiology Tests:  ECG:    Echo:  Echocardiogram 12/21/2021  1. The left ventricular systolic function is moderately decreased with a 35-40% estimated ejection fraction.  2. Kamrar is abnormal.  3. Abnormal septal motion consistent with RV pacemaker.  4. No left ventricular thrombus visualized.  5. RVSP within normal limits.  6. Mild to moderate aortic valve stenosis.  7. There is mild to moderate aortic valve regurgitation.  8. There is global hypokinesis of the left ventricle with minor regional variations.        Echocardiogram 12/27/2019   1. The left ventricular systolic function is severely decreased with a 25-30%  estimated ejection fraction.  2. Spectral Doppler shows a pseudonormal pattern of left ventricular diastolic  filling.  3. There is eccentric left ventricular hypertrophy.  4. The left atrium is enlarged.  5. Severe mitral valve regurgitation.  6. Moderately elevated right ventricular systolic pressure.  7. Moderate aortic valve stenosis.  8. There is mild aortic valve regurgitation.  9. The left ventricular cavity size is severely dilated.  10. There is global hypokinesis of the left ventricle with minor regional  variations.     Echocardiogram 7/31/2019  1. The left ventricular systolic function is severely decreased with a 25%  estimated ejection fraction.  2. Multiple segmental abnormalities exist. See findings.  3. Spectral Doppler shows an abnormal pattern of left ventricular  diastolic  filling.  4. There is moderate concentric left ventricular hypertrophy.  5. The left atrium is moderately dilated.  6. Moderate mitral valve regurgitation.  7. Moderate aortic valve stenosis.  8. There is mild aortic valve regurgitation.       Cath:  Left heart cath 8/13/2018    1. Left main: no significant angiographic CAD.   2. LAD: 60% mid-vessel focal stenosis. Lesion is 65% by OCT, FFR = 0.82.   3. LCx: no significant angiographic CAD.   4. RCA: 40-50% diffuse mid-vessel disease.   5. RA 1mmHg, RV 28/3, PA 26/10 (18), PCWP 13mmHg.   6. Tanna CI 2.7 l/min/m2.  Stress Test:  Nuclear Stress Test 12/21/2021  1. Small to moderate-sized territory of prior completed infarction  involving the apex where there is no evidence of associated ischemia.  2. Moderate-sized territory of prior completed infarction involving  the mid through distal inferior wall without evidence of significant  associated ischemia.  3. Normal perfusion best preserved throughout the remainder of the LV  myocardium where there is no evidence of ischemia or infarction.  4. Mild LV dilation.  5. Apical dyskinesis and inferior wall hypokinesis superimposed more  mild global hypokinesis with an LV EF estimated at 28-31%.  1. Indeterminate exercise ECG for inducible ischemia.  2. No clinical evidence for ischemia at a maximal infusion.  3. Nuclear image results are reported separately.  4. The adequate level of stress was achieved.    Cardiac Imaging:        Assessment/Plan   Mrs. Mccall is a very pleasant 89 year old female with a history of moderate aortic stenosis, moderate CAD (cath 2018), HFrEF, LBBB s/p CRT-p, she complains of intermittent chest pain. She will have an echocardiogram to evaluate her aortic stenosis. We did discuss a stress test she declined at this time. Continues to struggle with back pain and arthritis pain that limits her mobility. She is euvolemic on exam. Heart rate and blood pressure are well controlled today.       Plan   -call with any questions   -continue Crestor and Spironolactone   -continue Aspirin, Lasix, Losartan and Metoprolol   -follow up in October  -repeat echocardiogram   -will call to review the results of the echocardiogram         JOHN Khan-CNP

## 2024-05-16 ENCOUNTER — HOME CARE VISIT (OUTPATIENT)
Dept: HOME HEALTH SERVICES | Facility: HOME HEALTH | Age: 89
End: 2024-05-16
Payer: MEDICARE

## 2024-05-16 ENCOUNTER — LAB (OUTPATIENT)
Dept: LAB | Facility: LAB | Age: 89
End: 2024-05-16
Payer: MEDICARE

## 2024-05-16 DIAGNOSIS — E55.9 VITAMIN D DEFICIENCY: ICD-10-CM

## 2024-05-16 DIAGNOSIS — R53.83 OTHER FATIGUE: ICD-10-CM

## 2024-05-16 DIAGNOSIS — E78.00 PURE HYPERCHOLESTEROLEMIA: ICD-10-CM

## 2024-05-16 DIAGNOSIS — E78.5 DYSLIPIDEMIA, GOAL LDL BELOW 70: ICD-10-CM

## 2024-05-16 DIAGNOSIS — I10 PRIMARY HYPERTENSION: ICD-10-CM

## 2024-05-16 LAB
25(OH)D3 SERPL-MCNC: 94 NG/ML (ref 30–100)
ALBUMIN SERPL BCP-MCNC: 4.4 G/DL (ref 3.4–5)
ALP SERPL-CCNC: 54 U/L (ref 33–136)
ALT SERPL W P-5'-P-CCNC: 14 U/L (ref 7–45)
ANION GAP SERPL CALC-SCNC: 16 MMOL/L (ref 10–20)
AST SERPL W P-5'-P-CCNC: 24 U/L (ref 9–39)
BASOPHILS # BLD AUTO: 0.1 X10*3/UL (ref 0–0.1)
BASOPHILS NFR BLD AUTO: 1.2 %
BILIRUB SERPL-MCNC: 0.8 MG/DL (ref 0–1.2)
BUN SERPL-MCNC: 50 MG/DL (ref 6–23)
CALCIUM SERPL-MCNC: 9.9 MG/DL (ref 8.6–10.3)
CHLORIDE SERPL-SCNC: 104 MMOL/L (ref 98–107)
CHOLEST SERPL-MCNC: 155 MG/DL (ref 0–199)
CHOLESTEROL/HDL RATIO: 3.5
CK SERPL-CCNC: 80 U/L (ref 0–215)
CO2 SERPL-SCNC: 24 MMOL/L (ref 21–32)
CREAT SERPL-MCNC: 1.13 MG/DL (ref 0.5–1.05)
EGFRCR SERPLBLD CKD-EPI 2021: 47 ML/MIN/1.73M*2
EOSINOPHIL # BLD AUTO: 0.19 X10*3/UL (ref 0–0.4)
EOSINOPHIL NFR BLD AUTO: 2.2 %
ERYTHROCYTE [DISTWIDTH] IN BLOOD BY AUTOMATED COUNT: 12.2 % (ref 11.5–14.5)
GLUCOSE SERPL-MCNC: 97 MG/DL (ref 74–99)
HCT VFR BLD AUTO: 45.8 % (ref 36–46)
HDLC SERPL-MCNC: 44.3 MG/DL
HGB BLD-MCNC: 14.6 G/DL (ref 12–16)
IMM GRANULOCYTES # BLD AUTO: 0.04 X10*3/UL (ref 0–0.5)
IMM GRANULOCYTES NFR BLD AUTO: 0.5 % (ref 0–0.9)
LDLC SERPL CALC-MCNC: 73 MG/DL
LYMPHOCYTES # BLD AUTO: 2.79 X10*3/UL (ref 0.8–3)
LYMPHOCYTES NFR BLD AUTO: 32.3 %
MCH RBC QN AUTO: 32.1 PG (ref 26–34)
MCHC RBC AUTO-ENTMCNC: 31.9 G/DL (ref 32–36)
MCV RBC AUTO: 101 FL (ref 80–100)
MONOCYTES # BLD AUTO: 0.81 X10*3/UL (ref 0.05–0.8)
MONOCYTES NFR BLD AUTO: 9.4 %
NEUTROPHILS # BLD AUTO: 4.71 X10*3/UL (ref 1.6–5.5)
NEUTROPHILS NFR BLD AUTO: 54.4 %
NON HDL CHOLESTEROL: 111 MG/DL (ref 0–149)
NRBC BLD-RTO: 0 /100 WBCS (ref 0–0)
PLATELET # BLD AUTO: 242 X10*3/UL (ref 150–450)
POTASSIUM SERPL-SCNC: 4.3 MMOL/L (ref 3.5–5.3)
PROT SERPL-MCNC: 6.8 G/DL (ref 6.4–8.2)
RBC # BLD AUTO: 4.55 X10*6/UL (ref 4–5.2)
SODIUM SERPL-SCNC: 140 MMOL/L (ref 136–145)
TRIGL SERPL-MCNC: 190 MG/DL (ref 0–149)
TSH SERPL-ACNC: 1.47 MIU/L (ref 0.44–3.98)
VLDL: 38 MG/DL (ref 0–40)
WBC # BLD AUTO: 8.6 X10*3/UL (ref 4.4–11.3)

## 2024-05-16 PROCEDURE — 1090000001 HH PPS REVENUE CREDIT

## 2024-05-16 PROCEDURE — 85025 COMPLETE CBC W/AUTO DIFF WBC: CPT

## 2024-05-16 PROCEDURE — 80053 COMPREHEN METABOLIC PANEL: CPT

## 2024-05-16 PROCEDURE — 1090000002 HH PPS REVENUE DEBIT

## 2024-05-16 PROCEDURE — 82306 VITAMIN D 25 HYDROXY: CPT

## 2024-05-16 PROCEDURE — 82550 ASSAY OF CK (CPK): CPT

## 2024-05-16 PROCEDURE — 36415 COLL VENOUS BLD VENIPUNCTURE: CPT

## 2024-05-16 PROCEDURE — 80061 LIPID PANEL: CPT

## 2024-05-16 PROCEDURE — G0151 HHCP-SERV OF PT,EA 15 MIN: HCPCS | Mod: HHH

## 2024-05-16 PROCEDURE — 84443 ASSAY THYROID STIM HORMONE: CPT

## 2024-05-16 PROCEDURE — G0180 MD CERTIFICATION HHA PATIENT: HCPCS | Performed by: ANESTHESIOLOGY

## 2024-05-16 ASSESSMENT — ENCOUNTER SYMPTOMS
PAIN LOCATION - PAIN SEVERITY: 5/10
HIGHEST PAIN SEVERITY IN PAST 24 HOURS: 5/10
PERSON REPORTING PAIN: PATIENT
SUBJECTIVE PAIN PROGRESSION: UNCHANGED
PAIN: 1
PAIN LOCATION - PAIN QUALITY: ACHE
PAIN LOCATION: LEFT KNEE

## 2024-05-17 DIAGNOSIS — E78.00 PURE HYPERCHOLESTEROLEMIA: ICD-10-CM

## 2024-05-17 PROCEDURE — 1090000001 HH PPS REVENUE CREDIT

## 2024-05-17 PROCEDURE — 1090000002 HH PPS REVENUE DEBIT

## 2024-05-17 RX ORDER — ROSUVASTATIN CALCIUM 20 MG/1
TABLET, COATED ORAL
Qty: 90 TABLET | Refills: 1 | Status: SHIPPED | OUTPATIENT
Start: 2024-05-17

## 2024-05-18 PROCEDURE — 1090000002 HH PPS REVENUE DEBIT

## 2024-05-18 PROCEDURE — 1090000001 HH PPS REVENUE CREDIT

## 2024-05-19 PROCEDURE — 1090000002 HH PPS REVENUE DEBIT

## 2024-05-19 PROCEDURE — 1090000001 HH PPS REVENUE CREDIT

## 2024-05-20 PROCEDURE — 1090000002 HH PPS REVENUE DEBIT

## 2024-05-20 PROCEDURE — 1090000001 HH PPS REVENUE CREDIT

## 2024-05-21 ENCOUNTER — HOME CARE VISIT (OUTPATIENT)
Dept: HOME HEALTH SERVICES | Facility: HOME HEALTH | Age: 89
End: 2024-05-21
Payer: MEDICARE

## 2024-05-21 PROCEDURE — G0151 HHCP-SERV OF PT,EA 15 MIN: HCPCS | Mod: HHH

## 2024-05-21 PROCEDURE — 1090000002 HH PPS REVENUE DEBIT

## 2024-05-21 PROCEDURE — 1090000001 HH PPS REVENUE CREDIT

## 2024-05-21 ASSESSMENT — ENCOUNTER SYMPTOMS
PAIN LOCATION - PAIN SEVERITY: 5/10
PAIN LOCATION - PAIN FREQUENCY: CONSTANT
PAIN: 1
PERSON REPORTING PAIN: PATIENT
PAIN LOCATION: BACK

## 2024-05-22 PROCEDURE — 1090000001 HH PPS REVENUE CREDIT

## 2024-05-22 PROCEDURE — 1090000002 HH PPS REVENUE DEBIT

## 2024-05-23 ENCOUNTER — HOME CARE VISIT (OUTPATIENT)
Dept: HOME HEALTH SERVICES | Facility: HOME HEALTH | Age: 89
End: 2024-05-23
Payer: MEDICARE

## 2024-05-23 ENCOUNTER — HOSPITAL ENCOUNTER (OUTPATIENT)
Dept: RADIOLOGY | Facility: CLINIC | Age: 89
Discharge: HOME | End: 2024-05-23
Payer: MEDICARE

## 2024-05-23 ENCOUNTER — OFFICE VISIT (OUTPATIENT)
Dept: PRIMARY CARE | Facility: CLINIC | Age: 89
End: 2024-05-23
Payer: MEDICARE

## 2024-05-23 VITALS
HEART RATE: 71 BPM | WEIGHT: 136 LBS | DIASTOLIC BLOOD PRESSURE: 68 MMHG | BODY MASS INDEX: 23.22 KG/M2 | SYSTOLIC BLOOD PRESSURE: 128 MMHG | HEIGHT: 64 IN

## 2024-05-23 DIAGNOSIS — R10.9 LEFT FLANK PAIN: Primary | ICD-10-CM

## 2024-05-23 DIAGNOSIS — R07.81 RIB PAIN ON LEFT SIDE: ICD-10-CM

## 2024-05-23 LAB
POC APPEARANCE, URINE: CLEAR
POC BILIRUBIN, URINE: NEGATIVE
POC BLOOD, URINE: NEGATIVE
POC COLOR, URINE: YELLOW
POC GLUCOSE, URINE: NEGATIVE MG/DL
POC KETONES, URINE: NEGATIVE MG/DL
POC LEUKOCYTES, URINE: ABNORMAL
POC NITRITE,URINE: NEGATIVE
POC PH, URINE: 5.5 PH
POC PROTEIN, URINE: NEGATIVE MG/DL
POC SPECIFIC GRAVITY, URINE: 1.01
POC UROBILINOGEN, URINE: 0.2 EU/DL

## 2024-05-23 PROCEDURE — 1036F TOBACCO NON-USER: CPT | Performed by: INTERNAL MEDICINE

## 2024-05-23 PROCEDURE — 81001 URINALYSIS AUTO W/SCOPE: CPT

## 2024-05-23 PROCEDURE — 71101 X-RAY EXAM UNILAT RIBS/CHEST: CPT | Mod: LEFT SIDE | Performed by: RADIOLOGY

## 2024-05-23 PROCEDURE — 1090000001 HH PPS REVENUE CREDIT

## 2024-05-23 PROCEDURE — 71101 X-RAY EXAM UNILAT RIBS/CHEST: CPT | Mod: LT

## 2024-05-23 PROCEDURE — 1160F RVW MEDS BY RX/DR IN RCRD: CPT | Performed by: INTERNAL MEDICINE

## 2024-05-23 PROCEDURE — 1157F ADVNC CARE PLAN IN RCRD: CPT | Performed by: INTERNAL MEDICINE

## 2024-05-23 PROCEDURE — 99214 OFFICE O/P EST MOD 30 MIN: CPT | Performed by: INTERNAL MEDICINE

## 2024-05-23 PROCEDURE — 1090000002 HH PPS REVENUE DEBIT

## 2024-05-23 PROCEDURE — 3078F DIAST BP <80 MM HG: CPT | Performed by: INTERNAL MEDICINE

## 2024-05-23 PROCEDURE — 1159F MED LIST DOCD IN RCRD: CPT | Performed by: INTERNAL MEDICINE

## 2024-05-23 PROCEDURE — 87086 URINE CULTURE/COLONY COUNT: CPT

## 2024-05-23 PROCEDURE — 3074F SYST BP LT 130 MM HG: CPT | Performed by: INTERNAL MEDICINE

## 2024-05-23 PROCEDURE — 81003 URINALYSIS AUTO W/O SCOPE: CPT | Performed by: INTERNAL MEDICINE

## 2024-05-23 ASSESSMENT — ENCOUNTER SYMPTOMS: BACK PAIN: 1

## 2024-05-23 NOTE — PROGRESS NOTES
"Subjective   Patient ID: Neris Mccall is a 89 y.o. female who presents for Back Pain.    Having more severe lower back pain starting 4 days ago  Has had pain in her back  She fell 2-3 weeks ago and was fine after that  Hurts worse when standing  Is better when she lays down  Her PT person has had a put a pillow under her legs    She is taking melatonin and ambien and sleeping better    No blood in the urine or dysuria  The therapist    Back Pain         Review of Systems   Musculoskeletal:  Positive for back pain.       Objective   /68   Pulse 71   Ht 1.626 m (5' 4\")   Wt 61.7 kg (136 lb)   BMI 23.34 kg/m²     Physical Exam  Pulmonary:      Effort: Pulmonary effort is normal.      Breath sounds: Normal breath sounds.   Musculoskeletal:      Comments: Scoliosis noted  No pain with percussion over the spine, thoracic and lumbar  Tender to touch over the left lower ribs and cva tenderness on the left with percussion         Assessment/Plan          Patient Instructions   Left posterior rib pain  Checking urine to make sure no blood or infection  Get chest and rib xray  No pain over the spine so I do NOT think this is a compression fracture, no back xray    Insomnia, much better with ambien/zolpidem    For rib pain, we are limited in what we can use, as we often use gabapentin, lidocaine patches and or narcotic pain medication, and you have not tolerated gabapentin or narcotics in the past. Will see what the xray shows    Reviewed labs, kidney function okay but a little worse, limit ibuprofen to 200mg 4 times a day   "

## 2024-05-23 NOTE — PROGRESS NOTES
Subjective   Patient ID: Neris Mccall is a 89 y.o. female who presents for Back Pain.    HPI     Review of Systems    Objective   There were no vitals taken for this visit.    Physical Exam    Assessment/Plan

## 2024-05-23 NOTE — PATIENT INSTRUCTIONS
Left posterior rib pain  Checking urine to make sure no blood or infection  Get chest and rib xray  No pain over the spine so I do NOT think this is a compression fracture, no back xray    Insomnia, much better with ambien/zolpidem    For rib pain, we are limited in what we can use, as we often use gabapentin, lidocaine patches and or narcotic pain medication, and you have not tolerated gabapentin or narcotics in the past. Will see what the xray shows    Reviewed labs, kidney function okay but a little worse, limit ibuprofen to 200mg 4 times a day

## 2024-05-24 LAB
APPEARANCE UR: ABNORMAL
BACTERIA #/AREA URNS AUTO: ABNORMAL /HPF
BILIRUB UR STRIP.AUTO-MCNC: NEGATIVE MG/DL
COLOR UR: ABNORMAL
GLUCOSE UR STRIP.AUTO-MCNC: NORMAL MG/DL
KETONES UR STRIP.AUTO-MCNC: NEGATIVE MG/DL
LEUKOCYTE ESTERASE UR QL STRIP.AUTO: ABNORMAL
MUCOUS THREADS #/AREA URNS AUTO: ABNORMAL /LPF
NITRITE UR QL STRIP.AUTO: ABNORMAL
PH UR STRIP.AUTO: 5 [PH]
PROT UR STRIP.AUTO-MCNC: NEGATIVE MG/DL
RBC # UR STRIP.AUTO: NEGATIVE /UL
RBC #/AREA URNS AUTO: ABNORMAL /HPF
SP GR UR STRIP.AUTO: 1.01
SQUAMOUS #/AREA URNS AUTO: ABNORMAL /HPF
UROBILINOGEN UR STRIP.AUTO-MCNC: NORMAL MG/DL
WBC #/AREA URNS AUTO: ABNORMAL /HPF
WBC CLUMPS #/AREA URNS AUTO: ABNORMAL /HPF

## 2024-05-24 PROCEDURE — 1090000002 HH PPS REVENUE DEBIT

## 2024-05-24 PROCEDURE — 1090000001 HH PPS REVENUE CREDIT

## 2024-05-25 LAB — BACTERIA UR CULT: ABNORMAL

## 2024-05-25 PROCEDURE — 1090000001 HH PPS REVENUE CREDIT

## 2024-05-25 PROCEDURE — 1090000002 HH PPS REVENUE DEBIT

## 2024-05-26 PROCEDURE — 1090000001 HH PPS REVENUE CREDIT

## 2024-05-26 PROCEDURE — 1090000002 HH PPS REVENUE DEBIT

## 2024-05-27 PROCEDURE — 1090000001 HH PPS REVENUE CREDIT

## 2024-05-27 PROCEDURE — 1090000002 HH PPS REVENUE DEBIT

## 2024-05-28 ENCOUNTER — TELEPHONE (OUTPATIENT)
Dept: PAIN MEDICINE | Facility: CLINIC | Age: 89
End: 2024-05-28
Payer: MEDICARE

## 2024-05-28 ENCOUNTER — HOME CARE VISIT (OUTPATIENT)
Dept: HOME HEALTH SERVICES | Facility: HOME HEALTH | Age: 89
End: 2024-05-28
Payer: MEDICARE

## 2024-05-28 ENCOUNTER — HOSPITAL ENCOUNTER (OUTPATIENT)
Dept: RADIOLOGY | Facility: HOSPITAL | Age: 89
Discharge: HOME | End: 2024-05-28
Payer: MEDICARE

## 2024-05-28 DIAGNOSIS — S32.020A COMPRESSION FRACTURE OF L2 VERTEBRA, INITIAL ENCOUNTER (MULTI): Primary | ICD-10-CM

## 2024-05-28 DIAGNOSIS — S32.010A CLOSED COMPRESSION FRACTURE OF BODY OF L1 VERTEBRA (MULTI): Primary | ICD-10-CM

## 2024-05-28 DIAGNOSIS — Z79.899 MEDICATION MANAGEMENT: ICD-10-CM

## 2024-05-28 DIAGNOSIS — F51.04 CHRONIC INSOMNIA: ICD-10-CM

## 2024-05-28 DIAGNOSIS — S32.020A COMPRESSION FRACTURE OF L2 VERTEBRA, INITIAL ENCOUNTER (MULTI): ICD-10-CM

## 2024-05-28 DIAGNOSIS — N30.90 CYSTITIS: Primary | ICD-10-CM

## 2024-05-28 PROCEDURE — 72131 CT LUMBAR SPINE W/O DYE: CPT | Performed by: RADIOLOGY

## 2024-05-28 PROCEDURE — 1090000002 HH PPS REVENUE DEBIT

## 2024-05-28 PROCEDURE — 72131 CT LUMBAR SPINE W/O DYE: CPT

## 2024-05-28 PROCEDURE — 1090000001 HH PPS REVENUE CREDIT

## 2024-05-28 RX ORDER — CIPROFLOXACIN 250 MG/1
250 TABLET, FILM COATED ORAL 2 TIMES DAILY
Qty: 14 TABLET | Refills: 0 | Status: SHIPPED | OUTPATIENT
Start: 2024-05-28 | End: 2024-06-04

## 2024-05-29 ENCOUNTER — PREP FOR PROCEDURE (OUTPATIENT)
Dept: PAIN MEDICINE | Facility: CLINIC | Age: 89
End: 2024-05-29
Payer: MEDICARE

## 2024-05-29 DIAGNOSIS — M80.08XA AGE-RELATED OSTEOPOROSIS WITH CURRENT PATHOLOGICAL FRACTURE OF VERTEBRA, INITIAL ENCOUNTER (MULTI): Primary | ICD-10-CM

## 2024-05-29 DIAGNOSIS — M80.08XA AGE-RELATED OSTEOPOROSIS WITH CURRENT PATHOL FRACTURE OF VERTEBRA, INITIAL ENCOUNTER (MULTI): Primary | ICD-10-CM

## 2024-05-29 PROCEDURE — 1090000001 HH PPS REVENUE CREDIT

## 2024-05-29 PROCEDURE — 1090000002 HH PPS REVENUE DEBIT

## 2024-05-29 RX ORDER — CEFAZOLIN SODIUM 2 G/100ML
2 INJECTION, SOLUTION INTRAVENOUS ONCE
Status: CANCELLED | OUTPATIENT
Start: 2024-06-04 | End: 2024-05-29

## 2024-05-29 RX ORDER — OXYCODONE HYDROCHLORIDE 5 MG/1
2.5 TABLET ORAL EVERY 6 HOURS PRN
Qty: 10 TABLET | Refills: 0 | Status: SHIPPED | OUTPATIENT
Start: 2024-05-29 | End: 2024-06-04

## 2024-05-29 RX ORDER — SODIUM CHLORIDE, SODIUM LACTATE, POTASSIUM CHLORIDE, CALCIUM CHLORIDE 600; 310; 30; 20 MG/100ML; MG/100ML; MG/100ML; MG/100ML
100 INJECTION, SOLUTION INTRAVENOUS CONTINUOUS
Status: CANCELLED | OUTPATIENT
Start: 2024-06-04

## 2024-05-29 NOTE — TELEPHONE ENCOUNTER
Spoke to patient's son to make him aware. He voiced understanding . He also stated that the patient's pain is increasing. She states that her pain is between a 6/10 and 8/10. She is wanting to know if she could get a prescription for pain medication to hold her over until the Kyphoplasty.   She states with her previous compression fracture she was given oxycodone which seemed to help. Patient's pharmacy is [facility] in [place].

## 2024-05-30 ENCOUNTER — HOME CARE VISIT (OUTPATIENT)
Dept: HOME HEALTH SERVICES | Facility: HOME HEALTH | Age: 89
End: 2024-05-30
Payer: MEDICARE

## 2024-05-30 PROCEDURE — 1090000002 HH PPS REVENUE DEBIT

## 2024-05-30 PROCEDURE — 1090000001 HH PPS REVENUE CREDIT

## 2024-05-31 PROCEDURE — 1090000001 HH PPS REVENUE CREDIT

## 2024-05-31 PROCEDURE — 1090000002 HH PPS REVENUE DEBIT

## 2024-06-01 PROCEDURE — 1090000002 HH PPS REVENUE DEBIT

## 2024-06-01 PROCEDURE — 1090000001 HH PPS REVENUE CREDIT

## 2024-06-02 PROCEDURE — 1090000001 HH PPS REVENUE CREDIT

## 2024-06-02 PROCEDURE — 1090000002 HH PPS REVENUE DEBIT

## 2024-06-03 ENCOUNTER — HOME CARE VISIT (OUTPATIENT)
Dept: HOME HEALTH SERVICES | Facility: HOME HEALTH | Age: 89
End: 2024-06-03
Payer: MEDICARE

## 2024-06-03 DIAGNOSIS — I50.22 CHRONIC SYSTOLIC HEART FAILURE (MULTI): ICD-10-CM

## 2024-06-03 PROCEDURE — 1090000001 HH PPS REVENUE CREDIT

## 2024-06-03 PROCEDURE — 1090000002 HH PPS REVENUE DEBIT

## 2024-06-03 RX ORDER — SPIRONOLACTONE 25 MG/1
25 TABLET ORAL DAILY
Qty: 90 TABLET | Refills: 1 | Status: SHIPPED | OUTPATIENT
Start: 2024-06-03

## 2024-06-04 ENCOUNTER — APPOINTMENT (OUTPATIENT)
Dept: RADIOLOGY | Facility: HOSPITAL | Age: 89
End: 2024-06-04
Payer: MEDICARE

## 2024-06-04 ENCOUNTER — ANESTHESIA (OUTPATIENT)
Dept: OPERATING ROOM | Facility: HOSPITAL | Age: 89
End: 2024-06-04
Payer: MEDICARE

## 2024-06-04 ENCOUNTER — HOSPITAL ENCOUNTER (OUTPATIENT)
Facility: HOSPITAL | Age: 89
Setting detail: OUTPATIENT SURGERY
Discharge: HOME | End: 2024-06-04
Attending: ANESTHESIOLOGY | Admitting: ANESTHESIOLOGY
Payer: MEDICARE

## 2024-06-04 ENCOUNTER — ANESTHESIA EVENT (OUTPATIENT)
Dept: OPERATING ROOM | Facility: HOSPITAL | Age: 89
End: 2024-06-04
Payer: MEDICARE

## 2024-06-04 VITALS
DIASTOLIC BLOOD PRESSURE: 57 MMHG | HEART RATE: 70 BPM | BODY MASS INDEX: 23.69 KG/M2 | WEIGHT: 138 LBS | RESPIRATION RATE: 18 BRPM | OXYGEN SATURATION: 98 % | TEMPERATURE: 97.2 F | SYSTOLIC BLOOD PRESSURE: 143 MMHG

## 2024-06-04 DIAGNOSIS — M80.08XA AGE-RELATED OSTEOPOROSIS WITH CURRENT PATHOLOGICAL FRACTURE OF VERTEBRA, INITIAL ENCOUNTER (MULTI): Primary | ICD-10-CM

## 2024-06-04 PROCEDURE — C1889 IMPLANT/INSERT DEVICE, NOC: HCPCS | Performed by: ANESTHESIOLOGY

## 2024-06-04 PROCEDURE — 7100000002 HC RECOVERY ROOM TIME - EACH INCREMENTAL 1 MINUTE: Performed by: ANESTHESIOLOGY

## 2024-06-04 PROCEDURE — 7100000001 HC RECOVERY ROOM TIME - INITIAL BASE CHARGE: Performed by: ANESTHESIOLOGY

## 2024-06-04 PROCEDURE — 2720000007 HC OR 272 NO HCPCS: Performed by: ANESTHESIOLOGY

## 2024-06-04 PROCEDURE — 2500000005 HC RX 250 GENERAL PHARMACY W/O HCPCS: Performed by: ANESTHESIOLOGY

## 2024-06-04 PROCEDURE — 1090000001 HH PPS REVENUE CREDIT

## 2024-06-04 PROCEDURE — 3600000003 HC OR TIME - INITIAL BASE CHARGE - PROCEDURE LEVEL THREE: Performed by: ANESTHESIOLOGY

## 2024-06-04 PROCEDURE — 3700000001 HC GENERAL ANESTHESIA TIME - INITIAL BASE CHARGE: Performed by: ANESTHESIOLOGY

## 2024-06-04 PROCEDURE — 2780000003 HC OR 278 NO HCPCS: Performed by: ANESTHESIOLOGY

## 2024-06-04 PROCEDURE — 7100000009 HC PHASE TWO TIME - INITIAL BASE CHARGE: Performed by: ANESTHESIOLOGY

## 2024-06-04 PROCEDURE — 3600000008 HC OR TIME - EACH INCREMENTAL 1 MINUTE - PROCEDURE LEVEL THREE: Performed by: ANESTHESIOLOGY

## 2024-06-04 PROCEDURE — 7100000010 HC PHASE TWO TIME - EACH INCREMENTAL 1 MINUTE: Performed by: ANESTHESIOLOGY

## 2024-06-04 PROCEDURE — 22514 PERQ VERTEBRAL AUGMENTATION: CPT | Performed by: ANESTHESIOLOGY

## 2024-06-04 PROCEDURE — 2550000001 HC RX 255 CONTRASTS: Performed by: ANESTHESIOLOGY

## 2024-06-04 PROCEDURE — 3700000002 HC GENERAL ANESTHESIA TIME - EACH INCREMENTAL 1 MINUTE: Performed by: ANESTHESIOLOGY

## 2024-06-04 PROCEDURE — 2500000004 HC RX 250 GENERAL PHARMACY W/ HCPCS (ALT 636 FOR OP/ED): Performed by: NURSE ANESTHETIST, CERTIFIED REGISTERED

## 2024-06-04 PROCEDURE — 1090000002 HH PPS REVENUE DEBIT

## 2024-06-04 PROCEDURE — 2500000004 HC RX 250 GENERAL PHARMACY W/ HCPCS (ALT 636 FOR OP/ED): Performed by: ANESTHESIOLOGY

## 2024-06-04 PROCEDURE — 2500000004 HC RX 250 GENERAL PHARMACY W/ HCPCS (ALT 636 FOR OP/ED): Mod: JZ | Performed by: ANESTHESIOLOGY

## 2024-06-04 DEVICE — SYSTEM WITHOUT NEEDLES WITH HV BONE CEMENT
Type: IMPLANTABLE DEVICE | Site: BACK | Status: FUNCTIONAL
Brand: AUTOPLEX, VERTAPLEX

## 2024-06-04 RX ORDER — MEPERIDINE HYDROCHLORIDE 25 MG/ML
12.5 INJECTION INTRAMUSCULAR; INTRAVENOUS; SUBCUTANEOUS EVERY 10 MIN PRN
Status: DISCONTINUED | OUTPATIENT
Start: 2024-06-04 | End: 2024-06-04 | Stop reason: HOSPADM

## 2024-06-04 RX ORDER — SODIUM CHLORIDE, SODIUM LACTATE, POTASSIUM CHLORIDE, CALCIUM CHLORIDE 600; 310; 30; 20 MG/100ML; MG/100ML; MG/100ML; MG/100ML
100 INJECTION, SOLUTION INTRAVENOUS CONTINUOUS
Status: DISCONTINUED | OUTPATIENT
Start: 2024-06-04 | End: 2024-06-04 | Stop reason: HOSPADM

## 2024-06-04 RX ORDER — BUPIVACAINE HYDROCHLORIDE 2.5 MG/ML
INJECTION, SOLUTION INFILTRATION; PERINEURAL AS NEEDED
Status: DISCONTINUED | OUTPATIENT
Start: 2024-06-04 | End: 2024-06-04 | Stop reason: HOSPADM

## 2024-06-04 RX ORDER — LIDOCAINE HYDROCHLORIDE 10 MG/ML
0.1 INJECTION INFILTRATION; PERINEURAL ONCE
Status: DISCONTINUED | OUTPATIENT
Start: 2024-06-04 | End: 2024-06-04 | Stop reason: HOSPADM

## 2024-06-04 RX ORDER — PROPOFOL 10 MG/ML
INJECTION, EMULSION INTRAVENOUS CONTINUOUS PRN
Status: DISCONTINUED | OUTPATIENT
Start: 2024-06-04 | End: 2024-06-04

## 2024-06-04 RX ORDER — FENTANYL CITRATE 50 UG/ML
50 INJECTION, SOLUTION INTRAMUSCULAR; INTRAVENOUS EVERY 5 MIN PRN
Status: DISCONTINUED | OUTPATIENT
Start: 2024-06-04 | End: 2024-06-04 | Stop reason: HOSPADM

## 2024-06-04 RX ORDER — ZOLPIDEM TARTRATE 5 MG/1
5 TABLET ORAL NIGHTLY PRN
Qty: 90 TABLET | Refills: 1 | Status: SHIPPED | OUTPATIENT
Start: 2024-06-04 | End: 2024-12-01

## 2024-06-04 RX ORDER — MIDAZOLAM HYDROCHLORIDE 1 MG/ML
1 INJECTION, SOLUTION INTRAMUSCULAR; INTRAVENOUS ONCE AS NEEDED
Status: DISCONTINUED | OUTPATIENT
Start: 2024-06-04 | End: 2024-06-04 | Stop reason: HOSPADM

## 2024-06-04 RX ORDER — ALBUTEROL SULFATE 0.83 MG/ML
2.5 SOLUTION RESPIRATORY (INHALATION) ONCE
Status: DISCONTINUED | OUTPATIENT
Start: 2024-06-04 | End: 2024-06-04 | Stop reason: HOSPADM

## 2024-06-04 RX ORDER — CEFAZOLIN SODIUM 2 G/100ML
2 INJECTION, SOLUTION INTRAVENOUS ONCE
Status: COMPLETED | OUTPATIENT
Start: 2024-06-04 | End: 2024-06-04

## 2024-06-04 RX ORDER — ONDANSETRON HYDROCHLORIDE 2 MG/ML
4 INJECTION, SOLUTION INTRAVENOUS ONCE AS NEEDED
Status: DISCONTINUED | OUTPATIENT
Start: 2024-06-04 | End: 2024-06-04 | Stop reason: HOSPADM

## 2024-06-04 RX ORDER — LIDOCAINE HYDROCHLORIDE AND EPINEPHRINE 10; 10 MG/ML; UG/ML
INJECTION, SOLUTION INFILTRATION; PERINEURAL AS NEEDED
Status: DISCONTINUED | OUTPATIENT
Start: 2024-06-04 | End: 2024-06-04 | Stop reason: HOSPADM

## 2024-06-04 RX ADMIN — CEFAZOLIN SODIUM 2 G: 2 INJECTION, SOLUTION INTRAVENOUS at 14:28

## 2024-06-04 RX ADMIN — PROPOFOL 100 MCG/KG/MIN: 10 INJECTION, EMULSION INTRAVENOUS at 14:24

## 2024-06-04 RX ADMIN — HYDROMORPHONE HYDROCHLORIDE 0.2 MG: 0.2 INJECTION, SOLUTION INTRAMUSCULAR; INTRAVENOUS; SUBCUTANEOUS at 16:39

## 2024-06-04 SDOH — HEALTH STABILITY: MENTAL HEALTH: CURRENT SMOKER: 0

## 2024-06-04 ASSESSMENT — ENCOUNTER SYMPTOMS
BACK PAIN: 1
ENDOCRINE NEGATIVE: 1
GASTROINTESTINAL NEGATIVE: 1
RESPIRATORY NEGATIVE: 1
CONSTITUTIONAL NEGATIVE: 1
NEUROLOGICAL NEGATIVE: 1
PSYCHIATRIC NEGATIVE: 1
EYES NEGATIVE: 1
HEMATOLOGIC/LYMPHATIC NEGATIVE: 1
CARDIOVASCULAR NEGATIVE: 1

## 2024-06-04 ASSESSMENT — PAIN SCALES - GENERAL
PAINLEVEL_OUTOF10: 0 - NO PAIN
PAINLEVEL_OUTOF10: 6
PAINLEVEL_OUTOF10: 3
PAINLEVEL_OUTOF10: 3
PAINLEVEL_OUTOF10: 5 - MODERATE PAIN
PAINLEVEL_OUTOF10: 3

## 2024-06-04 ASSESSMENT — PAIN - FUNCTIONAL ASSESSMENT
PAIN_FUNCTIONAL_ASSESSMENT: 0-10

## 2024-06-04 ASSESSMENT — COLUMBIA-SUICIDE SEVERITY RATING SCALE - C-SSRS
2. HAVE YOU ACTUALLY HAD ANY THOUGHTS OF KILLING YOURSELF?: NO
1. IN THE PAST MONTH, HAVE YOU WISHED YOU WERE DEAD OR WISHED YOU COULD GO TO SLEEP AND NOT WAKE UP?: NO
6. HAVE YOU EVER DONE ANYTHING, STARTED TO DO ANYTHING, OR PREPARED TO DO ANYTHING TO END YOUR LIFE?: NO

## 2024-06-04 ASSESSMENT — PAIN DESCRIPTION - ORIENTATION: ORIENTATION: MID

## 2024-06-04 ASSESSMENT — PAIN DESCRIPTION - LOCATION: LOCATION: BACK

## 2024-06-04 NOTE — OP NOTE
Lumbar 2 Kyphoplasty Operative Note     Date: 2024  OR Location: [facility]    Name: Neris Mccall : 1935, Age: 89 y.o., MRN: [id number], Sex: female    Diagnosis  Pre-op Diagnosis     * Age-related osteoporosis with current pathological fracture of vertebra, initial encounter (Multi) [M80.08XA] Post-op Diagnosis     * Age-related osteoporosis with current pathological fracture of vertebra, initial encounter (Multi) [M80.08XA]     Procedures  Lumbar 2 Kyphoplasty  47187 - DC PERQ VERT AGMNTJ CAVITY CRTJ UNI/BI CANNULATION      Surgeons      * Nelida Cotto - Primary    Resident/Fellow/Other Assistant:  Surgeons and Role:  * No surgeons found with a matching role *    Procedure Summary  Anesthesia: Monitor Anesthesia Care  ASA: III  Anesthesia Staff: Anesthesiologist: Suleman Tipton DO  CRNA: JOHN Morales-CAROLYN  Estimated Blood Loss: 2 mL  Intra-op Medications:   Administrations occurring from 1259 to 1603 on 24:   Medication Name Total Dose   lidocaine-epinephrine (Xylocaine W/EPI) 1 %-1:100,000 injection 7 mL   iohexol (OMNIPaque) 240 mg iodine/mL solution 15 mL   BUPivacaine HCl (Marcaine) 0.25 % (2.5 mg/mL) injection 8 mL   ceFAZolin in dextrose (iso-os) (Ancef) IVPB 2 g 2 g              Anesthesia Record               Intraprocedure I/O Totals          Intake    Propofol Drip 0.00 mL    The total shown is the total volume documented since Anesthesia Start was filed.    Total Intake 0 mL          Specimen: No specimens collected     Staff:   Scrub Person: Charleen  Circulator: Elieser  Scrub Person: Cuco         Drains and/or Catheters: * None in log *    Tourniquet Times:         Implants:  Implants       Type Name Action Serial No.      Implant IVAS SYSTEM, BONE CEMENT W/HALF DOSE VERTAPLEX IRAIS DELIVERY SYSTM W/O NEEDLE - [id number] Implanted       2CM X 4CM 0.79-1.78MM THICKNESS ALLODERM REGENERATIVE TISSUE MATRIX IMPLANT THICK ALLOGRAFT (FORMERLY LIFECELL) Implanted            "    Indications: Neris Mccall is an 89 y.o. female who is having surgery for Age-related osteoporosis with current pathological fracture of vertebra, initial encounter (Multi) [M80.08XA].     VERTEBRAL AUGMENTATION PROCEDURE NOTE: L2 Kyphoplasty under fluoroscopic guidance    PREPROCEDURAL DIAGNOSES: Osteoporotic compression fracture  POSTPROCEDURAL DIAGNOSES: Osteoporotic compression fracture      PROCEDURALIST: Nelida Cotto MD  ANESTHESIA: MAC  LOCATION:  [facility]      INDICATION FOR PROCEDURE:       Persistent back pain at site of vertebral body insufficiency fracture, intractable, failed conservative medical management including: time, rest, activity modification, oral analgesics. Kyphoplasty indicated to target pain generator as seen on imaging and minimize risk/likelihood of chronic opioid use and/or surgery         INFORMED CONSENT:    After reviewing the procedure with the patient, expected outcomes, potential benefits and procedural risks, informed consent to proceed with the injection was obtained.  The potential benefit of significantly decreased back pain was reviewed; however, the possibility of little to no pain relief resulting from the procedure was also discussed.  The patient was specifically made aware of risks involving spinal cord injury, nerve root injury, pedicle fracture, hematoma, pain at injection site, cement leakage, and vascular injury.     DESCRIPTION OF PROCEDURE:     Pt brought to the OR, placed in prone position, standard ASA monitors applied by anesthesia car provider. Pt was given preoperative antibiotics: 2 g cefazolin IV.      Patient's lumbar spine prepped with chloroprep and duraprep and draped in sterile fashion. The L2 vertebra was identified under fluoroscopy and the end plates squared off. Right pedicle identified. Skin anesthetized with 1% lidocaine. A 3.5\" 22 G spinal needle was advanced under fluoro guidance to contact pedicle. After negative aspiration, " additional 1% lidocaine was administered. Needle re-styletted and withdrawn. Then, skin puncture was made with a #11 blade.     An 11 G Jamshidi trocar with bevel tip stylet was placed percutaenously here and seated into the superolateral aspect of the right pedicle.  Using a small mallet, the trocar was advanced slowly with AP and lateral fluoroscopic guidance. Maintained close parapedicular approach due to pedicle width ~5 mm. Frequent AP/lateral images taken to ensure safe approach without violation of foramen or canal. Thus, the  trochar was advanced to a terminal position in the middle  portion of the L2 vertebral body.  Curved stylet advanced through trocar until across midline in AP and 2/3 to anterior border of vertebral body. Stylet removed and balloon inserted. Balloon inflated not to exceed 300 mm Hg to create cavity. Balloon did rupture under 300 mm Hg resulting in confounding contrast dye. Balloon then manually deflated and removed completely.   After cement was prepared using Striker kit, approximately 2.5 cc of cement was injected under constant live fluoroscopy showing excellent cephalo-caudad spread and interdigitation within the vertebral body. Bilateral spread was confirmed on AP views. No canal or foraminal spread of cement seen. The stylet was then replaced and the trocar was carefully removed. Steri-strips were applied to the puncture site.     The pt was transferred to the recovery room in stable condition and left supine for 30 min until the cement was cured. At no time during the procedure did the patient experience lower extremity pain or paresthesia.  The patient was neurologically intact post-op and the BLE were assessed in the recovery room.      The patient reported that back pain was improved by  after recovery and ambulation.  The patient was discharged home  in excellent condition with a family member.       DISPOSITION:     HOME  COMPLICATIONS: NONE      Nelida Cotto  MD  Anesthesiologist & Interventional Pain Physician   [facility]  O: [phone]  F: [phone]  4:10 PM  06/04/24

## 2024-06-04 NOTE — POST-PROCEDURE NOTE
1721- pt brought back from pacu,verbal report received. Pt is alert and awake. Daughter at bedside. Water given.     1745-vss. Discharge instructions given and explained to pt and daughter. Both verbally understood. Pt tolerating drink.     1753- pt getting dressed at this time with assistance of daughter.    1759- pt discharged via her personal wheelchair with daughter.

## 2024-06-04 NOTE — PRE-PROCEDURE NOTE
Pt took aspirin & ibuprofen today. Will breana Cotto. Pt also on Cipro for Uti has one more dose left.

## 2024-06-04 NOTE — DISCHARGE INSTRUCTIONS
DISCHARGE INSTRUCTIONS FOR INJECTIONS     You underwent a kyphoplasty today    Aftermost injections, it is recommended that you relax and limit your activity for the remainder of the day unless you have been told otherwise by your pain physician.  You should not drive a car, operate machinery, or make important legal decisions unless otherwise directed by your pain physician.  You may resume your normal activity, including exercise, tomorrow.      Keep a written pain diary of how much pain relief you experienced following the injection procedure and the length of time of pain relief you experienced pain relief. Following diagnostic injections like medial branch nerve blocks, sacroiliac joint blocks, stellate ganglion injections and other blocks, it is very important you record the specific amount of pain relief you experienced immediately after the injectionand how long it lasted. Your doctor will ask you for this information at your follow up visit.     For all injections, please keep the injection site dry and inspect the site for a couple of days. You may remove the Band-Aid the day of the injection at any time.     Some discomfort, bruising or slight swelling may occur at the injection site. This is not abnormal if it occurs.  If needed you may:    -Take over the counter medication such as Tylenol or Motrin.   -Apply an ice pack for 30 minutes, 2 to 3 times a day for the first 24 hours.     You may shower in 2 days; no soaking baths, hot tubs, whirlpools or swimming pools for two weeks.      If you are given steroids in your injection, it may take 3-5 days for the steroid medication to take effect. You may notice a worsening of your symptoms for 1-2 days after the injection. This is not abnormal.  You may use acetaminophen, ibuprofen, or prescription medication that your doctor may have prescribed for you if you need to do so.     A few common side effects of steroids include facial flushing, sweating,  restlessness, irritability,difficulty sleeping, increase in blood sugar, and increased blood pressure. If you have diabetes, please monitor your blood sugar at least once a day for at least 5 days. If you have poorly controlled high blood pressure, monitoryour blood pressure for at least 2 days and contact your primary care physician if these numbers are unusually high for you.      If you take aspirin or non-steroidal anti-inflammatory drugs (examples are Motrin, Advil, ibuprofen, Naprosyn, Voltaren, Relafen, etc.) you may restart these this evening, but stop taking it 3 days before your next appointment, unless instructed otherwiseby your physician.      You do not need to discontinue non-aspirin-containing pain medications prior to an injection (examples: Celebrex, tramadol, hydrocodone and acetaminophen).      If you take a blood thinning medication (Coumadin, Lovenox, Fragmin,Ticlid, Plavix, Pradaxa, etc.), please discuss this with your primary care physician/cardiologist and your pain physician. These medications MUST be discontinued before you can have an injection safely, without the risk of uncontrolled bleeding. If these medications are not discontinued for an appropriate period of time, you will not be able to receivean injection.      If you are taking Coumadin, please have your INR checked the morning of your procedure and bringthe result to your appointment unless otherwise instructed. If your INR is over 1.2, your injection may need to be rescheduled to avoid uncontrolled bleeding from the needle placement.     Call Replaced by [facility] at [phone] between 8am-4pm Monday - Friday if you are experiencing the following:    If you received an epidural or spinal injection:    -Headache that doesnot go away with medicine, is worse when sitting or standing up, and is greatly relieved upon lying down.   -Severe pain worse than or different than your baseline pain.   -Chills or fever (101º F or  greater).   -Drainage or signs of infection at the injection site     Go directly to the Emergency Department if you are experiencing the following and received an epidural or spinal injection:   -Abrupt weakness or progressive weakness in your legs that starts after you leave the clinic.   -Abrupt severe or worsening numbness in your legs.   -Inability to urinate after the injection or loss of bowel or bladder control without the urge to defecate or urinate.     If you have a clinical question that cannot wait until your next appointment, please call [phone] between 8am-4pm Monday - Friday or send a [employer] message. We do our best to return all non-emergency messages within 24 hours, Monday - Friday. A nurse or physician will return your message.      If you need to cancel an appointment, please call the scheduling staff at [phone] during normal business hours or leave a message at least 24 hours in advance.     If you are going to be sedated for your next procedure, you MUST have responsible adult who can legally drive accompany you home. You cannot eat or drink for eight hours prior to the planned procedure if you are going to receive sedation. You may take your non-blood thinning medications with a small sip of water.

## 2024-06-04 NOTE — H&P
PAIN MANAGEMENT H&P    Date of Service: 6/4/2024  SUBJECTIVE    CHIEF COMPLAINT: LBP    HISTORY OF PRESENT ILLNESS    Neris Mccall is a 89 y.o. old female with PMH mod aortic stenosis, mod CAD c/b MI in 40s on ASA, HFrEF, LBBB, HTN, osteoporosis who presents for L2 kyphopasty.    Pt taking last dose of cipro today for UTI. Denies fever, chills, NS, abdominal pain, dysuria, polyuria.    Pain and overall medical condition unchanged from previous visit. Pt is appropriately NPO. Pt denies new-onset numbness, weakness, bowel/bladder incontinence.  Pt denies recent infection/abx use, allergy to Latex/iodine/contrast. Patient is currently taking the following blood thinner(s):     REVIEW OF SYSTEMS  Review of Systems   Constitutional: Negative.    HENT: Negative.     Eyes: Negative.    Respiratory: Negative.     Cardiovascular: Negative.    Gastrointestinal: Negative.    Endocrine: Negative.    Musculoskeletal:  Positive for back pain.   Skin: Negative.    Neurological: Negative.    Hematological: Negative.    Psychiatric/Behavioral: Negative.         PAST MEDICAL HISTORY  Past Medical History:   Diagnosis Date    Acute upper respiratory infection, unspecified 01/15/2018    Acute URI    Aneurysm of unspecified site (CMS-HCC)     Aneurysm    Arrhythmia 1975    Arthritis 2005    BiPAP (biphasic positive airway pressure) dependence 2023    Cataract 2005    Chronic pain disorder 1960    Coronary artery disease 2021    CPAP (continuous positive airway pressure) dependence 2014    Dependence on other enabling machines and devices     CPAP (continuous positive airway pressure) dependence    Dysphagia 2021    Fractures 1955    GERD (gastroesophageal reflux disease) 1985    No longer problem    Glaucoma 2021    Hearing aid worn 1985    Heart disease 1980    Heart failure (Multi) 2019    Heart valve disease 2021    History of blood transfusion 1958    HL (hearing loss) 1970    Hypertension 1972    Low back pain 1950    Lumbar disc  disease 2016    Old myocardial infarction     History of myocardial infarction    Other nail disorders 03/14/2017    Green nails    Pacemaker 2019    Personal history of diseases of the skin and subcutaneous tissue 12/14/2016    History of folliculitis    Personal history of other diseases of the circulatory system 05/16/2022    History of intermittent claudication    Personal history of other diseases of the circulatory system     History of high blood pressure    Personal history of other diseases of the musculoskeletal system and connective tissue     History of arthritis    Personal history of other diseases of the nervous system and sense organs     History of sleep apnea    Personal history of other endocrine, nutritional and metabolic disease     History of high cholesterol    Personal history of other venous thrombosis and embolism     H/O blood clots    Personal history of transient ischemic attack (TIA), and cerebral infarction without residual deficits     History of stroke    Platelet disorder (Multi) 1998    PCV    Pregnant [facility]    Scoliosis 2016    Secondary polycythemia     Polycythemia    Shingles 1996    Sleep apnea     Spinal stenosis, lumbar region with neurogenic claudication 11/01/2022    Neurogenic claudication due to lumbar spinal stenosis    Stroke (Multi) 1967    Urinary tract infection 2019    Vision loss 2017     Past Surgical History:   Procedure Laterality Date    BACK SURGERY      MILD procedure    BLEPHAROPLASTY  2002    COLONOSCOPY  2014    CORRECTION HAMMER TOE  2004    DILATION AND CURETTAGE OF UTERUS  04/20/2016    Dilation And Curettage    FRACTURE SURGERY  2015    INSERT / REPLACE / REMOVE PACEMAKER  2019    KNEE SURGERY  04/20/2016    Knee Surgery Left    ORIF WRIST FRACTURE  1955    OTHER SURGICAL HISTORY  04/20/2016    Ear Surgery    OTHER SURGICAL HISTORY  04/20/2016    Hammertoe Operation Left Toe No. ___    OTHER SURGICAL HISTORY  07/29/2022    Pacemaker insertion     ROTATOR CUFF REPAIR  04/20/2016    Rotator Cuff Repair    STAPEDECTOMY  1979    TOE SURGERY  2004    TONSILLECTOMY  04/20/2016    Tonsillectomy    UPPER GASTROINTESTINAL ENDOSCOPY  2013    VARICOSE VEIN SURGERY  04/20/2016    Varicose Vein Ligation     Family History   Problem Relation Name Age of Onset    Cancer Mother GIBSON SALINAS     Hypertension Sister KELSIE RENTERIA     Breast cancer Sister KELSIE RENTERIA 87    Coronary artery disease Brother TIN SALINAS     Hearing loss Brother SERINA SALINAS     Vision loss Brother SERINA SALINAS     Heart disease Brother AUDREY SALINAS     Breast cancer Paternal Grandmother  30       CURRENT MEDICATIONS  Current Facility-Administered Medications   Medication Dose Route Frequency Provider Last Rate Last Admin    ceFAZolin in dextrose (iso-os) (Ancef) IVPB 2 g  2 g intravenous Once Nelida Cotto MD        lactated Ringer's infusion  100 mL/hr intravenous Continuous Nelida Cotto MD           ALLERGIES AND DRUG REACTIONS  Allergies   Allergen Reactions    Duloxetine Diarrhea and Other     Reaction from Community: Diarrhea    Sulfa (Sulfonamide Antibiotics) Other and Rash    Atenolol Unknown    Cephalexin Diarrhea    Cholestyramine Unknown    Gemfibrozil Unknown    House Dust Other    Niacin Unknown    Pravastatin Unknown    Tramadol Other and Hallucinations     Reaction from Community: Hallucinations    Tree Pollen-White Maxim Unknown     Maxim, White          OBJECTIVE  Visit Vitals  /65   Pulse 69   Temp 36.9 °C (98.4 °F) (Temporal)   Resp 16   Wt 62.6 kg (138 lb)   SpO2 95%   BMI 23.69 kg/m²   OB Status Perimenopausal   Smoking Status Never   BSA 1.68 m²     General: Laying in bed, NAD  Head: NCAT  Eyes: Sclera/conjunctiva clear, EOMI, PERRL  Nose/mouth: MMM  CV: Good distal pulses  Lungs: Good/equal chest excursion  Abdomen: Soft, ND  Ext: No cyanosis/edema  MSK: Able to move extremities    Neuro: AAOx3  LEFT L1 (lower pelvis/upper thigh): WNL    RIGHT L1: WNL     "  LEFT L2 (upper thigh): WNL       RIGHT: L2:WNL      LEFT L3 (medial knee): WNL       RIGHT L3: WNL      LEFT L4 (superior medial malleolus): WNL       RIGHT L4: WNL      LEFT L5 (dorsal foot): WNL       RIGHT L5: WNL      LEFT S1 (lateral foot): WNL     RIGHT S1: WNL      LEFT S2 (popliteal fossa): WNL    RIGHT S2: WNL        Motor strength  LEFT L2 (hip flexion): 5/5   RIGHT L2: 5/5  LEFT L3 (knee extension): 5/5     RIGHT L3: 5/5  LEFT L4 (dorsiflexion): 5/5     RIGHT L4: 5/5  LEFT L5 (EHL extension): 5/5     RIGHT L5: 5/5  LEFT S1 (plantarflexion): 5/5     RIGHT S1: 5/5  LEFT S2 (knee flexion): 5/5      RIGHT S2: 5/5    Psych: affect nl    Skin: no lesions      REVIEW OF LABORATORY DATA  I have reviewed the following lab results:  No results found for: \"WBC\", \"RBC\", \"HGB\", \"HCT\", \"MCV\", \"MCH\", \"MCHC\", \"RDW\", \"PLT\", \"MPV\"  No results found for: \"NA\", \"K\", \"CO2\", \"BUN\", \"CALCIUM\"  No results found for: \"PROTIME\", \"PTT\", \"INR\", \"FIBRINOGEN\"      REVIEW OF RADIOLOGY DATA  I have reviewed the following:  Radiology Studies           CT L-spine 5/28/24:  Mild straightening of the lumbar lordosis. No spondylolisthesis is  identified.      Since the prior CT of 03/19/2022, there is progression of height loss  of the L2 vertebral body with a proximally 75% loss of height  centrally and 50% loss of height anteriorly and posteriorly. No  significant bony retropulsion. However these findings do not appear  significantly changed from the x-ray of the lumbar spine dated  03/27/2024 no evidence for high-grade thecal sac stenosis although  limited on CT examination.      Mild to moderate thecal sac stenosis in the lumbar spine is again  seen. Moderate-severe left neural foramina stenosis at L5-S1.  Additional levels of moderate stenosis.      Diffuse degenerative disc disease, disc space narrowing, and diffuse  disc bulging is again seen. Levoscoliosis is again noted with with  apex at L2-3. Endplate degenerative changes and " facet degenerative  changes are again seen.      The SI joints are intact.      IMPRESSION:  1.  Progression of height loss of the previously visualized  compression fracture of L2 with significantly increased height loss  since the prior CT study of 03/19/2022, however, this is not  significantly changed from the lumbar x-ray of 03/27/2024.      2.  No evidence for new/acute osseous abnormality.      3.  Prominent degenerative changes of the lumbar spine are again  noted.       ASSESSMENT & PLAN  Neris Mccall is a 89 y.o. old female with PMH mod aortic stenosis, mod CAD c/b MI in 40s on ASA, HFrEF, LBBB, HTN, osteoporosis who presents for L2 kyphopasty    1) LBP  -2/2 L2 VCF with proximally 75% loss of height centrally and 50% loss of height anteriorly and posteriorly. No significant bony retropulsion as seen on CT L-spine 5/28/24  -L2 kypho today to target pain generator as seen on imaging and minimize risk/likelihood of chronic opioid use and/or surgery        Discussed procedure risks/benefits in detail with patient. Pt meets medical necessity for procedure due to failure of conservative measures. Reviewed procedural risks including bleeding (discussed higher risk 2/2 aspirin use), infection, nerve damage, paralysis. Also reviewed mitigating factors such as screening for infection/blood thinner use, sterile precautions, and image-guidance when applicable. All questions answered. Pt/guardian expressed understanding and choose to proceed            Nelida Cotto MD  Anesthesiologist & Interventional Pain Physician   [facility] [place]  O: [phone]  F: [phone]  1:57 PM  06/04/24

## 2024-06-04 NOTE — ANESTHESIA PREPROCEDURE EVALUATION
Patient: Neris Mccall    Procedure Information       Date/Time: 06/04/24 1415    Procedure: Lumbar 2 Kyphoplasty - C-ARM, JOSEPH - MIGUE  *AOA*    [facility]: [facility]  / [facility]    Surgeons: Nelida Cotto MD            Relevant Problems   Cardiac   (+) Aortic valve sclerosis   (+) Biventricular cardiac pacemaker in situ   (+) Biventricular implantable cardioverter-defibrillator (ICD) in situ   (+) Complete atrioventricular block (Multi)   (+) Heart murmur, systolic   (+) Hyperlipidemia   (+) Hypertension   (+) LBBB (left bundle branch block)   (+) Moderate aortic stenosis      Pulmonary   (+) LISA treated with BiPAP      Neuro   (+) Depression   (+) Facial nerve paresis   (+) Peripheral neuropathy      GI   (+) Dysphagia   (+) GERD without esophagitis      /Renal   (+) Acute lower UTI      Endocrine   (+) Nontoxic multinodular goiter      Hematology   (+) Erythrocytosis   (+) Polycythemia vera (Multi)   (+) Secondary polycythemia      Musculoskeletal   (+) Chronic pain syndrome   (+) Degenerative joint disease of cervical spine   (+) Knee osteoarthritis   (+) Lumbar canal stenosis   (+) Neck pain, chronic   (+) Spinal stenosis, lumbar region, with neurogenic claudication   (+) Spondylosis without myelopathy or radiculopathy, lumbosacral region      HEENT   (+) Asymmetric SNHL (sensorineural hearing loss)   (+) Glaucoma   (+) Hearing impairment   (+) Mixed conductive and sensorineural hearing loss, bilateral   (+) Seasonal allergies      ID   (+) Acute lower UTI   (+) Tinea cruris       Clinical information reviewed:   Tobacco  Allergies  Meds   Med Hx  Surg Hx  OB Status  Fam Hx  Soc   Hx        NPO Detail:  NPO/Void Status  Date of Last Liquid: 06/04/24  Time of Last Liquid: 1030  Date of Last Solid: 06/03/24  Time of Last Solid: 2100  Time of Last Void: 0000         Physical Exam    Airway  Mallampati: II  TM distance: >3 FB  Neck ROM: full     Cardiovascular - normal exam     Dental - normal  exam     Pulmonary - normal exam     Abdominal - normal exam             Anesthesia Plan    History of general anesthesia?: yes  History of complications of general anesthesia?: no    ASA 3     MAC     The patient is not a current smoker.  Patient was not previously instructed to abstain from smoking on day of procedure.  Patient did not smoke on day of procedure.  Education provided regarding risk of obstructive sleep apnea.  intravenous induction   Postoperative administration of opioids is intended.  Trial extubation is planned.  Anesthetic plan and risks discussed with patient.  Use of blood products discussed with patient who consented to blood products.    Plan discussed with CAA, attending and CRNA.

## 2024-06-04 NOTE — ANESTHESIA POSTPROCEDURE EVALUATION
Patient: Neris Mccall    Procedure Summary       Date: 06/04/24 Room / Location: [facility]    Anesthesia Start: 1423 Anesthesia Stop: 1613    Procedure: Lumbar 2 Kyphoplasty Diagnosis:       Age-related osteoporosis with current pathological fracture of vertebra, initial encounter (Multi)      (Age-related osteoporosis with current pathological fracture of vertebra, initial encounter (Multi) [M80.08XA])    Surgeons: Nelida Cotto MD Responsible Provider: PAULA Morales    Anesthesia Type: MAC ASA Status: 3            Anesthesia Type: MAC    Vitals Value Taken Time   /59 06/04/24 1611   Temp  06/04/24 1613   Pulse 66 06/04/24 1613   Resp 20 06/04/24 1613   SpO2 98 % 06/04/24 1613   Vitals shown include unfiled device data.    Anesthesia Post Evaluation    Patient location during evaluation: bedside  Patient participation: complete - patient participated  Level of consciousness: awake  Pain management: adequate  Airway patency: patent  Cardiovascular status: acceptable  Respiratory status: acceptable  Hydration status: acceptable  Postoperative Nausea and Vomiting: none        There were no known notable events for this encounter.

## 2024-06-05 PROCEDURE — 1090000002 HH PPS REVENUE DEBIT

## 2024-06-05 PROCEDURE — 1090000001 HH PPS REVENUE CREDIT

## 2024-06-06 ENCOUNTER — HOME CARE VISIT (OUTPATIENT)
Dept: HOME HEALTH SERVICES | Facility: HOME HEALTH | Age: 89
End: 2024-06-06
Payer: MEDICARE

## 2024-06-06 NOTE — CASE COMMUNICATION
missed visit this date due to patient fatigued from procedure. Agreed to PT visit next week tuesday.

## 2024-06-11 ENCOUNTER — HOME CARE VISIT (OUTPATIENT)
Dept: HOME HEALTH SERVICES | Facility: HOME HEALTH | Age: 89
End: 2024-06-11
Payer: MEDICARE

## 2024-06-11 ENCOUNTER — HOSPITAL ENCOUNTER (OUTPATIENT)
Dept: CARDIOLOGY | Facility: HOSPITAL | Age: 89
Discharge: HOME | End: 2024-06-11
Payer: MEDICARE

## 2024-06-11 VITALS
SYSTOLIC BLOOD PRESSURE: 140 MMHG | DIASTOLIC BLOOD PRESSURE: 80 MMHG | OXYGEN SATURATION: 95 % | TEMPERATURE: 98.2 F | HEART RATE: 76 BPM

## 2024-06-11 DIAGNOSIS — I35.0 AORTIC VALVE STENOSIS, ETIOLOGY OF CARDIAC VALVE DISEASE UNSPECIFIED: ICD-10-CM

## 2024-06-11 PROCEDURE — G0151 HHCP-SERV OF PT,EA 15 MIN: HCPCS | Mod: HHH

## 2024-06-11 PROCEDURE — 0023 HH SOC

## 2024-06-11 PROCEDURE — 93306 TTE W/DOPPLER COMPLETE: CPT

## 2024-06-11 SDOH — HEALTH STABILITY: PHYSICAL HEALTH: EXERCISE ACTIVITY: HEEL/TOE RAISES

## 2024-06-11 SDOH — HEALTH STABILITY: PHYSICAL HEALTH: EXERCISE ACTIVITIES SETS: 1

## 2024-06-11 SDOH — HEALTH STABILITY: PHYSICAL HEALTH: EXERCISE TYPE: SEATED AND STD

## 2024-06-11 SDOH — HEALTH STABILITY: PHYSICAL HEALTH: PHYSICAL EXERCISE: 10

## 2024-06-11 SDOH — HEALTH STABILITY: PHYSICAL HEALTH

## 2024-06-11 SDOH — HEALTH STABILITY: PHYSICAL HEALTH: PHYSICAL EXERCISE: STANDING

## 2024-06-11 SDOH — HEALTH STABILITY: PHYSICAL HEALTH: PHYSICAL EXERCISE: SEATED

## 2024-06-11 SDOH — HEALTH STABILITY: PHYSICAL HEALTH: EXERCISE ACTIVITY: LAQ

## 2024-06-11 SDOH — HEALTH STABILITY: PHYSICAL HEALTH: EXERCISE ACTIVITY: AP

## 2024-06-11 ASSESSMENT — ACTIVITIES OF DAILY LIVING (ADL)
CURRENT_FUNCTION: SUPERVISION
DRESSING_LB_CURRENT_FUNCTION: INDEPENDENT
AMBULATION ASSISTANCE ON FLAT SURFACES: 1
PHYSICAL TRANSFERS ASSESSED: 1
AMBULATION ASSISTANCE: SUPERVISION
AMBULATION ASSISTANCE: 1

## 2024-06-11 ASSESSMENT — ENCOUNTER SYMPTOMS
PERSON REPORTING PAIN: PATIENT
PAIN LOCATION - PAIN QUALITY: ACHE
PAIN LOCATION - PAIN SEVERITY: 4/10
PAIN: 1
PAIN LOCATION: BACK
OCCASIONAL FEELINGS OF UNSTEADINESS: 0

## 2024-06-12 ENCOUNTER — OFFICE VISIT (OUTPATIENT)
Dept: PAIN MEDICINE | Facility: CLINIC | Age: 89
End: 2024-06-12
Payer: MEDICARE

## 2024-06-12 ENCOUNTER — HOSPITAL ENCOUNTER (OUTPATIENT)
Dept: RADIOLOGY | Facility: CLINIC | Age: 89
Discharge: HOME | End: 2024-06-12
Payer: MEDICARE

## 2024-06-12 VITALS
DIASTOLIC BLOOD PRESSURE: 74 MMHG | RESPIRATION RATE: 18 BRPM | SYSTOLIC BLOOD PRESSURE: 130 MMHG | HEART RATE: 85 BPM | OXYGEN SATURATION: 95 % | HEIGHT: 64 IN | WEIGHT: 138 LBS | BODY MASS INDEX: 23.56 KG/M2
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
## 2024-06-12 DIAGNOSIS — M79.604 PAIN OF RIGHT LOWER EXTREMITY: ICD-10-CM

## 2024-06-12 DIAGNOSIS — M25.552 BILATERAL HIP PAIN: ICD-10-CM

## 2024-06-12 DIAGNOSIS — M25.551 BILATERAL HIP PAIN: ICD-10-CM

## 2024-06-12 DIAGNOSIS — M79.605 PAIN OF LEFT LOWER EXTREMITY: ICD-10-CM
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
## 2024-06-12 PROCEDURE — 3078F DIAST BP <80 MM HG: CPT | Performed by: PHYSICIAN ASSISTANT

## 2024-06-12 PROCEDURE — 73564 X-RAY EXAM KNEE 4 OR MORE: CPT | Mod: BILATERAL PROCEDURE | Performed by: RADIOLOGY

## 2024-06-12 PROCEDURE — 1159F MED LIST DOCD IN RCRD: CPT | Performed by: PHYSICIAN ASSISTANT

## 2024-06-12 PROCEDURE — 72100 X-RAY EXAM L-S SPINE 2/3 VWS: CPT

## 2024-06-12 PROCEDURE — 72100 X-RAY EXAM L-S SPINE 2/3 VWS: CPT | Performed by: RADIOLOGY

## 2024-06-12 PROCEDURE — 3075F SYST BP GE 130 - 139MM HG: CPT | Performed by: PHYSICIAN ASSISTANT

## 2024-06-12 PROCEDURE — 99214 OFFICE O/P EST MOD 30 MIN: CPT | Performed by: PHYSICIAN ASSISTANT

## 2024-06-12 PROCEDURE — 73564 X-RAY EXAM KNEE 4 OR MORE: CPT | Mod: 50

## 2024-06-12 PROCEDURE — 1160F RVW MEDS BY RX/DR IN RCRD: CPT | Performed by: PHYSICIAN ASSISTANT

## 2024-06-12 PROCEDURE — 1036F TOBACCO NON-USER: CPT | Performed by: PHYSICIAN ASSISTANT

## 2024-06-12 PROCEDURE — 73521 X-RAY EXAM HIPS BI 2 VIEWS: CPT

## 2024-06-12 PROCEDURE — 1157F ADVNC CARE PLAN IN RCRD: CPT | Performed by: PHYSICIAN ASSISTANT

## 2024-06-12 ASSESSMENT — ENCOUNTER SYMPTOMS
WEAKNESS: 1
RESPIRATORY NEGATIVE: 1
EYES NEGATIVE: 1
JOINT SWELLING: 0
ENDOCRINE NEGATIVE: 1
BACK PAIN: 1
ALLERGIC/IMMUNOLOGIC NEGATIVE: 1
ARTHRALGIAS: 1
PSYCHIATRIC NEGATIVE: 1
CARDIOVASCULAR NEGATIVE: 1
HEMATOLOGIC/LYMPHATIC NEGATIVE: 1
GASTROINTESTINAL NEGATIVE: 1
CONSTITUTIONAL NEGATIVE: 1

## 2024-06-12 ASSESSMENT — PAIN SCALES - GENERAL
PAINLEVEL_OUTOF10: 5 - MODERATE PAIN
PAINLEVEL: 5

## 2024-06-12 ASSESSMENT — PAIN - FUNCTIONAL ASSESSMENT: PAIN_FUNCTIONAL_ASSESSMENT: 0-10

## 2024-06-12 ASSESSMENT — PATIENT HEALTH QUESTIONNAIRE - PHQ9
2. FEELING DOWN, DEPRESSED OR HOPELESS: NOT AT ALL
SUM OF ALL RESPONSES TO PHQ9 QUESTIONS 1 & 2: 0
1. LITTLE INTEREST OR PLEASURE IN DOING THINGS: NOT AT ALL

## 2024-06-12 ASSESSMENT — LIFESTYLE VARIABLES
HOW OFTEN DO YOU HAVE A DRINK CONTAINING ALCOHOL: NEVER
SKIP TO QUESTIONS 9-10: 1
HOW MANY STANDARD DRINKS CONTAINING ALCOHOL DO YOU HAVE ON A TYPICAL DAY: PATIENT DOES NOT DRINK
HOW OFTEN DO YOU HAVE SIX OR MORE DRINKS ON ONE OCCASION: NEVER
AUDIT-C TOTAL SCORE: 0

## 2024-06-12 NOTE — CASE COMMUNICATION
Patient reports she fell saturday night 6/8 while in bathroom trying to put on her cpap m ask and got dizzy and fell onto her bottom then fell onto her back but did not hit her head. Patient reports she had to call ems to get her up.  Patient reports she did not have any pain after she fell but today Patient reports pain in her left side of her back is better but now she has pain on the right side of her back 4/10. Patient has no new br uising. Educated patient to put cpap on while seated on edge of bed.

## 2024-06-12 NOTE — PROGRESS NOTES
Subjective   Patient ID: Neris Mccall is a 89 y.o. female who presents for Follow-up.  Pt is a 89 y/o female here today for post of from her L2 kyphoplasty 6/4/24 with Dr BARRIOS. Pt son is here that help provide historical date.  Pt states that her pain in the upper lumbar spine is better. Pt states that early Saturday her pain in her lower lumbar increased and she has pain in the hip and knees and thigh pain that is increased after she fell Saturday evening. [place] and has [place] and family lives close to her. Pt has a pacer and cannot have MRI per pt. Pt use 81 ASA for preventative reasons.         Review of Systems   Constitutional: Negative.    HENT: Negative.     Eyes: Negative.    Respiratory: Negative.     Cardiovascular: Negative.    Gastrointestinal: Negative.    Endocrine: Negative.    Genitourinary: Negative.    Musculoskeletal:  Positive for arthralgias, back pain and gait problem. Negative for joint swelling.   Skin: Negative.    Allergic/Immunologic: Negative.    Neurological:  Positive for weakness.   Hematological: Negative.    Psychiatric/Behavioral: Negative.         Objective   Physical Exam  Musculoskeletal:      Comments: Well healed incision. Nontender. BLE STR grossly intact for age. Pain with Knee ROM and hip Rom. Pt uses rolator for ambulation. Pt antalgic gait.          Assessment/Plan   Problem List Items Addressed This Visit             ICD-10-CM    Bilateral hip pain M25.551, M25.552    Pain of left lower extremity M79.605    Pain of right lower extremity M79.604     Other Visit Diagnoses         Codes    Fall, initial encounter    -  Primary W19.XXXA        I had nice discussion with the patient today our plan will be as follows.      Radiology: [ due to the osteoporosis and the recent fall I think it would be prudent to order x-rays of back hips and knees. ]      Physically:  [ continue modification of activities, healthy lifestyle choice ]      Psychologically:  [ No acute  psychological concerns ]      Medication: [ none at this apt.  ]      Duration:  [1 week]      Intervention:  [Concerned that the fall is potentially aggravated patient's condition or she may have an additional fall type injury.  I think it be prudent to evaluate hips and knees for potentially causative factors for the anterior thigh symptoms. ]           Kaden Guerra PA-C 06/12/24 12:07 PM

## 2024-06-13 ENCOUNTER — HOME CARE VISIT (OUTPATIENT)
Dept: HOME HEALTH SERVICES | Facility: HOME HEALTH | Age: 89
End: 2024-06-13
Payer: MEDICARE

## 2024-06-13 PROCEDURE — G0151 HHCP-SERV OF PT,EA 15 MIN: HCPCS | Mod: HHH

## 2024-06-13 ASSESSMENT — ENCOUNTER SYMPTOMS
PAIN LOCATION: LEFT KNEE
PAIN: 1
PAIN LOCATION: BACK
LOWEST PAIN SEVERITY IN PAST 24 HOURS: 4/10
PAIN LOCATION - PAIN SEVERITY: 5/10
PAIN LOCATION - PAIN FREQUENCY: CONSTANT
HIGHEST PAIN SEVERITY IN PAST 24 HOURS: 7/10
SUBJECTIVE PAIN PROGRESSION: GRADUALLY IMPROVING
PAIN LOCATION: RIGHT KNEE
PAIN LOCATION - PAIN SEVERITY: 5/10
PAIN LOCATION - PAIN QUALITY: ACHE
PAIN LOCATION - PAIN SEVERITY: 4/10
PERSON REPORTING PAIN: PATIENT

## 2024-06-17 ENCOUNTER — HOME CARE VISIT (OUTPATIENT)
Dept: HOME HEALTH SERVICES | Facility: HOME HEALTH | Age: 89
End: 2024-06-17
Payer: MEDICARE

## 2024-06-17 ENCOUNTER — TELEPHONE (OUTPATIENT)
Dept: PAIN MEDICINE | Facility: CLINIC | Age: 89
End: 2024-06-17
Payer: MEDICARE

## 2024-06-17 ENCOUNTER — HOSPITAL ENCOUNTER (OUTPATIENT)
Dept: CARDIOLOGY | Facility: CLINIC | Age: 89
Discharge: HOME | End: 2024-06-17
Payer: MEDICARE

## 2024-06-17 DIAGNOSIS — I44.2 CHB (COMPLETE HEART BLOCK) (MULTI): ICD-10-CM

## 2024-06-17 DIAGNOSIS — M80.08XA AGE-RELATED OSTEOPOROSIS WITH CURRENT PATHOL FRACTURE OF VERTEBRA, INITIAL ENCOUNTER (MULTI): Primary | ICD-10-CM

## 2024-06-17 LAB
AORTIC VALVE MEAN GRADIENT: 19.8 MMHG
AORTIC VALVE PEAK VELOCITY: 2.73 M/S
AV PEAK GRADIENT: 29.8 MMHG
AVA (PEAK VEL): 0.89 CM2
AVA (VTI): 1.01 CM2
EJECTION FRACTION APICAL 4 CHAMBER: 56.1
LEFT ATRIUM VOLUME AREA LENGTH INDEX BSA: 31.9 ML/M2
LEFT VENTRICULAR OUTFLOW TRACT DIAMETER: 1.91 CM
LV EJECTION FRACTION BIPLANE: 57 %
RIGHT VENTRICLE FREE WALL PEAK S': 8 CM/S
RIGHT VENTRICLE PEAK SYSTOLIC PRESSURE: 72.6 MMHG
TRICUSPID ANNULAR PLANE SYSTOLIC EXCURSION: 1.4 CM

## 2024-06-17 PROCEDURE — G0151 HHCP-SERV OF PT,EA 15 MIN: HCPCS | Mod: HHH

## 2024-06-17 PROCEDURE — 93296 REM INTERROG EVL PM/IDS: CPT

## 2024-06-17 RX ORDER — OXYCODONE HYDROCHLORIDE 5 MG/1
5 TABLET ORAL EVERY 12 HOURS PRN
Qty: 10 TABLET | Refills: 0 | Status: SHIPPED | OUTPATIENT
Start: 2024-06-17 | End: 2024-06-22

## 2024-06-17 ASSESSMENT — ENCOUNTER SYMPTOMS
PAIN LOCATION: BACK
PAIN LOCATION - PAIN SEVERITY: 5/10
PAIN LOCATION - PAIN SEVERITY: 5/10
PERSON REPORTING PAIN: PATIENT
PAIN LOCATION: LEFT LEG
LOWEST PAIN SEVERITY IN PAST 24 HOURS: 3/10
PAIN LOCATION: RIGHT LEG
PAIN LOCATION - PAIN SEVERITY: 5/10
PAIN LOCATION - PAIN FREQUENCY: CONSTANT
PAIN: 1
PAIN LOCATION - PAIN QUALITY: ACHE
SUBJECTIVE PAIN PROGRESSION: GRADUALLY IMPROVING
PAIN LOCATION - PAIN QUALITY: ACHE
HIGHEST PAIN SEVERITY IN PAST 24 HOURS: 7/10
PAIN LOCATION - PAIN QUALITY: ACHE

## 2024-06-17 NOTE — TELEPHONE ENCOUNTER
Patient's son left . States he sent [employer] message asking if dr tam would send in refill of oxycodone for his mother. She has 2 pills remaining and doesn't see francisco until this Thursday. Asking for script to help her till she has follow up appt.

## 2024-06-18 ENCOUNTER — HOME CARE VISIT (OUTPATIENT)
Dept: HOME HEALTH SERVICES | Facility: HOME HEALTH | Age: 89
End: 2024-06-18
Payer: MEDICARE

## 2024-06-18 VITALS
SYSTOLIC BLOOD PRESSURE: 110 MMHG | RESPIRATION RATE: 18 BRPM | TEMPERATURE: 98.5 F | HEART RATE: 69 BPM | OXYGEN SATURATION: 96 % | DIASTOLIC BLOOD PRESSURE: 60 MMHG

## 2024-06-18 PROCEDURE — G0151 HHCP-SERV OF PT,EA 15 MIN: HCPCS | Mod: HHH

## 2024-06-18 SDOH — HEALTH STABILITY: PHYSICAL HEALTH: EXERCISE TYPE: SEATED AND STD HEP

## 2024-06-18 ASSESSMENT — ACTIVITIES OF DAILY LIVING (ADL)
AMBULATION ASSISTANCE: 1
AMBULATION ASSISTANCE: INDEPENDENT
OASIS_M1830: 02
DRESSING_LB_CURRENT_FUNCTION: INDEPENDENT
CURRENT_FUNCTION: INDEPENDENT
HOME_HEALTH_OASIS: 01
PHYSICAL TRANSFERS ASSESSED: 1
AMBULATION ASSISTANCE ON FLAT SURFACES: 1

## 2024-06-18 ASSESSMENT — ENCOUNTER SYMPTOMS
LOWEST PAIN SEVERITY IN PAST 24 HOURS: 4/10
PAIN LOCATION - PAIN FREQUENCY: CONSTANT
OCCASIONAL FEELINGS OF UNSTEADINESS: 1
PAIN LOCATION: BACK
HIGHEST PAIN SEVERITY IN PAST 24 HOURS: 7/10
PAIN LOCATION - PAIN SEVERITY: 4/10
PAIN: 1
PAIN LOCATION - PAIN QUALITY: ACHE
SUBJECTIVE PAIN PROGRESSION: GRADUALLY IMPROVING
PERSON REPORTING PAIN: PATIENT

## 2024-06-19 NOTE — CASE COMMUNICATION
DC TO SELF CARE AT HOME. PATIENT I IN HEP AND ABLE TO MODIFY DEPENDING ON PAIN. I IN USE OF ICE, HEAT AND PAIN MEDS. PATIENT STILL HAVING SIGNIFICANT PAIN IN BACK AND B KNEES AND THIGHS.  PATIENT I IN USE OF ROLLATOR AND MULTIPLE SEATED REST BREAKS TO AMB TO DINING KUO AND . INCISION ALMOST FULLY EPITHELIALIZED.COMPLIANT WITH ALL SAFETY RECOMMENDATIONS

## 2024-06-20 ENCOUNTER — OFFICE VISIT (OUTPATIENT)
Dept: PAIN MEDICINE | Facility: CLINIC | Age: 89
End: 2024-06-20
Payer: MEDICARE

## 2024-06-20 VITALS
HEART RATE: 71 BPM | BODY MASS INDEX: 23.56 KG/M2 | WEIGHT: 138 LBS | RESPIRATION RATE: 18 BRPM | OXYGEN SATURATION: 96 % | SYSTOLIC BLOOD PRESSURE: 132 MMHG | DIASTOLIC BLOOD PRESSURE: 71 MMHG | HEIGHT: 64 IN

## 2024-06-20 DIAGNOSIS — M17.11 LOCALIZED OSTEOARTHRITIS OF RIGHT KNEE: ICD-10-CM

## 2024-06-20 DIAGNOSIS — M41.25 OTHER IDIOPATHIC SCOLIOSIS, THORACOLUMBAR REGION: ICD-10-CM

## 2024-06-20 DIAGNOSIS — Z96.652 HISTORY OF TOTAL LEFT KNEE REPLACEMENT: Primary | ICD-10-CM

## 2024-06-20 PROCEDURE — 1159F MED LIST DOCD IN RCRD: CPT | Performed by: PHYSICIAN ASSISTANT

## 2024-06-20 PROCEDURE — 1036F TOBACCO NON-USER: CPT | Performed by: PHYSICIAN ASSISTANT

## 2024-06-20 PROCEDURE — 1125F AMNT PAIN NOTED PAIN PRSNT: CPT | Performed by: PHYSICIAN ASSISTANT

## 2024-06-20 PROCEDURE — 99214 OFFICE O/P EST MOD 30 MIN: CPT | Performed by: PHYSICIAN ASSISTANT

## 2024-06-20 PROCEDURE — 1157F ADVNC CARE PLAN IN RCRD: CPT | Performed by: PHYSICIAN ASSISTANT

## 2024-06-20 PROCEDURE — 3075F SYST BP GE 130 - 139MM HG: CPT | Performed by: PHYSICIAN ASSISTANT

## 2024-06-20 PROCEDURE — 3078F DIAST BP <80 MM HG: CPT | Performed by: PHYSICIAN ASSISTANT

## 2024-06-20 PROCEDURE — 1160F RVW MEDS BY RX/DR IN RCRD: CPT | Performed by: PHYSICIAN ASSISTANT

## 2024-06-20 ASSESSMENT — LIFESTYLE VARIABLES
AUDIT-C TOTAL SCORE: 0
HOW MANY STANDARD DRINKS CONTAINING ALCOHOL DO YOU HAVE ON A TYPICAL DAY: PATIENT DOES NOT DRINK
SKIP TO QUESTIONS 9-10: 1
SKIP TO QUESTIONS 9-10: 1
HOW OFTEN DO YOU HAVE SIX OR MORE DRINKS ON ONE OCCASION: NEVER
HOW MANY STANDARD DRINKS CONTAINING ALCOHOL DO YOU HAVE ON A TYPICAL DAY: PATIENT DOES NOT DRINK
HOW OFTEN DO YOU HAVE A DRINK CONTAINING ALCOHOL: NEVER
AUDIT-C TOTAL SCORE: 0
HOW OFTEN DO YOU HAVE A DRINK CONTAINING ALCOHOL: NEVER
HOW OFTEN DO YOU HAVE SIX OR MORE DRINKS ON ONE OCCASION: NEVER

## 2024-06-20 ASSESSMENT — ENCOUNTER SYMPTOMS
WEAKNESS: 1
ALLERGIC/IMMUNOLOGIC NEGATIVE: 1
RESPIRATORY NEGATIVE: 1
ENDOCRINE NEGATIVE: 1
CONSTITUTIONAL NEGATIVE: 1
HEMATOLOGIC/LYMPHATIC NEGATIVE: 1
PSYCHIATRIC NEGATIVE: 1
PAIN: 1
EYES NEGATIVE: 1
ARTHRALGIAS: 1
BACK PAIN: 1
JOINT SWELLING: 0
GASTROINTESTINAL NEGATIVE: 1
CARDIOVASCULAR NEGATIVE: 1

## 2024-06-20 ASSESSMENT — PATIENT HEALTH QUESTIONNAIRE - PHQ9
2. FEELING DOWN, DEPRESSED OR HOPELESS: NOT AT ALL
SUM OF ALL RESPONSES TO PHQ9 QUESTIONS 1 & 2: 0
1. LITTLE INTEREST OR PLEASURE IN DOING THINGS: NOT AT ALL
2. FEELING DOWN, DEPRESSED OR HOPELESS: NOT AT ALL
SUM OF ALL RESPONSES TO PHQ9 QUESTIONS 1 & 2: 0
1. LITTLE INTEREST OR PLEASURE IN DOING THINGS: NOT AT ALL

## 2024-06-20 ASSESSMENT — PAIN SCALES - GENERAL
PAINLEVEL: 6
PAINLEVEL_OUTOF10: 6

## 2024-06-20 ASSESSMENT — PAIN - FUNCTIONAL ASSESSMENT: PAIN_FUNCTIONAL_ASSESSMENT: 0-10

## 2024-06-20 NOTE — PROGRESS NOTES
Subjective   Patient ID: Neris Mccall is a 89 y.o. female who presents for Pain.  Is an 89-year-old female with lumbar scoliosis recent vertebral fracture bilateral knee pain history of a left total knee arthroplasty after a fall.  Patient obtained her x-rays.  Patient continues to have right-sided back pain right buttocks pain bilateral knee pain.  Patient states that her back has felt better since the procedure but she does not understand why she now has right-sided pain.  She is requesting additional pain medication.  Patient does use zolpidem for sleep.    Pain  Associated symptoms include weakness. Pertinent negatives include no joint swelling.       Review of Systems   Constitutional: Negative.    HENT: Negative.     Eyes: Negative.    Respiratory: Negative.     Cardiovascular: Negative.    Gastrointestinal: Negative.    Endocrine: Negative.    Genitourinary: Negative.    Musculoskeletal:  Positive for arthralgias, back pain and gait problem. Negative for joint swelling.   Skin: Negative.    Allergic/Immunologic: Negative.    Neurological:  Positive for weakness.   Hematological: Negative.    Psychiatric/Behavioral: Negative.         Objective   Physical Exam  Musculoskeletal:      Comments: Well healed incision. Nontender. BLE STR grossly intact for age. Pain with Knee ROM and hip Rom. Pt uses rolator for ambulation. Pt antalgic gait.          Assessment/Plan   Problem List Items Addressed This Visit             ICD-10-CM    Localized osteoarthritis of right knee M17.11    History of total left knee replacement - Primary Z96.652    Other idiopathic scoliosis, thoracolumbar region M41.25     Reviewing x-rays there is severe scoliosis the vertebroplasty looks intact there does not seem to be any acute fractures.  See report for additional details.  Left knee does show a well placed knee arthroplasty.  Right knee does show severe arthritic arthritis.  No acute fractures.    Reviewing patient's symptoms the  severity of her back I do not believe her inability provide much intervention for this region.  We did discuss genicular blocks.  Patient and son verbalized understanding and would like to proceed forward.  We did also discuss the ultimate goal of radiofrequency ablation or peripheral nerve stimulation.  Therefore I am ordering bilateral genicular blocks under local sedation fluoroscopy guidance to be completed by Dr ROLAN Guerra PA-C 06/20/24 1:37 PM

## 2024-06-27 ENCOUNTER — TELEPHONE (OUTPATIENT)
Dept: PAIN MEDICINE | Facility: CLINIC | Age: 89
End: 2024-06-27
Payer: MEDICARE

## 2024-06-27 DIAGNOSIS — M80.08XA AGE-RELATED OSTEOPOROSIS WITH CURRENT PATHOL FRACTURE OF VERTEBRA, INITIAL ENCOUNTER (MULTI): ICD-10-CM

## 2024-06-27 RX ORDER — OXYCODONE HYDROCHLORIDE 5 MG/1
5 TABLET ORAL EVERY 8 HOURS PRN
Qty: 21 TABLET | Refills: 0 | Status: SHIPPED | OUTPATIENT
Start: 2024-06-27 | End: 2024-07-04

## 2024-06-27 NOTE — TELEPHONE ENCOUNTER
Phone call from the patient's son, Sumanth. He states his mother wishes to continue on the Oxycodone but that she needs an appointment to complete paperwork and do a UDS. Scheduled patient for first available appointment on 7/25/24. He is inquiring if she can have a refill to get her to her appointment. He states the medication is really helping her pain. Please advise.

## 2024-06-27 NOTE — ADDENDUM NOTE
Addendum  created 06/26/24 [id number] by PAULA Matthews    Intraprocedure Event edited, Intraprocedure Staff edited

## 2024-06-27 NOTE — TELEPHONE ENCOUNTER
Phone call to the patient's son. Advised that 21 more tablets were sent in but that he wants her seen sooner by Tyron. Appt scheduled for follow up with Tyron to discuss pain med contract and continuation of pain medications.

## 2024-07-08 ENCOUNTER — OFFICE VISIT (OUTPATIENT)
Dept: PAIN MEDICINE | Facility: CLINIC | Age: 89
End: 2024-07-08
Payer: MEDICARE

## 2024-07-08 VITALS
WEIGHT: 138 LBS | DIASTOLIC BLOOD PRESSURE: 74 MMHG | HEART RATE: 69 BPM | HEIGHT: 64 IN | OXYGEN SATURATION: 94 % | SYSTOLIC BLOOD PRESSURE: 130 MMHG | RESPIRATION RATE: 18 BRPM | BODY MASS INDEX: 23.56 KG/M2

## 2024-07-08 DIAGNOSIS — Z79.899 HISTORY OF ONGOING TREATMENT WITH HIGH-RISK MEDICATION: Primary | ICD-10-CM

## 2024-07-08 DIAGNOSIS — M80.08XA AGE-RELATED OSTEOPOROSIS WITH CURRENT PATHOL FRACTURE OF VERTEBRA, INITIAL ENCOUNTER (MULTI): ICD-10-CM

## 2024-07-08 PROCEDURE — 99214 OFFICE O/P EST MOD 30 MIN: CPT | Performed by: PHYSICIAN ASSISTANT

## 2024-07-08 PROCEDURE — 1125F AMNT PAIN NOTED PAIN PRSNT: CPT | Performed by: PHYSICIAN ASSISTANT

## 2024-07-08 PROCEDURE — 80365 DRUG SCREENING OXYCODONE: CPT | Performed by: PHYSICIAN ASSISTANT

## 2024-07-08 PROCEDURE — 3075F SYST BP GE 130 - 139MM HG: CPT | Performed by: PHYSICIAN ASSISTANT

## 2024-07-08 PROCEDURE — 1157F ADVNC CARE PLAN IN RCRD: CPT | Performed by: PHYSICIAN ASSISTANT

## 2024-07-08 PROCEDURE — 1036F TOBACCO NON-USER: CPT | Performed by: PHYSICIAN ASSISTANT

## 2024-07-08 PROCEDURE — 3078F DIAST BP <80 MM HG: CPT | Performed by: PHYSICIAN ASSISTANT

## 2024-07-08 PROCEDURE — 80307 DRUG TEST PRSMV CHEM ANLYZR: CPT | Performed by: PHYSICIAN ASSISTANT

## 2024-07-08 PROCEDURE — 1159F MED LIST DOCD IN RCRD: CPT | Performed by: PHYSICIAN ASSISTANT

## 2024-07-08 RX ORDER — OXYCODONE HYDROCHLORIDE 5 MG/1
5 TABLET ORAL EVERY 12 HOURS PRN
Qty: 60 TABLET | Refills: 0 | Status: SHIPPED | OUTPATIENT
Start: 2024-07-08 | End: 2024-08-07

## 2024-07-08 RX ORDER — NALOXONE HYDROCHLORIDE 4 MG/.1ML
1 SPRAY NASAL AS NEEDED
Qty: 2 EACH | Refills: 0 | Status: SHIPPED | OUTPATIENT
Start: 2024-07-08 | End: 2024-07-09 | Stop reason: SDUPTHER

## 2024-07-08 ASSESSMENT — LIFESTYLE VARIABLES
SKIP TO QUESTIONS 9-10: 1
HOW OFTEN DO YOU HAVE SIX OR MORE DRINKS ON ONE OCCASION: NEVER
HOW OFTEN DO YOU HAVE A DRINK CONTAINING ALCOHOL: NEVER
HOW MANY STANDARD DRINKS CONTAINING ALCOHOL DO YOU HAVE ON A TYPICAL DAY: PATIENT DOES NOT DRINK
AUDIT-C TOTAL SCORE: 0

## 2024-07-08 ASSESSMENT — ENCOUNTER SYMPTOMS
ENDOCRINE NEGATIVE: 1
HEMATOLOGIC/LYMPHATIC NEGATIVE: 1
EYES NEGATIVE: 1
RESPIRATORY NEGATIVE: 1
ALLERGIC/IMMUNOLOGIC NEGATIVE: 1
PSYCHIATRIC NEGATIVE: 1
BACK PAIN: 1
GASTROINTESTINAL NEGATIVE: 1
JOINT SWELLING: 0
CONSTITUTIONAL NEGATIVE: 1
WEAKNESS: 1
CARDIOVASCULAR NEGATIVE: 1
PAIN: 1
ARTHRALGIAS: 1

## 2024-07-08 ASSESSMENT — PATIENT HEALTH QUESTIONNAIRE - PHQ9
1. LITTLE INTEREST OR PLEASURE IN DOING THINGS: NOT AT ALL
2. FEELING DOWN, DEPRESSED OR HOPELESS: NOT AT ALL
SUM OF ALL RESPONSES TO PHQ9 QUESTIONS 1 & 2: 0

## 2024-07-08 ASSESSMENT — PAIN SCALES - GENERAL
PAINLEVEL: 6
PAINLEVEL_OUTOF10: 6

## 2024-07-08 ASSESSMENT — PAIN - FUNCTIONAL ASSESSMENT: PAIN_FUNCTIONAL_ASSESSMENT: 0-10

## 2024-07-08 NOTE — PROGRESS NOTES
Subjective   Patient ID: Neris Mccall is a 89 y.o. female who presents for Pain.  Patient is an 89-year-old female with age-related vertebral fractures bilateral knee osteoarthritis the presents today for follow-up.  Patient denotes that she has been having about 1 and half tablets use a day for control of pain.  She is requesting more medication at this time she states she has about 5 tablets at home.  Son is here that helps corroborate patient's historical story.    Pain  Associated symptoms include weakness. Pertinent negatives include no joint swelling.       Review of Systems   Constitutional: Negative.    HENT: Negative.     Eyes: Negative.    Respiratory: Negative.     Cardiovascular: Negative.    Gastrointestinal: Negative.    Endocrine: Negative.    Genitourinary: Negative.    Musculoskeletal:  Positive for arthralgias, back pain and gait problem. Negative for joint swelling.   Skin: Negative.    Allergic/Immunologic: Negative.    Neurological:  Positive for weakness.   Hematological: Negative.    Psychiatric/Behavioral: Negative.         Objective   Physical Exam  Musculoskeletal:      Comments: Well healed incision. Nontender. BLE STR grossly intact for age. Pain with Knee ROM and hip Rom. Pt uses rolator for ambulation. Pt antalgic gait.          Assessment/Plan   Problem List Items Addressed This Visit             ICD-10-CM    Age-related osteoporosis with current pathol fracture of vertebra, initial encounter (Multi) M80.08XA    Relevant Medications    oxyCODONE (Roxicodone) 5 mg immediate release tablet     Other Visit Diagnoses         Codes    History of ongoing treatment with high-risk medication    -  Primary Z79.899    Relevant Medications    naloxone (Narcan) 4 mg/0.1 mL nasal spray    Other Relevant Orders    Opiate/Opioid/Benzo Prescription Compliance          I had nice discussion with the patient today our plan will be as follows.      Radiology: [ none at this time ]      Physically:  [  continue modification of activities, healthy lifestyle choice ]      Psychologically:  [ No acute psychological concerns ]      Medication: [I will refill the medications at the same dose and frequency. We will continue to monitor the patient monthly for compliance, adverse reaction or interactions The patient continues to see benefit and improvement in their quality of life and ability to maintain ADLs. Patient educated about the risks of taking opioids and operating a motor vehicle. Patient reports no adverse side effects to current medication regimen. Current regimen does allow patient to maintain ADLs. [place] has been reviewed. No suspicion of diversion or abuse. Compliance with medication regime, no use of illicit drugs, no sharing of narcotic medications with others, do not use others narcotic medication, and to avoid alcohol use. Patient has been educated on the risks, benefits, and alternatives of controlled substances as well as the proper way to store these medications.   The patient and I discussed the nature of this medication and its side effects. We discussed tolerance, physical dependence, psychological dependence, addiction and opioid-induced hyperalgesia  Patient is submit sample for tox screen  I have verbally reviewed with son and daughter at the main points of the pain management agreement the son signed for the mother patient verbalized understanding]      Duration:  [ 1 month ]      Intervention:  [ none at this time. Patient is stable.  Still awaiting approval for genicular blocks]         Kaden Guerra PA-C 07/08/24 3:18 PM

## 2024-07-09 ENCOUNTER — TELEPHONE (OUTPATIENT)
Dept: PAIN MEDICINE | Facility: CLINIC | Age: 89
End: 2024-07-09
Payer: MEDICARE

## 2024-07-09 DIAGNOSIS — Z79.899 HISTORY OF ONGOING TREATMENT WITH HIGH-RISK MEDICATION: ICD-10-CM

## 2024-07-09 LAB
AMPHETAMINES UR QL SCN: NORMAL
BARBITURATES UR QL SCN: NORMAL
BZE UR QL SCN: NORMAL
CANNABINOIDS UR QL SCN: NORMAL
CREAT UR-MCNC: 40.7 MG/DL (ref 20–320)
PCP UR QL SCN: NORMAL

## 2024-07-09 RX ORDER — NALOXONE HYDROCHLORIDE 4 MG/.1ML
1 SPRAY NASAL AS NEEDED
Qty: 2 EACH | Refills: 0 | Status: SHIPPED | OUTPATIENT
Start: 2024-07-09

## 2024-07-09 NOTE — TELEPHONE ENCOUNTER
Patients son called to say [facility] does not have Narcan.  Can you please call the Narcan in to [facility].

## 2024-07-10 LAB
1OH-MIDAZOLAM UR CFM-MCNC: <25 NG/ML
6MAM UR CFM-MCNC: <25 NG/ML
7AMINOCLONAZEPAM UR CFM-MCNC: <25 NG/ML
A-OH ALPRAZ UR CFM-MCNC: <25 NG/ML
ALPRAZ UR CFM-MCNC: <25 NG/ML
CHLORDIAZEP UR CFM-MCNC: <25 NG/ML
CLONAZEPAM UR CFM-MCNC: <25 NG/ML
CODEINE UR CFM-MCNC: <50 NG/ML
DIAZEPAM UR CFM-MCNC: <25 NG/ML
EDDP UR CFM-MCNC: <25 NG/ML
FENTANYL UR CFM-MCNC: <2.5 NG/ML
HYDROCODONE CTO UR CFM-MCNC: <25 NG/ML
HYDROMORPHONE UR CFM-MCNC: <25 NG/ML
LORAZEPAM UR CFM-MCNC: <25 NG/ML
METHADONE UR CFM-MCNC: <25 NG/ML
MIDAZOLAM UR CFM-MCNC: <25 NG/ML
MORPHINE UR CFM-MCNC: <50 NG/ML
NORDIAZEPAM UR CFM-MCNC: <25 NG/ML
NORFENTANYL UR CFM-MCNC: <2.5 NG/ML
NORHYDROCODONE UR CFM-MCNC: <25 NG/ML
NOROXYCODONE UR CFM-MCNC: 629 NG/ML
NORTRAMADOL UR-MCNC: <50 NG/ML
OXAZEPAM UR CFM-MCNC: <25 NG/ML
OXYCODONE UR CFM-MCNC: 336 NG/ML
OXYMORPHONE UR CFM-MCNC: 285 NG/ML
TEMAZEPAM UR CFM-MCNC: <25 NG/ML
TRAMADOL UR CFM-MCNC: <50 NG/ML
ZOLPIDEM UR CFM-MCNC: 695 NG/ML
ZOLPIDEM UR-MCNC: <25 NG/ML

## 2024-07-25 ENCOUNTER — APPOINTMENT (OUTPATIENT)
Dept: PAIN MEDICINE | Facility: CLINIC | Age: 89
End: 2024-07-25
Payer: MEDICARE

## 2024-08-08 ENCOUNTER — OFFICE VISIT (OUTPATIENT)
Dept: PAIN MEDICINE | Facility: CLINIC | Age: 89
End: 2024-08-08
Payer: MEDICARE

## 2024-08-08 VITALS
DIASTOLIC BLOOD PRESSURE: 67 MMHG | WEIGHT: 138 LBS | BODY MASS INDEX: 23.56 KG/M2 | RESPIRATION RATE: 18 BRPM | SYSTOLIC BLOOD PRESSURE: 114 MMHG | HEART RATE: 67 BPM | OXYGEN SATURATION: 96 % | HEIGHT: 64 IN

## 2024-08-08 DIAGNOSIS — M80.08XA AGE-RELATED OSTEOPOROSIS WITH CURRENT PATHOL FRACTURE OF VERTEBRA, INITIAL ENCOUNTER (MULTI): ICD-10-CM

## 2024-08-08 PROCEDURE — 1036F TOBACCO NON-USER: CPT | Performed by: PHYSICIAN ASSISTANT

## 2024-08-08 PROCEDURE — 1157F ADVNC CARE PLAN IN RCRD: CPT | Performed by: PHYSICIAN ASSISTANT

## 2024-08-08 PROCEDURE — 99214 OFFICE O/P EST MOD 30 MIN: CPT | Performed by: PHYSICIAN ASSISTANT

## 2024-08-08 PROCEDURE — 1159F MED LIST DOCD IN RCRD: CPT | Performed by: PHYSICIAN ASSISTANT

## 2024-08-08 PROCEDURE — 3078F DIAST BP <80 MM HG: CPT | Performed by: PHYSICIAN ASSISTANT

## 2024-08-08 PROCEDURE — 3074F SYST BP LT 130 MM HG: CPT | Performed by: PHYSICIAN ASSISTANT

## 2024-08-08 PROCEDURE — 1160F RVW MEDS BY RX/DR IN RCRD: CPT | Performed by: PHYSICIAN ASSISTANT

## 2024-08-08 RX ORDER — OXYCODONE HYDROCHLORIDE 5 MG/1
5 TABLET ORAL EVERY 12 HOURS PRN
Qty: 60 TABLET | Refills: 0 | Status: SHIPPED | OUTPATIENT
Start: 2024-08-08 | End: 2024-09-07

## 2024-08-08 ASSESSMENT — ENCOUNTER SYMPTOMS
GASTROINTESTINAL NEGATIVE: 1
ARTHRALGIAS: 1
RESPIRATORY NEGATIVE: 1
JOINT SWELLING: 0
PSYCHIATRIC NEGATIVE: 1
ALLERGIC/IMMUNOLOGIC NEGATIVE: 1
CONSTITUTIONAL NEGATIVE: 1
ENDOCRINE NEGATIVE: 1
BACK PAIN: 1
HEMATOLOGIC/LYMPHATIC NEGATIVE: 1
CARDIOVASCULAR NEGATIVE: 1
EYES NEGATIVE: 1
WEAKNESS: 1

## 2024-08-08 ASSESSMENT — PAIN SCALES - GENERAL
PAINLEVEL: 5
PAINLEVEL_OUTOF10: 5 - MODERATE PAIN

## 2024-08-08 ASSESSMENT — LIFESTYLE VARIABLES
SKIP TO QUESTIONS 9-10: 1
HOW OFTEN DO YOU HAVE SIX OR MORE DRINKS ON ONE OCCASION: NEVER
HOW MANY STANDARD DRINKS CONTAINING ALCOHOL DO YOU HAVE ON A TYPICAL DAY: PATIENT DOES NOT DRINK
AUDIT-C TOTAL SCORE: 0
HOW OFTEN DO YOU HAVE A DRINK CONTAINING ALCOHOL: NEVER

## 2024-08-08 ASSESSMENT — PAIN - FUNCTIONAL ASSESSMENT: PAIN_FUNCTIONAL_ASSESSMENT: 0-10

## 2024-08-08 NOTE — PROGRESS NOTES
Subjective   Patient ID: Neris Mccall is a 89 y.o. female who presents for Pain.  Is an 89-year-old female with age-related pathological fracture of the lumbar spine with continued pain bilateral knee pain with a history of a left total knee replacement the presents today for follow-up.  Patient's son is here that helps corroborate patient's information states that there is a letter that from the insurance company that the genicular block procedure has been denied patient states that the pain medications are helping with the back pain but do not provide drastic relief of patient's knee pain.         Review of Systems   Constitutional: Negative.    HENT: Negative.     Eyes: Negative.    Respiratory: Negative.     Cardiovascular: Negative.    Gastrointestinal: Negative.    Endocrine: Negative.    Genitourinary: Negative.    Musculoskeletal:  Positive for arthralgias, back pain and gait problem. Negative for joint swelling.   Skin: Negative.    Allergic/Immunologic: Negative.    Neurological:  Positive for weakness.   Hematological: Negative.    Psychiatric/Behavioral: Negative.         Objective   Physical Exam  Musculoskeletal:      Comments: Well healed incision. Nontender. BLE STR grossly intact for age. Pain with Knee ROM and hip Rom. Pt uses rolator for ambulation. Pt antalgic gait.          Assessment/Plan   Problem List Items Addressed This Visit             ICD-10-CM    Age-related osteoporosis with current pathol fracture of vertebra, initial encounter (Multi) M80.08XA    Relevant Medications    oxyCODONE (Roxicodone) 5 mg immediate release tablet      In my opinion patient's health is not adequate to go under the knife again for another knee replacement and she has continued residual pain from a total knee arthroplasty I think genicular blocks would be prudent we may want to consider peripheral stimulation but there is some concern about the stimulation in her cardiac pacer.    Patient has been utilizing 1.5  tablets a day.  She has been cautious about use of her medications of Ambien.  I informed her that we can have her dose approximately 4 hours before Ambien to hopefully provide her with better relief.  No suspicion of abuse or diversion.     My opinion I think genicular blocks would be beneficial to provide patient with some more durable relief to reduce her opiate dependency and risk factors and provide her with some quality of life       Kaden Guerra PA-C 08/08/24 3:03 PM

## 2024-08-08 NOTE — PROGRESS NOTES
MEDICATION NAME: Oxycodone   STRENGTH: 5mg  LAST FILL DATE: 24  DATE LAST TAKEN: 24  QUANTITY FILLED: 60  QUANTITY REMAININ.5  COUNT COMPLETED BY: DEJON GARDNER and PARVIZ Sargent      UDS LAST COMPLETED:   CONTROLLED SUBSTANCES AGREEMENT LAST SIGNED:   ORT LAST COMPLETED:  Modified [employer] disability form filled out annually.

## 2024-08-16 DIAGNOSIS — M80.08XA AGE-RELATED OSTEOPOROSIS WITH CURRENT PATHOL FRACTURE OF VERTEBRA, INITIAL ENCOUNTER (MULTI): ICD-10-CM

## 2024-08-16 NOTE — TELEPHONE ENCOUNTER
Patient states that script for oxycodone sent 8/8 with [facility] date [facility] was [facility] by [facility] because it was too early to fill. I [facility] to confirm. They stated that because it was too early to fill, that federal regulations for them states they can't hold a controlled script so script was cancelled. Please resend script.

## 2024-08-19 RX ORDER — OXYCODONE HYDROCHLORIDE 5 MG/1
5 TABLET ORAL EVERY 12 HOURS PRN
Qty: 60 TABLET | Refills: 0 | Status: SHIPPED | OUTPATIENT
Start: 2024-08-19 | End: 2024-09-18

## 2024-09-09 DIAGNOSIS — I50.22 CHRONIC SYSTOLIC HEART FAILURE (MULTI): ICD-10-CM

## 2024-09-09 RX ORDER — LOSARTAN POTASSIUM 25 MG/1
25 TABLET ORAL DAILY
Qty: 90 TABLET | Refills: 1 | Status: SHIPPED | OUTPATIENT
Start: 2024-09-09

## 2024-09-12 ENCOUNTER — HOSPITAL ENCOUNTER (OUTPATIENT)
Dept: CARDIOLOGY | Facility: HOSPITAL | Age: 89
Discharge: HOME | End: 2024-09-12
Payer: MEDICARE

## 2024-09-12 ENCOUNTER — OFFICE VISIT (OUTPATIENT)
Dept: PAIN MEDICINE | Facility: CLINIC | Age: 89
End: 2024-09-12
Payer: MEDICARE

## 2024-09-12 ENCOUNTER — APPOINTMENT (OUTPATIENT)
Dept: PAIN MEDICINE | Facility: CLINIC | Age: 89
End: 2024-09-12
Payer: MEDICARE

## 2024-09-12 VITALS
RESPIRATION RATE: 18 BRPM | WEIGHT: 138 LBS | DIASTOLIC BLOOD PRESSURE: 72 MMHG | HEART RATE: 68 BPM | SYSTOLIC BLOOD PRESSURE: 112 MMHG | BODY MASS INDEX: 23.69 KG/M2

## 2024-09-12 DIAGNOSIS — I44.2 CHB (COMPLETE HEART BLOCK) (MULTI): ICD-10-CM

## 2024-09-12 DIAGNOSIS — M25.561 CHRONIC PAIN OF BOTH KNEES: ICD-10-CM

## 2024-09-12 DIAGNOSIS — M80.08XA AGE-RELATED OSTEOPOROSIS WITH CURRENT PATHOL FRACTURE OF VERTEBRA, INITIAL ENCOUNTER (MULTI): ICD-10-CM

## 2024-09-12 DIAGNOSIS — M79.604 PAIN OF RIGHT LOWER EXTREMITY: Primary | ICD-10-CM

## 2024-09-12 DIAGNOSIS — M17.11 PRIMARY OSTEOARTHRITIS OF RIGHT KNEE: ICD-10-CM

## 2024-09-12 DIAGNOSIS — G89.29 CHRONIC PAIN OF BOTH KNEES: ICD-10-CM

## 2024-09-12 DIAGNOSIS — M25.562 CHRONIC PAIN OF BOTH KNEES: ICD-10-CM

## 2024-09-12 PROCEDURE — 1159F MED LIST DOCD IN RCRD: CPT | Performed by: NURSE PRACTITIONER

## 2024-09-12 PROCEDURE — 1036F TOBACCO NON-USER: CPT | Performed by: NURSE PRACTITIONER

## 2024-09-12 PROCEDURE — 3078F DIAST BP <80 MM HG: CPT | Performed by: NURSE PRACTITIONER

## 2024-09-12 PROCEDURE — 99214 OFFICE O/P EST MOD 30 MIN: CPT | Performed by: NURSE PRACTITIONER

## 2024-09-12 PROCEDURE — 3074F SYST BP LT 130 MM HG: CPT | Performed by: NURSE PRACTITIONER

## 2024-09-12 PROCEDURE — 1157F ADVNC CARE PLAN IN RCRD: CPT | Performed by: NURSE PRACTITIONER

## 2024-09-12 PROCEDURE — 93281 PM DEVICE PROGR EVAL MULTI: CPT

## 2024-09-12 PROCEDURE — 1160F RVW MEDS BY RX/DR IN RCRD: CPT | Performed by: NURSE PRACTITIONER

## 2024-09-12 RX ORDER — OXYCODONE HYDROCHLORIDE 5 MG/1
5 TABLET ORAL EVERY 12 HOURS PRN
Qty: 60 TABLET | Refills: 0 | Status: SHIPPED | OUTPATIENT
Start: 2024-09-18 | End: 2024-10-18

## 2024-09-12 ASSESSMENT — ENCOUNTER SYMPTOMS
CONSTIPATION: 0
NAUSEA: 0
MYALGIAS: 1
CONFUSION: 0
DYSURIA: 0
ARTHRALGIAS: 1
DIFFICULTY URINATING: 0
NERVOUS/ANXIOUS: 0
WEAKNESS: 1
CONSTITUTIONAL NEGATIVE: 1
SLEEP DISTURBANCE: 0
ABDOMINAL PAIN: 0
AGITATION: 0
ABDOMINAL DISTENTION: 0
BACK PAIN: 1
VOMITING: 0
DIARRHEA: 0

## 2024-09-12 ASSESSMENT — PATIENT HEALTH QUESTIONNAIRE - PHQ9
SUM OF ALL RESPONSES TO PHQ9 QUESTIONS 1 AND 2: 0
2. FEELING DOWN, DEPRESSED OR HOPELESS: NOT AT ALL
1. LITTLE INTEREST OR PLEASURE IN DOING THINGS: NOT AT ALL

## 2024-09-12 ASSESSMENT — PAIN SCALES - GENERAL
PAINLEVEL: 5
PAINLEVEL_OUTOF10: 5 - MODERATE PAIN

## 2024-09-12 ASSESSMENT — PAIN - FUNCTIONAL ASSESSMENT: PAIN_FUNCTIONAL_ASSESSMENT: 0-10

## 2024-09-12 NOTE — PROGRESS NOTES
MEDICATION NAME: oxycodone   STRENGTH: 5 mg   LAST FILL DATE:   DATE LAST TAKEN:   QUANTITY FILLED: 60  QUANTITY REMAININ  COUNT COMPLETED BY: SHERRY YARBROUGH RN and IRMA RN

## 2024-09-12 NOTE — PROGRESS NOTES
Subjective   Patient ID: Neris Mccall is a 89 y.o. female who presents for Pain.  HPI  Patient is here today for a follow up and refill on her lumbar and BL knee pain. She states her pain medications do help temporarily. She states she can only do limited activity before her pain gets worse. She states her pain is across the low back and into both knees. Her left knee is replaced. She has been using lidocaine patches on her back which do help some. She receied a letter in the mail that stated the genicular blocks were denied.       Review of Systems   Constitutional: Negative.    HENT: Negative.     Gastrointestinal:  Negative for abdominal distention, abdominal pain, constipation, diarrhea, nausea and vomiting.   Endocrine: Negative for cold intolerance and heat intolerance.   Genitourinary:  Negative for difficulty urinating, dysuria and urgency.   Musculoskeletal:  Positive for arthralgias, back pain, gait problem and myalgias.   Skin: Negative.    Neurological:  Positive for weakness.   Psychiatric/Behavioral:  Negative for agitation, confusion, sleep disturbance and suicidal ideas. The patient is not nervous/anxious.        Objective   Physical Exam  Constitutional:       Appearance: Normal appearance.   Pulmonary:      Effort: Pulmonary effort is normal.   Skin:     General: Skin is warm.   Neurological:      Mental Status: She is alert and oriented to person, place, and time.      Gait: Gait abnormal.   Psychiatric:         Mood and Affect: Mood normal.         Behavior: Behavior normal.         Assessment/Plan   Problem List Items Addressed This Visit             ICD-10-CM       Musculoskeletal and Injuries    Pain of right lower extremity - Primary M79.604       Other    Age-related osteoporosis with current pathol fracture of vertebra, initial encounter (Multi) M80.08XA    Relevant Medications    oxyCODONE (Roxicodone) 5 mg immediate release tablet (Start on 9/18/2024)    lidocaine 5 % gel     Other Visit  Diagnoses         Codes    Chronic pain of both knees     M25.561, M25.562, G89.29    Primary osteoarthritis of right knee     M17.11          I nice discussion with the patient today our plan will be as follows.    Radiology: no new imaging needed at this time    Physically:  She completed PT in the past. She tries to stay active at home.     Psychologically:  no concern     Medication: I will refill the patient's opioids today for 1 month.  The patient continues to see benefit and improvement in their quality of life and ability to maintain ADLs.  Patient educated about the risks of taking opioids and operating a motor vehicle.  Patient reports no adverse side effects to current medication regimen.  Current regimen does allow patient to maintain ADLs.  Patient reports no new neurologic symptoms, new pain areas, or exacerbation in pain today.  Patient reports they are happy with current treatment care path.    [place] was reviewed and was consistent with the history.    Patient has been educated on the risks, benefits, and alternatives of controlled substances as well as the proper way to store these medications.  The patient and I discussed the nature of this medication and its side effects.  We discussed tolerance, physical dependence, psychological dependence, addiction and opioid-induced hyperalgesia.  We discussed the potential need to wean from this medication.  We discussed the availability of programs that can help with this process if necessary.  We discussed safety issues related to opioids including safe storage.  We discussed the fact that the patient should not drive an automobile or operate heavy machinery while taking this medication.  A prescription for naloxone was offered to the patient.  The patient will be re-evaluated for the need to continue opioid therapy in 60-90 days.      Duration:  chronic on going    Intervention:  She is waitng to see if genicular blocks can be appealed. She will try and  take her medications as prescribed. She was only doing 1 full pill and 1/2. She will try and do 2 full pills a day. Follow up 1 month         MONET Parks 09/12/24 4:11 PM

## 2024-09-13 ENCOUNTER — TELEPHONE (OUTPATIENT)
Dept: PAIN MEDICINE | Facility: CLINIC | Age: 89
End: 2024-09-13
Payer: MEDICARE

## 2024-09-13 NOTE — TELEPHONE ENCOUNTER
Phone call to the pt. To advise to use Lidocaine 4% OTC. Spoke with her son, Sumanth. He thanked this nurse.

## 2024-09-13 NOTE — TELEPHONE ENCOUNTER
Phone call from pharmacist at [facility]. He is unable to get in Lidocaine 5% gel. He is asking if you would want another product or perhaps OTC 4%. Please advise

## 2024-09-16 ENCOUNTER — HOSPITAL ENCOUNTER (OUTPATIENT)
Dept: CARDIOLOGY | Facility: CLINIC | Age: 89
Discharge: HOME | End: 2024-09-16
Payer: MEDICARE

## 2024-09-16 DIAGNOSIS — I44.2 CHB (COMPLETE HEART BLOCK): ICD-10-CM

## 2024-09-17 ENCOUNTER — TELEPHONE (OUTPATIENT)
Dept: PAIN MEDICINE | Facility: CLINIC | Age: 89
End: 2024-09-17
Payer: MEDICARE

## 2024-09-17 DIAGNOSIS — M80.08XA AGE-RELATED OSTEOPOROSIS WITH CURRENT PATHOL FRACTURE OF VERTEBRA, INITIAL ENCOUNTER (MULTI): ICD-10-CM

## 2024-09-20 ENCOUNTER — TELEPHONE (OUTPATIENT)
Dept: PAIN MEDICINE | Facility: CLINIC | Age: 89
End: 2024-09-20
Payer: MEDICARE

## 2024-09-20 DIAGNOSIS — M48.062 NEUROGENIC CLAUDICATION DUE TO LUMBAR SPINAL STENOSIS: Primary | ICD-10-CM

## 2024-09-20 RX ORDER — SENNOSIDES 25 MG/1
1 TABLET, FILM COATED ORAL 4 TIMES DAILY
Qty: 120 G | Refills: 0 | Status: SHIPPED | OUTPATIENT
Start: 2024-09-20 | End: 2024-10-20

## 2024-09-20 NOTE — TELEPHONE ENCOUNTER
[facility] patient script. They do not carry a lidocaine 5% gel. They only carry a 2% gel if you would like to change to that, or a 5% lidocaine cream is available, if changing to cream-still comes in 50Gm quantity

## 2024-09-23 ENCOUNTER — INFUSION (OUTPATIENT)
Dept: HEMATOLOGY/ONCOLOGY | Facility: CLINIC | Age: 89
End: 2024-09-23
Payer: MEDICARE

## 2024-09-23 VITALS
WEIGHT: 135.25 LBS | OXYGEN SATURATION: 93 % | BODY MASS INDEX: 23.22 KG/M2 | SYSTOLIC BLOOD PRESSURE: 132 MMHG | TEMPERATURE: 98.1 F | HEART RATE: 70 BPM | DIASTOLIC BLOOD PRESSURE: 72 MMHG | RESPIRATION RATE: 16 BRPM

## 2024-09-23 DIAGNOSIS — M81.0 AGE-RELATED OSTEOPOROSIS WITHOUT CURRENT PATHOLOGICAL FRACTURE: ICD-10-CM

## 2024-09-23 LAB
ALBUMIN SERPL BCP-MCNC: 4 G/DL (ref 3.4–5)
ALP SERPL-CCNC: 61 U/L (ref 33–136)
ALT SERPL W P-5'-P-CCNC: 13 U/L (ref 7–45)
ANION GAP SERPL CALC-SCNC: 15 MMOL/L (ref 10–20)
AST SERPL W P-5'-P-CCNC: 25 U/L (ref 9–39)
BILIRUB SERPL-MCNC: 0.6 MG/DL (ref 0–1.2)
BUN SERPL-MCNC: 57 MG/DL (ref 6–23)
CALCIUM SERPL-MCNC: 10.1 MG/DL (ref 8.6–10.3)
CHLORIDE SERPL-SCNC: 101 MMOL/L (ref 98–107)
CO2 SERPL-SCNC: 24 MMOL/L (ref 21–32)
CREAT SERPL-MCNC: 1.02 MG/DL (ref 0.5–1.05)
EGFRCR SERPLBLD CKD-EPI 2021: 53 ML/MIN/1.73M*2
GLUCOSE SERPL-MCNC: 108 MG/DL (ref 74–99)
POTASSIUM SERPL-SCNC: 4.2 MMOL/L (ref 3.5–5.3)
PROT SERPL-MCNC: 6.5 G/DL (ref 6.4–8.2)
SODIUM SERPL-SCNC: 136 MMOL/L (ref 136–145)

## 2024-09-23 PROCEDURE — 84075 ASSAY ALKALINE PHOSPHATASE: CPT

## 2024-09-23 PROCEDURE — 96372 THER/PROPH/DIAG INJ SC/IM: CPT

## 2024-09-23 PROCEDURE — 36415 COLL VENOUS BLD VENIPUNCTURE: CPT

## 2024-09-23 PROCEDURE — 2500000004 HC RX 250 GENERAL PHARMACY W/ HCPCS (ALT 636 FOR OP/ED): Mod: JZ,JG | Performed by: INTERNAL MEDICINE

## 2024-09-23 RX ORDER — DIPHENHYDRAMINE HYDROCHLORIDE 50 MG/ML
50 INJECTION INTRAMUSCULAR; INTRAVENOUS AS NEEDED
OUTPATIENT
Start: 2025-03-06

## 2024-09-23 RX ORDER — FAMOTIDINE 10 MG/ML
20 INJECTION INTRAVENOUS ONCE AS NEEDED
OUTPATIENT
Start: 2025-03-06

## 2024-09-23 RX ORDER — EPINEPHRINE 0.3 MG/.3ML
0.3 INJECTION SUBCUTANEOUS EVERY 5 MIN PRN
OUTPATIENT
Start: 2025-03-06

## 2024-09-23 RX ORDER — ALBUTEROL SULFATE 0.83 MG/ML
3 SOLUTION RESPIRATORY (INHALATION) AS NEEDED
OUTPATIENT
Start: 2025-03-06

## 2024-09-23 ASSESSMENT — PAIN SCALES - GENERAL: PAINLEVEL: 5

## 2024-09-24 DIAGNOSIS — M80.08XA AGE-RELATED OSTEOPOROSIS WITH CURRENT PATHOL FRACTURE OF VERTEBRA, INITIAL ENCOUNTER (MULTI): ICD-10-CM

## 2024-09-25 RX ORDER — LIDOCAINE 50 MG/G
PATCH TOPICAL
Qty: 90 PATCH | Refills: 1 | Status: SHIPPED | OUTPATIENT
Start: 2024-09-25

## 2024-09-26 DIAGNOSIS — M80.08XA AGE-RELATED OSTEOPOROSIS WITH CURRENT PATHOL FRACTURE OF VERTEBRA, INITIAL ENCOUNTER (MULTI): ICD-10-CM

## 2024-09-26 NOTE — TELEPHONE ENCOUNTER
Phone call from the pt's son, Sumanth. He stated that they did not want to send a refill of her Oxycodone at the last appt until it's needed and the pharmacy has not gotten RX yet. She is in need of a refill now. Next appt 10.7.24 with Dr. Stevens.

## 2024-09-27 RX ORDER — OXYCODONE HYDROCHLORIDE 5 MG/1
5 TABLET ORAL EVERY 12 HOURS PRN
Qty: 60 TABLET | Refills: 0 | Status: SHIPPED | OUTPATIENT
Start: 2024-09-27 | End: 2024-10-27

## 2024-09-27 NOTE — TELEPHONE ENCOUNTER
Phone call from the son again, concerned that her rx has not been sent in and she may run out of medications before Monday.

## 2024-10-01 ENCOUNTER — TELEPHONE (OUTPATIENT)
Dept: PAIN MEDICINE | Facility: CLINIC | Age: 89
End: 2024-10-01
Payer: MEDICARE

## 2024-10-01 DIAGNOSIS — M80.08XA AGE-RELATED OSTEOPOROSIS WITH CURRENT PATHOL FRACTURE OF VERTEBRA, INITIAL ENCOUNTER (MULTI): ICD-10-CM

## 2024-10-01 RX ORDER — OXYCODONE HYDROCHLORIDE 5 MG/1
5 TABLET ORAL EVERY 12 HOURS PRN
Qty: 60 TABLET | Refills: 0 | Status: SHIPPED | OUTPATIENT
Start: 2024-10-01 | End: 2024-10-31

## 2024-10-01 NOTE — TELEPHONE ENCOUNTER
Pt's son called stating that his mom script [facility]. Requesting if you can send it to local [facility] because she is out of med today. I will update the pharmacy.

## 2024-10-02 RX ORDER — OXYCODONE HYDROCHLORIDE 5 MG/1
5 TABLET ORAL EVERY 12 HOURS PRN
Qty: 60 TABLET | Refills: 0 | OUTPATIENT
Start: 2024-10-02 | End: 2024-11-01

## 2024-10-02 NOTE — TELEPHONE ENCOUNTER
Phone call from the pt's son stating that he wanted a bridge RX until [facility] was able to deliver. Due to receiving another full RX and [facility] already having one the insurance will not fill. He is asking if he can have a small amt sent to [facility] and not a 30 day script. Please advise.

## 2024-10-07 ENCOUNTER — OFFICE VISIT (OUTPATIENT)
Dept: PAIN MEDICINE | Facility: CLINIC | Age: 89
End: 2024-10-07
Payer: MEDICARE

## 2024-10-07 VITALS
RESPIRATION RATE: 16 BRPM | SYSTOLIC BLOOD PRESSURE: 116 MMHG | WEIGHT: 135 LBS | HEIGHT: 64 IN | HEART RATE: 72 BPM | BODY MASS INDEX: 23.05 KG/M2 | DIASTOLIC BLOOD PRESSURE: 68 MMHG

## 2024-10-07 DIAGNOSIS — I42.8 NON-ISCHEMIC CARDIOMYOPATHY (MULTI): ICD-10-CM

## 2024-10-07 DIAGNOSIS — M54.16 LUMBAR RADICULOPATHY: ICD-10-CM

## 2024-10-07 DIAGNOSIS — G89.29 OTHER CHRONIC PAIN: ICD-10-CM

## 2024-10-07 DIAGNOSIS — M25.562 BILATERAL CHRONIC KNEE PAIN: Primary | ICD-10-CM

## 2024-10-07 DIAGNOSIS — G89.29 BILATERAL CHRONIC KNEE PAIN: Primary | ICD-10-CM

## 2024-10-07 DIAGNOSIS — M48.062 SPINAL STENOSIS, LUMBAR REGION, WITH NEUROGENIC CLAUDICATION: ICD-10-CM

## 2024-10-07 DIAGNOSIS — M25.561 BILATERAL CHRONIC KNEE PAIN: Primary | ICD-10-CM

## 2024-10-07 PROCEDURE — 3074F SYST BP LT 130 MM HG: CPT | Performed by: ANESTHESIOLOGY

## 2024-10-07 PROCEDURE — 1125F AMNT PAIN NOTED PAIN PRSNT: CPT | Performed by: ANESTHESIOLOGY

## 2024-10-07 PROCEDURE — 99213 OFFICE O/P EST LOW 20 MIN: CPT | Performed by: ANESTHESIOLOGY

## 2024-10-07 PROCEDURE — 1036F TOBACCO NON-USER: CPT | Performed by: ANESTHESIOLOGY

## 2024-10-07 PROCEDURE — 1160F RVW MEDS BY RX/DR IN RCRD: CPT | Performed by: ANESTHESIOLOGY

## 2024-10-07 PROCEDURE — 3078F DIAST BP <80 MM HG: CPT | Performed by: ANESTHESIOLOGY

## 2024-10-07 PROCEDURE — 1157F ADVNC CARE PLAN IN RCRD: CPT | Performed by: ANESTHESIOLOGY

## 2024-10-07 PROCEDURE — 1159F MED LIST DOCD IN RCRD: CPT | Performed by: ANESTHESIOLOGY

## 2024-10-07 ASSESSMENT — ENCOUNTER SYMPTOMS
WEAKNESS: 1
CONSTIPATION: 0
NERVOUS/ANXIOUS: 0
VOMITING: 0
SLEEP DISTURBANCE: 0
DIARRHEA: 0
NAUSEA: 0
BACK PAIN: 1
AGITATION: 0
MYALGIAS: 1
ABDOMINAL PAIN: 0
ABDOMINAL DISTENTION: 0
CONSTITUTIONAL NEGATIVE: 1
CONFUSION: 0
DYSURIA: 0
DIFFICULTY URINATING: 0
ARTHRALGIAS: 1

## 2024-10-07 ASSESSMENT — PAIN SCALES - GENERAL
PAINLEVEL_OUTOF10: 6
PAINLEVEL: 6

## 2024-10-07 ASSESSMENT — PAIN DESCRIPTION - DESCRIPTORS: DESCRIPTORS: ACHING

## 2024-10-07 ASSESSMENT — PAIN - FUNCTIONAL ASSESSMENT: PAIN_FUNCTIONAL_ASSESSMENT: 0-10

## 2024-10-07 NOTE — PROGRESS NOTES
Subjective   Patient ID: Neris Mccall is a 89 y.o. female who presents for Back Pain and Knee Pain.  Back Pain  Associated symptoms include weakness. Pertinent negatives include no abdominal pain or dysuria.   Knee Pain     Patient here today for follow up today.  She reports that she has severe pain in her knees, back, and then in her ankle area.  She gets this when she is sleeping on her back with a pillow under her knees.  She is rating her pain as a 6/10 today.  She has had 1 knee replacement the other knee has not been replaced but has severe tricompartmental osteoarthritis.  She suffered a compression fracture at L2 in the spring[place] underwent kyphoplasty.  She continues to have decreased functional ability with standing and walking as she will develop pain in her back and into her lower extremities.  She has undergone surgical consultation and many numerous consultations in the past all have determined that because of her health and her age she is not a candidate for any additional surgery.  She has maximized epidural steroid injections.  She underwent mild procedure.  It was attempted to have a genicular blocks and ablations completed however her insurance does not cover it and denied her access.  She has been taking oxycodone 5 mg half a tablet up to 4 times per day which does help her get through portions of her day and she is happy about.  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  She may consider having a consultation at [facility] as getting to [place] is challenging for her.  Her son is with her today.  She also question about having consultation with Dr. Villarreal in [place]    Review of Systems   Constitutional: Negative.    HENT: Negative.     Gastrointestinal:  Negative for abdominal distention, abdominal pain, constipation, diarrhea, nausea and vomiting.   Endocrine: Negative for cold intolerance and heat intolerance.   Genitourinary:  Negative for difficulty urinating,  dysuria and urgency.   Musculoskeletal:  Positive for arthralgias, back pain, gait problem and myalgias.   Skin: Negative.    Neurological:  Positive for weakness.   Psychiatric/Behavioral:  Negative for agitation, confusion, sleep disturbance and suicidal ideas. The patient is not nervous/anxious.        Objective   Physical Exam  Constitutional:       Appearance: Normal appearance.   Pulmonary:      Effort: Pulmonary effort is normal.   Skin:     General: Skin is warm.   Neurological:      Mental Status: She is alert and oriented to person, place, and time.      Gait: Gait abnormal.   Psychiatric:         Mood and Affect: Mood normal.         Behavior: Behavior normal.         Assessment/Plan   Problem List Items Addressed This Visit             ICD-10-CM    Spinal stenosis, lumbar region, with neurogenic claudication M48.062     Other Visit Diagnoses         Codes    Bilateral chronic knee pain    -  Primary M25.561, M25.562, G89.29    Lumbar radiculopathy     M54.16    Other chronic pain     G89.29          I nice discussion with the patient today our plan will be as follows.    Radiology: All available imaging was reviewed today.    Physically: Patient should participate in some lower extremity strengthening activities.  Patient should continue to use assistive devices for balance and safety.    Psychologically: No behavioral health issues at this time.    Medication: Patient does not require any refills of the medication.  A PDMP report was checked today, and was consistent with reported prescribing.  All previous urine drug screens and saliva drug screens were reviewed today and are compliant with the patient's prescriptive history.      Duration: Greater than 1 year    Intervention: Patient would like a cash option for genicular blocks and ablations to be completed.  We will get this information for the family.         Mj Stevens MD 10/07/24 11:40 AM

## 2024-10-07 NOTE — PROGRESS NOTES
MEDICATION NAME: oxycodone  STRENGTH: 5mg  LAST FILL DATE: 10/2/24  DATE LAST TAKEN: 10/7/24  QUANTITY FILLED: 60 x2 bottles (120total)  QUANTITY REMAININ  COUNT COMPLETED BY: RACHELLE ARVIZU RN and ANTOINETTE CORDERO MD      UDS LAST COMPLETED: 2024  CONTROLLED SUBSTANCES AGREEMENT LAST SIGNED: 2024  ORT LAST COMPLETED:2024  Modified [employer] disability form filled out annually.

## 2024-10-09 RX ORDER — FUROSEMIDE 20 MG/1
TABLET ORAL
Qty: 90 TABLET | Refills: 0 | Status: SHIPPED | OUTPATIENT
Start: 2024-10-09

## 2024-10-14 ENCOUNTER — TELEPHONE (OUTPATIENT)
Dept: PAIN MEDICINE | Facility: CLINIC | Age: 89
End: 2024-10-14
Payer: MEDICARE

## 2024-10-14 NOTE — TELEPHONE ENCOUNTER
SPOKE WITH PATIENTS SON AND MADE THEM AWARE OF OUT OF POCKET COST OF PROCEDURE TO BE DONE AT [facility] [place]. THEY WILL THINK ABOUT IT AND LET US KNOW.

## 2024-10-21 ENCOUNTER — OFFICE VISIT (OUTPATIENT)
Dept: CARDIOLOGY | Facility: HOSPITAL | Age: 89
End: 2024-10-21
Payer: MEDICARE

## 2024-10-21 VITALS
OXYGEN SATURATION: 93 % | WEIGHT: 134.04 LBS | SYSTOLIC BLOOD PRESSURE: 120 MMHG | BODY MASS INDEX: 23.01 KG/M2 | HEART RATE: 70 BPM | DIASTOLIC BLOOD PRESSURE: 69 MMHG

## 2024-10-21 DIAGNOSIS — E78.5 HYPERLIPIDEMIA, UNSPECIFIED HYPERLIPIDEMIA TYPE: ICD-10-CM

## 2024-10-21 DIAGNOSIS — I50.20 SYSTOLIC CONGESTIVE HEART FAILURE, UNSPECIFIED HF CHRONICITY: ICD-10-CM

## 2024-10-21 DIAGNOSIS — I25.10 CORONARY ARTERY DISEASE INVOLVING NATIVE CORONARY ARTERY OF NATIVE HEART WITHOUT ANGINA PECTORIS: ICD-10-CM

## 2024-10-21 DIAGNOSIS — I10 PRIMARY HYPERTENSION: Primary | ICD-10-CM

## 2024-10-21 LAB
ATRIAL RATE: 68 BPM
P AXIS: 48 DEGREES
P OFFSET: 117 MS
P ONSET: 76 MS
PR INTERVAL: 236 MS
Q ONSET: 194 MS
QRS COUNT: 11 BEATS
QRS DURATION: 174 MS
QT INTERVAL: 462 MS
QTC CALCULATION(BAZETT): 491 MS
QTC FREDERICIA: 482 MS
R AXIS: 262 DEGREES
T AXIS: 69 DEGREES
T OFFSET: 425 MS
VENTRICULAR RATE: 68 BPM

## 2024-10-21 PROCEDURE — 1159F MED LIST DOCD IN RCRD: CPT | Performed by: NURSE PRACTITIONER

## 2024-10-21 PROCEDURE — 99213 OFFICE O/P EST LOW 20 MIN: CPT | Performed by: NURSE PRACTITIONER

## 2024-10-21 PROCEDURE — 3074F SYST BP LT 130 MM HG: CPT | Performed by: NURSE PRACTITIONER

## 2024-10-21 PROCEDURE — 1036F TOBACCO NON-USER: CPT | Performed by: NURSE PRACTITIONER

## 2024-10-21 PROCEDURE — 3078F DIAST BP <80 MM HG: CPT | Performed by: NURSE PRACTITIONER

## 2024-10-21 PROCEDURE — 1157F ADVNC CARE PLAN IN RCRD: CPT | Performed by: NURSE PRACTITIONER

## 2024-10-21 PROCEDURE — G2211 COMPLEX E/M VISIT ADD ON: HCPCS | Performed by: NURSE PRACTITIONER

## 2024-10-21 PROCEDURE — 93005 ELECTROCARDIOGRAM TRACING: CPT | Performed by: NURSE PRACTITIONER

## 2024-10-21 ASSESSMENT — ENCOUNTER SYMPTOMS
RESPIRATORY NEGATIVE: 1
NEUROLOGICAL NEGATIVE: 1
ENDOCRINE NEGATIVE: 1
GASTROINTESTINAL NEGATIVE: 1
CONSTITUTIONAL NEGATIVE: 1
PSYCHIATRIC NEGATIVE: 1
MYALGIAS: 1
EYES NEGATIVE: 1
HEMATOLOGIC/LYMPHATIC NEGATIVE: 1
BACK PAIN: 1

## 2024-10-21 NOTE — PROGRESS NOTES
Referred by Dr. Hazel for No chief complaint on file.     History Of Present Illness:    Neris Mccall is a very pleasant 89 year old female with a history of moderate aortic stenosis, moderate CAD (Cath 2018), HFrEF, and LBBB, she is here for a a follow up visit.  Occasional lightheadedness. Following up with pain management. Denies chest pain or shortness of breath.     Review of Systems   Constitutional: Negative.   HENT: Negative.     Eyes: Negative.    Cardiovascular:  Positive for chest pain.   Respiratory: Negative.     Endocrine: Negative.    Hematologic/Lymphatic: Negative.    Skin: Negative.    Musculoskeletal:  Positive for arthritis, back pain, muscle weakness and myalgias.   Gastrointestinal: Negative.    Neurological: Negative.    Psychiatric/Behavioral: Negative.        Past Medical History:  She has a past medical history of Acute upper respiratory infection, unspecified (01/15/2018), Aneurysm of unspecified site (CMS-[facility]), Arrhythmia (1975), Arthritis (2005), BiPAP (biphasic positive airway pressure) dependence (2023), Cataract (2005), Chronic pain disorder (1960), Coronary artery disease (2021), CPAP (continuous positive airway pressure) dependence (2014), Dependence on other enabling machines and devices, Dysphagia (2021), Fractures (1955), GERD (gastroesophageal reflux disease) (1985), Glaucoma (2021), Hearing aid worn (1985), Heart disease (1980), Heart failure (2019), Heart valve disease (2021), History of blood transfusion (1958), HL (hearing loss) (1970), Hypertension (1972), Low back pain (1950), Lumbar disc disease (2016), Old myocardial infarction, Other nail disorders (03/14/2017), Pacemaker (2019), Personal history of diseases of the skin and subcutaneous tissue (12/14/2016), Personal history of other diseases of the circulatory system (05/16/2022), Personal history of other diseases of the circulatory system, Personal history of other diseases of the musculoskeletal system and  connective tissue, Personal history of other diseases of the nervous system and sense organs, Personal history of other endocrine, nutritional and metabolic disease, Personal history of other venous thrombosis and embolism, Personal history of transient ischemic attack (TIA), and cerebral infarction without residual deficits, Platelet disorder (Multi) (1998), Pregnant ([facility]) (1957), Scoliosis (2016), Secondary polycythemia, Shingles (1996), Sleep apnea, Spinal stenosis, lumbar region with neurogenic claudication (11/01/2022), Stroke (Multi) (1967), Urinary tract infection (2019), and Vision loss (2017).    She has no past medical history of ADD (attention deficit disorder), ADHD (attention deficit hyperactivity disorder), ALKA (acute kidney injury) (CMS-HCC), Anxiety, Autism ([facility]), Autoimmune disorder (Multi), Biliary disease, Bipolar disorder, Bladder cancer (Multi), BPH (benign prostatic hyperplasia), Cancer of neurologic system ([facility]), Celiac disease ([facility]), Cerebral aneurysm ([facility]), Cerebral palsy, Cerebral vascular accident (Multi), Cervical cancer, Cervical disc disease, Cholelithiasis, Chronic kidney disease, Cirrhosis (Multi), CKD (chronic kidney disease), Cognitive decline, Cognitive disorder, Colon polyp, Colorectal cancer (Multi), Crohn's disease (Multi), Dementia, Diverticulosis, Dizziness, Dysfunctional uterine bleeding, Endometrial cancer (Multi), Esophageal cancer (Multi), Esophageal disease, ESRD (end stage renal disease) (Multi), Fibroid, Fibromyalgia, primary, Gastric cancer (Multi), Gender dysphoria, Gestational diabetes, Gestational hypertension ([facility]), GI (gastrointestinal bleed), Hemodialysis status (CMS-HCC), Hernia, internal, Hiatal hernia, History of peritoneal dialysis, HIV disease (Multi), Hydrocephalus, Immunocompromised, Intellectual disability, Irritable bowel syndrome, Liver disease, Lupus, Mastocytosis, MS (multiple sclerosis) (Multi), Muscular dystrophy (Multi),  Myasthenia gravis, Myoneural disorder (Multi), Myotonia, VILLANUEVA (nonalcoholic steatohepatitis), Nephrolithiasis, Neuromuscular disorder (Multi), Ovarian cancer (Multi), Pancreatic cancer (Multi), Pancreatitis (HHS-HCC), Pelvic mass, Peptic ulcer disease, Personal history of other mental and behavioral disorders, Polycystic ovarian disease, Prematurity (HHS-HCC), Prostate cancer (Multi), PTSD (post-traumatic stress disorder), Pyelonephritis, Renal cancer (Multi), Renal disease due to hypertension, Schizophrenia, Seizure disorder (Multi), Substance addiction (Multi), Syncope, Thoracic spinal cord injury (Multi), TIA (transient ischemic attack), Ulcerative colitis, Uterine cancer (Multi), or Vertigo.    Past Surgical History:  She has a past surgical history that includes Dilation and curettage of uterus (04/20/2016); Other surgical history (04/20/2016); Varicose vein surgery (04/20/2016); Other surgical history (04/20/2016); Rotator cuff repair (04/20/2016); Tonsillectomy (04/20/2016); Knee surgery (04/20/2016); Other surgical history (07/29/2022); Fracture surgery (2015); Insert / replace / remove pacemaker (2019); Stapedectomy (1979); Colonoscopy (2014); Upper gastrointestinal endoscopy (2013); ORIF wrist fracture (1955); Toe Surgery (2004); Blepharoplasty (2002); Correction hammer toe (2004); and Back surgery.      Social History:  She reports that she has never smoked. She has never used smokeless tobacco. She reports that she does not currently use alcohol. She reports that she does not use drugs.    Family History:  Family History   Problem Relation Name Age of Onset    Cancer Mother GIBSON SALINAS     Hypertension Sister KELSIE RENTERIA     Breast cancer Sister KELSIE RENTERIA 87    Coronary artery disease Brother TIN RITA     Hearing loss Brother SERINA SALINAS     Vision loss Brother SERINA SALINAS     Heart disease Brother AUDREY MORENOE     Breast cancer Paternal Grandmother  30        Allergies:  Duloxetine, Sulfa  (sulfonamide antibiotics), Atenolol, Cephalexin, Cholestyramine, Gemfibrozil, House dust, Niacin, Pravastatin, Tramadol, and Tree pollen-white susanna    Outpatient Medications:  Current Outpatient Medications   Medication Instructions    acetaminophen (TYLENOL) 500 mg, Once daily (morning) M-F (5 days a week)    artificial tears, dextran-hypomel-glycerin, 0.1-0.3-0.2 % ophthalmic solution 4 times daily    ascorbic acid (vitamin C) 1,000 mg, Daily    aspirin 81 mg, Daily    calcium carb and citrate-vitD3 (Citracal-D3 Slow Release) 600 mg-12.5 mcg (500 unit) tablet extended release 1 tablet, Daily    calcium carbonate (Tums) 200 mg calcium chewable tablet 1 tablet, Daily    cholecalciferol (VITAMIN D-3) 50 mcg, Daily    cranberry extract 200 mg capsule 1 tablet, Daily    cyanocobalamin (Vitamin B-12) 50 mcg tablet 1 tablet, Daily    Eylea 2 mg, Once    furosemide (Lasix) 20 mg tablet TAKE 1 TABLET DAILY    gentamicin (Garamycin) 0.3 % ophthalmic solution Apply 1 drop under the nail and 1 drop on the nail topically twice daily and cover twice daily for 4 weeks    ibuprofen 200 mg, 4 times daily    ipratropium (Atrovent) 42 mcg (0.06 %) nasal spray USE 2 SPRAYS IN EACH NOSTRIL TWO TO THREE TIMES DAILY    latanoprost (Xalatan) 0.005 % ophthalmic solution 2 drops, Nightly    lidocaine (Lidoderm) 5 % patch APPLY 1 PATCH TO THE AFFECTED AREA AND LEAVE IN PLACE FOR 12 HOURS, THEN REMOVE AND LEAVE OFF FOR 12 HOURS    losartan (COZAAR) 25 mg, oral, Daily    melatonin 5 mg, Nightly    metoprolol succinate XL (TOPROL-XL) 25 mg, oral, Daily    multivit,calc,mins/iron/folic (WOMEN'S ONE DAILY ORAL) 1 tablet, Daily    naloxone (NARCAN) 4 mg, nasal, As needed, May repeat every 2-3 minutes if needed, alternating nostrils, until medical assistance becomes available.    omega-3 fatty acids-fish oil (Fish OiL) 340-1,000 mg capsule 1 capsule, Daily    omeprazole (PriLOSEC) 20 mg DR capsule TAKE 1 CAPSULE TWICE A DAY 30 MINUTES BEFORE MEALS     oxyCODONE (ROXICODONE) 5 mg, oral, Every 12 hours PRN    rosuvastatin (Crestor) 20 mg tablet TAKE 1 TABLET DAILY    spironolactone (ALDACTONE) 25 mg, oral, Daily    trolamine salicylate (Aspercreme) 10 % cream 1 Application, As needed    zolpidem (AMBIEN) 5 mg, oral, Nightly PRN        Last Recorded Vitals:  Vitals:    10/21/24 1304   BP: 120/69   Pulse: 70   SpO2: 93%   Weight: 60.8 kg (134 lb 0.6 oz)         Physical Exam:  Physical Exam  Vitals reviewed.   HENT:      Head: Normocephalic.      Nose: Nose normal.   Eyes:      Pupils: Pupils are equal, round, and reactive to light.   Cardiovascular:      Rate and Rhythm: Normal rate and regular rhythm. 3/6 DEREK   Pulmonary:      Effort: Pulmonary effort is normal.      Breath sounds: Normal breath sounds.   Abdominal:      General: Abdomen is flat.      Palpations: Abdomen is soft.   Musculoskeletal:         General: Normal range of motion.      Cervical back: Normal range of motion.   Skin:     General: Skin is warm and dry.   Neurological:      General: No focal deficit present.      Mental Status: He is alert and oriented to person, place, and time.   Psychiatric:         Mood and Affect: Mood normal.            Last Labs:  CBC -  Lab Results   Component Value Date    WBC 8.6 05/16/2024    HGB 14.6 05/16/2024    HCT 45.8 05/16/2024     (H) 05/16/2024     05/16/2024       CMP -  Lab Results   Component Value Date    CALCIUM 10.1 09/23/2024    PHOS 3.0 03/21/2022    PROT 6.5 09/23/2024    ALBUMIN 4.0 09/23/2024    AST 25 09/23/2024    ALT 13 09/23/2024    ALKPHOS 61 09/23/2024    BILITOT 0.6 09/23/2024       LIPID PANEL -   Lab Results   Component Value Date    CHOL 155 05/16/2024    TRIG 190 (H) 05/16/2024    HDL 44.3 05/16/2024    CHHDL 3.5 05/16/2024    LDLF 77 05/15/2023    VLDL 38 05/16/2024    NHDL 111 05/16/2024       RENAL FUNCTION PANEL -   Lab Results   Component Value Date    GLUCOSE 108 (H) 09/23/2024     09/23/2024    K 4.2  09/23/2024     09/23/2024    CO2 24 09/23/2024    ANIONGAP 15 09/23/2024    BUN 57 (H) 09/23/2024    CREATININE 1.02 09/23/2024    CALCIUM 10.1 09/23/2024    PHOS 3.0 03/21/2022    ALBUMIN 4.0 09/23/2024        Lab Results   Component Value Date    BNP 88 12/20/2021    HGBA1C 5.7 (A) 12/20/2021       Last Cardiology Tests:  ECG:  EKG independently reviewed from today showed sinus rhythm with first degree AV block RBBB heart rate 68 bpm     Echo:  Echocardiogram 6/11/2024   1. Left ventricular systolic function is normal.   2. There is reduced right ventricular systolic function.   3. Moderate aortic valve stenosis.   4. Mild aortic valve regurgitation.    Echocardiogram 12/21/2021  1. The left ventricular systolic function is moderately decreased with a 35-40% estimated ejection fraction.  2. [place] is abnormal.  3. Abnormal septal motion consistent with RV pacemaker.  4. No left ventricular thrombus visualized.  5. RVSP within normal limits.  6. Mild to moderate aortic valve stenosis.  7. There is mild to moderate aortic valve regurgitation.  8. There is global hypokinesis of the left ventricle with minor regional variations.        Echocardiogram 12/27/2019   1. The left ventricular systolic function is severely decreased with a 25-30%  estimated ejection fraction.  2. Spectral Doppler shows a pseudonormal pattern of left ventricular diastolic  filling.  3. There is eccentric left ventricular hypertrophy.  4. The left atrium is enlarged.  5. Severe mitral valve regurgitation.  6. Moderately elevated right ventricular systolic pressure.  7. Moderate aortic valve stenosis.  8. There is mild aortic valve regurgitation.  9. The left ventricular cavity size is severely dilated.  10. There is global hypokinesis of the left ventricle with minor regional  variations.     Echocardiogram 7/31/2019  1. The left ventricular systolic function is severely decreased with a 25%  estimated ejection fraction.  2. Multiple  segmental abnormalities exist. See findings.  3. Spectral Doppler shows an abnormal pattern of left ventricular diastolic  filling.  4. There is moderate concentric left ventricular hypertrophy.  5. The left atrium is moderately dilated.  6. Moderate mitral valve regurgitation.  7. Moderate aortic valve stenosis.  8. There is mild aortic valve regurgitation.       Cath:  Left heart cath 8/13/2018    1. Left main: no significant angiographic CAD.   2. LAD: 60% mid-vessel focal stenosis. Lesion is 65% by OCT, FFR = 0.82.   3. LCx: no significant angiographic CAD.   4. RCA: 40-50% diffuse mid-vessel disease.   5. RA 1mmHg, RV 28/3, PA 26/10 (18), PCWP 13mmHg.   6. Tanna CI 2.7 l/min/m2.  Stress Test:  Nuclear Stress Test 12/21/2021  1. Small to moderate-sized territory of prior completed infarction  involving the apex where there is no evidence of associated ischemia.  2. Moderate-sized territory of prior completed infarction involving  the mid through distal inferior wall without evidence of significant  associated ischemia.  3. Normal perfusion best preserved throughout the remainder of the LV  myocardium where there is no evidence of ischemia or infarction.  4. Mild LV dilation.  5. Apical dyskinesis and inferior wall hypokinesis superimposed more  mild global hypokinesis with an LV EF estimated at 28-31%.  1. Indeterminate exercise ECG for inducible ischemia.  2. No clinical evidence for ischemia at a maximal infusion.  3. Nuclear image results are reported separately.  4. The adequate level of stress was achieved.    Cardiac Imaging:        Assessment/Plan   Mrs. Mccall is a very pleasant 89 year old female with a history of moderate aortic stenosis, moderate CAD (cath 2018), HFrEF, LBBB s/p CRT-p, she continues to do well from a cardiac standpoint. Echocardiogram showed a preserved LV function with moderate aortic stenosis. She continues to follow up with pain management. She is euvolemic on exam. Heart rate and  blood pressure are well controlled today. Mrs. Mccall has a pacemaker that she is dependent on, she planning on a back and knee ablation, she cleared from a cardiac standpoint, but will need to have a pacemaker rep come to the procedure to switch the mode of the PPM      Plan   -call with any questions   -continue Crestor and Spironolactone   -continue Aspirin, Lasix, Losartan and Metoprolol   -follow up in May         JOHN Khan-CNP

## 2024-10-21 NOTE — PATIENT INSTRUCTIONS
CALL WITH ANY QUESTIONS   NO MEDICATION CHANGES   FOLLOW UP IN MAY     
It is recommended by the CDC that patients at higher risk, receive a COVID booster.  The goal of the booster is to reduce risk of complications from COVID should she get ill with it.    It has been 1 year since her last booster.   If she has not had adverse reaction to prior vaccination, it is recommended that she receive the booster at this time.      She will need to check at a pharmacy as we do not have it here.  She can ask to talk with the pharmacist there if she has any questions.  They usually have information sheets that can provide for her.          Please call patient     
Risk Statement (NON-critical care)

## 2024-10-25 DIAGNOSIS — M25.562 CHRONIC PAIN OF BOTH KNEES: Primary | ICD-10-CM

## 2024-10-25 DIAGNOSIS — M25.561 CHRONIC PAIN OF BOTH KNEES: Primary | ICD-10-CM

## 2024-10-25 DIAGNOSIS — G89.29 CHRONIC PAIN OF BOTH KNEES: Primary | ICD-10-CM

## 2024-10-31 ENCOUNTER — HOSPITAL ENCOUNTER (EMERGENCY)
Facility: HOSPITAL | Age: 89
Discharge: HOME | End: 2024-10-31
Attending: STUDENT IN AN ORGANIZED HEALTH CARE EDUCATION/TRAINING PROGRAM
Payer: MEDICARE

## 2024-10-31 ENCOUNTER — APPOINTMENT (OUTPATIENT)
Dept: CARDIOLOGY | Facility: HOSPITAL | Age: 89
End: 2024-10-31
Payer: MEDICARE

## 2024-10-31 ENCOUNTER — APPOINTMENT (OUTPATIENT)
Dept: RADIOLOGY | Facility: HOSPITAL | Age: 89
End: 2024-10-31
Payer: MEDICARE

## 2024-10-31 VITALS
HEART RATE: 77 BPM | WEIGHT: 131 LBS | RESPIRATION RATE: 17 BRPM | HEIGHT: 64 IN | OXYGEN SATURATION: 96 % | DIASTOLIC BLOOD PRESSURE: 73 MMHG | BODY MASS INDEX: 22.36 KG/M2 | TEMPERATURE: 97 F | SYSTOLIC BLOOD PRESSURE: 147 MMHG

## 2024-10-31 DIAGNOSIS — G89.29 CHRONIC PAIN WITH DRUG DEPENDENCE (MULTI): ICD-10-CM

## 2024-10-31 DIAGNOSIS — S39.012A LUMBAR STRAIN, INITIAL ENCOUNTER: ICD-10-CM

## 2024-10-31 DIAGNOSIS — R42 DIZZINESS: ICD-10-CM
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
## 2024-10-31 DIAGNOSIS — R79.89 ELEVATED TROPONIN: ICD-10-CM

## 2024-10-31 DIAGNOSIS — F19.20 CHRONIC PAIN WITH DRUG DEPENDENCE (MULTI): ICD-10-CM

## 2024-10-31 DIAGNOSIS — R07.89 CHEST WALL DISCOMFORT: ICD-10-CM

## 2024-10-31 LAB
ALBUMIN SERPL BCP-MCNC: 4.4 G/DL (ref 3.4–5)
ALP SERPL-CCNC: 60 U/L (ref 33–136)
ALT SERPL W P-5'-P-CCNC: 17 U/L (ref 7–45)
ANION GAP SERPL CALC-SCNC: 13 MMOL/L (ref 10–20)
APPEARANCE UR: CLEAR
AST SERPL W P-5'-P-CCNC: 33 U/L (ref 9–39)
BASOPHILS # BLD AUTO: 0.06 X10*3/UL (ref 0–0.1)
BASOPHILS NFR BLD AUTO: 0.6 %
BILIRUB SERPL-MCNC: 0.5 MG/DL (ref 0–1.2)
BILIRUB UR STRIP.AUTO-MCNC: NEGATIVE MG/DL
BNP SERPL-MCNC: 241 PG/ML (ref 0–99)
BUN SERPL-MCNC: 50 MG/DL (ref 6–23)
CALCIUM SERPL-MCNC: 10.2 MG/DL (ref 8.6–10.3)
CARDIAC TROPONIN I PNL SERPL HS: 38 NG/L (ref 0–13)
CARDIAC TROPONIN I PNL SERPL HS: 40 NG/L (ref 0–13)
CHLORIDE SERPL-SCNC: 103 MMOL/L (ref 98–107)
CO2 SERPL-SCNC: 26 MMOL/L (ref 21–32)
COLOR UR: ABNORMAL
CREAT SERPL-MCNC: 1.01 MG/DL (ref 0.5–1.05)
EGFRCR SERPLBLD CKD-EPI 2021: 53 ML/MIN/1.73M*2
EOSINOPHIL # BLD AUTO: 0.01 X10*3/UL (ref 0–0.4)
EOSINOPHIL NFR BLD AUTO: 0.1 %
ERYTHROCYTE [DISTWIDTH] IN BLOOD BY AUTOMATED COUNT: 12.2 % (ref 11.5–14.5)
GLUCOSE SERPL-MCNC: 113 MG/DL (ref 74–99)
GLUCOSE UR STRIP.AUTO-MCNC: NORMAL MG/DL
HCT VFR BLD AUTO: 44.1 % (ref 36–46)
HGB BLD-MCNC: 14.5 G/DL (ref 12–16)
IMM GRANULOCYTES # BLD AUTO: 0.03 X10*3/UL (ref 0–0.5)
IMM GRANULOCYTES NFR BLD AUTO: 0.3 % (ref 0–0.9)
KETONES UR STRIP.AUTO-MCNC: NEGATIVE MG/DL
LACTATE SERPL-SCNC: 1.2 MMOL/L (ref 0.4–2)
LEUKOCYTE ESTERASE UR QL STRIP.AUTO: ABNORMAL
LYMPHOCYTES # BLD AUTO: 1.85 X10*3/UL (ref 0.8–3)
LYMPHOCYTES NFR BLD AUTO: 19.1 %
MAGNESIUM SERPL-MCNC: 2 MG/DL (ref 1.6–2.4)
MCH RBC QN AUTO: 31.3 PG (ref 26–34)
MCHC RBC AUTO-ENTMCNC: 32.9 G/DL (ref 32–36)
MCV RBC AUTO: 95 FL (ref 80–100)
MONOCYTES # BLD AUTO: 0.88 X10*3/UL (ref 0.05–0.8)
MONOCYTES NFR BLD AUTO: 9.1 %
MUCOUS THREADS #/AREA URNS AUTO: ABNORMAL /LPF
NEUTROPHILS # BLD AUTO: 6.84 X10*3/UL (ref 1.6–5.5)
NEUTROPHILS NFR BLD AUTO: 70.8 %
NITRITE UR QL STRIP.AUTO: NEGATIVE
NRBC BLD-RTO: 0 /100 WBCS (ref 0–0)
PH UR STRIP.AUTO: 5.5 [PH]
PLATELET # BLD AUTO: 251 X10*3/UL (ref 150–450)
POTASSIUM SERPL-SCNC: 4.8 MMOL/L (ref 3.5–5.3)
PROT SERPL-MCNC: 7.5 G/DL (ref 6.4–8.2)
PROT UR STRIP.AUTO-MCNC: NEGATIVE MG/DL
RBC # BLD AUTO: 4.64 X10*6/UL (ref 4–5.2)
RBC # UR STRIP.AUTO: NEGATIVE /UL
RBC #/AREA URNS AUTO: ABNORMAL /HPF
SODIUM SERPL-SCNC: 137 MMOL/L (ref 136–145)
SP GR UR STRIP.AUTO: 1.01
SQUAMOUS #/AREA URNS AUTO: ABNORMAL /HPF
UROBILINOGEN UR STRIP.AUTO-MCNC: NORMAL MG/DL
WBC # BLD AUTO: 9.7 X10*3/UL (ref 4.4–11.3)
WBC #/AREA URNS AUTO: ABNORMAL /HPF

## 2024-10-31 PROCEDURE — 72128 CT CHEST SPINE W/O DYE: CPT | Mod: FOREIGN READ | Performed by: RADIOLOGY

## 2024-10-31 PROCEDURE — 72125 CT NECK SPINE W/O DYE: CPT

## 2024-10-31 PROCEDURE — 72131 CT LUMBAR SPINE W/O DYE: CPT

## 2024-10-31 PROCEDURE — 71250 CT THORAX DX C-: CPT

## 2024-10-31 PROCEDURE — 83605 ASSAY OF LACTIC ACID: CPT | Performed by: NURSE PRACTITIONER

## 2024-10-31 PROCEDURE — 83735 ASSAY OF MAGNESIUM: CPT | Performed by: NURSE PRACTITIONER

## 2024-10-31 PROCEDURE — 72125 CT NECK SPINE W/O DYE: CPT | Performed by: RADIOLOGY

## 2024-10-31 PROCEDURE — 83880 ASSAY OF NATRIURETIC PEPTIDE: CPT | Performed by: NURSE PRACTITIONER

## 2024-10-31 PROCEDURE — 70450 CT HEAD/BRAIN W/O DYE: CPT | Performed by: RADIOLOGY

## 2024-10-31 PROCEDURE — 99285 EMERGENCY DEPT VISIT HI MDM: CPT | Mod: 25

## 2024-10-31 PROCEDURE — 72131 CT LUMBAR SPINE W/O DYE: CPT | Mod: FOREIGN READ | Performed by: RADIOLOGY

## 2024-10-31 PROCEDURE — 36415 COLL VENOUS BLD VENIPUNCTURE: CPT | Performed by: NURSE PRACTITIONER

## 2024-10-31 PROCEDURE — 71250 CT THORAX DX C-: CPT | Mod: FOREIGN READ | Performed by: RADIOLOGY

## 2024-10-31 PROCEDURE — 81001 URINALYSIS AUTO W/SCOPE: CPT | Performed by: NURSE PRACTITIONER

## 2024-10-31 PROCEDURE — 93005 ELECTROCARDIOGRAM TRACING: CPT

## 2024-10-31 PROCEDURE — 87086 URINE CULTURE/COLONY COUNT: CPT | Mod: GEALAB | Performed by: NURSE PRACTITIONER

## 2024-10-31 PROCEDURE — 70450 CT HEAD/BRAIN W/O DYE: CPT

## 2024-10-31 PROCEDURE — 85025 COMPLETE CBC W/AUTO DIFF WBC: CPT | Performed by: NURSE PRACTITIONER

## 2024-10-31 PROCEDURE — 80053 COMPREHEN METABOLIC PANEL: CPT | Performed by: NURSE PRACTITIONER

## 2024-10-31 PROCEDURE — 72128 CT CHEST SPINE W/O DYE: CPT

## 2024-10-31 PROCEDURE — 84484 ASSAY OF TROPONIN QUANT: CPT | Performed by: NURSE PRACTITIONER

## 2024-10-31 RX ORDER — LIDOCAINE 50 MG/G
1 PATCH TOPICAL DAILY
Qty: 30 PATCH | Refills: 0 | Status: SHIPPED | OUTPATIENT
Start: 2024-10-31

## 2024-10-31 RX ORDER — LIDOCAINE 50 MG/G
1 PATCH TOPICAL DAILY
Qty: 30 PATCH | Refills: 0 | Status: SHIPPED | OUTPATIENT
Start: 2024-10-31 | End: 2024-10-31

## 2024-10-31 ASSESSMENT — LIFESTYLE VARIABLES
EVER HAD A DRINK FIRST THING IN THE MORNING TO STEADY YOUR NERVES TO GET RID OF A HANGOVER: NO
HAVE PEOPLE ANNOYED YOU BY CRITICIZING YOUR DRINKING: NO
HAVE YOU EVER FELT YOU SHOULD CUT DOWN ON YOUR DRINKING: NO
TOTAL SCORE: 0
EVER FELT BAD OR GUILTY ABOUT YOUR DRINKING: NO

## 2024-10-31 ASSESSMENT — PAIN DESCRIPTION - ORIENTATION: ORIENTATION: LEFT

## 2024-10-31 ASSESSMENT — PAIN - FUNCTIONAL ASSESSMENT: PAIN_FUNCTIONAL_ASSESSMENT: 0-10

## 2024-10-31 ASSESSMENT — PAIN SCALES - GENERAL: PAINLEVEL_OUTOF10: 7

## 2024-10-31 ASSESSMENT — COLUMBIA-SUICIDE SEVERITY RATING SCALE - C-SSRS
1. IN THE PAST MONTH, HAVE YOU WISHED YOU WERE DEAD OR WISHED YOU COULD GO TO SLEEP AND NOT WAKE UP?: NO
6. HAVE YOU EVER DONE ANYTHING, STARTED TO DO ANYTHING, OR PREPARED TO DO ANYTHING TO END YOUR LIFE?: NO
2. HAVE YOU ACTUALLY HAD ANY THOUGHTS OF KILLING YOURSELF?: NO

## 2024-10-31 ASSESSMENT — PAIN DESCRIPTION - LOCATION: LOCATION: BACK

## 2024-11-01 LAB
ATRIAL RATE: 78 BPM
HOLD SPECIMEN: NORMAL
P AXIS: 53 DEGREES
P OFFSET: 126 MS
P ONSET: 82 MS
PR INTERVAL: 232 MS
Q ONSET: 198 MS
QRS COUNT: 13 BEATS
QRS DURATION: 176 MS
QT INTERVAL: 444 MS
QTC CALCULATION(BAZETT): 506 MS
QTC FREDERICIA: 484 MS
R AXIS: 253 DEGREES
T AXIS: 35 DEGREES
T OFFSET: 420 MS
VENTRICULAR RATE: 78 BPM

## 2024-11-03 LAB — BACTERIA UR CULT: NORMAL

## 2024-11-07 PROBLEM — N39.0 ACUTE LOWER UTI: Status: RESOLVED | Noted: 2023-05-15 | Resolved: 2024-11-07

## 2024-11-07 PROBLEM — M25.571 ACUTE RIGHT ANKLE PAIN: Status: RESOLVED | Noted: 2023-05-15 | Resolved: 2024-11-07

## 2024-11-08 ENCOUNTER — TELEPHONE (OUTPATIENT)
Dept: PRIMARY CARE | Facility: CLINIC | Age: 89
End: 2024-11-08
Payer: MEDICARE

## 2024-11-08 DIAGNOSIS — E78.00 PURE HYPERCHOLESTEROLEMIA: ICD-10-CM

## 2024-11-08 NOTE — TELEPHONE ENCOUNTER
Sumanth, pt's grandson, states she was to have a 6 mo follow up blood draw for lipids.  Dr Giron did notate this on the pt's last result in May - there is no order in the system for this to be done.  Her apt with LIAM, RAZA is on Tuesday - can an order for Lipid panel be put in the system and Sumanth be called, he would like to take her first thing on Monday for the draw.

## 2024-11-11 ENCOUNTER — LAB (OUTPATIENT)
Dept: LAB | Facility: LAB | Age: 89
End: 2024-11-11
Payer: MEDICARE

## 2024-11-11 ENCOUNTER — OFFICE VISIT (OUTPATIENT)
Dept: PRIMARY CARE | Facility: CLINIC | Age: 89
End: 2024-11-11
Payer: MEDICARE

## 2024-11-11 VITALS
WEIGHT: 131 LBS | HEART RATE: 74 BPM | HEIGHT: 64 IN | BODY MASS INDEX: 22.36 KG/M2 | OXYGEN SATURATION: 97 % | SYSTOLIC BLOOD PRESSURE: 112 MMHG | DIASTOLIC BLOOD PRESSURE: 68 MMHG

## 2024-11-11 DIAGNOSIS — I50.22 CHRONIC SYSTOLIC HEART FAILURE: ICD-10-CM

## 2024-11-11 DIAGNOSIS — F51.04 CHRONIC INSOMNIA: ICD-10-CM

## 2024-11-11 DIAGNOSIS — F32.A DEPRESSION, UNSPECIFIED DEPRESSION TYPE: ICD-10-CM

## 2024-11-11 DIAGNOSIS — Z12.31 ENCOUNTER FOR SCREENING MAMMOGRAM FOR MALIGNANT NEOPLASM OF BREAST: ICD-10-CM

## 2024-11-11 DIAGNOSIS — D75.1 SECONDARY POLYCYTHEMIA: ICD-10-CM

## 2024-11-11 DIAGNOSIS — E78.00 PURE HYPERCHOLESTEROLEMIA: ICD-10-CM

## 2024-11-11 DIAGNOSIS — Z00.00 ROUTINE ADULT HEALTH MAINTENANCE: Primary | ICD-10-CM

## 2024-11-11 DIAGNOSIS — M80.08XA AGE-RELATED OSTEOPOROSIS WITH CURRENT PATHOL FRACTURE OF VERTEBRA, INITIAL ENCOUNTER (MULTI): ICD-10-CM

## 2024-11-11 DIAGNOSIS — Z79.899 MEDICATION MANAGEMENT: ICD-10-CM

## 2024-11-11 DIAGNOSIS — M25.50 ARTHRALGIA OF MULTIPLE SITES: ICD-10-CM

## 2024-11-11 DIAGNOSIS — I10 PRIMARY HYPERTENSION: ICD-10-CM

## 2024-11-11 DIAGNOSIS — I72.2 ANEURYSM ARTERY, RENAL (CMS-HCC): ICD-10-CM

## 2024-11-11 DIAGNOSIS — R71.8 OTHER ABNORMALITY OF RED BLOOD CELLS: ICD-10-CM

## 2024-11-11 DIAGNOSIS — E78.5 DYSLIPIDEMIA, GOAL LDL BELOW 70: ICD-10-CM

## 2024-11-11 LAB
ALBUMIN SERPL BCP-MCNC: 4.2 G/DL (ref 3.4–5)
ALP SERPL-CCNC: 64 U/L (ref 33–136)
ALT SERPL W P-5'-P-CCNC: 17 U/L (ref 7–45)
ANION GAP SERPL CALC-SCNC: 15 MMOL/L (ref 10–20)
AST SERPL W P-5'-P-CCNC: 29 U/L (ref 9–39)
BASOPHILS # BLD AUTO: 0.07 X10*3/UL (ref 0–0.1)
BASOPHILS NFR BLD AUTO: 1.1 %
BILIRUB SERPL-MCNC: 0.7 MG/DL (ref 0–1.2)
BUN SERPL-MCNC: 41 MG/DL (ref 6–23)
CALCIUM SERPL-MCNC: 9.9 MG/DL (ref 8.6–10.3)
CHLORIDE SERPL-SCNC: 101 MMOL/L (ref 98–107)
CHOLEST SERPL-MCNC: 138 MG/DL (ref 0–199)
CHOLESTEROL/HDL RATIO: 3.5
CK SERPL-CCNC: 71 U/L (ref 0–215)
CO2 SERPL-SCNC: 26 MMOL/L (ref 21–32)
CREAT SERPL-MCNC: 1 MG/DL (ref 0.5–1.05)
EGFRCR SERPLBLD CKD-EPI 2021: 54 ML/MIN/1.73M*2
EOSINOPHIL # BLD AUTO: 0.23 X10*3/UL (ref 0–0.4)
EOSINOPHIL NFR BLD AUTO: 3.5 %
ERYTHROCYTE [DISTWIDTH] IN BLOOD BY AUTOMATED COUNT: 12.4 % (ref 11.5–14.5)
FERRITIN SERPL-MCNC: 171 NG/ML (ref 8–150)
GLUCOSE SERPL-MCNC: 106 MG/DL (ref 74–99)
HCT VFR BLD AUTO: 43.8 % (ref 36–46)
HDLC SERPL-MCNC: 39.3 MG/DL
HGB BLD-MCNC: 13.8 G/DL (ref 12–16)
IMM GRANULOCYTES # BLD AUTO: 0.02 X10*3/UL (ref 0–0.5)
IMM GRANULOCYTES NFR BLD AUTO: 0.3 % (ref 0–0.9)
IRON SATN MFR SERPL: 33 % (ref 25–45)
IRON SERPL-MCNC: 126 UG/DL (ref 35–150)
LDLC SERPL CALC-MCNC: 63 MG/DL
LYMPHOCYTES # BLD AUTO: 2.46 X10*3/UL (ref 0.8–3)
LYMPHOCYTES NFR BLD AUTO: 37 %
MCH RBC QN AUTO: 31.2 PG (ref 26–34)
MCHC RBC AUTO-ENTMCNC: 31.5 G/DL (ref 32–36)
MCV RBC AUTO: 99 FL (ref 80–100)
MONOCYTES # BLD AUTO: 0.75 X10*3/UL (ref 0.05–0.8)
MONOCYTES NFR BLD AUTO: 11.3 %
NEUTROPHILS # BLD AUTO: 3.12 X10*3/UL (ref 1.6–5.5)
NEUTROPHILS NFR BLD AUTO: 46.8 %
NON HDL CHOLESTEROL: 99 MG/DL (ref 0–149)
NRBC BLD-RTO: 0 /100 WBCS (ref 0–0)
PLATELET # BLD AUTO: 236 X10*3/UL (ref 150–450)
POTASSIUM SERPL-SCNC: 4.2 MMOL/L (ref 3.5–5.3)
PROT SERPL-MCNC: 6.6 G/DL (ref 6.4–8.2)
RBC # BLD AUTO: 4.43 X10*6/UL (ref 4–5.2)
SODIUM SERPL-SCNC: 138 MMOL/L (ref 136–145)
TIBC SERPL-MCNC: 381 UG/DL (ref 240–445)
TRIGL SERPL-MCNC: 180 MG/DL (ref 0–149)
TSH SERPL-ACNC: 1.54 MIU/L (ref 0.44–3.98)
UIBC SERPL-MCNC: 255 UG/DL (ref 110–370)
VLDL: 36 MG/DL (ref 0–40)
WBC # BLD AUTO: 6.7 X10*3/UL (ref 4.4–11.3)

## 2024-11-11 PROCEDURE — 36415 COLL VENOUS BLD VENIPUNCTURE: CPT

## 2024-11-11 PROCEDURE — 84443 ASSAY THYROID STIM HORMONE: CPT

## 2024-11-11 PROCEDURE — 80061 LIPID PANEL: CPT

## 2024-11-11 PROCEDURE — G2211 COMPLEX E/M VISIT ADD ON: HCPCS | Performed by: NURSE PRACTITIONER

## 2024-11-11 PROCEDURE — 3078F DIAST BP <80 MM HG: CPT | Performed by: NURSE PRACTITIONER

## 2024-11-11 PROCEDURE — 80361 OPIATES 1 OR MORE: CPT

## 2024-11-11 PROCEDURE — 82728 ASSAY OF FERRITIN: CPT

## 2024-11-11 PROCEDURE — 1159F MED LIST DOCD IN RCRD: CPT | Performed by: NURSE PRACTITIONER

## 2024-11-11 PROCEDURE — 1160F RVW MEDS BY RX/DR IN RCRD: CPT | Performed by: NURSE PRACTITIONER

## 2024-11-11 PROCEDURE — 1157F ADVNC CARE PLAN IN RCRD: CPT | Performed by: NURSE PRACTITIONER

## 2024-11-11 PROCEDURE — 80053 COMPREHEN METABOLIC PANEL: CPT

## 2024-11-11 PROCEDURE — 83550 IRON BINDING TEST: CPT

## 2024-11-11 PROCEDURE — 85025 COMPLETE CBC W/AUTO DIFF WBC: CPT

## 2024-11-11 PROCEDURE — 82550 ASSAY OF CK (CPK): CPT

## 2024-11-11 PROCEDURE — 80365 DRUG SCREENING OXYCODONE: CPT

## 2024-11-11 PROCEDURE — 99214 OFFICE O/P EST MOD 30 MIN: CPT | Performed by: NURSE PRACTITIONER

## 2024-11-11 PROCEDURE — 80307 DRUG TEST PRSMV CHEM ANLYZR: CPT

## 2024-11-11 PROCEDURE — 83540 ASSAY OF IRON: CPT

## 2024-11-11 PROCEDURE — 3074F SYST BP LT 130 MM HG: CPT | Performed by: NURSE PRACTITIONER

## 2024-11-11 RX ORDER — ZOLPIDEM TARTRATE 5 MG/1
5 TABLET ORAL NIGHTLY PRN
Qty: 90 TABLET | Refills: 0 | Status: SHIPPED | OUTPATIENT
Start: 2024-11-11 | End: 2025-05-10

## 2024-11-11 RX ORDER — OXYCODONE HYDROCHLORIDE 5 MG/1
5 TABLET ORAL EVERY 12 HOURS PRN
Qty: 60 TABLET | Refills: 0 | Status: SHIPPED | OUTPATIENT
Start: 2024-11-11 | End: 2024-12-11

## 2024-11-11 ASSESSMENT — ENCOUNTER SYMPTOMS
ABDOMINAL PAIN: 0
DIZZINESS: 0
HEADACHES: 0
NAUSEA: 0
JOINT SWELLING: 0
COUGH: 0
CHILLS: 0
PALPITATIONS: 0
COLOR CHANGE: 0
WHEEZING: 0
SEIZURES: 0
BRUISES/BLEEDS EASILY: 0
TROUBLE SWALLOWING: 0
ABDOMINAL DISTENTION: 0
DIARRHEA: 0
DIFFICULTY URINATING: 0
FATIGUE: 0
MYALGIAS: 0
SHORTNESS OF BREATH: 1
WOUND: 0
CONSTIPATION: 0
WEAKNESS: 0
FEVER: 0
ADENOPATHY: 0
EYE PAIN: 0

## 2024-11-11 NOTE — PROGRESS NOTES
Subjective   Patient ID: Neris Mccall is a 89 y.o. female who presents for Follow-up (6 month ).    Patient seen today in order to establish primary care.  Present for today's assessment is her adult son.  Patient currently lives at [facility].  She is currently independent but it sounds like family is helping to look into some assisted living facilities for assistance with medication management/additional patient care.  Patient reports that her appetite is not the greatest and her weight is down approximately 9 pounds over the past year.  The majority of food that patient eats is prepared by the facility.  She does report occasional coughing with swallowing and has a history of esophageal interventions but is declining additional workup at this time.  She is not interested in seeing speech therapy nor completing a modified barium swallow.  No reported issues with staying hydrated, bowel or bladder.  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  She is to use gentamicin solution in the past with minimal improvement and continues with a diluted bleach solution per her previous primary care recommendations.  No other skin/nail concerns at this time.  Patient has been following with pain management due to pain that is chronic in her back and knees.  Pain is limited at rest but occurs with ambulation.  As pain management provider is far away from the patient, son is requesting that narcotics be prescribed by primary care for ease.  Patient agreeable to sign consent and provide urine sample today.  She does however report occasional lightheaded/fuzzy feeling since and exceeding her Oxy.  Discussed trialing lower dose formulations and she states that they have been ineffective in the past.  Patient also follows routinely with cardiology due to history of moderate aortic stenosis, moderate CAD (Cath 2018), HFrEF, and LBBB.  She denies any current issues with chest pain/pressure, headaches,  blurred vision or other cardiac concerns.  No reported issues with mood.  Insomnia controlled with Ambien.  Patient states that she never takes her Ambien at the same time of her pain medications.  Medications reviewed.  No other acute concerns reported.             Current Outpatient Medications on File Prior to Visit   Medication Sig Dispense Refill    acetaminophen (Tylenol) 500 mg tablet Take 1 tablet (500 mg) by mouth once daily (M-F).      aflibercept (Eylea) 2 mg/0.05 mL intra-ocular injection Administer 0.05 mL (2 mg) into the left eye 1 time. EVERY 10 WEEKS. NO DOSE LISTED      artificial tears, dextran-hypomel-glycerin, 0.1-0.3-0.2 % ophthalmic solution Administer into affected eye(s) 4 times a day.      ascorbic acid, vitamin C, 500 mg capsule Take 1,000 mg by mouth once daily.      aspirin 81 mg capsule Take 81 mg by mouth once daily.      calcium carb and citrate-vitD3 (Citracal-D3 Slow Release) 600 mg-12.5 mcg (500 unit) tablet extended release Take 1 tablet by mouth once daily.      calcium carbonate (Tums) 200 mg calcium chewable tablet Chew 1 tablet (500 mg) once daily.      cholecalciferol (Vitamin D-3) 25 MCG (1000 UT) capsule Take 2 capsules (50 mcg) by mouth once daily.      cranberry extract 200 mg capsule Take 1 tablet by mouth once daily. 30,000MG      cyanocobalamin (Vitamin B-12) 50 mcg tablet Take 1 tablet (50 mcg) by mouth once daily.      furosemide (Lasix) 20 mg tablet TAKE 1 TABLET DAILY 90 tablet 0    gentamicin (Garamycin) 0.3 % ophthalmic solution Apply 1 drop under the nail and 1 drop on the nail topically twice daily and cover twice daily for 4 weeks (Patient taking differently: Apply 1 drop under the nail and 1 drop on the nail topically twice daily and cover twice daily for 4 weeks  **FOR NAIL FUNGUS NOT EYES**) 15 mL 0    ibuprofen 200 mg tablet Take 1 tablet (200 mg) by mouth 4 times a day. (Patient taking differently: Take 1 tablet (200 mg) by mouth once daily.)       ipratropium (Atrovent) 42 mcg (0.06 %) nasal spray USE 2 SPRAYS IN EACH NOSTRIL TWO TO THREE TIMES DAILY 45 mL 7    latanoprost (Xalatan) 0.005 % ophthalmic solution Administer 2 drops into the left eye once daily at bedtime.      lidocaine (Lidoderm) 5 % patch Place 1 patch over 12 hours on the skin once daily. Remove & discard patch within 12 hours or as directed by MD. 30 patch 0    losartan (Cozaar) 25 mg tablet Take 1 tablet (25 mg) by mouth once daily. 90 tablet 1    melatonin 5 mg capsule Take 1 capsule (5 mg) by mouth once daily at bedtime.      metoprolol succinate XL (Toprol-XL) 25 mg 24 hr tablet Take 1 tablet (25 mg) by mouth once daily. 90 tablet 1    multivit,calc,mins/iron/folic (WOMEN'S ONE DAILY ORAL) Take 1 tablet by mouth once daily.      naloxone (Narcan) 4 mg/0.1 mL nasal spray Administer 1 spray (4 mg) into affected nostril(s) if needed for opioid reversal. May repeat every 2-3 minutes if needed, alternating nostrils, until medical assistance becomes available. 2 each 0    omega-3 fatty acids-fish oil (Fish OiL) 340-1,000 mg capsule Take 1 capsule by mouth once daily.      omeprazole (PriLOSEC) 20 mg DR capsule TAKE 1 CAPSULE TWICE A DAY 30 MINUTES BEFORE MEALS 180 capsule 3    spironolactone (Aldactone) 25 mg tablet TAKE 1 TABLET DAILY 90 tablet 1    trolamine salicylate (Aspercreme) 10 % cream Apply 1 Application topically if needed for muscle/joint pain.      [DISCONTINUED] rosuvastatin (Crestor) 20 mg tablet TAKE 1 TABLET DAILY 90 tablet 1    [DISCONTINUED] zolpidem (Ambien) 5 mg tablet Take 1 tablet (5 mg) by mouth as needed at bedtime for sleep. 90 tablet 1    [DISCONTINUED] oxyCODONE (Roxicodone) 5 mg immediate release tablet Take 1 tablet (5 mg) by mouth every 12 hours if needed for severe pain (7 - 10). 60 tablet 0     No current facility-administered medications on file prior to visit.       Past Medical History:   Diagnosis Date    Acute upper respiratory infection, unspecified  01/15/2018    Acute URI    Aneurysm of unspecified site (CMS-HCC)     Aneurysm    Arrhythmia 1975    Arthritis 2005    BiPAP (biphasic positive airway pressure) dependence 2023    Cataract 2005    Chronic pain disorder 1960    Coronary artery disease 2021    CPAP (continuous positive airway pressure) dependence 2014    Dependence on other enabling machines and devices     CPAP (continuous positive airway pressure) dependence    Dysphagia 2021    Fractures 1955    GERD (gastroesophageal reflux disease) 1985    No longer problem    Glaucoma 2021    Hearing aid worn 1985    Heart disease 1980    Heart failure 2019    Heart valve disease 2021    History of blood transfusion 1958    HL (hearing loss) 1970    Hypertension 1972    Low back pain 1950    Lumbar disc disease 2016    Old myocardial infarction     History of myocardial infarction    Other nail disorders 03/14/2017    Green nails    Pacemaker 2019    Personal history of diseases of the skin and subcutaneous tissue 12/14/2016    History of folliculitis    Personal history of other diseases of the circulatory system 05/16/2022    History of intermittent claudication    Personal history of other diseases of the circulatory system     History of high blood pressure    Personal history of other diseases of the musculoskeletal system and connective tissue     History of arthritis    Personal history of other diseases of the nervous system and sense organs     History of sleep apnea    Personal history of other endocrine, nutritional and metabolic disease     History of high cholesterol    Personal history of other venous thrombosis and embolism     H/O blood clots    Personal history of transient ischemic attack (TIA), and cerebral infarction without residual deficits     History of stroke    Platelet disorder (Multi) 1998    PCV    Pregnant ([facility]) [facility]    Scoliosis 2016    Secondary polycythemia     Polycythemia    Shingles 1996    Sleep apnea     Spinal stenosis,  lumbar region with neurogenic claudication 11/01/2022    Neurogenic claudication due to lumbar spinal stenosis    Stroke (Multi) 1967    Urinary tract infection 2019    Vision loss 2017        Past Surgical History:   Procedure Laterality Date    BACK SURGERY      MILD procedure    BLEPHAROPLASTY  2002    COLONOSCOPY  2014    CORRECTION HAMMER TOE  2004    DILATION AND CURETTAGE OF UTERUS  04/20/2016    Dilation And Curettage    FRACTURE SURGERY  2015    INSERT / REPLACE / REMOVE PACEMAKER  2019    KNEE SURGERY  04/20/2016    Knee Surgery Left    ORIF WRIST FRACTURE  1955    OTHER SURGICAL HISTORY  04/20/2016    Ear Surgery    OTHER SURGICAL HISTORY  04/20/2016    Hammertoe Operation Left Toe No. ___    OTHER SURGICAL HISTORY  07/29/2022    Pacemaker insertion    ROTATOR CUFF REPAIR  04/20/2016    Rotator Cuff Repair    STAPEDECTOMY  1979    TOE SURGERY  2004    TONSILLECTOMY  04/20/2016    Tonsillectomy    UPPER GASTROINTESTINAL ENDOSCOPY  2013    VARICOSE VEIN SURGERY  04/20/2016    Varicose Vein Ligation        Family History   Problem Relation Name Age of Onset    Cancer Mother GIBSON SALINAS     Hypertension Sister KELSIE RENTERIA     Breast cancer Sister KELSIE RENTERIA 87    Coronary artery disease Brother TIN SALINAS     Hearing loss Brother SERINA SALINAS     Vision loss Brother SERINA SALINAS     Heart disease Brother AUDREY SALINAS     Breast cancer Paternal Grandmother  30        Review of Systems   Constitutional:  Negative for chills, fatigue and fever.   HENT:  Negative for dental problem and trouble swallowing.    Eyes:  Negative for pain and visual disturbance.   Respiratory:  Positive for shortness of breath. Negative for cough and wheezing.         Positive for shortness of breath with exertion   Cardiovascular:  Negative for chest pain, palpitations and leg swelling.   Gastrointestinal:  Negative for abdominal distention, abdominal pain, constipation, diarrhea and nausea.   Endocrine: Negative for cold  "intolerance and heat intolerance.   Genitourinary:  Negative for difficulty urinating.   Musculoskeletal:  Positive for arthralgias and back pain. Negative for gait problem, joint swelling and myalgias.        Positive for chronic back and knee pain   Skin:  Negative for color change, pallor, rash and wound.        See HPI   Allergic/Immunologic: Negative for environmental allergies and food allergies.   Neurological:  Negative for dizziness, seizures, weakness and headaches.   Hematological:  Negative for adenopathy. Does not bruise/bleed easily.   Psychiatric/Behavioral:  Positive for sleep disturbance. Negative for behavioral problems.         Insomnia well-controlled with Ambien   All other systems reviewed and are negative.      Objective   /68   Pulse 74   Ht 1.626 m (5' 4\")   Wt 59.4 kg (131 lb)   SpO2 97%   BMI 22.49 kg/m²     Physical Exam  Constitutional:       General: She is not in acute distress.     Appearance: Normal appearance. She is not toxic-appearing.   HENT:      Head: Normocephalic and atraumatic.      Right Ear: Tympanic membrane, ear canal and external ear normal.      Left Ear: Tympanic membrane, ear canal and external ear normal.      Nose: Nose normal.      Mouth/Throat:      Mouth: Mucous membranes are moist.      Pharynx: Oropharynx is clear.   Eyes:      Extraocular Movements: Extraocular movements intact.      Conjunctiva/sclera: Conjunctivae normal.      Pupils: Pupils are equal, round, and reactive to light.   Cardiovascular:      Rate and Rhythm: Normal rate and regular rhythm.      Pulses: Normal pulses.      Heart sounds: Murmur heard.   Pulmonary:      Effort: Pulmonary effort is normal.      Breath sounds: Normal breath sounds. No wheezing.   Abdominal:      General: Bowel sounds are normal.      Palpations: Abdomen is soft.   Musculoskeletal:         General: No swelling.      Cervical back: Normal range of motion and neck supple.   Skin:     General: Skin is warm " "and dry.      Comments: Positive for partially green right thumbnail   Neurological:      General: No focal deficit present.      Mental Status: She is alert and oriented to person, place, and time. Mental status is at baseline.      Cranial Nerves: No cranial nerve deficit.      Motor: No weakness.   Psychiatric:         Mood and Affect: Mood normal.         Behavior: Behavior normal.         Thought Content: Thought content normal.         Judgment: Judgment normal.         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 Unable to visualize renal cysts vs. prominent pyramids in the right kidney compared to previous exam. Renal cyst noted in left kidney measuring 1.2 cm\".  Will discuss reimaging with patient at subsequent visits         Arthralgia of multiple sites M25.50     Will continue patient's narcotic medication per pain management recommendations.  Consent signed and urine obtained today for screening purposes.  Follow-up will need to be maintained every 3 months.         Chronic systolic heart failure I50.22     Patient to continue current diuretic dosing per cardiology recommendations.  She is to notify provider for any issues with persistent/worsening edema or shortness of breath.  She is maintain routine follow-up with cardiology for continued evaluation and treatment recommendations.         Depression F32.A     Mood reportedly stable.  Provider to be notified for any persistent or worsening mood concerns.         Hyperlipidemia E78.5     Patient continues on statin and omega-3 daily " without issue.  Will continue to monitor fasting lipid panel as appropriate         Hypertension I10     Stable on current medication regiment.  Will continue to monitor vital signs at subsequent visits.  Patient to maintain routine follow-up with cardiology for additional treatment/evaluation purposes.         Chronic insomnia F51.04     Stable with Ambien use.  Patient once again encouraged not to use in conjunction with the narcotics.  Consent signed and urine obtained today for screening purposes.         Relevant Medications    zolpidem (Ambien) 5 mg tablet    Routine adult health maintenance - Primary Z00.00     Most recent blood work reviewed reviewed today with patient and family.  Will continue to monitor as appropriate  -Patient agreeable for screening mammogram orders to be placed today  -No recent colonoscopy on file; no indication for colorectal cancer screening at this time due to advanced age and lack of symptoms.          Other Visit Diagnoses         Codes    Medication management     Z79.899    Relevant Medications    zolpidem (Ambien) 5 mg tablet    Other Relevant Orders    Drug Screen, Urine With Reflex to Confirmation (Completed)    OOB Internal Tracking (Completed)    Opiate Confirmation, Urine (Completed)    Encounter for screening mammogram for malignant neoplasm of breast     Z12.31    Relevant Orders    BI mammo bilateral screening tomosynthesis

## 2024-11-12 ENCOUNTER — APPOINTMENT (OUTPATIENT)
Dept: PRIMARY CARE | Facility: CLINIC | Age: 89
End: 2024-11-12
Payer: MEDICARE

## 2024-11-12 LAB
AMPHETAMINES UR QL SCN: ABNORMAL
BARBITURATES UR QL SCN: ABNORMAL
BENZODIAZ UR QL SCN: ABNORMAL
BZE UR QL SCN: ABNORMAL
CANNABINOIDS UR QL SCN: ABNORMAL
FENTANYL+NORFENTANYL UR QL SCN: ABNORMAL
METHADONE UR QL SCN: ABNORMAL
OPIATES UR QL SCN: ABNORMAL
OXYCODONE+OXYMORPHONE UR QL SCN: ABNORMAL
PCP UR QL SCN: ABNORMAL

## 2024-11-13 ENCOUNTER — APPOINTMENT (OUTPATIENT)
Dept: PRIMARY CARE | Facility: CLINIC | Age: 89
End: 2024-11-13
Payer: MEDICARE

## 2024-11-13 ENCOUNTER — ANCILLARY PROCEDURE (OUTPATIENT)
Dept: RADIOLOGY | Facility: EXTERNAL LOCATION | Age: 89
End: 2024-11-13
Payer: MEDICARE

## 2024-11-13 DIAGNOSIS — M25.561 PAIN IN RIGHT KNEE: ICD-10-CM

## 2024-11-13 DIAGNOSIS — M25.562 PAIN IN LEFT KNEE: ICD-10-CM

## 2024-11-13 DIAGNOSIS — Z96.652 PRESENCE OF LEFT ARTIFICIAL KNEE JOINT: ICD-10-CM

## 2024-11-13 DIAGNOSIS — M17.11 UNILATERAL PRIMARY OSTEOARTHRITIS, RIGHT KNEE: ICD-10-CM

## 2024-11-13 DIAGNOSIS — E78.00 PURE HYPERCHOLESTEROLEMIA: ICD-10-CM

## 2024-11-13 LAB
ATRIAL RATE: 78 BPM
P AXIS: 53 DEGREES
P OFFSET: 126 MS
P ONSET: 82 MS
PR INTERVAL: 232 MS
Q ONSET: 198 MS
QRS COUNT: 13 BEATS
QRS DURATION: 176 MS
QT INTERVAL: 444 MS
QTC CALCULATION(BAZETT): 506 MS
QTC FREDERICIA: 484 MS
R AXIS: 253 DEGREES
T AXIS: 35 DEGREES
T OFFSET: 420 MS
VENTRICULAR RATE: 78 BPM

## 2024-11-13 PROCEDURE — 64454 NJX AA&/STRD GNCLR NRV BRNCH: CPT | Performed by: ANESTHESIOLOGY

## 2024-11-13 NOTE — PROGRESS NOTES
Preprocedure diagnosis: Chronic bilateral knee pain  Postprocedure diagnosis chronic bilateral knee pain    Procedure performed: Bilateral genicular nerve block under fluoroscopic guidance    Physician: Mj Stevens MD    Anesthesia: Local    Complications: none   bilateral knee pain  Blood loss:  none    Clinical note: This is a very pleasant 89-year-old female who suffers with bilateral knee pain here meeting all medical criteria for above-mentioned procedure.    Procedure:        The patient was identified in the preoperative area.  The procedure was discussed in detail including its risks, benefits, and alternatives.  Signed consent was obtained and the patient agreed to proceed.     The patient was seen and evaluated in an examination room. The risks and benefits were described to the patient and informed consent was obtained. The patient was taken to the procedure room and placed in the supine position on the fluoroscopy table with a pillow placed under the knee for appropriate positioning. The right knee region was exposed, prepped, and draped in the usual sterile fashion. Under fluoroscopic guidance in the AP view, the anatomical locations of the medial and lateral femoral genicular nerves and the medial tibialgenicular nerve were identified. The skin and subcutaneous tissue along the needle trajectories was anesthetized with 5 mL of 2% preservative free lidocaine.  Three 22-gauge spinal needles were advanced under intermittent fluoroscopic guidance to the appropriate anatomical locations with final needle tip location being confirmed in the lateral fluoroscopic view. Once the desired needle tip location was achieved, the needle was aspirated and found to be negative for heme.  1 mL of 0.5% bupivacaine was then injected through each needle.  The needles were removed.  The identical procedure was then performed on the left knee targeting the 3 left genicular nerves medial lateral femoral genicular nerve and  medial tibial genicular nerve.  The patient was transported to the recovery area.    After a period of observation the patient was discharged with supervision to home in good condition.

## 2024-11-14 ENCOUNTER — PATIENT MESSAGE (OUTPATIENT)
Dept: PAIN MEDICINE | Facility: CLINIC | Age: 89
End: 2024-11-14
Payer: MEDICARE

## 2024-11-14 LAB
6MAM UR CFM-MCNC: <25 NG/ML
CODEINE UR CFM-MCNC: <50 NG/ML
HYDROCODONE CTO UR CFM-MCNC: <25 NG/ML
HYDROMORPHONE UR CFM-MCNC: <25 NG/ML
MORPHINE UR CFM-MCNC: <50 NG/ML
NORHYDROCODONE UR CFM-MCNC: <25 NG/ML
NOROXYCODONE UR CFM-MCNC: 830 NG/ML
OXYCODONE UR CFM-MCNC: 344 NG/ML
OXYMORPHONE UR CFM-MCNC: 514 NG/ML

## 2024-11-14 RX ORDER — ROSUVASTATIN CALCIUM 20 MG/1
TABLET, COATED ORAL
Qty: 90 TABLET | Refills: 3 | Status: SHIPPED | OUTPATIENT
Start: 2024-11-14

## 2024-11-17 PROBLEM — Z00.00 ROUTINE ADULT HEALTH MAINTENANCE: Status: ACTIVE | Noted: 2024-11-17

## 2024-11-17 PROBLEM — Z86.79 HISTORY OF HYPERTENSION: Status: RESOLVED | Noted: 2024-05-14 | Resolved: 2024-11-17

## 2024-11-17 ASSESSMENT — ENCOUNTER SYMPTOMS
ROS SKIN COMMENTS: SEE HPI
BACK PAIN: 1
ARTHRALGIAS: 1
SLEEP DISTURBANCE: 1

## 2024-11-18 NOTE — ASSESSMENT & PLAN NOTE
Stable on current medication regiment.  Will continue to monitor vital signs at subsequent visits.  Patient to maintain routine follow-up with cardiology for additional treatment/evaluation purposes.

## 2024-11-18 NOTE — ASSESSMENT & PLAN NOTE
Stable with Ambien use.  Patient once again encouraged not to use in conjunction with the narcotics.  Consent signed and urine obtained today for screening purposes.

## 2024-11-18 NOTE — ASSESSMENT & PLAN NOTE
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 Unable to visualize renal cysts vs. prominent pyramids in the right kidney compared to previous exam. Renal cyst noted in left kidney measuring 1.2 cm\".  Will discuss reimaging with patient at subsequent visits  "

## 2024-11-18 NOTE — ASSESSMENT & PLAN NOTE
Most recent blood work reviewed reviewed today with patient and family.  Will continue to monitor as appropriate  -Patient agreeable for screening mammogram orders to be placed today  -No recent colonoscopy on file; no indication for colorectal cancer screening at this time due to advanced age and lack of symptoms.

## 2024-11-18 NOTE — ASSESSMENT & PLAN NOTE
Will continue patient's narcotic medication per pain management recommendations.  Consent signed and urine obtained today for screening purposes.  Follow-up will need to be maintained every 3 months.

## 2024-11-18 NOTE — ASSESSMENT & PLAN NOTE
Patient to continue current diuretic dosing per cardiology recommendations.  She is to notify provider for any issues with persistent/worsening edema or shortness of breath.  She is maintain routine follow-up with cardiology for continued evaluation and treatment recommendations.

## 2024-11-21 ENCOUNTER — PREP FOR PROCEDURE (OUTPATIENT)
Dept: PAIN MEDICINE | Facility: CLINIC | Age: 89
End: 2024-11-21
Payer: MEDICARE

## 2024-11-21 DIAGNOSIS — L60.8 GREEN NAILS: Primary | ICD-10-CM

## 2024-11-21 DIAGNOSIS — G89.29 CHRONIC PAIN OF BOTH KNEES: Primary | ICD-10-CM

## 2024-11-21 DIAGNOSIS — M25.561 CHRONIC PAIN OF BOTH KNEES: Primary | ICD-10-CM

## 2024-11-21 DIAGNOSIS — M25.562 CHRONIC PAIN OF BOTH KNEES: Primary | ICD-10-CM

## 2024-11-21 RX ORDER — NEOMYCIN SULFATE, POLYMYXIN B SULFATE AND GRAMICIDIN 1.75; 10000; .025 MG/ML; [USP'U]/ML; MG/ML
SOLUTION/ DROPS OPHTHALMIC
Qty: 15 ML | Refills: 0 | Status: SHIPPED | OUTPATIENT
Start: 2024-11-21

## 2024-11-21 NOTE — PROGRESS NOTES
Patient requesting additional treatment for green nail syndrome.  Discussed with pharmacist who recommended a topical Neosporin solution.  - Neomycin + polymyxin B + gramicidin (Neosporin Solution), 1-2 drops to the affected nail BID

## 2024-11-29 DIAGNOSIS — I50.22 CHRONIC SYSTOLIC HEART FAILURE: ICD-10-CM

## 2024-11-29 RX ORDER — SPIRONOLACTONE 25 MG/1
25 TABLET ORAL DAILY
Qty: 90 TABLET | Refills: 3 | Status: SHIPPED | OUTPATIENT
Start: 2024-11-29

## 2024-12-16 ENCOUNTER — HOSPITAL ENCOUNTER (OUTPATIENT)
Dept: CARDIOLOGY | Facility: CLINIC | Age: 89
Discharge: HOME | End: 2024-12-16
Payer: MEDICARE

## 2024-12-16 DIAGNOSIS — Z95.0 PRESENCE OF CARDIAC PACEMAKER: ICD-10-CM

## 2024-12-16 DIAGNOSIS — I44.2 ATRIOVENTRICULAR BLOCK, COMPLETE (MULTI): ICD-10-CM

## 2024-12-16 PROCEDURE — 93296 REM INTERROG EVL PM/IDS: CPT

## 2024-12-17 ENCOUNTER — HOSPITAL ENCOUNTER (OUTPATIENT)
Dept: RADIOLOGY | Facility: HOSPITAL | Age: 89
Discharge: HOME | End: 2024-12-17
Payer: MEDICARE

## 2024-12-17 VITALS — BODY MASS INDEX: 22.36 KG/M2 | WEIGHT: 131 LBS | HEIGHT: 64 IN

## 2024-12-17 DIAGNOSIS — Z12.31 ENCOUNTER FOR SCREENING MAMMOGRAM FOR MALIGNANT NEOPLASM OF BREAST: ICD-10-CM

## 2024-12-17 PROCEDURE — 77067 SCR MAMMO BI INCL CAD: CPT | Performed by: RADIOLOGY

## 2024-12-17 PROCEDURE — 77063 BREAST TOMOSYNTHESIS BI: CPT

## 2024-12-17 PROCEDURE — 77063 BREAST TOMOSYNTHESIS BI: CPT | Performed by: RADIOLOGY

## 2024-12-18 ENCOUNTER — ANCILLARY PROCEDURE (OUTPATIENT)
Dept: RADIOLOGY | Facility: EXTERNAL LOCATION | Age: 89
End: 2024-12-18
Payer: MEDICARE

## 2024-12-18 DIAGNOSIS — M17.0 PRIMARY OSTEOARTHRITIS OF BOTH KNEES: Primary | ICD-10-CM

## 2024-12-18 DIAGNOSIS — M25.561 PAIN IN RIGHT KNEE: ICD-10-CM

## 2024-12-18 PROCEDURE — 64624 DSTRJ NULYT AGT GNCLR NRV: CPT | Performed by: ANESTHESIOLOGY

## 2024-12-18 NOTE — PROGRESS NOTES
Preprocedure diagnosis: Chronic pain in both knees  Postprocedure diagnosis chronic pain in both knees    Procedure performed: Genicular nerve radiofrequency ablation and left and right knee under fluoroscopic guidance    Physician: Mj Stevens MD    Anesthesia: Local    Complications: none    Blood loss:  none    Clinical note: This is a very pleasant 89-year-old female who suffers with chronic knee pain here meeting all medical criteria for above-mentioned procedure.    Procedure:      The patient was identified in the preoperative area.  The procedure was discussed in detail including its risks, benefits, and alternatives.  Signed consent was obtained and the patient agreed to proceed.     The patient was brought to the [facility] procedure room and placed on the exam table in a comfortable supine position.The place for needle placement was obtained by manual palpation with radiographic confirmation.  The sterile field was prepared by chloroprep and sterile drapes.  Local anesthesia superficial and deep was provided by local infiltration of lidocaine 2%, total 10 ml.    A 22 gauge Shopping Buddyan Sidekick radiofrequency needle  was placed overlying the Left knee joint and using fluoroscopic guidance the needle was advanced to a bony endpoint on the superiolateral portion of the femoral epicondyle of the right knee.  A second needle was advanced to a bony endpoint on the superiomedial portion of the femoral epicondyle.  A third needle was then placed over the inferiomedial portion of the tibial epicondyle until a bony enpoint was met.  Attempted aspiration yielded no blood.  Lateral x-ray views showed all the needles with the Radiopaque Band at 50% depth of the femur and tibia  Images were saved in AP and lateral.  A mixture consisting of 5 ml of 0.5% ropivacaine was slowly injected between the three needles in equal volumes.      Then a radiofrequency ablation of each of the geniculate nerves were done at 80°  C for 1 minutes and 30 seconds each.    The needles were withdrawn.    The identical procedure was then performed on the left knee targeting the medial and lateral femoral genicular nerves and medial tibial genicular nerve.    The patient tolerated the procedure well.  After observation the patient was discharged with instructions and follow up.

## 2024-12-20 DIAGNOSIS — I10 ESSENTIAL HYPERTENSION: ICD-10-CM

## 2024-12-23 RX ORDER — METOPROLOL SUCCINATE 25 MG/1
25 TABLET, EXTENDED RELEASE ORAL DAILY
Qty: 90 TABLET | Refills: 0 | Status: SHIPPED | OUTPATIENT
Start: 2024-12-23

## 2024-12-26 DIAGNOSIS — M80.08XA AGE-RELATED OSTEOPOROSIS WITH CURRENT PATHOL FRACTURE OF VERTEBRA, INITIAL ENCOUNTER (MULTI): ICD-10-CM

## 2024-12-26 RX ORDER — OXYCODONE HYDROCHLORIDE 5 MG/1
5 TABLET ORAL EVERY 12 HOURS PRN
Qty: 60 TABLET | Refills: 0 | Status: SHIPPED | OUTPATIENT
Start: 2024-12-26 | End: 2025-01-25

## 2025-01-03 DIAGNOSIS — I42.8 NON-ISCHEMIC CARDIOMYOPATHY (MULTI): ICD-10-CM

## 2025-01-05 RX ORDER — FUROSEMIDE 20 MG/1
TABLET ORAL
Qty: 90 TABLET | Refills: 1 | Status: SHIPPED | OUTPATIENT
Start: 2025-01-05

## 2025-01-07 DIAGNOSIS — K22.719 BARRETT'S ESOPHAGUS WITH DYSPLASIA: ICD-10-CM

## 2025-01-07 RX ORDER — OMEPRAZOLE 20 MG/1
20 CAPSULE, DELAYED RELEASE ORAL
Qty: 180 CAPSULE | Refills: 3 | Status: SHIPPED | OUTPATIENT
Start: 2025-01-07

## 2025-01-28 ENCOUNTER — OFFICE VISIT (OUTPATIENT)
Dept: PAIN MEDICINE | Facility: CLINIC | Age: OVER 89
End: 2025-01-28
Payer: MEDICARE

## 2025-01-28 VITALS
OXYGEN SATURATION: 95 % | DIASTOLIC BLOOD PRESSURE: 83 MMHG | RESPIRATION RATE: 16 BRPM | SYSTOLIC BLOOD PRESSURE: 131 MMHG | HEART RATE: 69 BPM

## 2025-01-28 DIAGNOSIS — G89.29 CHRONIC KNEE PAIN AFTER TOTAL REPLACEMENT OF KNEE JOINT: ICD-10-CM

## 2025-01-28 DIAGNOSIS — M48.062 SPINAL STENOSIS OF LUMBAR REGION WITH NEUROGENIC CLAUDICATION: Primary | ICD-10-CM

## 2025-01-28 DIAGNOSIS — M25.569 CHRONIC KNEE PAIN AFTER TOTAL REPLACEMENT OF KNEE JOINT: ICD-10-CM

## 2025-01-28 DIAGNOSIS — Z96.659 CHRONIC KNEE PAIN AFTER TOTAL REPLACEMENT OF KNEE JOINT: ICD-10-CM

## 2025-01-28 PROCEDURE — 1157F ADVNC CARE PLAN IN RCRD: CPT | Performed by: ANESTHESIOLOGY

## 2025-01-28 PROCEDURE — 1125F AMNT PAIN NOTED PAIN PRSNT: CPT | Performed by: ANESTHESIOLOGY

## 2025-01-28 PROCEDURE — 99214 OFFICE O/P EST MOD 30 MIN: CPT | Performed by: ANESTHESIOLOGY

## 2025-01-28 PROCEDURE — 1159F MED LIST DOCD IN RCRD: CPT | Performed by: ANESTHESIOLOGY

## 2025-01-28 PROCEDURE — 3079F DIAST BP 80-89 MM HG: CPT | Performed by: ANESTHESIOLOGY

## 2025-01-28 PROCEDURE — 3075F SYST BP GE 130 - 139MM HG: CPT | Performed by: ANESTHESIOLOGY

## 2025-01-28 PROCEDURE — 99204 OFFICE O/P NEW MOD 45 MIN: CPT | Performed by: ANESTHESIOLOGY

## 2025-01-28 ASSESSMENT — PAIN DESCRIPTION - DESCRIPTORS: DESCRIPTORS: BURNING;ACHING

## 2025-01-28 ASSESSMENT — PAIN SCALES - GENERAL: PAINLEVEL_OUTOF10: 6

## 2025-01-28 ASSESSMENT — PAIN - FUNCTIONAL ASSESSMENT: PAIN_FUNCTIONAL_ASSESSMENT: 0-10

## 2025-01-28 NOTE — PROGRESS NOTES
Subjective   Patient ID: Neris Mccall is a 89 y.o. female with a past medical history of moderate aortic stenosis, HTN, DL, CAD, HFrEF, LBBB, Hx of arrhythmia with a pacemaker, LISA on BiPAP with chronic low back pain and bilateral knee pain.      HPI:   Neris Mccall is a 89 y.o. female presenting with persistent back and bilateral knee pain. The back pain is localized in the middle of her lower back and non radiating. Standing and walking aggravates the pain while sitting and lifting the legs up makes the pain better. She gives it a pain of 6/10. The pain is continuously there and sharp in nature. She has had multiple epidural injections and is not a candidate for surgery. She suffered a compression fracture at L2 in the springtime underwent kyphoplasty.     The knee pain is continuously there. She describes it as burning, on the anterior side, and more on the right knee. She gives it 6/10 on average and can go up to 8/10. It is also triggered with standing and walking, while laying and lifting both legs up improves it. She mentions having bilateral knee genicular blocks with radiofrequency ablation a month ago. She had some symptoms relief after the block but no improvement after the RFA. She is taking oxycodone 5mg BID which provides relief for only 2 hours and the pain comes back. She does some limited exercises at home, she has tried gabapentin, tylenol, NSAIDs, she uses heating pads and nothing seems to help her. Her pain has been worsening in the last few years.    She uses a walker at home to ambulate.    Physical Therapy:  limited home exercises  Other Conservative Measures she has tried: Heating Pad, Ice, and Injections  Classes of medications tried in the past: Acetaminophen, NSAIDs, Gabapentenoids, SNRIs , Muscle Relaxants, and Opioids, topical creams    Last Urine Drug Screen:  Recent Results (from the past 8760 hours)   Opiate Confirmation, Urine    Collection Time: 11/11/24  4:43 PM   Result Value Ref  Range    6-Acetylmorphine <25 <25 ng/mL    Codeine <50 <50 ng/mL    Hydrocodone <25 <25 ng/mL    Hydromorphone <25 <25 ng/mL    Morphine  <50 <50 ng/mL    Norhydrocodone <25 <25 ng/mL    Noroxycodone 830 (H) <25 ng/mL    Oxycodone 344 (H) <25 ng/mL    Oxymorphone 514 (H) <25 ng/mL   Drug Screen, Urine With Reflex to Confirmation    Collection Time: 11/11/24  4:43 PM   Result Value Ref Range    Amphetamine Screen, Urine Presumptive Negative Presumptive Negative    Barbiturate Screen, Urine Presumptive Negative Presumptive Negative    Benzodiazepines Screen, Urine Presumptive Negative Presumptive Negative    Cannabinoid Screen, Urine Presumptive Negative Presumptive Negative    Cocaine Metabolite Screen, Urine Presumptive Negative Presumptive Negative    Fentanyl Screen, Urine Presumptive Negative Presumptive Negative    Opiate Screen, Urine Presumptive Negative Presumptive Negative    Oxycodone Screen, Urine Presumptive Positive (A) Presumptive Negative    PCP Screen, Urine Presumptive Negative Presumptive Negative    Methadone Screen, Urine Presumptive Negative Presumptive Negative   Confirmation Opiate/Opioid/Benzo Prescription Compliance    Collection Time: 07/08/24  3:29 PM   Result Value Ref Range    Clonazepam <25 <25 ng/mL    7-Aminoclonazepam <25 <25 ng/mL    Alprazolam <25 <25 ng/mL    Alpha-Hydroxyalprazolam <25 <25 ng/mL    Midazolam <25 <25 ng/mL    Alpha-Hydroxymidazolam <25 <25 ng/mL    Chlordiazepoxide <25 <25 ng/mL    Diazepam <25 <25 ng/mL    Nordiazepam <25 <25 ng/mL    Temazepam <25 <25 ng/mL    Oxazepam <25 <25 ng/mL    Lorazepam <25 <25 ng/mL    Methadone <25 <25 ng/mL    EDDP <25 <25 ng/mL    6-Acetylmorphine <25 <25 ng/mL    Codeine <50 <50 ng/mL    Hydrocodone <25 <25 ng/mL    Hydromorphone <25 <25 ng/mL    Morphine  <50 <50 ng/mL    Norhydrocodone <25 <25 ng/mL    Noroxycodone 629 (H) <25 ng/mL    Oxycodone 336 (H) <25 ng/mL    Oxymorphone 285 (H) <25 ng/mL    Fentanyl <2.5 <2.5 ng/mL     Norfentanyl <2.5 <2.5 ng/mL    Tramadol <50 <50 ng/mL    O-Desmethyltramadol <50 <50 ng/mL    Zolpidem <25 <25 ng/mL    Zolpidem Metabolite (ZCA) 695 (H) <25 ng/mL   Screen Opiate/Opioid/Benzo Prescription Compliance    Collection Time: 07/08/24  3:29 PM   Result Value Ref Range    Creatinine, Urine Random 40.7 20.0 - 320.0 mg/dL    Amphetamine Screen, Urine Presumptive Negative Presumptive Negative    Barbiturate Screen, Urine Presumptive Negative Presumptive Negative    Cannabinoid Screen, Urine Presumptive Negative Presumptive Negative    Cocaine Metabolite Screen, Urine Presumptive Negative Presumptive Negative    PCP Screen, Urine Presumptive Negative Presumptive Negative         Review of Systems   13-point ROS done and negative except for HPI.     Current Outpatient Medications   Medication Instructions    acetaminophen (TYLENOL) 500 mg, Once daily (morning) M-F (5 days a week)    artificial tears, dextran-hypomel-glycerin, 0.1-0.3-0.2 % ophthalmic solution 4 times daily    ascorbic acid (vitamin C) 1,000 mg, Daily    aspirin 81 mg, Daily    calcium carb and citrate-vitD3 (Citracal-D3 Slow Release) 600 mg-12.5 mcg (500 unit) tablet extended release 1 tablet, Daily    calcium carbonate (Tums) 200 mg calcium chewable tablet 1 tablet, Daily    cholecalciferol (VITAMIN D-3) 50 mcg, Daily    cranberry extract 200 mg capsule 1 tablet, Daily    cyanocobalamin (Vitamin B-12) 50 mcg tablet 1 tablet, Daily    Eylea 2 mg, Once    furosemide (Lasix) 20 mg tablet TAKE 1 TABLET DAILY    gentamicin (Garamycin) 0.3 % ophthalmic solution Apply 1 drop under the nail and 1 drop on the nail topically twice daily and cover twice daily for 4 weeks    ibuprofen 200 mg, 4 times daily    ipratropium (Atrovent) 42 mcg (0.06 %) nasal spray USE 2 SPRAYS IN EACH NOSTRIL TWO TO THREE TIMES DAILY    latanoprost (Xalatan) 0.005 % ophthalmic solution 2 drops, Nightly    lidocaine (Lidoderm) 5 % patch 1 patch, transdermal, Daily, Remove &  discard patch within 12 hours or as directed by MD.    losartan (COZAAR) 25 mg, oral, Daily    melatonin 5 mg, Nightly    metoprolol succinate XL (TOPROL-XL) 25 mg, oral, Daily    multivit,calc,mins/iron/folic (WOMEN'S ONE DAILY ORAL) 1 tablet, Daily    naloxone (NARCAN) 4 mg, nasal, As needed, May repeat every 2-3 minutes if needed, alternating nostrils, until medical assistance becomes available.    neomycin-polymyxin-gramicidin (Neosporin) 1.75 mg-10,000 unit-0.025mg/mL ophthalmic solution Apply 1 to 2 drops to the affected nail twice daily for green nail syndrome.    omega-3 fatty acids-fish oil (Fish OiL) 340-1,000 mg capsule 1 capsule, Daily    omeprazole (PRILOSEC) 20 mg, oral, 2 times daily before meals, Do not crush or chew.    oxyCODONE (ROXICODONE) 5 mg, oral, Every 12 hours PRN    rosuvastatin (Crestor) 20 mg tablet TAKE 1 TABLET DAILY    spironolactone (ALDACTONE) 25 mg, oral, Daily    trolamine salicylate (Aspercreme) 10 % cream 1 Application, As needed    zolpidem (AMBIEN) 5 mg, oral, Nightly PRN       Past Medical History:   Diagnosis Date    Acute upper respiratory infection, unspecified 01/15/2018    Acute URI    Aneurysm of unspecified site (CMS-HCC)     Aneurysm    Arrhythmia 1975    Arthritis 2005    BiPAP (biphasic positive airway pressure) dependence 2023    Cataract 2005    Chronic pain disorder 1960    Coronary artery disease 2021    CPAP (continuous positive airway pressure) dependence 2014    Dependence on other enabling machines and devices     CPAP (continuous positive airway pressure) dependence    Dysphagia 2021    Fractures 1955    GERD (gastroesophageal reflux disease) 1985    No longer problem    Glaucoma 2021    Hearing aid worn 1985    Heart disease 1980    Heart failure 2019    Heart valve disease 2021    History of blood transfusion 1958    HL (hearing loss) 1970    Hypertension 1972    Low back pain 1950    Lumbar disc disease 2016    Old myocardial infarction     History of  myocardial infarction    Other nail disorders 03/14/2017    Green nails    Pacemaker 2019    Personal history of diseases of the skin and subcutaneous tissue 12/14/2016    History of folliculitis    Personal history of other diseases of the circulatory system 05/16/2022    History of intermittent claudication    Personal history of other diseases of the circulatory system     History of high blood pressure    Personal history of other diseases of the musculoskeletal system and connective tissue     History of arthritis    Personal history of other diseases of the nervous system and sense organs     History of sleep apnea    Personal history of other endocrine, nutritional and metabolic disease     History of high cholesterol    Personal history of other venous thrombosis and embolism     H/O blood clots    Personal history of transient ischemic attack (TIA), and cerebral infarction without residual deficits     History of stroke    Platelet disorder (Multi) 1998    PCV    Pregnant ([facility] 1957    Scoliosis 2016    Secondary polycythemia     Polycythemia    Shingles 1996    Sleep apnea     Spinal stenosis, lumbar region with neurogenic claudication 11/01/2022    Neurogenic claudication due to lumbar spinal stenosis    Stroke (Multi) 1967    Urinary tract infection 2019    Vision loss 2017        Past Surgical History:   Procedure Laterality Date    BACK SURGERY      MILD procedure    BLEPHAROPLASTY  2002    COLONOSCOPY  2014    CORRECTION HAMMER TOE  2004    DILATION AND CURETTAGE OF UTERUS  04/20/2016    Dilation And Curettage    FRACTURE SURGERY  2015    INSERT / REPLACE / REMOVE PACEMAKER  2019    KNEE SURGERY  04/20/2016    Knee Surgery Left    ORIF WRIST FRACTURE  1955    OTHER SURGICAL HISTORY  04/20/2016    Ear Surgery    OTHER SURGICAL HISTORY  04/20/2016    Hammertoe Operation Left Toe No. ___    OTHER SURGICAL HISTORY  07/29/2022    Pacemaker insertion    ROTATOR CUFF REPAIR  04/20/2016    Rotator Cuff  Repair    STAPEDECTOMY  1979    TOE SURGERY  2004    TONSILLECTOMY  04/20/2016    Tonsillectomy    UPPER GASTROINTESTINAL ENDOSCOPY  2013    VARICOSE VEIN SURGERY  04/20/2016    Varicose Vein Ligation        Family History   Problem Relation Name Age of Onset    Cancer Mother GIBSON SALINAS     Hypertension Sister KELSIE RENTERIA     Breast cancer Sister KELSIE RENTERIA 87    Coronary artery disease Brother TIN SALINAS     Hearing loss Brother SERINA SALINAS     Vision loss Brother SERINA SALINAS     Heart disease Brother AUDREY SALINAS     Breast cancer Paternal Grandmother  30        Allergies   Allergen Reactions    Duloxetine Diarrhea and Other     Reaction from Community: Diarrhea    Sulfa (Sulfonamide Antibiotics) Other and Rash    Atenolol Unknown    Cephalexin Diarrhea    Cholestyramine Unknown    Gemfibrozil Unknown    House Dust Other    Niacin Unknown    Pravastatin Unknown    Tramadol Other and Hallucinations     Reaction from Community: Hallucinations    Tree Pollen-White Maxim Unknown     Maxim, White        Objective     Vitals:    01/28/25 1107   BP: 131/83   Pulse: 69   Resp: 16   SpO2: 95%        Physical Exam  General: NAD, well groomed, well nourished  Eyes: Non-icteric sclera, EOMI  Ears, Nose, Mouth, and Throat: External ears and nose appear to be without deformity or rash. No lesions or masses noted. Hearing is grossly intact.   Neck: Trachea midline  Respiratory: Nonlabored breathing   Skin: No rashes or open lesions/ulcers identified on skin.  Back: positive for lower back pain  Right Knee: positive for tenderness on the medial and lateral sides, more pronounced on the medial side.    Psychiatric: Alert, orientation to person, place, and time. Cooperative.    Assessment/Plan   Neris Mccall is a 89 y.o. female presenting with persistent back and bilateral knee pain. Due to her left knee pain that is more on the medial side, a trial of peripheral nerve block simulation for 60 days could be tried to assess if  it improves her pain after failing RFA. Her most recent lumbar CT scan showed multilevel degenerative changes and levoscoliosis. Due to her pacemaker, the patient doesn't have any recent MRI; however, she was informed that she could get one if she has a newer generation pacemaker, MRI compatible, that can be deactivated for the duration of the MRI and restarted after. Lumbar MRI is recommended for further evaluation as the lumbar CT scan findings are limited and don't explain her symptoms. Since she benefits from oxycodone but for a short period of time, her dose was increased to TID instead of the BID she was taking.      Neris was seen today for pain.  Diagnoses and all orders for this visit:  Spinal stenosis of lumbar region with neurogenic claudication (Primary)  Chronic knee pain after total replacement of knee joint       Plan:  - Consider saphenous nerve stimulation for the right knee, to which the patient and her son were handled a brochure about it  - Oxycodone 5mg TID    Medical Neccessity  The patient has failed conservative treatment including different classes of medications and physical therapy.  Patient continues to rate their pain as moderate to severe, affecting quality of sleep, quality of life and  significantly impairing daily activities (ADLs)   We discussed  the risks, benefits and alternatives of the procedure including but not limited to: Lack of efficacy , Transiently worsening pain , Bleeding, Infection , and Nerve Damage and patient is amicable to the plan    We discussed  the risks, benefits and alternatives of the procedure including but not limited to: , Lack of efficacy , Transiently worsening pain , Bleeding, Infection , and Nerve Damage    Follow up: As needed     The patient was invited to contact us back anytime with any questions or concerns and follow-up with us in the office as needed.     There are no diagnoses linked to this encounter.    This note was generated with the aid  of dictation software, there may be typos despite my attempts at proofreading.   The patient was discussed and seen with Dr. Fiore.

## 2025-01-31 DIAGNOSIS — M80.08XA AGE-RELATED OSTEOPOROSIS WITH CURRENT PATHOL FRACTURE OF VERTEBRA, INITIAL ENCOUNTER (MULTI): ICD-10-CM

## 2025-02-02 RX ORDER — OXYCODONE HYDROCHLORIDE 5 MG/1
5 TABLET ORAL EVERY 12 HOURS PRN
Qty: 60 TABLET | Refills: 0 | Status: SHIPPED | OUTPATIENT
Start: 2025-02-02 | End: 2025-03-04

## 2025-02-03 DIAGNOSIS — M54.16 LUMBAR RADICULOPATHY: ICD-10-CM

## 2025-02-05 ENCOUNTER — OFFICE VISIT (OUTPATIENT)
Dept: SLEEP MEDICINE | Facility: CLINIC | Age: OVER 89
End: 2025-02-05
Payer: MEDICARE

## 2025-02-05 VITALS
OXYGEN SATURATION: 95 % | SYSTOLIC BLOOD PRESSURE: 120 MMHG | HEART RATE: 68 BPM | DIASTOLIC BLOOD PRESSURE: 74 MMHG | RESPIRATION RATE: 16 BRPM | WEIGHT: 134 LBS | BODY MASS INDEX: 23 KG/M2

## 2025-02-05 DIAGNOSIS — G47.39 TREATMENT-EMERGENT CENTRAL SLEEP APNEA: ICD-10-CM

## 2025-02-05 DIAGNOSIS — R06.3 CENTRAL SLEEP APNEA DUE TO CHEYNE-STOKES RESPIRATION: ICD-10-CM

## 2025-02-05 DIAGNOSIS — G47.33 OSA TREATED WITH BIPAP: Primary | ICD-10-CM

## 2025-02-05 PROCEDURE — 1125F AMNT PAIN NOTED PAIN PRSNT: CPT | Performed by: INTERNAL MEDICINE

## 2025-02-05 PROCEDURE — G2211 COMPLEX E/M VISIT ADD ON: HCPCS | Performed by: INTERNAL MEDICINE

## 2025-02-05 PROCEDURE — 99214 OFFICE O/P EST MOD 30 MIN: CPT | Performed by: INTERNAL MEDICINE

## 2025-02-05 PROCEDURE — 1159F MED LIST DOCD IN RCRD: CPT | Performed by: INTERNAL MEDICINE

## 2025-02-05 PROCEDURE — 1157F ADVNC CARE PLAN IN RCRD: CPT | Performed by: INTERNAL MEDICINE

## 2025-02-05 PROCEDURE — 3074F SYST BP LT 130 MM HG: CPT | Performed by: INTERNAL MEDICINE

## 2025-02-05 PROCEDURE — 3078F DIAST BP <80 MM HG: CPT | Performed by: INTERNAL MEDICINE

## 2025-02-05 PROCEDURE — 1160F RVW MEDS BY RX/DR IN RCRD: CPT | Performed by: INTERNAL MEDICINE

## 2025-02-05 ASSESSMENT — PAIN SCALES - GENERAL: PAINLEVEL_OUTOF10: 5

## 2025-02-05 ASSESSMENT — SLEEP AND FATIGUE QUESTIONNAIRES
HOW LIKELY ARE YOU TO NOD OFF OR FALL ASLEEP WHEN YOU ARE A PASSENGER IN A CAR FOR AN HOUR WITHOUT A BREAK: WOULD NEVER DOZE
HOW LIKELY ARE YOU TO NOD OFF OR FALL ASLEEP WHILE SITTING QUIETLY AFTER LUNCH WITHOUT ALCOHOL: WOULD NEVER DOZE
HOW LIKELY ARE YOU TO NOD OFF OR FALL ASLEEP WHILE LYING DOWN TO REST IN THE AFTERNOON WHEN CIRCUMSTANCES PERMIT: WOULD NEVER DOZE
HOW LIKELY ARE YOU TO NOD OFF OR FALL ASLEEP WHILE SITTING AND READING: SLIGHT CHANCE OF DOZING
HOW LIKELY ARE YOU TO NOD OFF OR FALL ASLEEP WHILE SITTING AND TALKING TO SOMEONE: WOULD NEVER DOZE
HOW LIKELY ARE YOU TO NOD OFF OR FALL ASLEEP WHILE WATCHING TV: SLIGHT CHANCE OF DOZING
ESS-CHAD TOTAL SCORE: 2
HOW LIKELY ARE YOU TO NOD OFF OR FALL ASLEEP IN A CAR, WHILE STOPPED FOR A FEW MINUTES IN TRAFFIC: WOULD NEVER DOZE
SITING INACTIVE IN A PUBLIC PLACE LIKE A CLASS ROOM OR A MOVIE THEATER: WOULD NEVER DOZE

## 2025-02-05 ASSESSMENT — ENCOUNTER SYMPTOMS
DEPRESSION: 0
OCCASIONAL FEELINGS OF UNSTEADINESS: 1
LOSS OF SENSATION IN FEET: 0

## 2025-02-05 NOTE — PROGRESS NOTES
[facility] SLEEP MEDICINE   FOLLOW-UP VISIT NOTE    PCP: Hazel Dueñas, OJHN-CNP    HISTORY OF PRESENT ILLNESS     Patient ID: Neris Mccall is a 89 y.o. female who presents for follow-up of LISA on PAP, yearly checkup.     Patient is here accompanied by son Sumanth who adds to history.  To review, patient's medical history is notable for HTN, GERD, Blue's esophagus, LBBB, systolic HF (TTE 2/2021: LVEF 35%), s/p AICD implant, aortic stenosis, s/p MI, stroke, Bell's palsy, osteoporosis, chronic neck and back pain, knee osteoarthritis, polycythemia vera, TECSA with Cheyne-Mchugh respiration, and LISA on BPAP-S/T.      Interval History  Patient was last seen in 2/7/2024. Plan was to continue PAP and follow-up yearly.  Since last visit, patient has been using machine every night. Current mask interface being used is Resmed Airfit F20 full face mask. Per records, patient is getting supplies thru [employer]  Patient denies machine problems, perceived mask leak, air hunger, and aerophagia. Complains of dry mouth, nasal congestion, and skin irritation from mask.  The following are patient's perceived benefits of PAP: decreased snoring / choking / gasping, refreshing sleep, decreased daytime sleepiness and/or fatigue, and better quality of sleep    RLS Follow-up:   Denies    SLEEP STUDY HISTORY (personally reviewed raw data such as interpretation report, data sheet, hypnogram, and titration table if available and applicable)  split-night PSG 8/11/2014: AHI 28.9, supine AHI 33.3, non-supine AHI 27.7, SpO2 patrick 86% CPAP was started at 4-14 cm H2O then switched to BPAP due to persistent respiratory events. BPAP was started at 18/14 cm H2O and titrated to 20/16 cm H2O. No optimal pressures noted. Cheyne-Mchugh breathing pattern noted during titration only.   PAP titration 1/9/2015: CPAP was started at 4-10 cm H2O and switched to BPAP due to persistent respiratory events. BPAP was started at 8/4 cm H2O  and gradually titrated to 16/12 cm H2O. No optimal pressures noted even on BiPAP due to central apneas; hence, ASV was started at EPAP 10 cm H2O and pressure support 3 to 15 cm H2O. Again, Cheyne-Mchugh respiration was noted during the entire titration study. Residual AHI on ASV was 0, while SpO2 patrick was 92%  PSG 3/11/2020: AHI 10.5, REM AHI 0, supine AHI 57.3, REM index 8.4, RDI 18.9, SpO2 patrick 83%, PLM index 14.3. Status post pacemaker implant. ASV was prescribed last 2015 in [place] but was discontinued due to LVEF of 25%.  PAP titration 11/2/2022: BPAP-S/T was started at 10/6 cm H2O with BUR 12. BUR was increased to 14 to promote sleep. Max pressure tested was 17/13 cm H2O. No central apneas seen during the entire study. Patient slept non-supine all the time. At BPAP-S/T 14/10 cm H2O and BUR 14, RDI 0, REM RDI 0, SpO2 patrick 93%.     SLEEP-WAKE SCHEDULE  Bedtime:  10:30 PM daily  Subjective sleep latency: 10-15 minutes  Number of awakenings: once per night to go to bathroom  Nocturia: No  Falls back asleep right away, sometimes cannot fall back   Final wake time:  7 AM daily  Out of bed time:  7 AM daily  Shift work: No   Naps: no  Average sleep duration (excluding naps): 5-6 hours    SLEEP ENVIRONMENT  Sleep location: bed  Sleep status: sleeps alone  Preferred sleep position: side    SOCIAL HISTORY  Smoking: no  ETOH: no  Marijuana: no  Caffeine: no  Sleep aids: no    WEIGHT: stable    Claustrophobia: No     REVIEW OF SYSTEMS     All other systems have been reviewed and are negative.    ALLERGIES     Allergies   Allergen Reactions    Duloxetine Diarrhea and Other     Reaction from Community: Diarrhea    Sulfa (Sulfonamide Antibiotics) Other and Rash    Atenolol Unknown    Cephalexin Diarrhea    Cholestyramine Unknown    Gemfibrozil Unknown    House Dust Other    Niacin Unknown    Pravastatin Unknown    Tramadol Other and Hallucinations     Reaction from Community: Hallucinations    Tree Pollen-White Maxim  Unknown     Maxim, White       MEDICATIONS     Current Outpatient Medications   Medication Sig Dispense Refill    acetaminophen (Tylenol) 500 mg tablet Take 1 tablet (500 mg) by mouth once daily (M-F).      aflibercept (Eylea) 2 mg/0.05 mL intra-ocular injection Administer 0.05 mL (2 mg) into the left eye 1 time. EVERY 10 WEEKS. NO DOSE LISTED      artificial tears, dextran-hypomel-glycerin, 0.1-0.3-0.2 % ophthalmic solution Administer into affected eye(s) 4 times a day.      ascorbic acid, vitamin C, 500 mg capsule Take 1,000 mg by mouth once daily.      aspirin 81 mg capsule Take 81 mg by mouth once daily.      calcium carb and citrate-vitD3 (Citracal-D3 Slow Release) 600 mg-12.5 mcg (500 unit) tablet extended release Take 1 tablet by mouth once daily.      calcium carbonate (Tums) 200 mg calcium chewable tablet Chew 1 tablet (500 mg) once daily.      cholecalciferol (Vitamin D-3) 25 MCG (1000 UT) capsule Take 2 capsules (50 mcg) by mouth once daily.      cranberry extract 200 mg capsule Take 1 tablet by mouth once daily. 30,000MG      cyanocobalamin (Vitamin B-12) 50 mcg tablet Take 1 tablet (50 mcg) by mouth once daily.      furosemide (Lasix) 20 mg tablet TAKE 1 TABLET DAILY 90 tablet 1    gentamicin (Garamycin) 0.3 % ophthalmic solution Apply 1 drop under the nail and 1 drop on the nail topically twice daily and cover twice daily for 4 weeks (Patient taking differently: Apply 1 drop under the nail and 1 drop on the nail topically twice daily and cover twice daily for 4 weeks  **FOR NAIL FUNGUS NOT EYES**) 15 mL 0    ibuprofen 200 mg tablet Take 1 tablet (200 mg) by mouth 4 times a day.      ipratropium (Atrovent) 42 mcg (0.06 %) nasal spray USE 2 SPRAYS IN EACH NOSTRIL TWO TO THREE TIMES DAILY 45 mL 7    latanoprost (Xalatan) 0.005 % ophthalmic solution Administer 2 drops into the left eye once daily at bedtime.      lidocaine (Lidoderm) 5 % patch Place 1 patch over 12 hours on the skin once daily. Remove &  discard patch within 12 hours or as directed by MD. 30 patch 0    losartan (Cozaar) 25 mg tablet Take 1 tablet (25 mg) by mouth once daily. 90 tablet 1    melatonin 5 mg capsule Take 1 capsule (5 mg) by mouth once daily at bedtime.      metoprolol succinate XL (Toprol-XL) 25 mg 24 hr tablet Take 1 tablet (25 mg) by mouth once daily. 90 tablet 0    multivit,calc,mins/iron/folic (WOMEN'S ONE DAILY ORAL) Take 1 tablet by mouth once daily.      naloxone (Narcan) 4 mg/0.1 mL nasal spray Administer 1 spray (4 mg) into affected nostril(s) if needed for opioid reversal. May repeat every 2-3 minutes if needed, alternating nostrils, until medical assistance becomes available. 2 each 0    neomycin-polymyxin-gramicidin (Neosporin) 1.75 mg-10,000 unit-0.025mg/mL ophthalmic solution Apply 1 to 2 drops to the affected nail twice daily for green nail syndrome. 15 mL 0    omega-3 fatty acids-fish oil (Fish OiL) 340-1,000 mg capsule Take 1 capsule by mouth once daily.      omeprazole (PriLOSEC) 20 mg DR capsule Take 1 capsule (20 mg) by mouth 2 times a day before meals. Do not crush or chew. 180 capsule 3    oxyCODONE (Roxicodone) 5 mg immediate release tablet Take 1 tablet (5 mg) by mouth every 12 hours if needed for severe pain (7 - 10). 60 tablet 0    rosuvastatin (Crestor) 20 mg tablet TAKE 1 TABLET DAILY 90 tablet 3    spironolactone (Aldactone) 25 mg tablet TAKE 1 TABLET DAILY 90 tablet 3    trolamine salicylate (Aspercreme) 10 % cream Apply 1 Application topically if needed for muscle/joint pain.      zolpidem (Ambien) 5 mg tablet Take 1 tablet (5 mg) by mouth as needed at bedtime for sleep. 90 tablet 0     No current facility-administered medications for this visit.       Active Problems, Allergy List, Medication List, and PMH/PSH/FH/Social Hx have been reviewed and reconciled in chart. No significant changes unless documented in the pertinent chart section. Updates made when necessary.       PHYSICAL EXAM     VITAL SIGNS: BP  120/74   Pulse 68   Resp 16   Wt 60.8 kg (134 lb)   SpO2 95%   BMI 23.00 kg/m²     NECK CIRCUMFERENCE: not measured    Today ESS: 2  Last visit ESS: 2  Last visit ANTONIO: 4    Physical Exam  Constitutional: Awake, not in distress  Lungs: Clear to auscultation bilateral, no rales  Heart: Regular rate and rhythm, no murmurs  Skin: Warm, no rash  Neuro: No tremors, moves all extremities  Psych: alert and oriented to time, place, and person    RESULTS/DATA     Iron (ug/dL)   Date Value   11/11/2024 126     % Saturation (%)   Date Value   11/11/2024 33     TIBC (ug/dL)   Date Value   11/11/2024 381     Ferritin   Date Value   11/11/2024 171 ng/mL (H)   03/04/2020 144 ug/L   11/05/2019 57 ug/L       Bicarbonate   Date Value Ref Range Status   11/11/2024 26 21 - 32 mmol/L Final       PAP Adherence  A PAP adherence data was obtained and reviewed personally today in clinic. (see scanned document in EPIC)      ASSESSMENT/PLAN     Neris Mccall is a 89 y.o. female who returns in [facility] for the following problems:    TREATMENT-EMERGENT CENTRAL SLEEP APNEA with CHEYNE-PALACIOS BREATHING  OBSTRUCTIVE SLEEP APNEA, MILD positional (PSG AHI 10.5 )  - Now on BPAP-S/T 12/8 cm H2O and BUR 14.   - PAP adherence data reviewed today. Usage days 28/30, days> 4 hours at 93%, residual AHI 1.9.   - Patient reports improvement of LISA symptoms.   - Continue current machine settings. Continue using machine every night all night.  - Renew PAP supplies. Order placed and sent to [facility].   - Continue supportive management as follows: Lose weight. Stay off your back when sleeping. Avoid smoking, alcohol, and sedating medications if applicable. Don't drive when sleepy.    SEASONAL ALLERGIES   - Continue fluticasone nasal spray 2 sprays per nostril once daily 1 hour before bedtime.    HYPERTENSION  - BP stable today  - Continue anti-hypertensive medications per PCP.  - Supportive management: low salt  DASH diet (less than 2000 mg sodium intake daily), moderate intensity aerobic exercise at least 30 minutes 5 days per week, reduce stress, quit smoking, limit alcohol, lose weight, and monitor BP once daily  - PCP following. Defer management to PCP.      Follow-up in 1 year.    All of patient's questions were answered. She verbalizes understanding and agreement with my assessment and plan. Refer to after-visit-summary (AVS) for patient education and if applicable, for more details on sleep study preparation, troubleshooting issues with PAP usage, proper cleaning instructions of PAP supplies, and usual recommended replacement schedule for each of the PAP supplies.

## 2025-02-12 RX ORDER — NEOMYCIN SULFATE, POLYMYXIN B SULFATE AND DEXAMETHASONE 3.5; 10000; 1 MG/ML; [USP'U]/ML; MG/ML
SUSPENSION/ DROPS OPHTHALMIC
COMMUNITY
Start: 2025-01-09

## 2025-02-12 NOTE — PATIENT INSTRUCTIONS
"Thank you for coming to the [facility] Medicine [facility] today! Your sleep medicine provider today was: Jackie Lyn MD Below is a summary of your treatment plan, patient education, other important information, and our contact numbers.      TREATMENT PLAN     - Continue current machine settings. Continue using machine every night all night.   - Renew PAP supplies. Order placed and sent to [employer].  - Please read the \"Patient Education\" section below for more detailed information. Try implementing tips, reminders, strategies, and supportive management.   - If not yet done, please sign up for devsisters to make a future schedule, send prescription requests, or send messages.    Follow-up Appointment:   Follow-up in 1 year.    PATIENT EDUCATION     OBSTRUCTIVE SLEEP APNEA (LISA) is a sleep disorder where your upper airway muscles relax during sleep and the airway intermittently and repetitively narrows and collapses leading to partially blocked airway (hypopnea) or completely blocked airway (apnea) which, in turn, can disrupt breathing in sleep, lower oxygen levels while you sleep and cause night time wakings. Because both apnea and hypopnea may cause higher carbon dioxide or low oxygen levels, untreated LISA can lead to heart arrhythmia, elevation of blood pressure, and make it harder for the body to consolidate memory and facilitate metabolism (leading to higher blood sugars at night). Frequent partial arousals occur during sleep resulting in sleep deprivation and daytime sleepiness. LISA is associated with an increased risk of cardiovascular disease, stroke, hypertension, and insulin resistance. Moreover, untreated LISA with excessive daytime sleepiness can increase the risk of motor vehicular accidents.    Below are conservative strategies for LISA regardless of LISA severity are:   Positional therapy - Avoid sleeping on your back.   Healthy diet and regular exercise to optimize weight is highly encouraged. "   Avoid alcohol late in the evening and sedative-hypnotics as these substances can make sleep apnea worse.   Improve breathing through the nose with intranasal steroid spray, saline rinse, or antihistamines    Safety: Avoid driving vehicle and operating heavy equipment while sleepy. Drowsy driving may lead to life-threatening motor vehicle accidents. A person driving while sleepy is 5 times more likely to have an accident. If you feel sleepy, pull over and take a short power nap (sleep for less than 30 minutes). Otherwise, ask somebody to drive you.    Treatment options for sleep apnea include weight management, positional therapy, Positive Airway Therapy (PAP) therapy, oral appliance therapy, hypoglossal nerve stimulator (Inspire) and select airway surgeries.    Annual Reminders About Your Sleep Apnea    Your sleep apnea is well controlled based on reviewing your PAP Data Report.     General Reminders:  Continue current machine settings. Continue using machine every night. Need at least 4 hours daily usage.   Remember to clean your mask, tubings, and water chamber regularly as instructed.   Know the replacement schedule of your supplies/ accessories and contact your DME (durable medical equipment) provider if you are due for them.   Avoid driving or operating heavy machinery when drowsy. A person driving while sleepy is 5 times more likely to have an accident. If you feel sleepy, pull over and take a short power nap (sleep for less than 30 minutes). Otherwise, ask somebody to drive you.    [facility] or calling our office if you have any of the following symptoms:  Snoring or stopping breathing while using the machine  Recurrence of fatigue, sleepiness, insomnia, and other symptoms you had prior using machine  Persistent or worsening fatigue or sleepiness despite regular use of machine  Issues tolerating the machine like bloating sensation, air hunger, too much hot air, too much pressure,  taking off mask without recall in the middle of sleep, etc.     Other conservative measures to improve sleep apnea:  Losing weight can lead to some improvement of LISA which means you will need lower pressures in machine to control your LISA. In some patients, they don't need to use the machine after bariatric surgery. Hence, consider medical and/or surgical weight loss especially if your BMI is more than 35.  Avoiding alcohol, sedative-hypnotics, and sedating medications is imperative as these substances can worsen snoring and sleep apnea  If you have nasal congestion or seasonal allergies, improving your breathing through the nose is critical for treating sleep apnea, tolerating CPAP, and improving your sleep; hence, using intranasal steroid spray like Flonase might help. Make sure you know the proper way to use it.  Stay off your back when sleeping.    Common issues with PAP machine:  Mask gets dislodged when turning to the side: Consider getting a CPAP pillow or switching to a mask with hose on top.     Dry mouth:  Your machine has built-in humidifier that heats up the air to prevent dry mouth. It can be adjusted to your comfort. You can try that first and increase setting one level one night at a time to check which setting is comfortable and effective in lessening dry mouth. In some patients with heated hose, adjusting tube temperature to make air warmer can improve dry mouth. If dry mouth persists despite adjusting humidity or tube temperature setting, may apply OTC Biotene gel over the gums at bedtime.  If Biotene gel is not effective, consider trying XEROSTOM gel from [employer].  Also, eliminate or reduce dose of medications that can cause dry mouth if possible. Lastly, may try getting a separate room humidifier machine.    Airleaks: Please call DME as they may need to adjust your mask or refit you with a different kind or different size of mask. In addition, you can ask DME for tips on getting a good mask  "seal and mask fit.     Difficulty tolerating the mask: Contact your DME to try a different kind of mask and/or call office to get a referral to Sleep Psychologist for CPAP desensitization. CPAP desensitization technique is a set of strategies that helps patient cope with claustrophobia and anxiety related to wearing mask. Alternatively, we can do a daytime mini-sleep study called PAP-nap trial wherein you will try on different kinds of mask and the sleep technician will try different pressure settings on CPAP and BPAP machines to see which specific pressure is tolerable and comfortable for you.     Water droplets or moisture within the hose and/or mask: This is called rain-out and it is caused by condensation of too much heated humidity on the cooler walls of the hose. If you have rain-out, turn down humidity settings or get a heated hose. If you already have a heated hose, turn up the \"tube temperature\" of the heated hose. Alternatively, if you don't want to get a heated hose or warmer air, may wrap the CPAP hose with stockings to keep it somewhat warm. Also, you need to place the machine on the floor and lower the hose so that water won't travel upward towards your mask.     Maintaining your CPAP/BPAP device:    The humidification chamber (aka water tank or water chamber) needs to be filled with distilled water to prevent buildup of white deposits in the future. If you cannot find distilled water, you can use tap water but expect to have white deposits buildup seen after prolonged use with tap water. If you start seeing white deposits on the water chamber, you can clean it by filling it with equal parts of distilled white vinegar and water. Let the vinegar-water mixture sit for 2 hours, and then rinse it with running tap water. Clean with soap and water then let it dry.     You should try to keep your machine clean in order to work well. Here are some tips to clean PAP supplies / accessories:    Clean the " 70 yo female with stage IV pancreatic cancer ( humidification chamber (aka water tank) as well as your mask and tubing at least once a week with soap and water.   Alternatively, you can fill a sink or basin with warm water and add a little mild detergent, like Ivory dish soap. Gently wipe your supplies with the soapy water to free all the oils and dirt that may have collected. Once that's done, rinse these items with clean water until the soap is gone and let them air dry. You can hang your tubing over the curtain surekha in your bathroom so that it dries.  The mask insert (part of the mask that has contact with your skin) needs to be cleaned with soap and water daily. Another option is to wipe them down with CPAP wipes or baby wipes.    You should replace your mask and tubing frequently in order to prevent bacteria buildup, machine damage, and mask seal issues. The older the mask and hose, the high likelihood that there is bacteria buildup in it especially if they are not cleaned regularly. Dirty filters damage machines because build-up of dust and contaminants can cause machine to over-heat, and in time, damage the motor of machine. Cushions lose their seal over time as most masks are made of plastic and silicone while headgear is made of neoprene. These materials will break down with age and frequent use. Here is the recommended replacement schedule for PAP supplies / accessories:    Twice a month- disposable filters and cushions for nasal mask or nasal pillows.  Once a month- cushion for full face mask  Every 3 months- mask with headgear and PAP tubing (standard or heated hose)  Every 6 months- reusable filter, water chamber, and chin strap     Other useful information:    Some people do not put water in the tank while other people prefers to put water in the tank to prevent mouth dryness. Try to experiment to determine which is more comfortable for you.   In general, new machines have 2 years warranty on parts while health insurance allows you to have a new  machine once every 5 years.     You can also go to the following EDUCATION WEBSITES for further information:   American Academy of Sleep Medicine http://sleepeducation.org  National Sleep Foundation: https://sleepfoundation.org  American Sleep Apnea Association: https://www.sleepapnea.org (for patients with sleep apnea)  Narcolepsy Network: https://www.narcolepsynetwork.org (for patients with narcolepsy)  [employer]: https://www.Wynlinkcolepsy.org (for patients with narcolepsy)  Hypersomnia Foundation: https://www.hypersomniafoundation.org (for patients with idiopathic hypersomnia)  RLS foundation: https://www.rls.org (for patients with restless leg syndrome)    IMPORTANT INFORMATION     Call 911 for medical emergencies.  Our offices are generally open from Monday-Friday, 8 am - 5 pm.   There are no supporting services by either the sleep doctors or their staff on weekends and Holidays, or after 5 PM on weekdays.   If you need to get in touch with me, you may either call my office number or you can use [employer].  If a referral for a test, for CPAP, or for another specialist was made, and you have not heard about scheduling this within a week, please call scheduling at [phone] ([phone]).  If you are unable to make your appointment for clinic or an overnight study, kindly call the office or sleep testing center at least 48 hours in advance to cancel and reschedule.  If you are on CPAP, please bring your device's card and/or the device to each clinic appointment.   In case of problems with PAP machine or mask interface, please contact your [employer]urable [employer]) company first. [employer] is the company who provides you the machine and/or PAP supplies.       PRESCRIPTIONS     We require 7 days advanced notice for prescription refills. If we do not receive the request in this time, we cannot guarantee that your medication will be refilled in time.    IMPORTANT PHONE NUMBERS     Sleep Medicine Clinic Fax:  [phone]  Appointments (for Pediatric Sleep Clinic): [phone] - option 1  Appointments (for Adult Sleep Clinic): [phone] - option 2  Appointments (For Sleep Studies): [phone] - option 3  Behavioral Sleep Medicine: [phone]  Sleep Surgery: [phone]  Nutrition Service: [phone]  Weight management clinics with endocrinology: [phone]  Bariatric Services: [phone] (includes weight loss medications and weight loss surgery)  [facility]: [phone] (offers holistic approaches to weight management)  ENT (Otolaryngology): [phone]  Headache Clinic (Neurology): [phone]  Neurology: [phone]  Psychiatry: [phone]  Pulmonary Function Testing (PFT) Center: [phone]  Pulmonary Medicine: [phone]  [employer] ([place]): [phone]      OUR SLEEP TESTING LOCATIONS     Our team will contact you to schedule your sleep study, however, you can contact us as follow:  Main Phone Line (scheduling only): [phone]), option 3  Adult and Pediatric Locations  [facility] (6 years and older): [facility] - 4th floor ([address] [place] [address]) After hours line: [phone]  [facility] (Main campus: All ages): [facility], 6th floor. After hours line: [phone]   [place] (5 years and older; younger considered on case-by-case basis): [address], [address]. Scheduling  After hours line: [phone]   [place] (6 years and older): [address]; [address]; [address]   [place] (6 years and older): [address] [facility], [address]  After hours line: [phone]   [facility] (13 years and older) in [place]: [address], 2nd floor  After hours line: [phone]   [place] (13 year and older): [address] [facility] [address]  After hours line: [phone]     Adult Only Locations:  [place] (18 years and  "older): [address] [facility], 2nd floor   [place] (18 years and older): [address] [facility]; 4th floor  After hours line: [phone]   [place] (18 years and older) at [place]: [address] [facility]  After hours line: [phone]     CONTACTING YOUR SLEEP MEDICINE PROVIDER AND SLEEP TEAM      For issues with your machine or mask interface, please call your DME provider first. DME stands for durable medical company. DME is the company who provides you the machine and/or PAP supplies / accessories.   To schedule, cancel, or reschedule SLEEP STUDY APPOINTMENTS, please call the Main Phone Line at [phone] - option 3.   To schedule, cancel, or reschedule CLINIC APPOINTMENTS, you can do it in \"MyChart\", call [phone] (direct line of [place] clinic), call [phone] (direct line of [facility]), or call the Main Phone Line at [phone] - option 2  For CLINICAL QUESTIONS or MEDICATION REFILLS, please call direct line for Adult Sleep Nurses at [phone].   Lastly, you can also send a message directly to your provider through \"My Chart\", which is the email service through your  Records Account: [email or web address]       Here at [facility], we wish you a restful sleep!    " 70 yo female with stage IV pancreatic cancer (on chemotherapy last infusion 11/23/20) and PPM presents with chest pain, pt states the CP started yesterday, becoming severe around 2 am associated with diaphoresis and NBNB vomiting x1 prompting her to come to the ED. Pt describes the pain as a tightness, currently states she is having CP 6/10. +CP/+SOB/ denies fever n/v/d/recent illness. In regard to her cancer, pt states that she has a prognosis >1yr and wants everything done.  ED Course: ECG with PAULA in inferior leads with reciprocal changes, cath lab activated. S/p Plavix 600mg ASA 162mg 68 yo female with stage IV pancreatic cancer (on chemotherapy last infusion 11/23/20) and PPM presents with chest pain, pt states the CP started yesterday, becoming severe around 2 am associated with diaphoresis and NBNB vomiting x1 prompting her to come to the ED. Pt describes the pain as a tightness, currently states she is having CP 6/10. +CP/+SOB/ denies fever n/v/d/recent illness. Of note patient had a hospitalization in October for sepsis 2/2 chemotherapy (was on Folfax Cycle #9 at the time of that admission) now on Gemcitabine 1000 mg/m2 + Abraxane 100 mg/m2 and last infusion 11/23/20. As per heme-onc notes patient has stable disease. Pt also confirms that she has stable disease and GOC discussed, wishes to have everything done.   ED Course: ECG with PAULA in inferior leads with reciprocal changes, cath lab activated. S/p Plavix 600mg ASA 162mg

## 2025-02-14 ENCOUNTER — APPOINTMENT (OUTPATIENT)
Dept: PRIMARY CARE | Facility: CLINIC | Age: OVER 89
End: 2025-02-14
Payer: MEDICARE

## 2025-02-14 VITALS
SYSTOLIC BLOOD PRESSURE: 126 MMHG | WEIGHT: 136.6 LBS | HEART RATE: 62 BPM | DIASTOLIC BLOOD PRESSURE: 74 MMHG | OXYGEN SATURATION: 96 % | BODY MASS INDEX: 23.32 KG/M2 | HEIGHT: 64 IN

## 2025-02-14 DIAGNOSIS — Z00.00 ROUTINE ADULT HEALTH MAINTENANCE: Primary | ICD-10-CM

## 2025-02-14 DIAGNOSIS — I50.22 CHRONIC SYSTOLIC HEART FAILURE: ICD-10-CM

## 2025-02-14 DIAGNOSIS — Z00.00 ROUTINE GENERAL MEDICAL EXAMINATION AT HEALTH CARE FACILITY: ICD-10-CM

## 2025-02-14 DIAGNOSIS — I10 PRIMARY HYPERTENSION: ICD-10-CM

## 2025-02-14 DIAGNOSIS — R09.89 OTHER SPECIFIED SYMPTOMS AND SIGNS INVOLVING THE CIRCULATORY AND RESPIRATORY SYSTEMS: ICD-10-CM

## 2025-02-14 DIAGNOSIS — L60.8 GREEN NAILS: ICD-10-CM

## 2025-02-14 DIAGNOSIS — E78.00 PURE HYPERCHOLESTEROLEMIA: ICD-10-CM

## 2025-02-14 DIAGNOSIS — I72.2 ANEURYSM ARTERY, RENAL (CMS-HCC): ICD-10-CM

## 2025-02-14 DIAGNOSIS — R73.09 BLOOD GLUCOSE ABNORMAL: ICD-10-CM

## 2025-02-14 DIAGNOSIS — F32.A DEPRESSION, UNSPECIFIED DEPRESSION TYPE: ICD-10-CM

## 2025-02-14 DIAGNOSIS — M81.0 AGE-RELATED OSTEOPOROSIS WITHOUT CURRENT PATHOLOGICAL FRACTURE: ICD-10-CM

## 2025-02-14 DIAGNOSIS — E55.9 VITAMIN D DEFICIENCY: ICD-10-CM

## 2025-02-14 DIAGNOSIS — R13.10 DYSPHAGIA, UNSPECIFIED TYPE: ICD-10-CM

## 2025-02-14 PROBLEM — I35.0 MODERATE AORTIC STENOSIS: Status: RESOLVED | Noted: 2023-05-15 | Resolved: 2025-02-14

## 2025-02-14 PROBLEM — T82.110A PACEMAKER LEAD MALFUNCTION: Status: RESOLVED | Noted: 2023-05-15 | Resolved: 2025-02-14

## 2025-02-14 PROBLEM — I35.8 AORTIC VALVE SCLEROSIS: Status: RESOLVED | Noted: 2023-05-15 | Resolved: 2025-02-14

## 2025-02-14 PROBLEM — J30.0 VASOMOTOR RHINITIS: Status: RESOLVED | Noted: 2023-05-15 | Resolved: 2025-02-14

## 2025-02-14 PROBLEM — R39.9 SYMPTOMS INVOLVING URINARY SYSTEM: Status: RESOLVED | Noted: 2023-05-15 | Resolved: 2025-02-14

## 2025-02-14 PROBLEM — I44.7 LBBB (LEFT BUNDLE BRANCH BLOCK): Status: RESOLVED | Noted: 2023-05-15 | Resolved: 2025-02-14

## 2025-02-14 PROBLEM — I83.813 VARICOSE VEINS OF BOTH LOWER EXTREMITIES WITH PAIN: Status: RESOLVED | Noted: 2023-05-15 | Resolved: 2025-02-14

## 2025-02-14 PROBLEM — N63.20 LEFT BREAST MASS: Status: RESOLVED | Noted: 2023-05-15 | Resolved: 2025-02-14

## 2025-02-14 PROCEDURE — 3074F SYST BP LT 130 MM HG: CPT | Performed by: NURSE PRACTITIONER

## 2025-02-14 PROCEDURE — 99214 OFFICE O/P EST MOD 30 MIN: CPT | Performed by: NURSE PRACTITIONER

## 2025-02-14 PROCEDURE — G0439 PPPS, SUBSEQ VISIT: HCPCS | Performed by: NURSE PRACTITIONER

## 2025-02-14 PROCEDURE — 1160F RVW MEDS BY RX/DR IN RCRD: CPT | Performed by: NURSE PRACTITIONER

## 2025-02-14 PROCEDURE — 1159F MED LIST DOCD IN RCRD: CPT | Performed by: NURSE PRACTITIONER

## 2025-02-14 PROCEDURE — 3078F DIAST BP <80 MM HG: CPT | Performed by: NURSE PRACTITIONER

## 2025-02-14 PROCEDURE — 1170F FXNL STATUS ASSESSED: CPT | Performed by: NURSE PRACTITIONER

## 2025-02-14 PROCEDURE — 1157F ADVNC CARE PLAN IN RCRD: CPT | Performed by: NURSE PRACTITIONER

## 2025-02-14 ASSESSMENT — ENCOUNTER SYMPTOMS
JOINT SWELLING: 0
NAUSEA: 0
FATIGUE: 0
COUGH: 0
WHEEZING: 0
EYE PAIN: 0
ADENOPATHY: 0
WEAKNESS: 0
DIARRHEA: 0
MYALGIAS: 0
SLEEP DISTURBANCE: 1
OCCASIONAL FEELINGS OF UNSTEADINESS: 1
ABDOMINAL PAIN: 0
FEVER: 0
DEPRESSION: 0
CHILLS: 0
ARTHRALGIAS: 1
WOUND: 0
ABDOMINAL DISTENTION: 0
HEADACHES: 0
BRUISES/BLEEDS EASILY: 0
LOSS OF SENSATION IN FEET: 0
DIZZINESS: 0
CONSTIPATION: 0
SEIZURES: 0
DIFFICULTY URINATING: 0
BACK PAIN: 1
COLOR CHANGE: 0
SHORTNESS OF BREATH: 1
ROS SKIN COMMENTS: SEE HPI
PALPITATIONS: 0
TROUBLE SWALLOWING: 1

## 2025-02-14 ASSESSMENT — ACTIVITIES OF DAILY LIVING (ADL)
MANAGING_FINANCES: INDEPENDENT
DOING_HOUSEWORK: NEEDS ASSISTANCE
BATHING: INDEPENDENT
GROCERY_SHOPPING: TOTAL CARE
TAKING_MEDICATION: INDEPENDENT
DRESSING: INDEPENDENT

## 2025-02-14 NOTE — ASSESSMENT & PLAN NOTE
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 Unable to visualize renal cysts vs. prominent pyramids in the right kidney compared to previous exam. Renal cyst noted in left kidney measuring 1.2 cm\".  New orders placed today for reassessment purposes via ultrasound  "

## 2025-02-14 NOTE — PROGRESS NOTES
Subjective   Patient ID: Neris Mccall is a 89 y.o. female who presents for Medicare Annual Wellness Visit Subsequent and Follow-up (3 month throat issues and  aneurysm question).    Patient seen today in order to complete an annual Medicare wellness exam.  Present for today's assessment is her adult son.  Patient currently lives at [facility].  She is currently independent but it sounds like family is helping to look into some assisted living facilities for assistance with medication management/additional patient care.  Patient reports that since her most recent primary care visit, her weight has stabilized.  She monitors this routinely at home and is also been drinking Ensure nutritional supplements daily. She does report occasional coughing with swallowing and has a history of esophageal interventions and is agreeable for ENT follow-up due to persistent recurrence of symptoms.  No reported issues with staying hydrated, bowel or bladder.  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  She states that the most recent antibiotic prescribed was slightly irritating to his surrounding skin and she wanted to know what other interventions she could trial.  No other skin/nail concerns at this time.  Patient has been following with pain management due to pain that is chronic in her back and knees.  Pain is limited at rest but occurs with ambulation.  Son states that MRI is in place for next week for additional assessment purposes.  She does however report occasional lightheaded/fuzzy feeling since and exceeding her Oxy.  Discussed trialing lower dose formulations and she states that they have been ineffective in the past.  Patient also follows routinely with cardiology due to history of moderate aortic stenosis, moderate CAD (Cath 2018), HFrEF, and LBBB.  She denies any current issues with chest pain/pressure, headaches, blurred vision or other cardiac concerns.  No reported issues with mood.   Insomnia controlled with Ambien.  Patient states that she never takes her Ambien at the same time of her pain medications.  Medications reviewed.  No other acute concerns reported.             Current Outpatient Medications on File Prior to Visit   Medication Sig Dispense Refill    acetaminophen (Tylenol) 500 mg tablet Take 1 tablet (500 mg) by mouth once daily (M-F).      aflibercept (Eylea) 2 mg/0.05 mL intra-ocular injection Administer 0.05 mL (2 mg) into the left eye 1 time. EVERY 10 WEEKS. NO DOSE LISTED      artificial tears, dextran-hypomel-glycerin, 0.1-0.3-0.2 % ophthalmic solution Administer into affected eye(s) 4 times a day.      ascorbic acid, vitamin C, 500 mg capsule Take 1,000 mg by mouth once daily.      aspirin 81 mg capsule Take 81 mg by mouth once daily.      calcium carb and citrate-vitD3 (Citracal-D3 Slow Release) 600 mg-12.5 mcg (500 unit) tablet extended release Take 1 tablet by mouth once daily.      calcium carbonate (Tums) 200 mg calcium chewable tablet Chew 1 tablet (500 mg) once daily.      cholecalciferol (Vitamin D-3) 25 MCG (1000 UT) capsule Take 2 capsules (50 mcg) by mouth once daily.      cranberry extract 200 mg capsule Take 1 tablet by mouth once daily. 30,000MG      cyanocobalamin (Vitamin B-12) 50 mcg tablet Take 1 tablet (50 mcg) by mouth once daily.      furosemide (Lasix) 20 mg tablet TAKE 1 TABLET DAILY 90 tablet 1    gentamicin (Garamycin) 0.3 % ophthalmic solution Apply 1 drop under the nail and 1 drop on the nail topically twice daily and cover twice daily for 4 weeks (Patient taking differently: Apply 1 drop under the nail and 1 drop on the nail topically twice daily and cover twice daily for 4 weeks  **FOR NAIL FUNGUS NOT EYES**) 15 mL 0    ibuprofen 200 mg tablet Take 1 tablet (200 mg) by mouth 4 times a day.      ipratropium (Atrovent) 42 mcg (0.06 %) nasal spray USE 2 SPRAYS IN EACH NOSTRIL TWO TO THREE TIMES DAILY 45 mL 7    latanoprost (Xalatan) 0.005 %  ophthalmic solution Administer 2 drops into the left eye once daily at bedtime.      lidocaine (Lidoderm) 5 % patch Place 1 patch over 12 hours on the skin once daily. Remove & discard patch within 12 hours or as directed by MD. 30 patch 0    losartan (Cozaar) 25 mg tablet Take 1 tablet (25 mg) by mouth once daily. 90 tablet 1    melatonin 5 mg capsule Take 1 capsule (5 mg) by mouth once daily at bedtime.      metoprolol succinate XL (Toprol-XL) 25 mg 24 hr tablet Take 1 tablet (25 mg) by mouth once daily. 90 tablet 0    multivit,calc,mins/iron/folic (WOMEN'S ONE DAILY ORAL) Take 1 tablet by mouth once daily.      naloxone (Narcan) 4 mg/0.1 mL nasal spray Administer 1 spray (4 mg) into affected nostril(s) if needed for opioid reversal. May repeat every 2-3 minutes if needed, alternating nostrils, until medical assistance becomes available. 2 each 0    neomycin-polymyxin-dexAMETHasone (Maxitrol) 3.5mg/mL-10,000 unit/mL-0.1 % ophthalmic suspension INSTILL ONE DROP INTO AFFECTED EYE THREE TIMES A DAY      neomycin-polymyxin-gramicidin (Neosporin) 1.75 mg-10,000 unit-0.025mg/mL ophthalmic solution Apply 1 to 2 drops to the affected nail twice daily for green nail syndrome. 15 mL 0    omega-3 fatty acids-fish oil (Fish OiL) 340-1,000 mg capsule Take 1 capsule by mouth once daily.      omeprazole (PriLOSEC) 20 mg DR capsule Take 1 capsule (20 mg) by mouth 2 times a day before meals. Do not crush or chew. 180 capsule 3    oxyCODONE (Roxicodone) 5 mg immediate release tablet Take 1 tablet (5 mg) by mouth every 12 hours if needed for severe pain (7 - 10). 60 tablet 0    rosuvastatin (Crestor) 20 mg tablet TAKE 1 TABLET DAILY 90 tablet 3    spironolactone (Aldactone) 25 mg tablet TAKE 1 TABLET DAILY 90 tablet 3    trolamine salicylate (Aspercreme) 10 % cream Apply 1 Application topically if needed for muscle/joint pain.      zolpidem (Ambien) 5 mg tablet Take 1 tablet (5 mg) by mouth as needed at bedtime for sleep. 90 tablet  0     No current facility-administered medications on file prior to visit.       Past Medical History:   Diagnosis Date    Acute upper respiratory infection, unspecified 01/15/2018    Acute URI    Aneurysm of unspecified site (CMS-HCC)     Aneurysm    Arrhythmia 1975    Arthritis 2005    BiPAP (biphasic positive airway pressure) dependence 2023    Cataract 2005    Chronic pain disorder 1960    Coronary artery disease 2021    CPAP (continuous positive airway pressure) dependence 2014    Dependence on other enabling machines and devices     CPAP (continuous positive airway pressure) dependence    Dysphagia 2021    Fractures 1955    GERD (gastroesophageal reflux disease) 1985    No longer problem    Glaucoma 2021    Hearing aid worn 1985    Heart disease 1980    Heart failure 2019    Heart valve disease 2021    History of blood transfusion 1958    HL (hearing loss) 1970    Hypertension 1972    Low back pain 1950    Lumbar disc disease 2016    Old myocardial infarction     History of myocardial infarction    Other nail disorders 03/14/2017    Green nails    Pacemaker 2019    Personal history of diseases of the skin and subcutaneous tissue 12/14/2016    History of folliculitis    Personal history of other diseases of the circulatory system 05/16/2022    History of intermittent claudication    Personal history of other diseases of the circulatory system     History of high blood pressure    Personal history of other diseases of the musculoskeletal system and connective tissue     History of arthritis    Personal history of other diseases of the nervous system and sense organs     History of sleep apnea    Personal history of other endocrine, nutritional and metabolic disease     History of high cholesterol    Personal history of other venous thrombosis and embolism     H/O blood clots    Personal history of transient ischemic attack (TIA), and cerebral infarction without residual deficits     History of stroke    Platelet  disorder (Multi) 1998    PCV    Pregnant ([facility]) 1957    Scoliosis 2016    Secondary polycythemia     Polycythemia    Shingles 1996    Sleep apnea     Spinal stenosis, lumbar region with neurogenic claudication 11/01/2022    Neurogenic claudication due to lumbar spinal stenosis    Stroke (Multi) 1967    Urinary tract infection 2019    Vision loss 2017        Past Surgical History:   Procedure Laterality Date    BACK SURGERY      MILD procedure    BLEPHAROPLASTY  2002    COLONOSCOPY  2014    CORRECTION HAMMER TOE  2004    DILATION AND CURETTAGE OF UTERUS  04/20/2016    Dilation And Curettage    FRACTURE SURGERY  2015    INSERT / REPLACE / REMOVE PACEMAKER  2019    KNEE SURGERY  04/20/2016    Knee Surgery Left    ORIF WRIST FRACTURE  1955    OTHER SURGICAL HISTORY  04/20/2016    Ear Surgery    OTHER SURGICAL HISTORY  04/20/2016    Hammertoe Operation Left Toe No. ___    OTHER SURGICAL HISTORY  07/29/2022    Pacemaker insertion    ROTATOR CUFF REPAIR  04/20/2016    Rotator Cuff Repair    STAPEDECTOMY  1979    TOE SURGERY  2004    TONSILLECTOMY  04/20/2016    Tonsillectomy    UPPER GASTROINTESTINAL ENDOSCOPY  2013    VARICOSE VEIN SURGERY  04/20/2016    Varicose Vein Ligation        Family History   Problem Relation Name Age of Onset    Cancer Mother GIBSON SALINAS     Hypertension Sister KELSIE RENTERIA     Breast cancer Sister KELSIE RENTERIA 87    Coronary artery disease Brother TIN SALINAS     Hearing loss Brother SERINA SALINAS     Vision loss Brother SERINA SALINAS     Heart disease Brother AUDREY SALINAS     Breast cancer Paternal Grandmother  30        Review of Systems   Constitutional:  Negative for chills, fatigue and fever.   HENT:  Positive for hearing loss and trouble swallowing. Negative for dental problem.         See HPI   Eyes:  Negative for pain and visual disturbance.   Respiratory:  Positive for shortness of breath. Negative for cough and wheezing.         Positive for shortness of breath with exertion  "  Cardiovascular:  Negative for chest pain, palpitations and leg swelling.   Gastrointestinal:  Negative for abdominal distention, abdominal pain, constipation, diarrhea and nausea.   Endocrine: Negative for cold intolerance and heat intolerance.   Genitourinary:  Negative for difficulty urinating.   Musculoskeletal:  Positive for arthralgias and back pain. Negative for gait problem, joint swelling and myalgias.        Positive for chronic back and knee pain   Skin:  Negative for color change, pallor, rash and wound.        See HPI   Allergic/Immunologic: Negative for environmental allergies and food allergies.   Neurological:  Negative for dizziness, seizures, weakness and headaches.   Hematological:  Negative for adenopathy. Does not bruise/bleed easily.   Psychiatric/Behavioral:  Positive for sleep disturbance. Negative for behavioral problems.         Insomnia well-controlled with Ambien   All other systems reviewed and are negative.      Objective   /74   Pulse 62   Ht 1.626 m (5' 4\")   Wt 62 kg (136 lb 9.6 oz)   SpO2 96%   BMI 23.45 kg/m²     Physical Exam  Constitutional:       General: She is not in acute distress.     Appearance: Normal appearance. She is not toxic-appearing.   HENT:      Head: Normocephalic and atraumatic.      Right Ear: Tympanic membrane, ear canal and external ear normal.      Left Ear: Tympanic membrane, ear canal and external ear normal.      Ears:      Comments: Very hard of hearing     Nose: Nose normal.      Mouth/Throat:      Mouth: Mucous membranes are moist.      Pharynx: Oropharynx is clear.   Eyes:      Extraocular Movements: Extraocular movements intact.      Conjunctiva/sclera: Conjunctivae normal.      Pupils: Pupils are equal, round, and reactive to light.   Cardiovascular:      Rate and Rhythm: Normal rate and regular rhythm.      Pulses: Normal pulses.      Heart sounds: Murmur heard.   Pulmonary:      Effort: Pulmonary effort is normal.      Breath sounds: " "Normal breath sounds. No wheezing.   Abdominal:      General: Bowel sounds are normal.      Palpations: Abdomen is soft.   Musculoskeletal:         General: No swelling.      Cervical back: Normal range of motion and neck supple.   Skin:     General: Skin is warm and dry.      Comments: Positive for partially green right thumbnail   Neurological:      General: No focal deficit present.      Mental Status: She is alert and oriented to person, place, and time. Mental status is at baseline.      Cranial Nerves: No cranial nerve deficit.      Motor: No weakness.   Psychiatric:         Mood and Affect: Mood normal.         Behavior: Behavior normal.         Thought Content: Thought content normal.         Judgment: Judgment normal.         Assessment/Plan   Problem List Items Addressed This Visit             ICD-10-CM    Osteoporosis M81.0     Patient has been receiving Prolia injections in the past for osteoporosis.  Currently overdue for repeat DEXA scan.  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 Unable to visualize renal cysts vs. prominent pyramids in the right kidney compared to previous exam. Renal cyst noted in left kidney measuring 1.2 cm\".  New orders placed today for reassessment purposes via ultrasound         Relevant Orders    Vascular US renal artery duplex complete (Completed)    Comprehensive Metabolic Panel    CBC and Auto Differential    Chronic systolic heart failure I50.22     Patient to continue current diuretic dosing per cardiology recommendations.  She is to notify provider for any issues with persistent/worsening edema or shortness of breath.  She is maintain routine follow-up with cardiology for continued evaluation and treatment recommendations.         " Depression F32.A     Mood reportedly stable.  Provider to be notified for any persistent or worsening mood concerns.         Dysphagia R13.10     Follow-up with ENT recommended due to persistent issues with dysphagia.  Patient has followed up with ENT in the past for Zenker's diverticulum treatment         Relevant Orders    Referral to ENT    Hyperlipidemia E78.5     Patient continues on statin and omega-3 daily without issue.  Will continue to monitor fasting lipid panel as appropriate         Hypertension I10     Stable on current medication regiment.  Will continue to monitor vital signs at subsequent visits.  Patient to maintain routine follow-up with cardiology for additional treatment/evaluation purposes.         Vitamin D deficiency E55.9    Relevant Orders    Vitamin D 25-Hydroxy,Total (for eval of Vitamin D levels)    Routine adult health maintenance - Primary Z00.00     Most recent blood work reviewed reviewed today with patient and family.  Will continue to monitor as appropriate  -Most recent mammogram completed in December, 2024 with recommendations for annual follow-up.  -Most recent bone density scan completed in 2023 and showed osteoporosis.. Overdue for Prolia?  -No recent colonoscopy on file; no indication for colorectal cancer screening at this time due to advanced age and lack of symptoms.         Relevant Orders    Hemoglobin A1C    Comprehensive Metabolic Panel    TSH with reflex to Free T4 if abnormal    Lipid Panel    Vitamin D 25-Hydroxy,Total (for eval of Vitamin D levels)    CBC and Auto Differential    Green nails L60.8     Per clinical pharmacy recommendations, patient to trial vinegar solution: 1% acetic acid/vinegar (1 part white vinegar and 4-10 parts water to achieve a 0.25-1% acetic acid solution). Soak for 10 minutes BID, then thoroughly dry. May need a few weeks to a few months to effectively treat.           Other Visit Diagnoses         Codes    Other specified symptoms and  signs involving the circulatory and respiratory systems     R09.89    Relevant Orders    Vascular US renal artery duplex complete (Completed)    Blood glucose abnormal     R73.09    Relevant Orders    Hemoglobin A1C    Routine general medical examination at health care facility     Z00.00    Relevant Orders    1 Year Follow Up In Primary Care - Wellness Exam

## 2025-02-14 NOTE — ASSESSMENT & PLAN NOTE
Most recent blood work reviewed reviewed today with patient and family.  Will continue to monitor as appropriate  -Most recent mammogram completed in December, 2024 with recommendations for annual follow-up.  -Most recent bone density scan completed in 2023 and showed osteoporosis.. Overdue for Prolia?  -No recent colonoscopy on file; no indication for colorectal cancer screening at this time due to advanced age and lack of symptoms.

## 2025-02-14 NOTE — ASSESSMENT & PLAN NOTE
Patient continues on statin and omega-3 daily without issue.  Will continue to monitor fasting lipid panel as appropriate

## 2025-02-14 NOTE — ASSESSMENT & PLAN NOTE
Follow-up with ENT recommended due to persistent issues with dysphagia.  Patient has followed up with ENT in the past for Zenker's diverticulum treatment

## 2025-02-14 NOTE — PATIENT INSTRUCTIONS
You mar trial 1% acetic acid/vinegar (1 part white vinegar and 4-10 parts water to achieve a 0.25-1% acetic acid solution). Soak for 10 minutes BID, then thoroughly dry. May need a few weeks to a few months to effectively treat.     Orders are in place for you to complete imaging for additional assessment purposes (Renal Artery US).  You can have this completed at [facility].  Please contact the radiology department there to schedule.  The number is [phone]  [phone]  [phone]    Please arrange for routine follow up in 3 months. You may schedule on-line through [employer] or call our office at [phone].     Orders are in place for you to have fasting blood work completed. Please do not eat or drink 8 hours prior to having your blood drawn.   You may have your blood work completed in this building through [employer]  [facility] ([address] [address], [place] [address]).  For this location, you may schedule an appointment online or drop-in for walk-in services. [email or web address]  Hours:   Monday - Friday: 7:30 a.m. - 5 p.m. and Saturday: 8 a.m. - 11 a.m.  Phone:  [phone]

## 2025-02-18 ENCOUNTER — HOSPITAL ENCOUNTER (OUTPATIENT)
Dept: VASCULAR MEDICINE | Facility: HOSPITAL | Age: OVER 89
Discharge: HOME | End: 2025-02-18
Payer: MEDICARE

## 2025-02-18 DIAGNOSIS — I72.2 ANEURYSM ARTERY, RENAL (CMS-HCC): ICD-10-CM

## 2025-02-18 DIAGNOSIS — R09.89 OTHER SPECIFIED SYMPTOMS AND SIGNS INVOLVING THE CIRCULATORY AND RESPIRATORY SYSTEMS: ICD-10-CM

## 2025-02-18 PROCEDURE — 93975 VASCULAR STUDY: CPT

## 2025-02-18 PROCEDURE — 93975 VASCULAR STUDY: CPT | Performed by: SURGERY

## 2025-02-19 PROBLEM — R25.2 CRAMPS OF LOWER EXTREMITY: Status: RESOLVED | Noted: 2023-05-15 | Resolved: 2025-02-19

## 2025-02-19 PROBLEM — M79.673 HEEL PAIN: Status: RESOLVED | Noted: 2023-05-15 | Resolved: 2025-02-19

## 2025-02-19 PROBLEM — L73.9 FOLLICULITIS: Status: RESOLVED | Noted: 2024-05-14 | Resolved: 2025-02-19

## 2025-02-19 PROBLEM — D45 POLYCYTHEMIA VERA: Status: RESOLVED | Noted: 2023-05-15 | Resolved: 2025-02-19

## 2025-02-19 PROBLEM — Z96.652 HISTORY OF TOTAL LEFT KNEE REPLACEMENT: Status: RESOLVED | Noted: 2023-11-13 | Resolved: 2025-02-19

## 2025-02-19 PROBLEM — M79.605 PAIN OF LEFT LOWER EXTREMITY: Status: RESOLVED | Noted: 2023-05-15 | Resolved: 2025-02-19

## 2025-02-19 PROBLEM — Z95.0 S/P CARDIAC PACEMAKER PROCEDURE: Status: RESOLVED | Noted: 2023-05-15 | Resolved: 2025-02-19

## 2025-02-19 PROBLEM — M53.82 WEAKNESS OF NECK: Status: RESOLVED | Noted: 2023-05-15 | Resolved: 2025-02-19

## 2025-02-19 PROBLEM — B35.6 TINEA CRURIS: Status: RESOLVED | Noted: 2023-05-15 | Resolved: 2025-02-19

## 2025-02-19 PROBLEM — M25.551 BILATERAL HIP PAIN: Status: RESOLVED | Noted: 2023-05-15 | Resolved: 2025-02-19

## 2025-02-19 PROBLEM — M79.604 PAIN OF RIGHT LOWER EXTREMITY: Status: RESOLVED | Noted: 2023-05-15 | Resolved: 2025-02-19

## 2025-02-19 PROBLEM — M17.11 LOCALIZED OSTEOARTHRITIS OF RIGHT KNEE: Status: RESOLVED | Noted: 2023-05-15 | Resolved: 2025-02-19

## 2025-02-19 PROBLEM — J30.2 SEASONAL ALLERGIES: Status: RESOLVED | Noted: 2023-05-15 | Resolved: 2025-02-19

## 2025-02-19 PROBLEM — H90.3 ASYMMETRIC SNHL (SENSORINEURAL HEARING LOSS): Status: RESOLVED | Noted: 2023-05-15 | Resolved: 2025-02-19

## 2025-02-19 PROBLEM — R53.83 FATIGUE: Status: RESOLVED | Noted: 2023-05-15 | Resolved: 2025-02-19

## 2025-02-19 PROBLEM — S93.401A SPRAIN OF RIGHT ANKLE: Status: RESOLVED | Noted: 2023-05-15 | Resolved: 2025-02-19

## 2025-02-19 PROBLEM — M25.552 BILATERAL HIP PAIN: Status: RESOLVED | Noted: 2023-05-15 | Resolved: 2025-02-19

## 2025-02-19 PROBLEM — M48.062 NEUROGENIC CLAUDICATION DUE TO LUMBAR SPINAL STENOSIS: Status: RESOLVED | Noted: 2023-05-15 | Resolved: 2025-02-19

## 2025-02-19 PROBLEM — L29.9 PRURITIC DISORDER: Status: RESOLVED | Noted: 2023-05-15 | Resolved: 2025-02-19

## 2025-02-19 PROBLEM — R25.8: Status: RESOLVED | Noted: 2023-05-15 | Resolved: 2025-02-19

## 2025-02-19 PROBLEM — R01.1 HEART MURMUR, SYSTOLIC: Status: RESOLVED | Noted: 2023-05-15 | Resolved: 2025-02-19

## 2025-02-19 PROBLEM — M25.69 BACK STIFFNESS: Status: RESOLVED | Noted: 2023-05-15 | Resolved: 2025-02-19

## 2025-02-19 PROBLEM — G89.29 NECK PAIN, CHRONIC: Status: RESOLVED | Noted: 2023-05-15 | Resolved: 2025-02-19

## 2025-02-19 PROBLEM — N32.81 OVERACTIVE BLADDER: Status: RESOLVED | Noted: 2023-05-15 | Resolved: 2025-02-19

## 2025-02-19 PROBLEM — M54.2 NECK PAIN, CHRONIC: Status: RESOLVED | Noted: 2023-05-15 | Resolved: 2025-02-19

## 2025-02-19 PROBLEM — D75.1 ERYTHROCYTOSIS: Status: RESOLVED | Noted: 2024-05-14 | Resolved: 2025-02-19

## 2025-02-19 PROBLEM — M54.9 BACK PAIN: Status: RESOLVED | Noted: 2023-05-15 | Resolved: 2025-02-19

## 2025-02-19 PROBLEM — H69.92 DYSFUNCTION OF LEFT EUSTACHIAN TUBE: Status: RESOLVED | Noted: 2023-05-15 | Resolved: 2025-02-19

## 2025-02-19 PROBLEM — L60.8 GREEN NAILS: Status: ACTIVE | Noted: 2025-02-19

## 2025-02-19 PROBLEM — R53.1 WEAKNESS GENERALIZED: Status: RESOLVED | Noted: 2023-05-15 | Resolved: 2025-02-19

## 2025-02-19 PROBLEM — R51.9 HEADACHE: Status: RESOLVED | Noted: 2023-05-15 | Resolved: 2025-02-19

## 2025-02-19 PROBLEM — Z95.0 BIVENTRICULAR CARDIAC PACEMAKER IN SITU: Status: RESOLVED | Noted: 2023-05-15 | Resolved: 2025-02-19

## 2025-02-19 PROBLEM — I44.2 COMPLETE ATRIOVENTRICULAR BLOCK (MULTI): Status: RESOLVED | Noted: 2023-05-15 | Resolved: 2025-02-19

## 2025-02-19 PROBLEM — D75.1 SECONDARY POLYCYTHEMIA: Status: RESOLVED | Noted: 2024-03-11 | Resolved: 2025-02-19

## 2025-02-20 ENCOUNTER — HOSPITAL ENCOUNTER (OUTPATIENT)
Dept: CARDIOLOGY | Facility: HOSPITAL | Age: OVER 89
Discharge: HOME | End: 2025-02-20
Payer: MEDICARE

## 2025-02-20 ENCOUNTER — HOSPITAL ENCOUNTER (OUTPATIENT)
Dept: RADIOLOGY | Facility: HOSPITAL | Age: OVER 89
Discharge: HOME | End: 2025-02-20
Payer: MEDICARE

## 2025-02-20 VITALS
SYSTOLIC BLOOD PRESSURE: 109 MMHG | DIASTOLIC BLOOD PRESSURE: 53 MMHG | RESPIRATION RATE: 20 BRPM | OXYGEN SATURATION: 94 % | HEART RATE: 80 BPM

## 2025-02-20 DIAGNOSIS — M54.16 RADICULOPATHY, LUMBAR REGION: ICD-10-CM

## 2025-02-20 DIAGNOSIS — M54.16 LUMBAR RADICULOPATHY: ICD-10-CM

## 2025-02-20 LAB
BODY SURFACE AREA: 1.64 M2
BODY SURFACE AREA: 1.64 M2

## 2025-02-20 PROCEDURE — 93286 PERI-PX EVAL PM/LDLS PM IP: CPT

## 2025-02-20 PROCEDURE — 72148 MRI LUMBAR SPINE W/O DYE: CPT

## 2025-02-20 NOTE — ASSESSMENT & PLAN NOTE
Per clinical pharmacy recommendations, patient to trial vinegar solution: 1% acetic acid/vinegar (1 part white vinegar and 4-10 parts water to achieve a 0.25-1% acetic acid solution). Soak for 10 minutes BID, then thoroughly dry. May need a few weeks to a few months to effectively treat.

## 2025-02-20 NOTE — ASSESSMENT & PLAN NOTE
Patient has been receiving Prolia injections in the past for osteoporosis.  Currently overdue for repeat DEXA scan.  Clinical pharmacy referral placed for additional treatment recommendations

## 2025-02-20 NOTE — ASSESSMENT & PLAN NOTE
Will continue patient's narcotic medication per pain management recommendations.  Consent signed and urine obtained.  Follow-up will need to be maintained every 3 months.  I have personally reviewed the OARRS report for the patient. This report is scanned into the electronic medical record. I have considered the risks of abuse, dependence, addiction and diversion. I believe that it is clinically appropriate for the patient to be prescribed this medication

## 2025-03-03 ENCOUNTER — TELEPHONE (OUTPATIENT)
Dept: PAIN MEDICINE | Facility: CLINIC | Age: OVER 89
End: 2025-03-03
Payer: MEDICARE

## 2025-03-03 NOTE — TELEPHONE ENCOUNTER
Patient had her MRI last Thursday- son is following up, as she is in a lot of pain.  Let me know if Dr Fiore will call with the results

## 2025-03-04 ENCOUNTER — PREP FOR PROCEDURE (OUTPATIENT)
Dept: PAIN MEDICINE | Facility: CLINIC | Age: OVER 89
End: 2025-03-04
Payer: MEDICARE

## 2025-03-04 DIAGNOSIS — M48.062 LUMBAR STENOSIS WITH NEUROGENIC CLAUDICATION: ICD-10-CM

## 2025-03-04 NOTE — TELEPHONE ENCOUNTER
Dr BROWN reviewed MRI. Pt not a candidate for MILD,may consider YURIY. Son would like to proceed. Will need a PA from [facility] and will call to schedule

## 2025-03-10 ENCOUNTER — TELEPHONE (OUTPATIENT)
Dept: PRIMARY CARE | Facility: CLINIC | Age: OVER 89
End: 2025-03-10

## 2025-03-10 ENCOUNTER — APPOINTMENT (OUTPATIENT)
Dept: OTOLARYNGOLOGY | Facility: CLINIC | Age: OVER 89
End: 2025-03-10
Payer: MEDICARE

## 2025-03-10 VITALS — BODY MASS INDEX: 22.53 KG/M2 | WEIGHT: 132 LBS | HEIGHT: 64 IN

## 2025-03-10 DIAGNOSIS — R13.10 DYSPHAGIA, UNSPECIFIED TYPE: Primary | ICD-10-CM

## 2025-03-10 DIAGNOSIS — Z79.899 MEDICATION MANAGEMENT: ICD-10-CM

## 2025-03-10 DIAGNOSIS — K22.5 ZENKER'S DIVERTICULUM: ICD-10-CM

## 2025-03-10 DIAGNOSIS — R09.A2 GLOBUS SENSATION: ICD-10-CM

## 2025-03-10 DIAGNOSIS — F51.04 CHRONIC INSOMNIA: ICD-10-CM

## 2025-03-10 DIAGNOSIS — K21.9 LARYNGOPHARYNGEAL REFLUX (LPR): ICD-10-CM

## 2025-03-10 DIAGNOSIS — I50.22 CHRONIC SYSTOLIC HEART FAILURE: ICD-10-CM

## 2025-03-10 DIAGNOSIS — M80.08XA AGE-RELATED OSTEOPOROSIS WITH CURRENT PATHOL FRACTURE OF VERTEBRA, INITIAL ENCOUNTER (MULTI): ICD-10-CM

## 2025-03-10 PROCEDURE — 1159F MED LIST DOCD IN RCRD: CPT

## 2025-03-10 PROCEDURE — 99203 OFFICE O/P NEW LOW 30 MIN: CPT

## 2025-03-10 PROCEDURE — 31575 DIAGNOSTIC LARYNGOSCOPY: CPT

## 2025-03-10 PROCEDURE — 1036F TOBACCO NON-USER: CPT

## 2025-03-10 PROCEDURE — 1157F ADVNC CARE PLAN IN RCRD: CPT

## 2025-03-10 RX ORDER — OXYCODONE HYDROCHLORIDE 5 MG/1
5 TABLET ORAL EVERY 12 HOURS PRN
Qty: 60 TABLET | Refills: 0 | Status: SHIPPED | OUTPATIENT
Start: 2025-03-10 | End: 2025-04-09

## 2025-03-10 RX ORDER — ZOLPIDEM TARTRATE 5 MG/1
5 TABLET ORAL NIGHTLY PRN
Qty: 90 TABLET | Refills: 0 | Status: SHIPPED | OUTPATIENT
Start: 2025-03-10 | End: 2025-09-06

## 2025-03-10 RX ORDER — LOSARTAN POTASSIUM 25 MG/1
25 TABLET ORAL DAILY
Qty: 90 TABLET | Refills: 3 | Status: SHIPPED | OUTPATIENT
Start: 2025-03-10

## 2025-03-10 NOTE — LETTER
March 10, 2025     Hazel Dueñas, APRN-CNP  [address]  [address]  [facility] [address]    Patient: Neris Mccall   YOB: 1935   Date of Visit: 3/10/2025       Dear Dr. Hazel Dueñas, APRN-CNP:    Thank you for referring Neris Mccall to me for evaluation. Below are my notes for this consultation.  If you have questions, please do not hesitate to call me. I look forward to following your patient along with you.       Sincerely,     Ezio Zee, APRN-CNP      CC: No Recipients  ______________________________________________________________________________________    Reason For Consult  Chief Complaint   Patient presents with   • New Patient Visit     Something stuck in throat         HISTORY OF PRESENT ILLNESS:   Neris Mccall, who is a 89 y.o. female presenting for an initial visit for feeling of something stuck in the throat.  The patient had cricopharyngeal myotomy for a Zenker's diverticulum in 2020 with Dr. Suh. Patient reports that she will experience the feeling of something caught in the throat and occurs without eating. Patient reports it is worse in the evening around her dinner time. Patient denies any changes in voice. Patient reports breathing is stable. Patient denies any choking with solids, liquids, or pills. Patient currently using 20 mg of Omeprazole BID for GERD. Patient denies any smoking history.        Past Medical History  She has a past medical history of Acute upper respiratory infection, unspecified (01/15/2018), Aneurysm of unspecified site (CMS-HCC), Arrhythmia (1975), Arthritis (2005), BiPAP (biphasic positive airway pressure) dependence (2023), Cataract (2005), Chronic pain disorder (1960), Coronary artery disease (2021), CPAP (continuous positive airway pressure) dependence (2014), Dependence on other enabling machines and devices, Dysphagia (2021), Fractures (1955), GERD (gastroesophageal reflux disease) (1985), Glaucoma (2021), Hearing aid worn (1985), Heart  disease (1980), Heart failure (2019), Heart valve disease (2021), History of blood transfusion (1958), HL (hearing loss) (1970), Hypertension (1972), Low back pain (1950), Lumbar disc disease (2016), Old myocardial infarction, Other nail disorders (03/14/2017), Pacemaker (2019), Personal history of diseases of the skin and subcutaneous tissue (12/14/2016), Personal history of other diseases of the circulatory system (05/16/2022), Personal history of other diseases of the circulatory system, Personal history of other diseases of the musculoskeletal system and connective tissue, Personal history of other diseases of the nervous system and sense organs, Personal history of other endocrine, nutritional and metabolic disease, Personal history of other venous thrombosis and embolism, Personal history of transient ischemic attack (TIA), and cerebral infarction without residual deficits, Platelet disorder (Multi) (1998), Pregnant ([facility]) (1957), Scoliosis (2016), Secondary polycythemia, Shingles (1996), Sleep apnea, Spinal stenosis, lumbar region with neurogenic claudication (11/01/2022), Stroke (Multi) (1967), Urinary tract infection (2019), and Vision loss (2017).    She has no past medical history of ADD (attention deficit disorder), ADHD (attention deficit hyperactivity disorder), ALKA (acute kidney injury) (CMS-HCC), Anxiety, Autism ([facility]), Autoimmune disorder (Multi), Biliary disease, Bipolar disorder, Bladder cancer (Multi), BPH (benign prostatic hyperplasia), Cancer of neurologic system ([facility]), Celiac disease ([facility]), Cerebral aneurysm ([facility]), Cerebral palsy, Cerebral vascular accident (Multi), Cervical cancer, Cervical disc disease, Cholelithiasis, Chronic kidney disease, Cirrhosis (Multi), CKD (chronic kidney disease), Cognitive decline, Cognitive disorder, Colon polyp, Colorectal cancer (Multi), Crohn's disease (Multi), Dementia, Diverticulosis, Dizziness, Dysfunctional uterine bleeding, Endometrial  cancer (Multi), Esophageal cancer (Multi), Esophageal disease, ESRD (end stage renal disease) (Multi), Fibroid, Fibromyalgia, primary, Gastric cancer (Multi), Gender dysphoria, Gestational diabetes, Gestational hypertension ([facility]), GI (gastrointestinal bleed), Hemodialysis status (CMS-HCC), Hernia, internal, Hiatal hernia, History of peritoneal dialysis, HIV disease (Multi), Hydrocephalus, Immunocompromised, Intellectual disability, Irritable bowel syndrome, Liver disease, Lupus, Mastocytosis, MS (multiple sclerosis) (Multi), Muscular dystrophy (Multi), Myasthenia gravis, Myoneural disorder (Multi), Myotonia, VILLNAUEVA (nonalcoholic steatohepatitis), Nephrolithiasis, Neuromuscular disorder (Multi), Ovarian cancer (Multi), Pancreatic cancer (Multi), Pancreatitis ([facility]), Pelvic mass, Peptic ulcer disease, Personal history of other mental and behavioral disorders, Polycystic ovarian disease, Prematurity ([facility]), Prostate cancer (Multi), PTSD (post-traumatic stress disorder), Pyelonephritis, Renal cancer (Multi), Renal disease due to hypertension, Schizophrenia, Seizure disorder (Multi), Substance addiction (Multi), Syncope, Thoracic spinal cord injury (Multi), TIA (transient ischemic attack), Ulcerative colitis, Uterine cancer (Multi), or Vertigo.  Surgical History  She has a past surgical history that includes Dilation and curettage of uterus (04/20/2016); Other surgical history (04/20/2016); Varicose vein surgery (04/20/2016); Other surgical history (04/20/2016); Rotator cuff repair (04/20/2016); Tonsillectomy (04/20/2016); Knee surgery (04/20/2016); Other surgical history (07/29/2022); Fracture surgery (2015); Insert / replace / remove pacemaker (2019); Stapedectomy (1979); Colonoscopy (2014); Upper gastrointestinal endoscopy (2013); ORIF wrist fracture (1955); Toe Surgery (2004); Blepharoplasty (2002); Correction hammer toe (2004); and Back surgery.     Social History  She reports that she has never smoked. She  "has never used smokeless tobacco. She reports that she does not currently use alcohol. She reports that she does not use drugs.    Allergies  Duloxetine, Sulfa (sulfonamide antibiotics), Atenolol, Cephalexin, Cholestyramine, Gemfibrozil, House dust, Niacin, Pravastatin, Tramadol, and Tree pollen-white susanna    Review of Systems  All 10 systems were reviewed and negative except for above.      Physical Exam  CONSTITUTIONAL: Well developed, well nourished.    VOICE: mild variable hoarseness  RESPIRATION: Breathing comfortably, no stridor.    NEURO: Alert and oriented x3, cranial nerves II-XII intact and symmetric bilaterally.    EARS: Normal external ears, external auditory canals, use of hearing aid to conversational hearing to conversational voice.    NOSE: External nose midline, anterior rhinoscopy is normal with limited visualization to the anterior aspect of the interior turbinates. No lesions noted.     ORAL CAVITY/OROPHARYNX/LIPS: Normal mucous membranes, normal floor of mouth/tongue/OP, no masses or lesions are noted.    SKIN: Neck skin is normal.  PSYCH: Alert and oriented with appropriate mood and affect.        Last Recorded Vitals  Height 1.626 m (5' 4\"), weight 59.9 kg (132 lb).    Procedure  PROCEDURE NOTE:  Recommended flexible laryngoscopy/stroboscopy.  Risks, benefits,  and alternatives were explained.  They wish to proceed and provide verbal consent.     PROCEDURE:  Flexible Laryngoscopy, CPT 37488      POSTPROCEDURE DIAGNOSIS: LPR, Globus sensation    INDICATIONS: Inability to tolerate mirror exam or abnormal findings on mirror, Flexible Laryngoscopy/Stroboscopy performed to assess one of the followin. Diagnosis of symptomatic disorder involving the voice, swallow, upper aerodigestive tract, including LISA disorders, or  2. Preoperative evaluation of vocal cord function for individuals undergoing surgery where the RLN or vagus nerves are at risk of injury, or  3. Further evaluation of " abnormalities of the upper aerodigestive tract discovered by another modality, such as CT, MRI, bronchoscopy or EGD    Description of Procedure:    After adequate afrin and lidocaine spray, I advanced the endoscope.  Visualization of the nasopharynx, vallecula, posterior pharyngeal walls, pyriform, epiglottis and post cricoid areas was unremarkable.  The following laryngeal findings were noted:    vocal cord movement was mild right side weakness, left normal  closure was complete  Compression was increased AP and FVC (R>L)  edema was none  interarytenoid edema present  lesions were none  the subglottis was widely patent  No pooling of secretions  Procedure well tolerated.   Relevant Results  Esophagram 1/8/2020  MBS-1/10/2020    ASSESSMENT AND PLAN:   This is an initial visit for dysphagia, LPR, and globus sensation with clinical findings notable for mild right side vc weakness with complete closure. No masses or lesions. IA edema present- patient currently using Omeprazole 20 mg BID, will add gaviscon after meals. Will call patient after receiving results from esophagram. Patient agreeable to plan. All questions answered.     We discussed the treatment options to include, medical and surgical options.  We have decided to proceed as follows:   Will repeat esophagram- last swallow imaging 2020 prior to CP myotomy for Zenker's Diverticulum.   Discussed continuing current dose of Omeprazole 20 mg BID   Will have patient use gaviscon after meals for LPR   Will call patient son Sumanth ([place]) with results from swallow study.

## 2025-03-10 NOTE — PROGRESS NOTES
Reason For Consult  Chief Complaint   Patient presents with    New Patient Visit     Something stuck in throat         HISTORY OF PRESENT ILLNESS:   Neris Mccall, who is a 89 y.o. female presenting for an initial visit for feeling of something stuck in the throat.  The patient had cricopharyngeal myotomy for a Zenker's diverticulum in 2020 with Dr. Suh. Patient reports that she will experience the feeling of something caught in the throat and occurs without eating. Patient reports it is worse in the evening around her dinner time. Patient denies any changes in voice. Patient reports breathing is stable. Patient denies any choking with solids, liquids, or pills. Patient currently using 20 mg of Omeprazole BID for GERD. Patient denies any smoking history.        Past Medical History  She has a past medical history of Acute upper respiratory infection, unspecified (01/15/2018), Aneurysm of unspecified site (CMS-[facility]), Arrhythmia (1975), Arthritis (2005), BiPAP (biphasic positive airway pressure) dependence (2023), Cataract (2005), Chronic pain disorder (1960), Coronary artery disease (2021), CPAP (continuous positive airway pressure) dependence (2014), Dependence on other enabling machines and devices, Dysphagia (2021), Fractures (1955), GERD (gastroesophageal reflux disease) (1985), Glaucoma (2021), Hearing aid worn (1985), Heart disease (1980), Heart failure (2019), Heart valve disease (2021), History of blood transfusion (1958), HL (hearing loss) (1970), Hypertension (1972), Low back pain (1950), Lumbar disc disease (2016), Old myocardial infarction, Other nail disorders (03/14/2017), Pacemaker (2019), Personal history of diseases of the skin and subcutaneous tissue (12/14/2016), Personal history of other diseases of the circulatory system (05/16/2022), Personal history of other diseases of the circulatory system, Personal history of other diseases of the musculoskeletal system and connective tissue, Personal  history of other diseases of the nervous system and sense organs, Personal history of other endocrine, nutritional and metabolic disease, Personal history of other venous thrombosis and embolism, Personal history of transient ischemic attack (TIA), and cerebral infarction without residual deficits, Platelet disorder (Multi) (1998), Pregnant ([facility]) (1957), Scoliosis (2016), Secondary polycythemia, Shingles (1996), Sleep apnea, Spinal stenosis, lumbar region with neurogenic claudication (11/01/2022), Stroke (Multi) (1967), Urinary tract infection (2019), and Vision loss (2017).    She has no past medical history of ADD (attention deficit disorder), ADHD (attention deficit hyperactivity disorder), ALKA (acute kidney injury) (CMS-HCC), Anxiety, Autism ([facility]), Autoimmune disorder (Multi), Biliary disease, Bipolar disorder, Bladder cancer (Multi), BPH (benign prostatic hyperplasia), Cancer of neurologic system ([facility]), Celiac disease ([facility]), Cerebral aneurysm ([facility]), Cerebral palsy, Cerebral vascular accident (Multi), Cervical cancer, Cervical disc disease, Cholelithiasis, Chronic kidney disease, Cirrhosis (Multi), CKD (chronic kidney disease), Cognitive decline, Cognitive disorder, Colon polyp, Colorectal cancer (Multi), Crohn's disease (Multi), Dementia, Diverticulosis, Dizziness, Dysfunctional uterine bleeding, Endometrial cancer (Multi), Esophageal cancer (Multi), Esophageal disease, ESRD (end stage renal disease) (Multi), Fibroid, Fibromyalgia, primary, Gastric cancer (Multi), Gender dysphoria, Gestational diabetes, Gestational hypertension ([facility]), GI (gastrointestinal bleed), Hemodialysis status (CMS-HCC), Hernia, internal, Hiatal hernia, History of peritoneal dialysis, HIV disease (Multi), Hydrocephalus, Immunocompromised, Intellectual disability, Irritable bowel syndrome, Liver disease, Lupus, Mastocytosis, MS (multiple sclerosis) (Multi), Muscular dystrophy (Multi), Myasthenia gravis, Myoneural  disorder (Multi), Myotonia, VILLANUEVA (nonalcoholic steatohepatitis), Nephrolithiasis, Neuromuscular disorder (Multi), Ovarian cancer (Multi), Pancreatic cancer (Multi), Pancreatitis ([facility]), Pelvic mass, Peptic ulcer disease, Personal history of other mental and behavioral disorders, Polycystic ovarian disease, Prematurity ([facility]), Prostate cancer (Multi), PTSD (post-traumatic stress disorder), Pyelonephritis, Renal cancer (Multi), Renal disease due to hypertension, Schizophrenia, Seizure disorder (Multi), Substance addiction (Multi), Syncope, Thoracic spinal cord injury (Multi), TIA (transient ischemic attack), Ulcerative colitis, Uterine cancer (Multi), or Vertigo.  Surgical History  She has a past surgical history that includes Dilation and curettage of uterus (04/20/2016); Other surgical history (04/20/2016); Varicose vein surgery (04/20/2016); Other surgical history (04/20/2016); Rotator cuff repair (04/20/2016); Tonsillectomy (04/20/2016); Knee surgery (04/20/2016); Other surgical history (07/29/2022); Fracture surgery (2015); Insert / replace / remove pacemaker (2019); Stapedectomy (1979); Colonoscopy (2014); Upper gastrointestinal endoscopy (2013); ORIF wrist fracture (1955); Toe Surgery (2004); Blepharoplasty (2002); Correction hammer toe (2004); and Back surgery.     Social History  She reports that she has never smoked. She has never used smokeless tobacco. She reports that she does not currently use alcohol. She reports that she does not use drugs.    Allergies  Duloxetine, Sulfa (sulfonamide antibiotics), Atenolol, Cephalexin, Cholestyramine, Gemfibrozil, House dust, Niacin, Pravastatin, Tramadol, and Tree pollen-white susanna    Review of Systems  All 10 systems were reviewed and negative except for above.      Physical Exam  CONSTITUTIONAL: Well developed, well nourished.    VOICE: mild variable hoarseness  RESPIRATION: Breathing comfortably, no stridor.    NEURO: Alert and oriented x3, cranial nerves  "II-XII intact and symmetric bilaterally.    EARS: Normal external ears, external auditory canals, use of hearing aid to conversational hearing to conversational voice.    NOSE: External nose midline, anterior rhinoscopy is normal with limited visualization to the anterior aspect of the interior turbinates. No lesions noted.     ORAL CAVITY/OROPHARYNX/LIPS: Normal mucous membranes, normal floor of mouth/tongue/OP, no masses or lesions are noted.    SKIN: Neck skin is normal.  PSYCH: Alert and oriented with appropriate mood and affect.        Last Recorded Vitals  Height 1.626 m (5' 4\"), weight 59.9 kg (132 lb).    Procedure  PROCEDURE NOTE:  Recommended flexible laryngoscopy/stroboscopy.  Risks, benefits,  and alternatives were explained.  They wish to proceed and provide verbal consent.     PROCEDURE:  Flexible Laryngoscopy, CPT 35042      POSTPROCEDURE DIAGNOSIS: LPR, Globus sensation    INDICATIONS: Inability to tolerate mirror exam or abnormal findings on mirror, Flexible Laryngoscopy/Stroboscopy performed to assess one of the followin. Diagnosis of symptomatic disorder involving the voice, swallow, upper aerodigestive tract, including LISA disorders, or  2. Preoperative evaluation of vocal cord function for individuals undergoing surgery where the RLN or vagus nerves are at risk of injury, or  3. Further evaluation of abnormalities of the upper aerodigestive tract discovered by another modality, such as CT, MRI, bronchoscopy or EGD    Description of Procedure:    After adequate afrin and lidocaine spray, I advanced the endoscope.  Visualization of the nasopharynx, vallecula, posterior pharyngeal walls, pyriform, epiglottis and post cricoid areas was unremarkable.  The following laryngeal findings were noted:    vocal cord movement was mild right side weakness, left normal  closure was complete  Compression was increased AP and FVC (R>L)  edema was none  interarytenoid edema present  lesions were none  the " subglottis was widely patent  No pooling of secretions  Procedure well tolerated.   Relevant Results  Esophagram 1/8/2020  MBS-1/10/2020    ASSESSMENT AND PLAN:   This is an initial visit for dysphagia, LPR, and globus sensation with clinical findings notable for mild right side vc weakness with complete closure. No masses or lesions. IA edema present- patient currently using Omeprazole 20 mg BID, will add gaviscon after meals. Will call patient after receiving results from esophagram. Patient agreeable to plan. All questions answered.     We discussed the treatment options to include, medical and surgical options.  We have decided to proceed as follows:   Will repeat esophagram- last swallow imaging 2020 prior to CP myotomy for Zenker's Diverticulum.   Discussed continuing current dose of Omeprazole 20 mg BID   Will have patient use OTC gaviscon after meals for LPR   Will call patient son Sumanth (POA) with results from swallow study.

## 2025-03-11 ENCOUNTER — APPOINTMENT (OUTPATIENT)
Dept: HEMATOLOGY/ONCOLOGY | Facility: CLINIC | Age: OVER 89
End: 2025-03-11
Payer: MEDICARE

## 2025-03-17 ENCOUNTER — HOSPITAL ENCOUNTER (OUTPATIENT)
Dept: CARDIOLOGY | Facility: CLINIC | Age: OVER 89
Discharge: HOME | End: 2025-03-17
Payer: MEDICARE

## 2025-03-17 DIAGNOSIS — Z95.0 PRESENCE OF CARDIAC PACEMAKER: ICD-10-CM

## 2025-03-17 DIAGNOSIS — I44.2 ATRIOVENTRICULAR BLOCK, COMPLETE (MULTI): ICD-10-CM

## 2025-03-17 PROCEDURE — 93296 REM INTERROG EVL PM/IDS: CPT

## 2025-03-17 PROCEDURE — 93294 REM INTERROG EVL PM/LDLS PM: CPT | Performed by: STUDENT IN AN ORGANIZED HEALTH CARE EDUCATION/TRAINING PROGRAM

## 2025-03-18 ENCOUNTER — TELEPHONE (OUTPATIENT)
Dept: PRIMARY CARE | Facility: CLINIC | Age: OVER 89
End: 2025-03-18
Payer: MEDICARE

## 2025-03-18 DIAGNOSIS — M80.08XA AGE-RELATED OSTEOPOROSIS WITH CURRENT PATHOL FRACTURE OF VERTEBRA, INITIAL ENCOUNTER (MULTI): ICD-10-CM

## 2025-03-18 DIAGNOSIS — I10 ESSENTIAL HYPERTENSION: ICD-10-CM

## 2025-03-20 ENCOUNTER — HOSPITAL ENCOUNTER (OUTPATIENT)
Dept: PAIN MEDICINE | Facility: CLINIC | Age: OVER 89
Discharge: HOME | End: 2025-03-20
Payer: MEDICARE

## 2025-03-20 ENCOUNTER — APPOINTMENT (OUTPATIENT)
Dept: HEMATOLOGY/ONCOLOGY | Facility: CLINIC | Age: OVER 89
End: 2025-03-20
Payer: MEDICARE

## 2025-03-20 VITALS
SYSTOLIC BLOOD PRESSURE: 157 MMHG | DIASTOLIC BLOOD PRESSURE: 77 MMHG | HEART RATE: 69 BPM | OXYGEN SATURATION: 98 % | RESPIRATION RATE: 16 BRPM

## 2025-03-20 DIAGNOSIS — M48.062 LUMBAR STENOSIS WITH NEUROGENIC CLAUDICATION: ICD-10-CM

## 2025-03-20 DIAGNOSIS — N18.31 STAGE 3A CHRONIC KIDNEY DISEASE (MULTI): Primary | ICD-10-CM

## 2025-03-20 PROCEDURE — 2550000001 HC RX 255 CONTRASTS: Performed by: ANESTHESIOLOGY

## 2025-03-20 PROCEDURE — 62323 NJX INTERLAMINAR LMBR/SAC: CPT | Performed by: ANESTHESIOLOGY

## 2025-03-20 PROCEDURE — 2500000004 HC RX 250 GENERAL PHARMACY W/ HCPCS (ALT 636 FOR OP/ED): Performed by: ANESTHESIOLOGY

## 2025-03-20 PROCEDURE — 2500000005 HC RX 250 GENERAL PHARMACY W/O HCPCS: Performed by: ANESTHESIOLOGY

## 2025-03-20 RX ORDER — INDOMETHACIN 25 MG/1
CAPSULE ORAL AS NEEDED
Status: COMPLETED | OUTPATIENT
Start: 2025-03-20 | End: 2025-03-20

## 2025-03-20 RX ORDER — LIDOCAINE HYDROCHLORIDE 5 MG/ML
INJECTION, SOLUTION INFILTRATION; INTRAVENOUS AS NEEDED
Status: COMPLETED | OUTPATIENT
Start: 2025-03-20 | End: 2025-03-20

## 2025-03-20 RX ORDER — SODIUM CHLORIDE 9 MG/ML
INJECTION, SOLUTION INTRAMUSCULAR; INTRAVENOUS; SUBCUTANEOUS AS NEEDED
Status: COMPLETED | OUTPATIENT
Start: 2025-03-20 | End: 2025-03-20

## 2025-03-20 RX ORDER — METHYLPREDNISOLONE ACETATE 40 MG/ML
INJECTION, SUSPENSION INTRA-ARTICULAR; INTRALESIONAL; INTRAMUSCULAR; SOFT TISSUE AS NEEDED
Status: COMPLETED | OUTPATIENT
Start: 2025-03-20 | End: 2025-03-20

## 2025-03-20 RX ADMIN — LIDOCAINE HYDROCHLORIDE 13 ML: 5 INJECTION, SOLUTION INFILTRATION at 10:30

## 2025-03-20 RX ADMIN — SODIUM CHLORIDE 5 ML: 9 INJECTION, SOLUTION INTRAMUSCULAR; INTRAVENOUS; SUBCUTANEOUS at 10:30

## 2025-03-20 RX ADMIN — METHYLPREDNISOLONE ACETATE 40 MG: 40 INJECTION, SUSPENSION INTRA-ARTICULAR; INTRALESIONAL; INTRAMUSCULAR; INTRASYNOVIAL; SOFT TISSUE at 10:30

## 2025-03-20 RX ADMIN — IOHEXOL 5 ML: 240 INJECTION, SOLUTION INTRATHECAL; INTRAVASCULAR; INTRAVENOUS; ORAL at 10:30

## 2025-03-20 RX ADMIN — SODIUM BICARBONATE 1 MEQ: 84 INJECTION, SOLUTION INTRAVENOUS at 10:30

## 2025-03-20 ASSESSMENT — PAIN - FUNCTIONAL ASSESSMENT: PAIN_FUNCTIONAL_ASSESSMENT: 0-10

## 2025-03-20 ASSESSMENT — PAIN DESCRIPTION - DESCRIPTORS: DESCRIPTORS: ACHING;BURNING

## 2025-03-20 ASSESSMENT — PAIN SCALES - GENERAL
PAINLEVEL_OUTOF10: 6
PAINLEVEL_OUTOF10: 6

## 2025-03-20 NOTE — DISCHARGE INSTRUCTIONS
[facility]  [facility] [address]  [address] [id number] [phone]    POST PROCEDURE INSTRUCTIONS    Activity  Medication used during your procedure may cause some temporary weakness or numbness in your arms or legs, depending on the type of injection you received. It is recommended that you do not drive or operate machinery the day of your injection.  You do not need to stay in bed when you get home. You should be able to resume your normal activities, including work. However, any pre-existing physical restriction you had prior to the procedure may still remain.   Have a responsible adult with you if you received sedation for the procedure. Do not drive or operate machinery for 12 hours.    Pain  Immediate pain relief from the local anesthetic will wear off after several hours.  Prolonged relief from the steroid may take 3-14 days to occur.  Some patients may experience a “flare up” of their normal pain for a few days after the procedure. You may apply ice packs to the sore area for 15minutes on/ 2 hours off and take your prescribed or over the counter analgesics as needed.  Common side effects from the steroid include facial flushing, headaches,fluid retention,trouble sleeping,and cold like symptoms for 24-48 hours post procedure.  Patients receiving diagnostic injections (no steroid): pain relief is intended to be temporary. Pay attention to the percentage of pain relief that occurs compared to before your injection so you can report this to your doctor. Pain scores before and after the procedure need to be documented.    Medications  Resume your normal medications unless otherwise instructed  If you held your blood thinning medication for the procedure,you will be instructed on when to restart.    Injection site care  Keep the injection site clean and dry. You may shower and remove the Band Aid after you arrive home.  If you notice excessive  bleeding (slow general oozing that completely soaks the dressing or fresh bright red bleeding),apply pressure and call our office immediately.  Observe for signs of infection: increasing pain at injection site, redness, swelling, drainage with a foul smell, any associated fever or chills                        If you notice any of these, call our office immediately.    Diabetic patients   You may notice an elevation in blood sugar if steroid is used. Notify your primary care doctor if blood sugar levels do not return to normal.    Follow up  Make a virtual injection follow up appointment  with Dr. Fiore in 3-4 weeks      Call our office at [phone] to speak to the clinical staff with any concerns or problems.  Go to the nearest emergency room if you are not able to reach us and your problem is urgent.  Call [phone] if you develop serious symptoms such as: chest pain or difficulty breathing.

## 2025-03-20 NOTE — H&P
Pain Management H&P    History Of Present Illness  Neris Mccall is a 89 y.o. female presents for procedure state below. Endorses no changes in past medical history or medical health since last seen in clinic.      Past Medical History  She has a past medical history of Acute upper respiratory infection, unspecified (01/15/2018), Aneurysm of unspecified site (CMS-HCC), Arrhythmia (1975), Arthritis (2005), BiPAP (biphasic positive airway pressure) dependence (2023), Cataract (2005), Chronic pain disorder (1960), Coronary artery disease (2021), CPAP (continuous positive airway pressure) dependence (2014), Dependence on other enabling machines and devices, Dysphagia (2021), Fractures (1955), GERD (gastroesophageal reflux disease) (1985), Glaucoma (2021), Hearing aid worn (1985), Heart disease (1980), Heart failure (2019), Heart valve disease (2021), History of blood transfusion (1958), HL (hearing loss) (1970), Hypertension (1972), Low back pain (1950), Lumbar disc disease (2016), Old myocardial infarction, Other nail disorders (03/14/2017), Pacemaker (2019), Personal history of diseases of the skin and subcutaneous tissue (12/14/2016), Personal history of other diseases of the circulatory system (05/16/2022), Personal history of other diseases of the circulatory system, Personal history of other diseases of the musculoskeletal system and connective tissue, Personal history of other diseases of the nervous system and sense organs, Personal history of other endocrine, nutritional and metabolic disease, Personal history of other venous thrombosis and embolism, Personal history of transient ischemic attack (TIA), and cerebral infarction without residual deficits, Platelet disorder (Multi) (1998), Pregnant ([facility]) (1957), Scoliosis (2016), Secondary polycythemia, Shingles (1996), Sleep apnea, Spinal stenosis, lumbar region with neurogenic claudication (11/01/2022), Stroke (Multi) (1967), Urinary tract infection (2019),  and Vision loss (2017).    She has no past medical history of ADD (attention deficit disorder), ADHD (attention deficit hyperactivity disorder), ALKA (acute kidney injury) (CMS-HCC), Anxiety, Autism ([facility]), Autoimmune disorder (Multi), Biliary disease, Bipolar disorder, Bladder cancer (Multi), BPH (benign prostatic hyperplasia), Cancer of neurologic system ([facility]), Celiac disease ([facility]), Cerebral aneurysm ([facility]), Cerebral palsy, Cerebral vascular accident (Multi), Cervical cancer, Cervical disc disease, Cholelithiasis, Chronic kidney disease, Cirrhosis (Multi), CKD (chronic kidney disease), Cognitive decline, Cognitive disorder, Colon polyp, Colorectal cancer (Multi), Crohn's disease (Multi), Dementia, Diverticulosis, Dizziness, Dysfunctional uterine bleeding, Endometrial cancer (Multi), Esophageal cancer (Multi), Esophageal disease, ESRD (end stage renal disease) (Multi), Fibroid, Fibromyalgia, primary, Gastric cancer (Multi), Gender dysphoria, Gestational diabetes, Gestational hypertension ([facility]), GI (gastrointestinal bleed), Hemodialysis status (CMS-HCC), Hernia, internal, Hiatal hernia, History of peritoneal dialysis, HIV disease (Multi), Hydrocephalus, Immunocompromised, Intellectual disability, Irritable bowel syndrome, Liver disease, Lupus, Mastocytosis, MS (multiple sclerosis) (Multi), Muscular dystrophy (Multi), Myasthenia gravis, Myoneural disorder (Multi), Myotonia, VILLANUEVA (nonalcoholic steatohepatitis), Nephrolithiasis, Neuromuscular disorder (Multi), Ovarian cancer (Multi), Pancreatic cancer (Multi), Pancreatitis ([facility]), Pelvic mass, Peptic ulcer disease, Personal history of other mental and behavioral disorders, Polycystic ovarian disease, Prematurity ([facility]), Prostate cancer (Multi), PTSD (post-traumatic stress disorder), Pyelonephritis, Renal cancer (Multi), Renal disease due to hypertension, Schizophrenia, Seizure disorder (Multi), Substance addiction (Multi), Syncope, Thoracic spinal  cord injury (Multi), TIA (transient ischemic attack), Ulcerative colitis, Uterine cancer (Multi), or Vertigo.    Surgical History  She has a past surgical history that includes Dilation and curettage of uterus (04/20/2016); Other surgical history (04/20/2016); Varicose vein surgery (04/20/2016); Other surgical history (04/20/2016); Rotator cuff repair (04/20/2016); Tonsillectomy (04/20/2016); Knee surgery (04/20/2016); Other surgical history (07/29/2022); Fracture surgery (2015); Insert / replace / remove pacemaker (2019); Stapedectomy (1979); Colonoscopy (2014); Upper gastrointestinal endoscopy (2013); ORIF wrist fracture (1955); Toe Surgery (2004); Blepharoplasty (2002); Correction hammer toe (2004); and Back surgery.     Social History  She reports that she has never smoked. She has never used smokeless tobacco. She reports that she does not currently use alcohol. She reports that she does not use drugs.    Family History  Family History   Problem Relation Name Age of Onset    Cancer Mother GIBSON SALINAS     Hypertension Sister KELSIE RENTERIA     Breast cancer Sister KELSIE RENTERIA 87    Coronary artery disease Brother TIN SALINAS     Hearing loss Brother SERINA SALINAS     Vision loss Brother SERINA SALINAS     Heart disease Brother AUDREY SALINAS     Breast cancer Paternal Grandmother  30        Allergies  Duloxetine, Sulfa (sulfonamide antibiotics), Atenolol, Cephalexin, Cholestyramine, Gemfibrozil, House dust, Niacin, Pravastatin, Tramadol, and Tree pollen-white susanna    Review of Symptoms:   Constitutional: Negative for chills, diaphoresis or fever  HENT: Negative for neck swelling  Eyes:.  Negative for eye pain  Respiratory:.  Negative for cough, shortness of breath or wheezing    Cardiovascular:.  Negative for chest pain or palpitations  Gastrointestinal:.  Negative for abdominal pain, nausea and vomiting  Genitourinary:.  Negative for urgency  Musculoskeletal:  Positive for back pain. Positive for joint pain. Denies  falls within the past 3 months.  Skin: Negative for wounds or itching   Neurological: Negative for dizziness, seizures, loss of consciousness and weakness  Endo/Heme/Allergies: Does not bruise/bleed easily  Psychiatric/Behavioral: Negative for depression. The patient does not appear anxious.      Pre-sedation Evaluation  ASA class 2  Mallampati score 2     PHYSICAL EXAM  Vitals signs reviewed  Constitutional:       General: Not in acute distress     Appearance: Normal appearance. Not ill-appearing.  HENT:     Head: Normocephalic and atraumatic  Eyes:     Conjunctiva/sclera: Conjunctivae normal  Cardiovascular:     Rate and Rhythm: Normal rate and regular rhythm  Pulmonary:     Effort: No respiratory distress  Abdominal:     Palpations: Abdomen is soft  Musculoskeletal: CLARK  Skin:     General: Skin is warm and dry  Neurological:     General: No focal deficit present  Psychiatric:         Mood and Affect: Mood normal         Behavior: Behavior normal     Last Recorded Vitals  There were no vitals taken for this visit.    Relevant Results  Current Outpatient Medications   Medication Instructions    acetaminophen (TYLENOL) 500 mg, Once daily (morning) M-F (5 days a week)    artificial tears, dextran-hypomel-glycerin, 0.1-0.3-0.2 % ophthalmic solution 4 times daily    ascorbic acid (vitamin C) 1,000 mg, Daily    aspirin 81 mg, Daily    calcium carb and citrate-vitD3 (Citracal-D3 Slow Release) 600 mg-12.5 mcg (500 unit) tablet extended release 1 tablet, Daily    calcium carbonate (Tums) 200 mg calcium chewable tablet 1 tablet, Daily    cholecalciferol (VITAMIN D-3) 50 mcg, Daily    cranberry extract 200 mg capsule 1 tablet, Daily    cyanocobalamin (Vitamin B-12) 50 mcg tablet 1 tablet, Daily    Eylea 2 mg, Once    furosemide (Lasix) 20 mg tablet TAKE 1 TABLET DAILY    gentamicin (Garamycin) 0.3 % ophthalmic solution Apply 1 drop under the nail and 1 drop on the nail topically twice daily and cover twice daily for 4 weeks     ibuprofen 200 mg, 4 times daily    ipratropium (Atrovent) 42 mcg (0.06 %) nasal spray USE 2 SPRAYS IN EACH NOSTRIL TWO TO THREE TIMES DAILY    latanoprost (Xalatan) 0.005 % ophthalmic solution 2 drops, Nightly    lidocaine (Lidoderm) 5 % patch 1 patch, transdermal, Daily, Remove & discard patch within 12 hours or as directed by MD.    losartan (COZAAR) 25 mg, oral, Daily    melatonin 5 mg, Nightly    metoprolol succinate XL (TOPROL-XL) 25 mg, oral, Daily    multivit,calc,mins/iron/folic (WOMEN'S ONE DAILY ORAL) 1 tablet, Daily    naloxone (NARCAN) 4 mg, nasal, As needed, May repeat every 2-3 minutes if needed, alternating nostrils, until medical assistance becomes available.    neomycin-polymyxin-dexAMETHasone (Maxitrol) 3.5mg/mL-10,000 unit/mL-0.1 % ophthalmic suspension INSTILL ONE DROP INTO AFFECTED EYE THREE TIMES A DAY    neomycin-polymyxin-gramicidin (Neosporin) 1.75 mg-10,000 unit-0.025mg/mL ophthalmic solution Apply 1 to 2 drops to the affected nail twice daily for green nail syndrome.    omega-3 fatty acids-fish oil (Fish OiL) 340-1,000 mg capsule 1 capsule, Daily    omeprazole (PRILOSEC) 20 mg, oral, 2 times daily before meals, Do not crush or chew.    oxyCODONE (ROXICODONE) 5 mg, oral, Every 12 hours PRN    rosuvastatin (Crestor) 20 mg tablet TAKE 1 TABLET DAILY    spironolactone (ALDACTONE) 25 mg, oral, Daily    trolamine salicylate (Aspercreme) 10 % cream 1 Application, As needed    zolpidem (AMBIEN) 5 mg, oral, Nightly PRN         MR lumbar spine wo IV contrast 02/20/2025    Narrative  Interpreted By:  Sumanth Lopez,  STUDY:  MR LUMBAR SPINE WO IV CONTRAST;  2/20/2025 11:05 am    INDICATION:  Signs/Symptoms:back pain.    COMPARISON:  None.    ACCESSION NUMBER(S):  [id number]    ORDERING CLINICIAN:  WOODY HUERTAS    TECHNIQUE:  Sagittal STIR, sagittal T2, sagittal T1, axial T2, axial T1 weighted  MRI images of the lumbar spine were obtained without intravenous  contrast  administration.    FINDINGS:  There is collapsed and anterior wedging of the L2 vertebral body more  pronounced to the right of midline with approximately 40-45% loss of  height of the L2 vertebral body. There is dark signal on all  sequences noted within the right aspect of the L2 vertebral body  compatible with bone cement from a previous vertebroplasty. There is  mild retropulsion of the superior/posterior margin of the collapsed  L2 vertebral body contributing to mild bony encroachment upon the  spinal canal. There is no associated abnormal epidural paraspinal  soft tissue mass.    There is mild collapse of the superior endplate of the L3 vertebral  body to the right of midline. There is nonspecific abnormal bone  marrow signal adjacent to the mild collapsed superior endplate of the  L3 vertebral body to the right of midline noted to be increased in  signal on the STIR images and diminished signal on T1 images which  while nonspecific may represent degenerative marrow signal changes  although nonspecific bone marrow edema conceivably posttraumatic in  etiology could give a similar MRI appearance and can not be excluded.    The coronal  images demonstrate a levocurvature of the lumbar  spine.    There are degenerative signal changes along endplates throughout the  lumbar and visualized lower thoracic region.    The visualized spinal cord demonstrates no signal abnormality within  it.  The conus medullaris is normally positioned terminating at the  L1/2 level.    There is a perineural cyst/Tarlov cyst within the spinal canal at the  S2 level measuring approximately 13 mm in greatest sagittal dimension.    There is diminished disc signal throughout the lumbar and visualized  lower thoracic region compatible with degenerative disc disease.    At the L5/S1 level,  there is mild posterior osteophytic spurring and  posterior disc bulge asymmetrically more pronounced to the left of  midline along with degenerative  facet changes contributing to  encroachment upon the left lateral recess. There is no significant  narrowing of the thecal sac within the spinal canal. There is severe  left-sided neural foraminal narrowing. There is no significant  right-sided neural foramen narrowing.    At the L4/L5 level,  there is mild posterior osteophytic spurring  along with degenerative facet changes and ligamentum flavum  hypertrophy contributing to mild spinal canal narrowing. There is  mild-to-moderate encroachment upon the left neural foramen. There is  no significant right-sided neural foraminal narrowing.    At the L3/L4 level,  there is posterior osteophytic spurring and  posterior disc bulge along with degenerative facet changes and  ligamentum flavum hypertrophy contributing to mild-to-moderate spinal  canal narrowing. There is no significant neural foraminal narrowing.    At the L2/L3 level,  there is posterior osteophytic spurring and  posterior disc bulge along with degenerative facet changes and  ligamentum flavum hypertrophy contributing to mild overall narrowing  of the thecal sac within the spinal canal. There is mild encroachment  upon the neural foramen bilaterally.    At the L1/L2 level,  there is mild retropulsion of the  superior/posterior margin of the collapsed L2 vertebral body,  posterior osteophytic spurring and posterior disc bulge along with  degenerative facet changes and ligamentum flavum hypertrophy  contributing to mild overall narrowing of the thecal sac within the  spinal canal. There is moderate right-sided neural foraminal  narrowing there is no significant left-sided neural foraminal  narrowing.    At the T12/L1 level,  there is a mild posterior osteophytic spurring  and posterior disc bulge along with degenerative facet changes and  ligamentum flavum hypertrophy without significant spinal canal  narrowing. There is no significant neural foraminal narrowing. There  is a perineural cyst/Tarlov cysts  extending through the right neural  foramen.    There is atrophy/fatty infiltration of the posterior paraspinal  musculature within the lumbar and sacral regions.    There are scattered cystic foci within the partially imaged kidneys  bilaterally.    Impression  There is collapsed and anterior wedging of the L2 vertebral body more  pronounced to the right of midline with approximately 40-45% loss of  height of the L2 vertebral body. There is dark signal on all  sequences noted within the right aspect of the L2 vertebral body  compatible with bone cement from a previous vertebroplasty. There is  mild retropulsion of the superior/posterior margin of the collapsed  L2 vertebral body contributing to mild bony encroachment upon the  spinal canal. There is no associated abnormal epidural paraspinal  soft tissue mass.    There is mild collapse of the superior endplate of the L3 vertebral  body to the right of midline. There is nonspecific abnormal bone  marrow signal adjacent to the mild collapsed superior endplate of the  L3 vertebral body to the right of midline noted to be increased in  signal on the STIR images and diminished signal on T1 images which  while nonspecific may represent degenerative marrow signal changes  although nonspecific bone marrow edema conceivably posttraumatic in  etiology could give a similar MRI appearance and can not be excluded.    The coronal  images demonstrate a levocurvature of the lumbar  spine.    There is multilevel spondylosis. There are varying degrees of spinal  canal and neural foraminal narrowing as described above.    There is atrophy/fatty infiltration of the posterior paraspinal  musculature within the lumbar and sacral regions.    There are scattered cystic foci within the partially imaged kidneys  bilaterally.    MACRO:  None.    Signed by: Sumanth Lopez 2/20/2025 2:15 PM  Dictation workstation:   [id number]     No image results found.       No diagnosis found.      ASSESSMENT/PLAN  Neris Mccall is a 89 y.o. female here for L5-S1 interlaminar epidural steroid injection under fluoroscopy     Patient denies any recent antibiotic use or infections, takes ASA 81 but denies other blood thinner use, and denies contrast or local anesthetic allergies     Risks, benefits, alternatives discussed. All questions answered to the best of my ability. Patient agrees to proceed.      Our plan is as follows:  - Proceed with aforementioned procedure          Rashad Albert MD   Pain fellow

## 2025-03-24 ENCOUNTER — APPOINTMENT (OUTPATIENT)
Dept: HEMATOLOGY/ONCOLOGY | Facility: CLINIC | Age: OVER 89
End: 2025-03-24
Payer: MEDICARE

## 2025-03-24 ENCOUNTER — OFFICE VISIT (OUTPATIENT)
Dept: HEMATOLOGY/ONCOLOGY | Facility: CLINIC | Age: OVER 89
End: 2025-03-24
Payer: MEDICARE

## 2025-03-24 VITALS
BODY MASS INDEX: 22.89 KG/M2 | RESPIRATION RATE: 16 BRPM | WEIGHT: 133.38 LBS | OXYGEN SATURATION: 94 % | SYSTOLIC BLOOD PRESSURE: 142 MMHG | HEART RATE: 74 BPM | TEMPERATURE: 97.5 F | DIASTOLIC BLOOD PRESSURE: 84 MMHG

## 2025-03-24 DIAGNOSIS — N18.31 STAGE 3A CHRONIC KIDNEY DISEASE (MULTI): ICD-10-CM

## 2025-03-24 LAB
ERYTHROCYTE [DISTWIDTH] IN BLOOD BY AUTOMATED COUNT: 12.1 % (ref 11.5–14.5)
HCT VFR BLD AUTO: 42.3 % (ref 36–46)
HGB BLD-MCNC: 13.7 G/DL (ref 12–16)
MCH RBC QN AUTO: 31.1 PG (ref 26–34)
MCHC RBC AUTO-ENTMCNC: 32.4 G/DL (ref 32–36)
MCV RBC AUTO: 96 FL (ref 80–100)
PLATELET # BLD AUTO: 229 X10*3/UL (ref 150–450)
RBC # BLD AUTO: 4.4 X10*6/UL (ref 4–5.2)
WBC # BLD AUTO: 7.5 X10*3/UL (ref 4.4–11.3)

## 2025-03-24 PROCEDURE — 85027 COMPLETE CBC AUTOMATED: CPT | Performed by: PHYSICIAN ASSISTANT

## 2025-03-24 PROCEDURE — 3077F SYST BP >= 140 MM HG: CPT | Performed by: PHYSICIAN ASSISTANT

## 2025-03-24 PROCEDURE — 36415 COLL VENOUS BLD VENIPUNCTURE: CPT | Performed by: PHYSICIAN ASSISTANT

## 2025-03-24 PROCEDURE — 1157F ADVNC CARE PLAN IN RCRD: CPT | Performed by: PHYSICIAN ASSISTANT

## 2025-03-24 PROCEDURE — 3079F DIAST BP 80-89 MM HG: CPT | Performed by: PHYSICIAN ASSISTANT

## 2025-03-24 PROCEDURE — 99213 OFFICE O/P EST LOW 20 MIN: CPT | Performed by: PHYSICIAN ASSISTANT

## 2025-03-24 PROCEDURE — 1125F AMNT PAIN NOTED PAIN PRSNT: CPT | Performed by: PHYSICIAN ASSISTANT

## 2025-03-24 ASSESSMENT — PAIN SCALES - GENERAL: PAINLEVEL_OUTOF10: 5

## 2025-03-24 NOTE — PROGRESS NOTES
Visit Type: Follow Up Visit      Cancer History:   Treatment Synopsis:    #1) history of polycythemia; non-smoker. Had been off and on phlebotomy sessions over the years. recently established care with us. Had briefly been on Hydrea in  the 1990s. JAK2 negative. Normal EPO level with us.     #2) osteoporosis.     History of Present Illness:   Patient presents for follow up of polycythemia. On assessment, reports arthritic pain. Otherwise, she is doing well. She denies fever, chills, night sweats, weight  loss, bleeding, or any other complaints at this time.     Review of Systems:   All of the systems have been reviewed and are negative for complaints except what is stated in the HPI and/or past medical history.    Allergies and Intolerances:   Allergies   Allergen Reactions    Duloxetine Diarrhea and Other     Reaction from Community: Diarrhea    Sulfa (Sulfonamide Antibiotics) Other and Rash    Atenolol Unknown    Cephalexin Diarrhea    Cholestyramine Unknown    Gemfibrozil Unknown    House Dust Other    Niacin Unknown    Pravastatin Unknown    Tramadol Other and Hallucinations     Reaction from Community: Hallucinations    Tree Pollen-White Maxim Unknown     Maxim, White     Outpatient medications:  Current Outpatient Medications on File Prior to Visit   Medication Sig Dispense Refill    acetaminophen (Tylenol) 500 mg tablet Take 1 tablet (500 mg) by mouth once daily (M-F).      aflibercept (Eylea) 2 mg/0.05 mL intra-ocular injection Administer 0.05 mL (2 mg) into the left eye 1 time. EVERY 10 WEEKS. NO DOSE LISTED      artificial tears, dextran-hypomel-glycerin, 0.1-0.3-0.2 % ophthalmic solution Administer into affected eye(s) 4 times a day.      ascorbic acid, vitamin C, 500 mg capsule Take 1,000 mg by mouth once daily.      aspirin 81 mg capsule Take 81 mg by mouth once daily.      calcium carb and citrate-vitD3 (Citracal-D3 Slow Release) 600 mg-12.5 mcg (500 unit) tablet extended release Take 1 tablet by mouth  once daily.      calcium carbonate (Tums) 200 mg calcium chewable tablet Chew 1 tablet (500 mg) once daily.      cholecalciferol (Vitamin D-3) 25 MCG (1000 UT) capsule Take 2 capsules (50 mcg) by mouth once daily.      cranberry extract 200 mg capsule Take 1 tablet by mouth once daily. 30,000MG      cyanocobalamin (Vitamin B-12) 50 mcg tablet Take 1 tablet (50 mcg) by mouth once daily.      furosemide (Lasix) 20 mg tablet TAKE 1 TABLET DAILY 90 tablet 1    gentamicin (Garamycin) 0.3 % ophthalmic solution Apply 1 drop under the nail and 1 drop on the nail topically twice daily and cover twice daily for 4 weeks (Patient taking differently: Apply 1 drop under the nail and 1 drop on the nail topically twice daily and cover twice daily for 4 weeks  **FOR NAIL FUNGUS NOT EYES**) 15 mL 0    ibuprofen 200 mg tablet Take 1 tablet (200 mg) by mouth 4 times a day.      ipratropium (Atrovent) 42 mcg (0.06 %) nasal spray USE 2 SPRAYS IN EACH NOSTRIL TWO TO THREE TIMES DAILY 45 mL 7    latanoprost (Xalatan) 0.005 % ophthalmic solution Administer 2 drops into the left eye once daily at bedtime.      lidocaine (Lidoderm) 5 % patch Place 1 patch over 12 hours on the skin once daily. Remove & discard patch within 12 hours or as directed by MD. 30 patch 0    losartan (Cozaar) 25 mg tablet TAKE 1 TABLET DAILY 90 tablet 3    melatonin 5 mg capsule Take 1 capsule (5 mg) by mouth once daily at bedtime.      metoprolol succinate XL (Toprol-XL) 25 mg 24 hr tablet Take 1 tablet (25 mg) by mouth once daily. 90 tablet 0    multivit,calc,mins/iron/folic (WOMEN'S ONE DAILY ORAL) Take 1 tablet by mouth once daily.      naloxone (Narcan) 4 mg/0.1 mL nasal spray Administer 1 spray (4 mg) into affected nostril(s) if needed for opioid reversal. May repeat every 2-3 minutes if needed, alternating nostrils, until medical assistance becomes available. 2 each 0    neomycin-polymyxin-dexAMETHasone (Maxitrol) 3.5mg/mL-10,000 unit/mL-0.1 % ophthalmic  "suspension INSTILL ONE DROP INTO AFFECTED EYE THREE TIMES A DAY      neomycin-polymyxin-gramicidin (Neosporin) 1.75 mg-10,000 unit-0.025mg/mL ophthalmic solution Apply 1 to 2 drops to the affected nail twice daily for green nail syndrome. 15 mL 0    omega-3 fatty acids-fish oil (Fish OiL) 340-1,000 mg capsule Take 1 capsule by mouth once daily.      omeprazole (PriLOSEC) 20 mg DR capsule Take 1 capsule (20 mg) by mouth 2 times a day before meals. Do not crush or chew. 180 capsule 3    oxyCODONE (Roxicodone) 5 mg immediate release tablet Take 1 tablet (5 mg) by mouth every 12 hours if needed for severe pain (7 - 10). 60 tablet 0    rosuvastatin (Crestor) 20 mg tablet TAKE 1 TABLET DAILY 90 tablet 3    spironolactone (Aldactone) 25 mg tablet TAKE 1 TABLET DAILY 90 tablet 3    trolamine salicylate (Aspercreme) 10 % cream Apply 1 Application topically if needed for muscle/joint pain.      zolpidem (Ambien) 5 mg tablet Take 1 tablet (5 mg) by mouth as needed at bedtime for sleep. 90 tablet 0     Current Facility-Administered Medications on File Prior to Visit   Medication Dose Route Frequency Provider Last Rate Last Admin    denosumab (Prolia) injection 60 mg  60 mg subcutaneous q6 months Yovanny Quintana MD               2/20/2025     9:47 AM 2/20/2025    10:12 AM 2/20/2025    10:20 AM 3/10/2025     2:44 PM 3/20/2025    10:19 AM 3/20/2025    10:51 AM 3/24/2025    11:22 AM   Vitals   Systolic  112 109  136 157 142   Diastolic  56 53  78 77 84   BP Location  Right arm Right arm    Left arm   Heart Rate  69 80  80 69 74   Temp       36.4 °C (97.5 °F)   Resp  20   20 16 16   Height 1.626 m (5' 4\")   1.626 m (5' 4\")      Weight (lb) 132   132   133.38   BMI 22.66 kg/m2   22.66 kg/m2   22.89 kg/m2   BSA (m2) 1.64 m2   1.64 m2   1.65 m2   Visit Report    Report   Report      Physical Exam:   Constitutional: alert, awake and oriented, not in acute distress   HEENT: moist mucous membranes, normal nose   Neck: supple, no " lymphadenopathy   EYES: PERRL, EOM intact, conjunctiva normal  Skin: no jaundice, rash or erythema  Neurological: AAOx3, no gross focal deficit   Psychiatric: normal mood and behavior     Assessment and Plan:    Patient with history of polycythemia. 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     Patient has not needed phlebo in a very long time. Can follow with PCP and RTC PRN.    Patient verbalized understanding, and all her questions were answered to her satisfaction    20 min spent with patient greater than 50 % of which was spent in consultation and coordination of care.

## 2025-03-25 RX ORDER — METOPROLOL SUCCINATE 25 MG/1
25 TABLET, EXTENDED RELEASE ORAL DAILY
Qty: 90 TABLET | Refills: 1 | Status: SHIPPED | OUTPATIENT
Start: 2025-03-25

## 2025-03-26 ENCOUNTER — HOSPITAL ENCOUNTER (OUTPATIENT)
Dept: RADIOLOGY | Facility: HOSPITAL | Age: OVER 89
Discharge: HOME | End: 2025-03-26
Payer: MEDICARE

## 2025-03-26 DIAGNOSIS — R13.10 DYSPHAGIA, UNSPECIFIED TYPE: ICD-10-CM

## 2025-03-26 PROCEDURE — A9698 NON-RAD CONTRAST MATERIALNOC: HCPCS

## 2025-03-26 PROCEDURE — 2500000005 HC RX 250 GENERAL PHARMACY W/O HCPCS

## 2025-03-26 PROCEDURE — 74220 X-RAY XM ESOPHAGUS 1CNTRST: CPT

## 2025-03-26 PROCEDURE — 2500000001 HC RX 250 WO HCPCS SELF ADMINISTERED DRUGS (ALT 637 FOR MEDICARE OP)

## 2025-03-26 PROCEDURE — 74221 X-RAY XM ESOPHAGUS 2CNTRST: CPT | Performed by: STUDENT IN AN ORGANIZED HEALTH CARE EDUCATION/TRAINING PROGRAM

## 2025-03-26 RX ADMIN — BARIUM SULFATE 100 ML: 980 POWDER, FOR SUSPENSION ORAL at 10:33

## 2025-03-26 RX ADMIN — ANTACID/ANTIFLATULENT 1 PACKET: 380; 550; 10; 10 GRANULE, EFFERVESCENT ORAL at 10:34

## 2025-04-07 ENCOUNTER — TELEPHONE (OUTPATIENT)
Dept: PULMONOLOGY | Facility: CLINIC | Age: OVER 89
End: 2025-04-07
Payer: MEDICARE

## 2025-04-07 NOTE — TELEPHONE ENCOUNTER
Patient called and said the mask we gave her is leaking so bad that she is currently using her old mask which has the magnets on it and she knows she can not use that mask.    Patient is asking for a new mask to be ordered for her.    [phone]

## 2025-04-11 ENCOUNTER — TELEPHONE (OUTPATIENT)
Dept: PRIMARY CARE | Facility: CLINIC | Age: OVER 89
End: 2025-04-11
Payer: MEDICARE

## 2025-04-11 ENCOUNTER — TELEPHONE (OUTPATIENT)
Dept: PAIN MEDICINE | Facility: CLINIC | Age: OVER 89
End: 2025-04-11
Payer: MEDICARE

## 2025-04-11 DIAGNOSIS — M80.08XA AGE-RELATED OSTEOPOROSIS WITH CURRENT PATHOL FRACTURE OF VERTEBRA, INITIAL ENCOUNTER (MULTI): ICD-10-CM

## 2025-04-11 RX ORDER — OXYCODONE HYDROCHLORIDE 5 MG/1
5 TABLET ORAL EVERY 12 HOURS PRN
Qty: 60 TABLET | Refills: 0 | Status: SHIPPED | OUTPATIENT
Start: 2025-04-11 | End: 2025-05-11

## 2025-04-11 NOTE — TELEPHONE ENCOUNTER
Son called, mom has not noticed any improvement from injection. If needs appointment let me know where to add her. thanks

## 2025-04-11 NOTE — TELEPHONE ENCOUNTER
I have personally reviewed the [facility] report for the patient. This report is scanned into the electronic medical record. I have considered the risks of abuse, dependence, addiction and diversion. I believe that it is clinically appropriate for the patient to be prescribed this medication

## 2025-04-11 NOTE — TELEPHONE ENCOUNTER
No relief with YURIY. She has tried multiple types of interventions and medications. Oxycodone is helping but she does not like how it makes her feel. Son does not feel PNS would be manageable as an option to help with her knee pain. Will review with Dr erazo

## 2025-04-15 NOTE — TELEPHONE ENCOUNTER
Can you call son to schedule follow up,you can put 4/28 at 10:30 and cx the refill pt,we can send a 1 month supply

## 2025-04-15 NOTE — TELEPHONE ENCOUNTER
I did not have Vitera mask for a while. I had 1 Vitera mask in [place] now for a different patient who did not pick it up for 2 weeks. I will bring tomorrow and just give it to Neris.

## 2025-04-15 NOTE — TELEPHONE ENCOUNTER
Patients son called back and said they are actually trying to use the Jerome mask but that is the one that is leaking too much. He states that with the Jerome mask, it is a SM/M and he is thinking they might need to go smaller? He also said that there was supposed to be a sample of Viterra mask you were going to bring from [place] and they had not heard back about that?    Patient does have Pacemaker.    How would you like to proceed?    Sumanth (son) [phone]

## 2025-04-28 ENCOUNTER — OFFICE VISIT (OUTPATIENT)
Dept: PAIN MEDICINE | Facility: CLINIC | Age: OVER 89
End: 2025-04-28
Payer: MEDICARE

## 2025-04-28 VITALS
HEART RATE: 68 BPM | OXYGEN SATURATION: 93 % | SYSTOLIC BLOOD PRESSURE: 123 MMHG | DIASTOLIC BLOOD PRESSURE: 71 MMHG | RESPIRATION RATE: 18 BRPM

## 2025-04-28 DIAGNOSIS — M48.062 PSEUDOCLAUDICATION SYNDROME: ICD-10-CM

## 2025-04-28 DIAGNOSIS — M48.062 SPINAL STENOSIS, LUMBAR REGION, WITH NEUROGENIC CLAUDICATION: ICD-10-CM

## 2025-04-28 DIAGNOSIS — M48.062 LUMBAR STENOSIS WITH NEUROGENIC CLAUDICATION: ICD-10-CM

## 2025-04-28 PROCEDURE — 99213 OFFICE O/P EST LOW 20 MIN: CPT | Performed by: ANESTHESIOLOGY

## 2025-04-28 PROCEDURE — 1125F AMNT PAIN NOTED PAIN PRSNT: CPT | Performed by: ANESTHESIOLOGY

## 2025-04-28 PROCEDURE — 1157F ADVNC CARE PLAN IN RCRD: CPT | Performed by: ANESTHESIOLOGY

## 2025-04-28 PROCEDURE — 3074F SYST BP LT 130 MM HG: CPT | Performed by: ANESTHESIOLOGY

## 2025-04-28 PROCEDURE — G2211 COMPLEX E/M VISIT ADD ON: HCPCS | Performed by: ANESTHESIOLOGY

## 2025-04-28 PROCEDURE — 3078F DIAST BP <80 MM HG: CPT | Performed by: ANESTHESIOLOGY

## 2025-04-28 RX ORDER — DULOXETIN HYDROCHLORIDE 20 MG/1
20 CAPSULE, DELAYED RELEASE ORAL DAILY
Qty: 30 CAPSULE | Refills: 11 | Status: SHIPPED | OUTPATIENT
Start: 2025-04-28

## 2025-04-28 ASSESSMENT — PAIN SCALES - GENERAL: PAINLEVEL_OUTOF10: 6

## 2025-04-28 ASSESSMENT — PAIN - FUNCTIONAL ASSESSMENT: PAIN_FUNCTIONAL_ASSESSMENT: 0-10

## 2025-04-28 ASSESSMENT — PAIN DESCRIPTION - DESCRIPTORS: DESCRIPTORS: ACHING;NAGGING

## 2025-04-28 NOTE — PROGRESS NOTES
Subjective   Patient ID: Neris Mccall is a 89 y.o. female seen with complaint of chronic back pain and chronic bilateral leg pain.      HPI:   Neris Mccall is a 89 y.o. female seen with complaint of chronic back pain and chronic bilateral leg pain.  Patient's status post L5-S1 lumbar interlaminar epidural steroid injection on 3/20/2025..  Patient says she did not have much relief after her injection in March.  Patient has had several interventional procedures done with pain management in the past, including genicular blocks, epidural steroid injections, lumbar spine kyphoplasty.  Patient complains today of continued back pain and pain in her legs, mostly in her knees.  Patient is reportedly using oxycodone 5 mg twice daily for additional pain relief.  Patient says she has significant pain when she is upright and walking also when she is sitting for long periods of time.  Patient says she only has relief when she is sitting and has her legs raised on an incline.      Last Urine Drug Screen:  Recent Results (from the past 8760 hours)   Opiate Confirmation, Urine    Collection Time: 11/11/24  4:43 PM   Result Value Ref Range    6-Acetylmorphine <25 <25 ng/mL    Codeine <50 <50 ng/mL    Hydrocodone <25 <25 ng/mL    Hydromorphone <25 <25 ng/mL    Morphine  <50 <50 ng/mL    Norhydrocodone <25 <25 ng/mL    Noroxycodone 830 (H) <25 ng/mL    Oxycodone 344 (H) <25 ng/mL    Oxymorphone 514 (H) <25 ng/mL   Drug Screen, Urine With Reflex to Confirmation    Collection Time: 11/11/24  4:43 PM   Result Value Ref Range    Amphetamine Screen, Urine Presumptive Negative Presumptive Negative    Barbiturate Screen, Urine Presumptive Negative Presumptive Negative    Benzodiazepines Screen, Urine Presumptive Negative Presumptive Negative    Cannabinoid Screen, Urine Presumptive Negative Presumptive Negative    Cocaine Metabolite Screen, Urine Presumptive Negative Presumptive Negative    Fentanyl Screen, Urine Presumptive Negative  Presumptive Negative    Opiate Screen, Urine Presumptive Negative Presumptive Negative    Oxycodone Screen, Urine Presumptive Positive (A) Presumptive Negative    PCP Screen, Urine Presumptive Negative Presumptive Negative    Methadone Screen, Urine Presumptive Negative Presumptive Negative   Confirmation Opiate/Opioid/Benzo Prescription Compliance    Collection Time: 07/08/24  3:29 PM   Result Value Ref Range    Clonazepam <25 <25 ng/mL    7-Aminoclonazepam <25 <25 ng/mL    Alprazolam <25 <25 ng/mL    Alpha-Hydroxyalprazolam <25 <25 ng/mL    Midazolam <25 <25 ng/mL    Alpha-Hydroxymidazolam <25 <25 ng/mL    Chlordiazepoxide <25 <25 ng/mL    Diazepam <25 <25 ng/mL    Nordiazepam <25 <25 ng/mL    Temazepam <25 <25 ng/mL    Oxazepam <25 <25 ng/mL    Lorazepam <25 <25 ng/mL    Methadone <25 <25 ng/mL    EDDP <25 <25 ng/mL    6-Acetylmorphine <25 <25 ng/mL    Codeine <50 <50 ng/mL    Hydrocodone <25 <25 ng/mL    Hydromorphone <25 <25 ng/mL    Morphine  <50 <50 ng/mL    Norhydrocodone <25 <25 ng/mL    Noroxycodone 629 (H) <25 ng/mL    Oxycodone 336 (H) <25 ng/mL    Oxymorphone 285 (H) <25 ng/mL    Fentanyl <2.5 <2.5 ng/mL    Norfentanyl <2.5 <2.5 ng/mL    Tramadol <50 <50 ng/mL    O-Desmethyltramadol <50 <50 ng/mL    Zolpidem <25 <25 ng/mL    Zolpidem Metabolite (ZCA) 695 (H) <25 ng/mL   Screen Opiate/Opioid/Benzo Prescription Compliance    Collection Time: 07/08/24  3:29 PM   Result Value Ref Range    Creatinine, Urine Random 40.7 20.0 - 320.0 mg/dL    Amphetamine Screen, Urine Presumptive Negative Presumptive Negative    Barbiturate Screen, Urine Presumptive Negative Presumptive Negative    Cannabinoid Screen, Urine Presumptive Negative Presumptive Negative    Cocaine Metabolite Screen, Urine Presumptive Negative Presumptive Negative    PCP Screen, Urine Presumptive Negative Presumptive Negative         Review of Systems   13-point ROS done and negative except for HPI.     Current Outpatient Medications   Medication  Instructions    acetaminophen (TYLENOL) 500 mg, Once daily (morning) M-F (5 days a week)    artificial tears, dextran-hypomel-glycerin, 0.1-0.3-0.2 % ophthalmic solution 4 times daily    ascorbic acid (vitamin C) 1,000 mg, Daily    aspirin 81 mg, Daily    calcium carb and citrate-vitD3 (Citracal-D3 Slow Release) 600 mg-12.5 mcg (500 unit) tablet extended release 1 tablet, Daily    calcium carbonate (Tums) 200 mg calcium chewable tablet 1 tablet, Daily    cholecalciferol (VITAMIN D-3) 50 mcg, Daily    cranberry extract 200 mg capsule 1 tablet, Daily    cyanocobalamin (Vitamin B-12) 50 mcg tablet 1 tablet, Daily    Eylea 2 mg, Once    furosemide (Lasix) 20 mg tablet TAKE 1 TABLET DAILY    gentamicin (Garamycin) 0.3 % ophthalmic solution Apply 1 drop under the nail and 1 drop on the nail topically twice daily and cover twice daily for 4 weeks    ibuprofen 200 mg, 4 times daily    ipratropium (Atrovent) 42 mcg (0.06 %) nasal spray USE 2 SPRAYS IN EACH NOSTRIL TWO TO THREE TIMES DAILY    latanoprost (Xalatan) 0.005 % ophthalmic solution 2 drops, Nightly    lidocaine (Lidoderm) 5 % patch 1 patch, transdermal, Daily, Remove & discard patch within 12 hours or as directed by MD.    losartan (COZAAR) 25 mg, oral, Daily    melatonin 5 mg, Nightly    metoprolol succinate XL (TOPROL-XL) 25 mg, oral, Daily    multivit,calc,mins/iron/folic (WOMEN'S ONE DAILY ORAL) 1 tablet, Daily    naloxone (NARCAN) 4 mg, nasal, As needed, May repeat every 2-3 minutes if needed, alternating nostrils, until medical assistance becomes available.    neomycin-polymyxin-dexAMETHasone (Maxitrol) 3.5mg/mL-10,000 unit/mL-0.1 % ophthalmic suspension INSTILL ONE DROP INTO AFFECTED EYE THREE TIMES A DAY    neomycin-polymyxin-gramicidin (Neosporin) 1.75 mg-10,000 unit-0.025mg/mL ophthalmic solution Apply 1 to 2 drops to the affected nail twice daily for green nail syndrome.    omega-3 fatty acids-fish oil (Fish OiL) 340-1,000 mg capsule 1 capsule, Daily     omeprazole (PRILOSEC) 20 mg, oral, 2 times daily before meals, Do not crush or chew.    oxyCODONE (ROXICODONE) 5 mg, oral, Every 12 hours PRN    rosuvastatin (Crestor) 20 mg tablet TAKE 1 TABLET DAILY    spironolactone (ALDACTONE) 25 mg, oral, Daily    trolamine salicylate (Aspercreme) 10 % cream 1 Application, As needed    zolpidem (AMBIEN) 5 mg, oral, Nightly PRN       Medical History[1]     Surgical History[2]     Family History[3]     RX Allergies[4]     Objective     Vitals:    04/28/25 1038   BP: 123/71   Pulse: 68   Resp: 18   SpO2: 93%        Physical Exam  General: NAD, well groomed, well nourished  Eyes: Non-icteric sclera, EOMI  Ears, Nose, Mouth, and Throat: External ears and nose appear to be without deformity or rash. No lesions or masses noted. Hearing is grossly intact.   Neck: Trachea midline  Respiratory: Nonlabored breathing   Cardiovascular: No noted significant peripheral edema   Skin: No rashes or open lesions/ulcers identified on skin.    Back:   Palpation: Tenderness to palpation over lumbar paraspinous muscles.   Straight leg raise: does not reproduce their pain, bilaterally       Neurologic:   Cranial nerves grossly intact.   throughout.  DTRs:normal and symmetric throughout  Pizarro: absent    Psychiatric: Alert, orientation to person, place, and time. Cooperative.    Imaging personally reviewed and independently interpreted    Assessment/Plan   Neris Mccall is a 89 y.o. female seen with complaint of chronic back pain and chronic bilateral leg pain.    Plan:  - Patient to be scheduled for L1-L2 lumbar interlaminar epidural steroid injection under fluoroscopy    Medical Necessity  The patient has failed conservative treatment including different classes of medications and physical therapy.  Patient continues to rate their pain as moderate to severe, affecting quality of sleep, quality of life and  significantly impairing daily activities (ADLs)   We discussed  the risks, benefits and  alternatives of the procedure including but not limited to: Lack of efficacy , Transiently worsening pain , Bleeding, Infection , and Nerve Damage and patient is amicable to the plan    Follow up: As needed     The patient was invited to contact us back anytime with any questions or concerns and follow-up with us in the office as needed.     Diagnoses and all orders for this visit:  Pseudoclaudication syndrome  Spinal stenosis, lumbar region, with neurogenic claudication      This note was generated with the aid of dictation software, there may be typos despite my attempts at proofreading.         [1]   Past Medical History:  Diagnosis Date    Acute upper respiratory infection, unspecified 01/15/2018    Acute URI    Aneurysm of unspecified site (CMS-[facility])     Aneurysm    Arrhythmia 1975    Arthritis 2005    BiPAP (biphasic positive airway pressure) dependence 2023    Cataract 2005    Chronic pain disorder 1960    Coronary artery disease 2021    CPAP (continuous positive airway pressure) dependence 2014    Dependence on other enabling machines and devices     CPAP (continuous positive airway pressure) dependence    Dysphagia 2021    Fractures 1955    GERD (gastroesophageal reflux disease) 1985    No longer problem    Glaucoma 2021    Hearing aid worn 1985    Heart disease 1980    Heart failure 2019    Heart valve disease 2021    History of blood transfusion 1958    HL (hearing loss) 1970    Hypertension 1972    Low back pain 1950    Lumbar disc disease 2016    Old myocardial infarction     History of myocardial infarction    Other nail disorders 03/14/2017    Green nails    Pacemaker 2019    Personal history of diseases of the skin and subcutaneous tissue 12/14/2016    History of folliculitis    Personal history of other diseases of the circulatory system 05/16/2022    History of intermittent claudication    Personal history of other diseases of the circulatory system     History of high blood pressure    Personal  history of other diseases of the musculoskeletal system and connective tissue     History of arthritis    Personal history of other diseases of the nervous system and sense organs     History of sleep apnea    Personal history of other endocrine, nutritional and metabolic disease     History of high cholesterol    Personal history of other venous thrombosis and embolism     H/O blood clots    Personal history of transient ischemic attack (TIA), and cerebral infarction without residual deficits     History of stroke    Platelet disorder (Multi) 1998    PCV    Pregnant ([facility]) 1957    Scoliosis 2016    Secondary polycythemia     Polycythemia    Shingles 1996    Sleep apnea     Spinal stenosis, lumbar region with neurogenic claudication 11/01/2022    Neurogenic claudication due to lumbar spinal stenosis    Stroke (Multi) 1967    Urinary tract infection 2019    Vision loss 2017   [2]   Past Surgical History:  Procedure Laterality Date    BACK SURGERY      MILD procedure    BLEPHAROPLASTY  2002    COLONOSCOPY  2014    CORRECTION HAMMER TOE  2004    DILATION AND CURETTAGE OF UTERUS  04/20/2016    Dilation And Curettage    FRACTURE SURGERY  2015    INSERT / REPLACE / REMOVE PACEMAKER  2019    KNEE SURGERY  04/20/2016    Knee Surgery Left    ORIF WRIST FRACTURE  1955    OTHER SURGICAL HISTORY  04/20/2016    Ear Surgery    OTHER SURGICAL HISTORY  04/20/2016    Hammertoe Operation Left Toe No. ___    OTHER SURGICAL HISTORY  07/29/2022    Pacemaker insertion    ROTATOR CUFF REPAIR  04/20/2016    Rotator Cuff Repair    STAPEDECTOMY  1979    TOE SURGERY  2004    TONSILLECTOMY  04/20/2016    Tonsillectomy    UPPER GASTROINTESTINAL ENDOSCOPY  2013    VARICOSE VEIN SURGERY  04/20/2016    Varicose Vein Ligation   [3]   Family History  Problem Relation Name Age of Onset    Cancer Mother GIBSON SALINAS     Hypertension Sister KELSIE RENTERIA     Breast cancer Sister KELSIE RENTERIA 87    Coronary artery disease Brother TIN SALINAS      Hearing loss Brother SERINA RITA     Vision loss Brother SERINA RITA     Heart disease Brother AUDREY SALINAS     Breast cancer Paternal Grandmother  30   [4]   Allergies  Allergen Reactions    Duloxetine Diarrhea and Other     Reaction from Community: Diarrhea    Sulfa (Sulfonamide Antibiotics) Other and Rash    Atenolol Unknown    Cephalexin Diarrhea    Cholestyramine Unknown    Gemfibrozil Unknown    House Dust Other    Niacin Unknown    Pravastatin Unknown    Tramadol Other and Hallucinations     Reaction from Community: Hallucinations    Tree Pollen-White Maxim Unknown     Maxim, White

## 2025-05-01 ENCOUNTER — TELEPHONE (OUTPATIENT)
Dept: SLEEP MEDICINE | Facility: CLINIC | Age: OVER 89
End: 2025-05-01
Payer: MEDICARE

## 2025-05-01 DIAGNOSIS — G47.33 OSA (OBSTRUCTIVE SLEEP APNEA): ICD-10-CM

## 2025-05-01 NOTE — TELEPHONE ENCOUNTER
Patient's son archana called and said that we had given Neris a sample mask: Viterra? He was calling back to let us know that she has been doing great on the mask and he was wondering if it could be updated in her chart and to have an order placed over to [facility] letting them know of the mask change that way when it is time for her to receive a new mask they will get the correct mask.    Son asked for a call back once this is done at [phone]

## 2025-05-08 ENCOUNTER — HOSPITAL ENCOUNTER (OUTPATIENT)
Dept: PAIN MEDICINE | Facility: CLINIC | Age: OVER 89
Discharge: HOME | End: 2025-05-08
Payer: MEDICARE

## 2025-05-08 VITALS
HEART RATE: 65 BPM | RESPIRATION RATE: 16 BRPM | DIASTOLIC BLOOD PRESSURE: 75 MMHG | SYSTOLIC BLOOD PRESSURE: 169 MMHG | OXYGEN SATURATION: 96 %

## 2025-05-08 DIAGNOSIS — M48.062 LUMBAR STENOSIS WITH NEUROGENIC CLAUDICATION: ICD-10-CM

## 2025-05-08 PROCEDURE — 62323 NJX INTERLAMINAR LMBR/SAC: CPT | Performed by: ANESTHESIOLOGY

## 2025-05-08 PROCEDURE — 2500000005 HC RX 250 GENERAL PHARMACY W/O HCPCS: Mod: JZ | Performed by: ANESTHESIOLOGY

## 2025-05-08 PROCEDURE — 2500000004 HC RX 250 GENERAL PHARMACY W/ HCPCS (ALT 636 FOR OP/ED): Mod: JW | Performed by: ANESTHESIOLOGY

## 2025-05-08 PROCEDURE — 2550000001 HC RX 255 CONTRASTS: Mod: JW | Performed by: ANESTHESIOLOGY

## 2025-05-08 RX ORDER — LIDOCAINE HYDROCHLORIDE 5 MG/ML
INJECTION, SOLUTION INFILTRATION; INTRAVENOUS AS NEEDED
Status: COMPLETED | OUTPATIENT
Start: 2025-05-08 | End: 2025-05-08

## 2025-05-08 RX ORDER — INDOMETHACIN 25 MG/1
CAPSULE ORAL AS NEEDED
Status: COMPLETED | OUTPATIENT
Start: 2025-05-08 | End: 2025-05-08

## 2025-05-08 RX ORDER — METHYLPREDNISOLONE ACETATE 40 MG/ML
INJECTION, SUSPENSION INTRA-ARTICULAR; INTRALESIONAL; INTRAMUSCULAR; SOFT TISSUE AS NEEDED
Status: COMPLETED | OUTPATIENT
Start: 2025-05-08 | End: 2025-05-08

## 2025-05-08 RX ORDER — SODIUM CHLORIDE 9 MG/ML
INJECTION, SOLUTION INTRAMUSCULAR; INTRAVENOUS; SUBCUTANEOUS AS NEEDED
Status: COMPLETED | OUTPATIENT
Start: 2025-05-08 | End: 2025-05-08

## 2025-05-08 RX ADMIN — LIDOCAINE HYDROCHLORIDE 13 ML: 5 INJECTION, SOLUTION INFILTRATION at 10:27

## 2025-05-08 RX ADMIN — SODIUM CHLORIDE 5 ML: 9 INJECTION, SOLUTION INTRAMUSCULAR; INTRAVENOUS; SUBCUTANEOUS at 10:28

## 2025-05-08 RX ADMIN — SODIUM BICARBONATE 1 MEQ: 84 INJECTION, SOLUTION INTRAVENOUS at 10:28

## 2025-05-08 RX ADMIN — IOHEXOL 5 ML: 240 INJECTION, SOLUTION INTRATHECAL; INTRAVASCULAR; INTRAVENOUS; ORAL at 10:27

## 2025-05-08 RX ADMIN — METHYLPREDNISOLONE ACETATE 40 MG: 40 INJECTION, SUSPENSION INTRA-ARTICULAR; INTRALESIONAL; INTRAMUSCULAR; INTRASYNOVIAL; SOFT TISSUE at 10:27

## 2025-05-08 ASSESSMENT — PAIN SCALES - GENERAL
PAINLEVEL_OUTOF10: 5 - MODERATE PAIN
PAINLEVEL_OUTOF10: 2

## 2025-05-08 ASSESSMENT — PAIN - FUNCTIONAL ASSESSMENT: PAIN_FUNCTIONAL_ASSESSMENT: 0-10

## 2025-05-08 ASSESSMENT — PAIN DESCRIPTION - DESCRIPTORS: DESCRIPTORS: ACHING

## 2025-05-08 NOTE — H&P
History Of Present Illness  Nreis Mccall is a 89 y.o. female presenting with axial low back pain scheduled with L2 compression fracture.  Patient scheduled for a L3-L4 lumbar epidural steroid injection     Past Medical History  She has a past medical history of Acute upper respiratory infection, unspecified (01/15/2018), Aneurysm of unspecified site (CMS-HCC), Arrhythmia (1975), Arthritis (2005), BiPAP (biphasic positive airway pressure) dependence (2023), Cataract (2005), Chronic pain disorder (1960), Coronary artery disease (2021), CPAP (continuous positive airway pressure) dependence (2014), Dependence on other enabling machines and devices, Dysphagia (2021), Fractures (1955), GERD (gastroesophageal reflux disease) (1985), Glaucoma (2021), Hearing aid worn (1985), Heart disease (1980), Heart failure (2019), Heart valve disease (2021), History of blood transfusion (1958), HL (hearing loss) (1970), Hypertension (1972), Low back pain (1950), Lumbar disc disease (2016), Old myocardial infarction, Other nail disorders (03/14/2017), Pacemaker (2019), Personal history of diseases of the skin and subcutaneous tissue (12/14/2016), Personal history of other diseases of the circulatory system (05/16/2022), Personal history of other diseases of the circulatory system, Personal history of other diseases of the musculoskeletal system and connective tissue, Personal history of other diseases of the nervous system and sense organs, Personal history of other endocrine, nutritional and metabolic disease, Personal history of other venous thrombosis and embolism, Personal history of transient ischemic attack (TIA), and cerebral infarction without residual deficits, Platelet disorder (Multi) (1998), Pregnant ([facility]) (1957), Scoliosis (2016), Secondary polycythemia, Shingles (1996), Sleep apnea, Spinal stenosis, lumbar region with neurogenic claudication (11/01/2022), Stroke (Multi) (1967), Urinary tract infection (2019), and Vision  loss (2017).    She has no past medical history of ADD (attention deficit disorder), ADHD (attention deficit hyperactivity disorder), ALKA (acute kidney injury), Anxiety, Autism ([facility]), Autoimmune disorder (Multi), Biliary disease, Bipolar disorder, Bladder cancer (Multi), BPH (benign prostatic hyperplasia), Cancer of neurologic system (Multi), Celiac disease ([facility]), Cerebral aneurysm ([facility]), Cerebral palsy, Cerebral vascular accident (Multi), Cervical cancer, Cervical disc disease, Cholelithiasis, Chronic kidney disease, Cirrhosis (Multi), CKD (chronic kidney disease), Cognitive decline, Cognitive disorder, Colon polyp, Colorectal cancer (Multi), Crohn's disease (Multi), Dementia, Diverticulosis, Dizziness, Dysfunctional uterine bleeding, Endometrial cancer (Multi), Esophageal cancer (Multi), Esophageal disease, ESRD (end stage renal disease) (Multi), Fibroid, Fibromyalgia, primary, Gastric cancer (Multi), Gender dysphoria, Gestational diabetes, Gestational hypertension ([facility]), GI (gastrointestinal bleed), Hemodialysis status, Hernia, internal, Hiatal hernia, History of peritoneal dialysis, HIV disease (Multi), Hydrocephalus, Immunocompromised, Intellectual disability, Irritable bowel syndrome, Liver disease, Lupus, Mastocytosis, MS (multiple sclerosis) (Multi), Muscular dystrophy (Multi), Myasthenia gravis, Myoneural disorder (Multi), Myotonia, VILLANUEVA (nonalcoholic steatohepatitis), Nephrolithiasis, Neuromuscular disorder (Multi), Ovarian cancer (Multi), Pancreatic cancer (Multi), Pancreatitis ([facility]), Pelvic mass, Peptic ulcer disease, Personal history of other mental and behavioral disorders, Polycystic ovarian disease, Prematurity ([facility]), Prostate cancer (Multi), PTSD (post-traumatic stress disorder), Pyelonephritis, Renal cancer (Multi), Renal disease due to hypertension, Schizophrenia, Seizure disorder (Multi), Substance addiction (Multi), Syncope, Thoracic spinal cord injury (Multi), TIA  (transient ischemic attack), Ulcerative colitis, Uterine cancer (Multi), or Vertigo.    Surgical History  She has a past surgical history that includes Dilation and curettage of uterus (04/20/2016); Other surgical history (04/20/2016); Varicose vein surgery (04/20/2016); Other surgical history (04/20/2016); Rotator cuff repair (04/20/2016); Tonsillectomy (04/20/2016); Knee surgery (04/20/2016); Other surgical history (07/29/2022); Fracture surgery (2015); Insert / replace / remove pacemaker (2019); Stapedectomy (1979); Colonoscopy (2014); Upper gastrointestinal endoscopy (2013); ORIF wrist fracture (1955); Toe Surgery (2004); Blepharoplasty (2002); Correction hammer toe (2004); and Back surgery.     Social History  She reports that she has never smoked. She has never used smokeless tobacco. She reports that she does not currently use alcohol. She reports that she does not use drugs.    Family History  Family History[1]     Allergies  Duloxetine, Sulfa (sulfonamide antibiotics), Atenolol, Cephalexin, Cholestyramine, Gemfibrozil, House dust, Niacin, Pravastatin, Tramadol, and Tree pollen-white susanna    Review of systems:   13-point review of systems done and negative except as noted in HPI      Physical Exam   Vital signs: Reviewed  Constitutional: No acute distress, well appearing and well nourished. Patient appears stated age.   Eyes: Conjunctiva non-icteric and eye lids are without obvious rash or drooping. Pupils are symmetric.   Ears, Nose, Mouth, and Throat: External ears and nose appear to be without deformity or rash. No lesions or masses noted. Hearing is grossly intact.   Neck:. No JVD noted, tracheal position is midline.   Head and Face: Examination of the head and face revealed no abnormalities.   Respiratory: No gasping or shortness of breath noted, no use of accessory muscles noted.   Cardiovascular: Examination for edema is normal.   GI: Abdomen nontender to palpation.   Skin: No rashes or open  lesions/ulcers identified on skin.   Musk: Digits/nails show no clubbing or cyanosis. No asymmetry or masses noted of the musculature. Examination of the muscles/joints/bones show normal range of motion. Gait is grossly [].  [] to walk on toes and heels.   Neurologic: Cranial nerves II-XII intact, motor strength 5/5 muscle strength of the lower extremities bilaterally and equal. 5/5 muscle strength of the upper extremities bilaterally and equal.   Reflexes: normal.   Sensation: []  Provocative tests:       Last Recorded Vitals  /70   Pulse 69   Resp 20     Relevant Results            Last OARRS Review: No data recorded    I have personally reviewed the OARRS report for Neris Mccall I have considered the risks of abuse, dependence, addiction and diversion       Assessment/Plan   Assessment & Plan  Lumbar stenosis with neurogenic claudication      As discussed above       This note was generated with the aid of dictation software, there may be typos despite my attempts at proofreading.    Rajeev Fiore MD        [1]   Family History  Problem Relation Name Age of Onset    Cancer Mother GIBSON SALINAS     Hypertension Sister KELSIE RENTERIA     Breast cancer Sister KELSIE RENTERIA 87    Coronary artery disease Brother TIN RITA     Hearing loss Brother SERINA SALINAS     Vision loss Brother SERINA SALINAS     Heart disease Brother AUDREY SALINAS     Breast cancer Paternal Grandmother  30

## 2025-05-08 NOTE — DISCHARGE INSTRUCTIONS
[facility]  [facility] [address]  [address] [id number] [phone]    POST PROCEDURE INSTRUCTIONS    Activity  Medication used during your procedure may cause some temporary weakness or numbness in your arms or legs, depending on the type of injection you received. It is recommended that you do not drive or operate machinery the day of your injection.  You do not need to stay in bed when you get home. You should be able to resume your normal activities, including work. However, any pre-existing physical restriction you had prior to the procedure may still remain.   Have a responsible adult with you if you received sedation for the procedure. Do not drive or operate machinery for 12 hours.    Pain  Immediate pain relief from the local anesthetic will wear off after several hours.  Prolonged relief from the steroid may take 3-14 days to occur.  Some patients may experience a “flare up” of their normal pain for a few days after the procedure. You may apply ice packs to the sore area for 15minutes on/ 2 hours off and take your prescribed or over the counter analgesics as needed.  Common side effects from the steroid include facial flushing, headaches,fluid retention,trouble sleeping,and cold like symptoms for 24-48 hours post procedure.  Patients receiving diagnostic injections (no steroid): pain relief is intended to be temporary. Pay attention to the percentage of pain relief that occurs compared to before your injection so you can report this to your doctor. Pain scores before and after the procedure need to be documented.    Medications  Resume your normal medications unless otherwise instructed  If you held your blood thinning medication for the procedure,you will be instructed on when to restart.    Injection site care  Keep the injection site clean and dry. You may shower and remove the Band Aid after you arrive home.  If you notice excessive  bleeding (slow general oozing that completely soaks the dressing or fresh bright red bleeding),apply pressure and call our office immediately.  Observe for signs of infection: increasing pain at injection site, redness, swelling, drainage with a foul smell, any associated fever or chills                        If you notice any of these, call our office immediately.    Diabetic patients   You may notice an elevation in blood sugar if steroid is used. Notify your primary care doctor if blood sugar levels do not return to normal.    Follow up  Call office to schedule  injection follow up appointment  with Dr. Fiore in 3-4 weeks      Call our office at [phone] to speak to the clinical staff with any concerns or problems.  Go to the nearest emergency room if you are not able to reach us and your problem is urgent.  Call [phone] if you develop serious symptoms such as: chest pain or difficulty breathing.

## 2025-05-09 DIAGNOSIS — S39.012A LUMBAR STRAIN, INITIAL ENCOUNTER: ICD-10-CM

## 2025-05-09 DIAGNOSIS — M48.062 SPINAL STENOSIS, LUMBAR REGION, WITH NEUROGENIC CLAUDICATION: ICD-10-CM

## 2025-05-09 NOTE — TELEPHONE ENCOUNTER
Spoke to son, verified correct mask (vitera) was on PAP order form and re-faxed it to [employer] with a cover specifying mask type.

## 2025-05-10 RX ORDER — LIDOCAINE 50 MG/G
1 PATCH TOPICAL DAILY
Qty: 90 PATCH | Refills: 1 | Status: SHIPPED | OUTPATIENT
Start: 2025-05-10

## 2025-05-12 ENCOUNTER — OFFICE VISIT (OUTPATIENT)
Dept: CARDIOLOGY | Facility: HOSPITAL | Age: OVER 89
End: 2025-05-12
Payer: MEDICARE

## 2025-05-12 VITALS
WEIGHT: 130.07 LBS | DIASTOLIC BLOOD PRESSURE: 68 MMHG | BODY MASS INDEX: 22.33 KG/M2 | OXYGEN SATURATION: 92 % | SYSTOLIC BLOOD PRESSURE: 126 MMHG | HEART RATE: 76 BPM

## 2025-05-12 DIAGNOSIS — I77.810 ASCENDING AORTA DILATATION: ICD-10-CM

## 2025-05-12 DIAGNOSIS — I50.20 SYSTOLIC CONGESTIVE HEART FAILURE, UNSPECIFIED HF CHRONICITY: ICD-10-CM

## 2025-05-12 DIAGNOSIS — I10 PRIMARY HYPERTENSION: Primary | ICD-10-CM

## 2025-05-12 PROCEDURE — 99214 OFFICE O/P EST MOD 30 MIN: CPT | Performed by: NURSE PRACTITIONER

## 2025-05-12 PROCEDURE — 3078F DIAST BP <80 MM HG: CPT | Performed by: NURSE PRACTITIONER

## 2025-05-12 PROCEDURE — 3074F SYST BP LT 130 MM HG: CPT | Performed by: NURSE PRACTITIONER

## 2025-05-12 PROCEDURE — 1159F MED LIST DOCD IN RCRD: CPT | Performed by: NURSE PRACTITIONER

## 2025-05-12 PROCEDURE — G2211 COMPLEX E/M VISIT ADD ON: HCPCS | Performed by: NURSE PRACTITIONER

## 2025-05-12 RX ORDER — DULOXETIN HYDROCHLORIDE 20 MG/1
20 CAPSULE, DELAYED RELEASE ORAL DAILY
Qty: 90 CAPSULE | Refills: 0 | Status: SHIPPED | OUTPATIENT
Start: 2025-05-12

## 2025-05-12 ASSESSMENT — ENCOUNTER SYMPTOMS
HEMATOLOGIC/LYMPHATIC NEGATIVE: 1
MYALGIAS: 1
PSYCHIATRIC NEGATIVE: 1
CONSTITUTIONAL NEGATIVE: 1
BACK PAIN: 1
ENDOCRINE NEGATIVE: 1
NEUROLOGICAL NEGATIVE: 1
GASTROINTESTINAL NEGATIVE: 1
RESPIRATORY NEGATIVE: 1
EYES NEGATIVE: 1

## 2025-05-12 NOTE — PATIENT INSTRUCTIONS
CALL WITH ANY QUESTIONS   NO MEDICATION CHANGES   FOLLOW UP IN SIX MONTHS   CT ANGIO OF THE CHEST

## 2025-05-12 NOTE — PROGRESS NOTES
Referred by Dr. Estevez ref. provider found for No chief complaint on file.     History Of Present Illness:    Neris Mccall is a very pleasant 89 year old female with a history of moderate aortic stenosis, moderate CAD (Cath 2018), HFrEF, and LBBB, she is here for a a follow up visit.  Complains of general aches and pain, recently had spinal injections. Following up with GI for esophagus. Denies chest pain or shortness of breath. Occasional lightheadedness.     Review of Systems   Constitutional: Negative.   HENT: Negative.     Eyes: Negative.    Cardiovascular:  Positive for chest pain.   Respiratory: Negative.     Endocrine: Negative.    Hematologic/Lymphatic: Negative.    Skin: Negative.    Musculoskeletal:  Positive for arthritis, back pain, muscle weakness and myalgias.   Gastrointestinal: Negative.    Neurological: Negative.    Psychiatric/Behavioral: Negative.        Past Medical History:  She has a past medical history of Acute upper respiratory infection, unspecified (01/15/2018), Aneurysm of unspecified site (CMS-[facility]), Arrhythmia (1975), Arthritis (2005), BiPAP (biphasic positive airway pressure) dependence (2023), Cataract (2005), Chronic pain disorder (1960), Coronary artery disease (2021), CPAP (continuous positive airway pressure) dependence (2014), Dependence on other enabling machines and devices, Dysphagia (2021), Fractures (1955), GERD (gastroesophageal reflux disease) (1985), Glaucoma (2021), Hearing aid worn (1985), Heart disease (1980), Heart failure (2019), Heart valve disease (2021), History of blood transfusion (1958), HL (hearing loss) (1970), Hypertension (1972), Low back pain (1950), Lumbar disc disease (2016), Old myocardial infarction, Other nail disorders (03/14/2017), Pacemaker (2019), Personal history of diseases of the skin and subcutaneous tissue (12/14/2016), Personal history of other diseases of the circulatory system (05/16/2022), Personal history of other diseases of the circulatory  system, Personal history of other diseases of the musculoskeletal system and connective tissue, Personal history of other diseases of the nervous system and sense organs, Personal history of other endocrine, nutritional and metabolic disease, Personal history of other venous thrombosis and embolism, Personal history of transient ischemic attack (TIA), and cerebral infarction without residual deficits, Platelet disorder (Multi) (1998), Pregnant ([facility]) (1957), Scoliosis (2016), Secondary polycythemia, Shingles (1996), Sleep apnea, Spinal stenosis, lumbar region with neurogenic claudication (11/01/2022), Stroke (Multi) (1967), Urinary tract infection (2019), and Vision loss (2017).    She has no past medical history of ADD (attention deficit disorder), ADHD (attention deficit hyperactivity disorder), ALKA (acute kidney injury), Anxiety, Autism ([facility]), Autoimmune disorder (Multi), Biliary disease, Bipolar disorder, Bladder cancer (Multi), BPH (benign prostatic hyperplasia), Cancer of neurologic system ([facility]), Celiac disease ([facility]), Cerebral aneurysm ([facility]), Cerebral palsy, Cerebral vascular accident (Multi), Cervical cancer, Cervical disc disease, Cholelithiasis, Chronic kidney disease, Cirrhosis (Multi), CKD (chronic kidney disease), Cognitive decline, Cognitive disorder, Colon polyp, Colorectal cancer (Multi), Crohn's disease (Multi), Dementia, Diverticulosis, Dizziness, Dysfunctional uterine bleeding, Endometrial cancer (Multi), Esophageal cancer (Multi), Esophageal disease, ESRD (end stage renal disease) (Multi), Fibroid, Fibromyalgia, primary, Gastric cancer (Multi), Gender dysphoria, Gestational diabetes, Gestational hypertension ([facility]), GI (gastrointestinal bleed), Hemodialysis status, Hernia, internal, Hiatal hernia, History of peritoneal dialysis, HIV disease (Multi), Hydrocephalus, Immunocompromised, Intellectual disability, Irritable bowel syndrome, Liver disease, Lupus, Mastocytosis, MS  (multiple sclerosis) (Multi), Muscular dystrophy (Multi), Myasthenia gravis, Myoneural disorder (Multi), Myotonia, VILLANUEVA (nonalcoholic steatohepatitis), Nephrolithiasis, Neuromuscular disorder (Multi), Ovarian cancer (Multi), Pancreatic cancer (Multi), Pancreatitis ([facility]), Pelvic mass, Peptic ulcer disease, Personal history of other mental and behavioral disorders, Polycystic ovarian disease, Prematurity ([facility]), Prostate cancer (Multi), PTSD (post-traumatic stress disorder), Pyelonephritis, Renal cancer (Multi), Renal disease due to hypertension, Schizophrenia, Seizure disorder (Multi), Substance addiction (Multi), Syncope, Thoracic spinal cord injury (Multi), TIA (transient ischemic attack), Ulcerative colitis, Uterine cancer (Multi), or Vertigo.    Past Surgical History:  She has a past surgical history that includes Dilation and curettage of uterus (04/20/2016); Other surgical history (04/20/2016); Varicose vein surgery (04/20/2016); Other surgical history (04/20/2016); Rotator cuff repair (04/20/2016); Tonsillectomy (04/20/2016); Knee surgery (04/20/2016); Other surgical history (07/29/2022); Fracture surgery (2015); Insert / replace / remove pacemaker (2019); Stapedectomy (1979); Colonoscopy (2014); Upper gastrointestinal endoscopy (2013); ORIF wrist fracture (1955); Toe Surgery (2004); Blepharoplasty (2002); Correction hammer toe (2004); and Back surgery.      Social History:  She reports that she has never smoked. She has never used smokeless tobacco. She reports that she does not currently use alcohol. She reports that she does not use drugs.    Family History:  Family History   Problem Relation Name Age of Onset    Cancer Mother GIBSON SALINAS     Hypertension Sister KELSIE RENTERIA     Breast cancer Sister KELSIE RENTERIA 87    Coronary artery disease Brother TIN SALINAS     Hearing loss Brother SERINA SALINAS     Vision loss Brother SERINA SALINAS     Heart disease Brother AUDREY SALINAS     Breast cancer  Paternal Grandmother  30        Allergies:  Duloxetine, Sulfa (sulfonamide antibiotics), Atenolol, Cephalexin, Cholestyramine, Gemfibrozil, House dust, Niacin, Pravastatin, Tramadol, and Tree pollen-white susanna    Outpatient Medications:  Current Outpatient Medications   Medication Instructions    acetaminophen (TYLENOL) 500 mg, Once daily (morning) M-F (5 days a week)    artificial tears, dextran-hypomel-glycerin, 0.1-0.3-0.2 % ophthalmic solution 4 times daily    ascorbic acid (vitamin C) 1,000 mg, Daily    aspirin 81 mg, Daily    calcium carb and citrate-vitD3 (Citracal-D3 Slow Release) 600 mg-12.5 mcg (500 unit) tablet extended release 1 tablet, Daily    calcium carbonate (Tums) 200 mg calcium chewable tablet 1 tablet, Daily    cholecalciferol (VITAMIN D-3) 50 mcg, Daily    cranberry extract 200 mg capsule 1 tablet, Daily    cyanocobalamin (Vitamin B-12) 50 mcg tablet 1 tablet, Daily    DULoxetine (CYMBALTA) 20 mg, oral, Daily, Do not crush or chew.    Eylea 2 mg, Once    furosemide (Lasix) 20 mg tablet TAKE 1 TABLET DAILY    gentamicin (Garamycin) 0.3 % ophthalmic solution Apply 1 drop under the nail and 1 drop on the nail topically twice daily and cover twice daily for 4 weeks    ibuprofen 200 mg, 4 times daily    ipratropium (Atrovent) 42 mcg (0.06 %) nasal spray USE 2 SPRAYS IN EACH NOSTRIL TWO TO THREE TIMES DAILY    latanoprost (Xalatan) 0.005 % ophthalmic solution 2 drops, Nightly    lidocaine (Lidoderm) 5 % patch 1 patch, transdermal, Daily, Remove & discard patch within 12 hours or as directed by MD.    losartan (COZAAR) 25 mg, oral, Daily    melatonin 5 mg, Nightly    metoprolol succinate XL (TOPROL-XL) 25 mg, oral, Daily    multivit,calc,mins/iron/folic (WOMEN'S ONE DAILY ORAL) 1 tablet, Daily    naloxone (NARCAN) 4 mg, nasal, As needed, May repeat every 2-3 minutes if needed, alternating nostrils, until medical assistance becomes available.    neomycin-polymyxin-dexAMETHasone (Maxitrol) 3.5mg/mL-10,000  unit/mL-0.1 % ophthalmic suspension INSTILL ONE DROP INTO AFFECTED EYE THREE TIMES A DAY    neomycin-polymyxin-gramicidin (Neosporin) 1.75 mg-10,000 unit-0.025mg/mL ophthalmic solution Apply 1 to 2 drops to the affected nail twice daily for green nail syndrome.    omega-3 fatty acids-fish oil (Fish OiL) 340-1,000 mg capsule 1 capsule, Daily    omeprazole (PRILOSEC) 20 mg, oral, 2 times daily before meals, Do not crush or chew.    oxyCODONE (ROXICODONE) 5 mg, oral, Every 12 hours PRN    rosuvastatin (Crestor) 20 mg tablet TAKE 1 TABLET DAILY    spironolactone (ALDACTONE) 25 mg, oral, Daily    trolamine salicylate (Aspercreme) 10 % cream 1 Application, As needed    zolpidem (AMBIEN) 5 mg, oral, Nightly PRN        Last Recorded Vitals:  Vitals:    05/12/25 1329   BP: 126/68   Pulse: 76   SpO2: 92%   Weight: 59 kg (130 lb 1.1 oz)           Physical Exam:  Physical Exam  Vitals reviewed.   HENT:      Head: Normocephalic.      Nose: Nose normal.   Eyes:      Pupils: Pupils are equal, round, and reactive to light.   Cardiovascular:      Rate and Rhythm: Normal rate and regular rhythm. 3/6 DEREK   Pulmonary:      Effort: Pulmonary effort is normal.      Breath sounds: Normal breath sounds.   Abdominal:      General: Abdomen is flat.      Palpations: Abdomen is soft.   Musculoskeletal:         General: Normal range of motion.      Cervical back: Normal range of motion.   Skin:     General: Skin is warm and dry.   Neurological:      General: No focal deficit present.      Mental Status: He is alert and oriented to person, place, and time.   Psychiatric:         Mood and Affect: Mood normal.            Last Labs:  CBC -  Lab Results   Component Value Date    WBC 7.5 03/24/2025    HGB 13.7 03/24/2025    HCT 42.3 03/24/2025    MCV 96 03/24/2025     03/24/2025       CMP -  Lab Results   Component Value Date    CALCIUM 9.9 11/11/2024    PHOS 3.0 03/21/2022    PROT 6.6 11/11/2024    ALBUMIN 4.2 11/11/2024    AST 29 11/11/2024     ALT 17 11/11/2024    ALKPHOS 64 11/11/2024    BILITOT 0.7 11/11/2024       LIPID PANEL -   Lab Results   Component Value Date    CHOL 138 11/11/2024    TRIG 180 (H) 11/11/2024    HDL 39.3 11/11/2024    CHHDL 3.5 11/11/2024    LDLF 77 05/15/2023    VLDL 36 11/11/2024    NHDL 99 11/11/2024       RENAL FUNCTION PANEL -   Lab Results   Component Value Date    GLUCOSE 106 (H) 11/11/2024     11/11/2024    K 4.2 11/11/2024     11/11/2024    CO2 26 11/11/2024    ANIONGAP 15 11/11/2024    BUN 41 (H) 11/11/2024    CREATININE 1.00 11/11/2024    CALCIUM 9.9 11/11/2024    PHOS 3.0 03/21/2022    ALBUMIN 4.2 11/11/2024        Lab Results   Component Value Date     (H) 10/31/2024    HGBA1C 5.7 (A) 12/20/2021       Last Cardiology Tests:  ECG:  EKG independently reviewed from today showed sinus rhythm with first degree AV block RBBB heart rate 68 bpm     Echo:  Echocardiogram 6/11/2024   1. Left ventricular systolic function is normal.   2. There is reduced right ventricular systolic function.   3. Moderate aortic valve stenosis.   4. Mild aortic valve regurgitation.    Echocardiogram 12/21/2021  1. The left ventricular systolic function is moderately decreased with a 35-40% estimated ejection fraction.  2. [place] is abnormal.  3. Abnormal septal motion consistent with RV pacemaker.  4. No left ventricular thrombus visualized.  5. RVSP within normal limits.  6. Mild to moderate aortic valve stenosis.  7. There is mild to moderate aortic valve regurgitation.  8. There is global hypokinesis of the left ventricle with minor regional variations.        Echocardiogram 12/27/2019   1. The left ventricular systolic function is severely decreased with a 25-30%  estimated ejection fraction.  2. Spectral Doppler shows a pseudonormal pattern of left ventricular diastolic  filling.  3. There is eccentric left ventricular hypertrophy.  4. The left atrium is enlarged.  5. Severe mitral valve regurgitation.  6. Moderately  elevated right ventricular systolic pressure.  7. Moderate aortic valve stenosis.  8. There is mild aortic valve regurgitation.  9. The left ventricular cavity size is severely dilated.  10. There is global hypokinesis of the left ventricle with minor regional  variations.     Echocardiogram 7/31/2019  1. The left ventricular systolic function is severely decreased with a 25%  estimated ejection fraction.  2. Multiple segmental abnormalities exist. See findings.  3. Spectral Doppler shows an abnormal pattern of left ventricular diastolic  filling.  4. There is moderate concentric left ventricular hypertrophy.  5. The left atrium is moderately dilated.  6. Moderate mitral valve regurgitation.  7. Moderate aortic valve stenosis.  8. There is mild aortic valve regurgitation.       Cath:  Left heart cath 8/13/2018    1. Left main: no significant angiographic CAD.   2. LAD: 60% mid-vessel focal stenosis. Lesion is 65% by OCT, FFR = 0.82.   3. LCx: no significant angiographic CAD.   4. RCA: 40-50% diffuse mid-vessel disease.   5. RA 1mmHg, RV 28/3, PA 26/10 (18), PCWP 13mmHg.   6. Tanna CI 2.7 l/min/m2.  Stress Test:  Nuclear Stress Test 12/21/2021  1. Small to moderate-sized territory of prior completed infarction  involving the apex where there is no evidence of associated ischemia.  2. Moderate-sized territory of prior completed infarction involving  the mid through distal inferior wall without evidence of significant  associated ischemia.  3. Normal perfusion best preserved throughout the remainder of the LV  myocardium where there is no evidence of ischemia or infarction.  4. Mild LV dilation.  5. Apical dyskinesis and inferior wall hypokinesis superimposed more  mild global hypokinesis with an LV EF estimated at 28-31%.  1. Indeterminate exercise ECG for inducible ischemia.  2. No clinical evidence for ischemia at a maximal infusion.  3. Nuclear image results are reported separately.  4. The adequate level of stress  was achieved.    Cardiac Imaging:        Assessment/Plan   Mrs. Mccall is a very pleasant 89 year old female with a history of moderate aortic stenosis, moderate CAD (cath 2018), HFrEF, LBBB s/p CRT-p, she continues to do well from a cardiac standpoint. Echocardiogram showed a preserved LV function with moderate aortic stenosis. She continues to follow up with pain management. She is euvolemic on exam. Heart rate and blood pressure are well controlled today. Mrs. Mccall has a pacemaker that she is dependent. Repeat CT angio of the chest to assess ascending aorta.      Plan   -call with any questions   -continue Crestor 20 mg daily and Spironolactone 25 mg daily   -continue Aspirin 81 mg daily, Lasix 20 mg daily, Losartan 25 mg daily and Metoprolol ER 25 mg daily   -follow up in six months   -CT of the chest   -blood work         Nina Gaona, APRN-CNP

## 2025-05-15 ENCOUNTER — HOSPITAL ENCOUNTER (EMERGENCY)
Facility: HOSPITAL | Age: OVER 89
Discharge: HOME | End: 2025-05-15
Attending: EMERGENCY MEDICINE
Payer: MEDICARE

## 2025-05-15 VITALS
HEART RATE: 74 BPM | RESPIRATION RATE: 18 BRPM | WEIGHT: 129 LBS | DIASTOLIC BLOOD PRESSURE: 70 MMHG | OXYGEN SATURATION: 98 % | HEIGHT: 64 IN | TEMPERATURE: 97.5 F | BODY MASS INDEX: 22.02 KG/M2 | SYSTOLIC BLOOD PRESSURE: 124 MMHG

## 2025-05-15 DIAGNOSIS — G89.29 CHRONIC RIGHT-SIDED LOW BACK PAIN WITH RIGHT-SIDED SCIATICA: Primary | ICD-10-CM

## 2025-05-15 DIAGNOSIS — N39.0 URINARY TRACT INFECTION WITHOUT HEMATURIA, SITE UNSPECIFIED: ICD-10-CM

## 2025-05-15 DIAGNOSIS — M54.41 CHRONIC RIGHT-SIDED LOW BACK PAIN WITH RIGHT-SIDED SCIATICA: Primary | ICD-10-CM

## 2025-05-15 LAB
ALBUMIN SERPL BCP-MCNC: 4 G/DL (ref 3.4–5)
ALP SERPL-CCNC: 72 U/L (ref 33–136)
ALT SERPL W P-5'-P-CCNC: 13 U/L (ref 7–45)
ANION GAP SERPL CALC-SCNC: 14 MMOL/L (ref 10–20)
APPEARANCE UR: ABNORMAL
AST SERPL W P-5'-P-CCNC: 21 U/L (ref 9–39)
BACTERIA #/AREA URNS AUTO: ABNORMAL /HPF
BASOPHILS # BLD AUTO: 0.08 X10*3/UL (ref 0–0.1)
BASOPHILS NFR BLD AUTO: 0.7 %
BILIRUB SERPL-MCNC: 0.7 MG/DL (ref 0–1.2)
BILIRUB UR STRIP.AUTO-MCNC: NEGATIVE MG/DL
BUN SERPL-MCNC: 35 MG/DL (ref 6–23)
CALCIUM SERPL-MCNC: 9.8 MG/DL (ref 8.6–10.3)
CAOX CRY #/AREA UR COMP ASSIST: ABNORMAL /HPF
CHLORIDE SERPL-SCNC: 100 MMOL/L (ref 98–107)
CO2 SERPL-SCNC: 27 MMOL/L (ref 21–32)
COLOR UR: YELLOW
CREAT SERPL-MCNC: 0.95 MG/DL (ref 0.5–1.05)
EGFRCR SERPLBLD CKD-EPI 2021: 57 ML/MIN/1.73M*2
EOSINOPHIL # BLD AUTO: 0.04 X10*3/UL (ref 0–0.4)
EOSINOPHIL NFR BLD AUTO: 0.3 %
ERYTHROCYTE [DISTWIDTH] IN BLOOD BY AUTOMATED COUNT: 12.3 % (ref 11.5–14.5)
GLUCOSE SERPL-MCNC: 114 MG/DL (ref 74–99)
GLUCOSE UR STRIP.AUTO-MCNC: NORMAL MG/DL
HCT VFR BLD AUTO: 43 % (ref 36–46)
HGB BLD-MCNC: 14.4 G/DL (ref 12–16)
HYALINE CASTS #/AREA URNS AUTO: ABNORMAL /LPF
IMM GRANULOCYTES # BLD AUTO: 0.05 X10*3/UL (ref 0–0.5)
IMM GRANULOCYTES NFR BLD AUTO: 0.4 % (ref 0–0.9)
KETONES UR STRIP.AUTO-MCNC: NEGATIVE MG/DL
LEUKOCYTE ESTERASE UR QL STRIP.AUTO: ABNORMAL
LYMPHOCYTES # BLD AUTO: 1.28 X10*3/UL (ref 0.8–3)
LYMPHOCYTES NFR BLD AUTO: 10.5 %
MCH RBC QN AUTO: 31.9 PG (ref 26–34)
MCHC RBC AUTO-ENTMCNC: 33.5 G/DL (ref 32–36)
MCV RBC AUTO: 95 FL (ref 80–100)
MONOCYTES # BLD AUTO: 1.45 X10*3/UL (ref 0.05–0.8)
MONOCYTES NFR BLD AUTO: 11.9 %
MUCOUS THREADS #/AREA URNS AUTO: ABNORMAL /LPF
NEUTROPHILS # BLD AUTO: 9.25 X10*3/UL (ref 1.6–5.5)
NEUTROPHILS NFR BLD AUTO: 76.2 %
NITRITE UR QL STRIP.AUTO: NEGATIVE
NRBC BLD-RTO: 0 /100 WBCS (ref 0–0)
PH UR STRIP.AUTO: 5.5 [PH]
PLATELET # BLD AUTO: 208 X10*3/UL (ref 150–450)
POTASSIUM SERPL-SCNC: 3.8 MMOL/L (ref 3.5–5.3)
PROT SERPL-MCNC: 6.7 G/DL (ref 6.4–8.2)
PROT UR STRIP.AUTO-MCNC: NEGATIVE MG/DL
RBC # BLD AUTO: 4.51 X10*6/UL (ref 4–5.2)
RBC # UR STRIP.AUTO: NEGATIVE MG/DL
RBC #/AREA URNS AUTO: ABNORMAL /HPF
SODIUM SERPL-SCNC: 137 MMOL/L (ref 136–145)
SP GR UR STRIP.AUTO: 1.02
SQUAMOUS #/AREA URNS AUTO: ABNORMAL /HPF
UROBILINOGEN UR STRIP.AUTO-MCNC: NORMAL MG/DL
WBC # BLD AUTO: 12.2 X10*3/UL (ref 4.4–11.3)
WBC #/AREA URNS AUTO: >50 /HPF
WBC CLUMPS #/AREA URNS AUTO: ABNORMAL /HPF

## 2025-05-15 PROCEDURE — 81001 URINALYSIS AUTO W/SCOPE: CPT

## 2025-05-15 PROCEDURE — 36415 COLL VENOUS BLD VENIPUNCTURE: CPT

## 2025-05-15 PROCEDURE — 96376 TX/PRO/DX INJ SAME DRUG ADON: CPT

## 2025-05-15 PROCEDURE — 96375 TX/PRO/DX INJ NEW DRUG ADDON: CPT

## 2025-05-15 PROCEDURE — 87086 URINE CULTURE/COLONY COUNT: CPT | Mod: GEALAB

## 2025-05-15 PROCEDURE — 99284 EMERGENCY DEPT VISIT MOD MDM: CPT | Mod: 25 | Performed by: EMERGENCY MEDICINE

## 2025-05-15 PROCEDURE — 2500000005 HC RX 250 GENERAL PHARMACY W/O HCPCS

## 2025-05-15 PROCEDURE — 2500000004 HC RX 250 GENERAL PHARMACY W/ HCPCS (ALT 636 FOR OP/ED)

## 2025-05-15 PROCEDURE — 85025 COMPLETE CBC W/AUTO DIFF WBC: CPT

## 2025-05-15 PROCEDURE — 80053 COMPREHEN METABOLIC PANEL: CPT

## 2025-05-15 PROCEDURE — 96374 THER/PROPH/DIAG INJ IV PUSH: CPT

## 2025-05-15 RX ORDER — ACETAMINOPHEN 325 MG/1
650 TABLET ORAL EVERY 6 HOURS PRN
Qty: 60 TABLET | Refills: 0 | Status: SHIPPED | OUTPATIENT
Start: 2025-05-15

## 2025-05-15 RX ORDER — KETOROLAC TROMETHAMINE 15 MG/ML
15 INJECTION, SOLUTION INTRAMUSCULAR; INTRAVENOUS ONCE
Status: COMPLETED | OUTPATIENT
Start: 2025-05-15 | End: 2025-05-15

## 2025-05-15 RX ORDER — NITROFURANTOIN 25; 75 MG/1; MG/1
100 CAPSULE ORAL 2 TIMES DAILY
Qty: 10 CAPSULE | Refills: 0 | Status: SHIPPED | OUTPATIENT
Start: 2025-05-15 | End: 2025-05-20

## 2025-05-15 RX ORDER — PREDNISONE 20 MG/1
40 TABLET ORAL ONCE
Status: COMPLETED | OUTPATIENT
Start: 2025-05-15 | End: 2025-05-15

## 2025-05-15 RX ORDER — LIDOCAINE 560 MG/1
1 PATCH PERCUTANEOUS; TOPICAL; TRANSDERMAL ONCE
Status: DISCONTINUED | OUTPATIENT
Start: 2025-05-15 | End: 2025-05-15 | Stop reason: HOSPADM

## 2025-05-15 RX ORDER — ORPHENADRINE CITRATE 30 MG/ML
30 INJECTION INTRAMUSCULAR; INTRAVENOUS ONCE
Status: COMPLETED | OUTPATIENT
Start: 2025-05-15 | End: 2025-05-15

## 2025-05-15 RX ORDER — IBUPROFEN 400 MG/1
400 TABLET ORAL EVERY 6 HOURS PRN
Qty: 60 TABLET | Refills: 0 | Status: SHIPPED | OUTPATIENT
Start: 2025-05-15 | End: 2025-05-22

## 2025-05-15 RX ORDER — PREDNISONE 10 MG/1
10 TABLET ORAL DAILY
Qty: 30 TABLET | Refills: 0 | Status: SHIPPED | OUTPATIENT
Start: 2025-05-15

## 2025-05-15 RX ORDER — PREDNISONE 10 MG/1
10 TABLET ORAL DAILY
Qty: 30 TABLET | Refills: 0 | Status: SHIPPED | OUTPATIENT
Start: 2025-05-15 | End: 2025-05-15 | Stop reason: WASHOUT

## 2025-05-15 RX ADMIN — ORPHENADRINE CITRATE 30 MG: 60 INJECTION INTRAMUSCULAR; INTRAVENOUS at 11:46

## 2025-05-15 RX ADMIN — KETOROLAC TROMETHAMINE 15 MG: 15 INJECTION, SOLUTION INTRAMUSCULAR; INTRAVENOUS at 13:01

## 2025-05-15 RX ADMIN — KETOROLAC TROMETHAMINE 15 MG: 15 INJECTION, SOLUTION INTRAMUSCULAR; INTRAVENOUS at 11:46

## 2025-05-15 RX ADMIN — PREDNISONE 40 MG: 20 TABLET ORAL at 13:01

## 2025-05-15 RX ADMIN — LIDOCAINE 4% 1 PATCH: 40 PATCH TOPICAL at 11:46

## 2025-05-15 ASSESSMENT — LIFESTYLE VARIABLES
TOTAL SCORE: 0
HAVE PEOPLE ANNOYED YOU BY CRITICIZING YOUR DRINKING: NO
EVER HAD A DRINK FIRST THING IN THE MORNING TO STEADY YOUR NERVES TO GET RID OF A HANGOVER: NO
EVER FELT BAD OR GUILTY ABOUT YOUR DRINKING: NO
HAVE YOU EVER FELT YOU SHOULD CUT DOWN ON YOUR DRINKING: NO

## 2025-05-15 ASSESSMENT — PAIN - FUNCTIONAL ASSESSMENT: PAIN_FUNCTIONAL_ASSESSMENT: 0-10

## 2025-05-15 ASSESSMENT — COLUMBIA-SUICIDE SEVERITY RATING SCALE - C-SSRS
1. IN THE PAST MONTH, HAVE YOU WISHED YOU WERE DEAD OR WISHED YOU COULD GO TO SLEEP AND NOT WAKE UP?: NO
2. HAVE YOU ACTUALLY HAD ANY THOUGHTS OF KILLING YOURSELF?: NO
6. HAVE YOU EVER DONE ANYTHING, STARTED TO DO ANYTHING, OR PREPARED TO DO ANYTHING TO END YOUR LIFE?: NO

## 2025-05-15 ASSESSMENT — PAIN DESCRIPTION - LOCATION: LOCATION: BACK

## 2025-05-15 ASSESSMENT — PAIN SCALES - GENERAL: PAINLEVEL_OUTOF10: 8

## 2025-05-15 NOTE — ED PROVIDER NOTES
The patient was seen by the midlevel/resident.  I have personally saw the patient and made/approved the management plan and take responsibility for the patient management.  I reviewed the EKG's (when done) and agree with the interpretation.  I have seen and examined the patient; agree with the workup, evaluation, MDM, and diagnosis.  The care plan has been discussed with the midlevel/resident; I have reviewed the note and agree with the documented findings.     Patient presents with chronic back pain.  She has had this pain for a while with no new falls.  She has been to the pain management has had MRIs and other imaging test.  We will try to help her with her discomfort.  She is worried that maybe she has urinary tract infection as the cause.  She stable this time as we are waiting labs anticipate discharge.  Patient was found to have UTI which is being treated.  In addition we talked her about the risks of steroids NSAID's and  other medications.  We decided to try a course of steroids see if that would help her.  Diagnoses as of 05/15/25 1625   Chronic right-sided low back pain with right-sided sciatica   Urinary tract infection without hematuria, site unspecified     MD Gerber Moses MD  05/15/25 1626

## 2025-05-15 NOTE — ED PROVIDER NOTES
Emergency Department Provider Note          History of Present Illness     CC: Back Pain (Has chronic back pain, states last Thursday she had a steroid injection in her spine. Pain is radiating to the right flank, no new injury to the area. )     History provided by: Patient  Limitations to History: None    HPI:   Neris Mccall is a 89 y.o.female with PMH ADHD, BPH, bladder cancer, CKD, CHF, presenting to the Emergency Department for severe back pain on the right side, which has recently worsened to the point of significantly impacting mobility. She describes the pain, which has been present for a long time, as reaching a severity of 8 out of 10 today. The pain is sharp when attempting to stand or move, but remains steady while sitting. She recently received a steroid injection 1 week ago but reported no improvement. She is under pain management care and has previously tried steroid injections and other treatments without success, now experiencing more significant flares since yesterday. She is concerned about potential serious underlying conditions such as a urinary tract infection or kidney issues, though she denies urinary symptoms such as pain, burning, or increased frequency. She also denies fever, chills, and recent back injuries. Additionally, the patient experiences inconsistencies with incontinence, but there is no recent worsening.No saddle anesthesia. A history of MRI in February showed scoliosis, fractures at L1 and L2, and canal stenosis.    Records Reviewed: Recent available ED and inpatient notes reviewed in EMR.    PMHx/PSHx:  Per HPI.   - has a past medical history of Acute upper respiratory infection, unspecified, Aneurysm of unspecified site (CMS-HCC), Arrhythmia, Arthritis, BiPAP (biphasic positive airway pressure) dependence, Cataract, Chronic pain disorder, Coronary artery disease, CPAP (continuous positive airway pressure) dependence, Dependence on other enabling machines and devices,  Dysphagia, Fractures, GERD (gastroesophageal reflux disease), Glaucoma, Hearing aid worn, Heart disease, Heart failure, Heart valve disease, History of blood transfusion, HL (hearing loss), Hypertension, Low back pain, Lumbar disc disease, Old myocardial infarction, Other nail disorders, Pacemaker, Personal history of diseases of the skin and subcutaneous tissue, Personal history of other diseases of the circulatory system, Personal history of other diseases of the circulatory system, Personal history of other diseases of the musculoskeletal system and connective tissue, Personal history of other diseases of the nervous system and sense organs, Personal history of other endocrine, nutritional and metabolic disease, Personal history of other venous thrombosis and embolism, Personal history of transient ischemic attack (TIA), and cerebral infarction without residual deficits, Platelet disorder (Multi), Pregnant ([facility] Scoliosis, Secondary polycythemia, Shingles, Sleep apnea, Spinal stenosis, lumbar region with neurogenic claudication, Stroke (Multi), Urinary tract infection, and Vision loss.  - has a past surgical history that includes Dilation and curettage of uterus (04/20/2016); Other surgical history (04/20/2016); Varicose vein surgery (04/20/2016); Other surgical history (04/20/2016); Rotator cuff repair (04/20/2016); Tonsillectomy (04/20/2016); Knee surgery (04/20/2016); Other surgical history (07/29/2022); Fracture surgery (2015); Insert / replace / remove pacemaker (2019); Stapedectomy (1979); Colonoscopy (2014); Upper gastrointestinal endoscopy (2013); ORIF wrist fracture (1955); Toe Surgery (2004); Blepharoplasty (2002); Correction hammer toe (2004); and Back surgery.  - has Age-related osteoporosis with current pathol fracture of vertebra, initial encounter (Multi); Osteoporosis; Aneurysm artery, renal (CMS-HCC); Arthralgia of multiple sites; Blue's esophagus; Biventricular implantable  cardioverter-defibrillator (ICD) in situ; Blepharospasm; Central sleep apnea due to Cheyne-Mchugh respiration; Chronic systolic heart failure; Compression fracture of L2 lumbar vertebra (Multi); Lumbar compression fracture (Multi); Depression; DNR no code (do not resuscitate); Facial nerve paresis; Dysphagia; Ford type III hyperlipoproteinemia; GERD without esophagitis; Glaucoma; H/O polycythemia vera; Hearing impairment; History of Bell's palsy; History of heart attack; Hyperlipidemia; Hypertension; Chronic insomnia; Chronic pain syndrome; Lumbar canal stenosis; Degenerative joint disease of cervical spine; Postmenopausal atrophic vaginitis; Mixed conductive and sensorineural hearing loss, bilateral; Nonischemic cardiomyopathy (Multi); Nontoxic multinodular goiter; LISA treated with BiPAP; Peripheral neuropathy; Thyroglossal duct cyst; Vitamin D deficiency; Zenker's diverticulum; Spinal stenosis, lumbar region, with neurogenic claudication; Spondylosis without myelopathy or radiculopathy, lumbosacral region; Secondary hyperaldosteronism (Multi); Treatment-emergent central sleep apnea; Dependence on continuous positive airway pressure ventilation; History of cerebrovascular accident; Exudative age-related macular degeneration, left eye, with active choroidal neovascularization; Stage 3a chronic kidney disease (Multi); Age-related osteoporosis with current pathological fracture of vertebra (Multi); Other idiopathic scoliosis, thoracolumbar region; Routine adult health maintenance; Chronic knee pain after total replacement of knee joint; and Green nails on their problem list.    Medications:  Current Outpatient Medications   Medication Instructions    acetaminophen (TYLENOL) 500 mg, Once daily (morning) M-F (5 days a week)    acetaminophen (TYLENOL) 650 mg, oral, Every 6 hours PRN    artificial tears, dextran-hypomel-glycerin, 0.1-0.3-0.2 % ophthalmic solution 4 times daily    ascorbic acid (vitamin C) 1,000 mg,  Daily    aspirin 81 mg, Daily    calcium carb and citrate-vitD3 (Citracal-D3 Slow Release) 600 mg-12.5 mcg (500 unit) tablet extended release 1 tablet, Daily    calcium carbonate (Tums) 200 mg calcium chewable tablet 1 tablet, Daily    cholecalciferol (VITAMIN D-3) 50 mcg, Daily    cranberry extract 200 mg capsule 1 tablet, Daily    cyanocobalamin (Vitamin B-12) 50 mcg tablet 1 tablet, Daily    DULoxetine (CYMBALTA) 20 mg, oral, Daily    Eylea 2 mg, Once    furosemide (Lasix) 20 mg tablet TAKE 1 TABLET DAILY    gentamicin (Garamycin) 0.3 % ophthalmic solution Apply 1 drop under the nail and 1 drop on the nail topically twice daily and cover twice daily for 4 weeks    ibuprofen 200 mg, 4 times daily    ibuprofen 400 mg, oral, Every 6 hours PRN    ipratropium (Atrovent) 42 mcg (0.06 %) nasal spray USE 2 SPRAYS IN EACH NOSTRIL TWO TO THREE TIMES DAILY    latanoprost (Xalatan) 0.005 % ophthalmic solution 2 drops, Nightly    lidocaine (Lidoderm) 5 % patch 1 patch, transdermal, Daily, Remove & discard patch within 12 hours or as directed by MD.    losartan (COZAAR) 25 mg, oral, Daily    melatonin 5 mg, Nightly    metoprolol succinate XL (TOPROL-XL) 25 mg, oral, Daily    multivit,calc,mins/iron/folic (WOMEN'S ONE DAILY ORAL) 1 tablet, Daily    naloxone (NARCAN) 4 mg, nasal, As needed, May repeat every 2-3 minutes if needed, alternating nostrils, until medical assistance becomes available.    neomycin-polymyxin-dexAMETHasone (Maxitrol) 3.5mg/mL-10,000 unit/mL-0.1 % ophthalmic suspension INSTILL ONE DROP INTO AFFECTED EYE THREE TIMES A DAY    neomycin-polymyxin-gramicidin (Neosporin) 1.75 mg-10,000 unit-0.025mg/mL ophthalmic solution Apply 1 to 2 drops to the affected nail twice daily for green nail syndrome.    nitrofurantoin, macrocrystal-monohydrate, (Macrobid) 100 mg capsule 100 mg, oral, 2 times daily    omega-3 fatty acids-fish oil (Fish OiL) 340-1,000 mg capsule 1 capsule, Daily    omeprazole (PRILOSEC) 20 mg, oral, 2  times daily before meals, Do not crush or chew.    oxyCODONE (ROXICODONE) 5 mg, oral, Every 12 hours PRN    predniSONE (DELTASONE) 10 mg, oral, Daily, Take 4 pills once a day for 3 days then 3 pills once a day for 3 days then 2 pills once a day for 3 days then one pill once a day for 3 days then STOP    rosuvastatin (Crestor) 20 mg tablet TAKE 1 TABLET DAILY    spironolactone (ALDACTONE) 25 mg, oral, Daily    trolamine salicylate (Aspercreme) 10 % cream 1 Application, As needed    zolpidem (AMBIEN) 5 mg, oral, Nightly PRN        Allergies:  Duloxetine, Sulfa (sulfonamide antibiotics), Atenolol, Cephalexin, Cholestyramine, Gemfibrozil, House dust, Niacin, Pravastatin, Tramadol, and Tree pollen-white susanna    Social History:  - Tobacco:  reports that she has never smoked. She has never used smokeless tobacco.   - Alcohol:  reports that she does not currently use alcohol.   - Illicit Drugs:  reports no history of drug use.     ROS:  Per HPI.       Physical Exam     Triage Vitals:  T 36.4 °C (97.5 °F)  HR 74  /70  RR 18  O2 98 % None (Room air)    General: Awake, alert, in no acute distress  Eyes: Gaze conjugate.  No scleral icterus or injection  HENT: Normo-cephalic, atraumatic. No stridor  CV: Regular rate, regular rhythm. Radial pulses 2+ bilaterally  Resp: Breathing non-labored, speaking in full sentences.  Clear to auscultation bilaterally  GI: Soft, non-distended, non-tender. No rebound or guarding.  MSK/Extremities: Midline lumbar spine tenderness to palpation without radiation down the right a lower extremity.  Neurovascular intact of the distal digits.  5-5 strength with dorsiflexion and plantarflexion.  Sensation intact.  Skin: Warm. Appropriate color  Neuro: Alert. Oriented. Face symmetric. Speech is fluent.  Gross strength and sensation intact in b/l UE and LEs  Psych: Appropriate mood and affect          Rene Coma Scale Score: 15                    Medical Decision Making & ED Course     EKG: EKG  interpreted by myself. If applicable, please see ED Course for full interpretation.    Medical Decision Making   Neris Mccall is a 89 y.o.female presenting to the Emergency Department for lower back pain.  On arrival, blood pressure 124/70, rest of vital signs within normal limits.  Afebrile for us. on examination, patient appears uncomfortable.  Has notable tenderness with palpation of the lower back extending down to the right lower extremity consistent with sciatica.  Symptomatically treated with lidocaine patches, Toradol, and Norflex here in the ED with moderate improvement.  Labs showed a white count of 12.2 with associated left shift.  Patient denies any fevers, urinary symptoms, cough, or other acute findings today.  Lower concern for infectious process.  Chemistry relatively unremarkable.  Low concern for cauda equina syndrome today.  Believe the likely cause of her symptoms is chronic lower back pain secondary to acute on chronic compression fractures and canal stenosis + sciatica.  Labs in the ED showed a white count of 12.2 with associated left shift.  Urinalysis borderline with leukocyte esterase, white blood cells, clumps.  Started on Keflex for empiric UTI coverage.  Cultures pending.  Chemistries relatively unremarkable.  After Toradol, Norflex, prednisone, lidocaine patch in the ED, patient feels moderately clinically improved.  Will be discharged home with a prescription for Keflex, prednisone, lidocaine patches, Tylenol/ibuprofen, and recommendations to follow-up closely with her pain team.      Diagnoses as of 05/15/25 1539   Chronic right-sided low back pain with right-sided sciatica   Urinary tract infection without hematuria, site unspecified       Independent Result Review and Interpretation: Relevant laboratory and radiographic results were reviewed and independently interpreted by myself.  As necessary, they are commented on in the ED Course.    Chronic conditions affecting the patient's  care: As documented above in MDM.    Disposition   Discharge    José Miguel Grace MD  Emergency Medicine PGY3      Procedures     Procedures ? SmartLinks last updated 5/15/2025 3:39 PM        José Miguel Grace MD  Resident  05/15/25 [id number]

## 2025-05-15 NOTE — ED TRIAGE NOTES
Patient here for back pain Has chronic back pain, states last Thursday she had a steroid injection in her spine. Pain is radiating to the right flank, no new injury to the area.

## 2025-05-16 DIAGNOSIS — M80.08XA AGE-RELATED OSTEOPOROSIS WITH CURRENT PATHOL FRACTURE OF VERTEBRA, INITIAL ENCOUNTER (MULTI): ICD-10-CM

## 2025-05-16 LAB — HOLD SPECIMEN: NORMAL

## 2025-05-16 RX ORDER — OXYCODONE HYDROCHLORIDE 5 MG/1
5 TABLET ORAL EVERY 12 HOURS PRN
Qty: 60 TABLET | Refills: 0 | Status: SHIPPED | OUTPATIENT
Start: 2025-05-16 | End: 2025-06-15

## 2025-05-18 LAB — BACTERIA UR CULT: NORMAL

## 2025-05-19 ENCOUNTER — APPOINTMENT (OUTPATIENT)
Dept: RADIOLOGY | Facility: HOSPITAL | Age: OVER 89
End: 2025-05-19
Payer: MEDICARE

## 2025-05-19 ENCOUNTER — HOSPITAL ENCOUNTER (INPATIENT)
Facility: HOSPITAL | Age: OVER 89
LOS: 3 days | Discharge: NURSING, REHAB OR LONG-TERM CARE | End: 2025-05-22
Attending: EMERGENCY MEDICINE | Admitting: STUDENT IN AN ORGANIZED HEALTH CARE EDUCATION/TRAINING PROGRAM
Payer: MEDICARE

## 2025-05-19 DIAGNOSIS — N13.30 HYDROURETERONEPHROSIS: ICD-10-CM

## 2025-05-19 DIAGNOSIS — G89.29 CHRONIC LOW BACK PAIN, UNSPECIFIED BACK PAIN LATERALITY, UNSPECIFIED WHETHER SCIATICA PRESENT: Primary | ICD-10-CM

## 2025-05-19 DIAGNOSIS — N20.0 KIDNEY STONE: ICD-10-CM

## 2025-05-19 DIAGNOSIS — Z79.899 MEDICATION MANAGEMENT: ICD-10-CM

## 2025-05-19 DIAGNOSIS — M81.8 OTHER OSTEOPOROSIS WITHOUT CURRENT PATHOLOGICAL FRACTURE: ICD-10-CM

## 2025-05-19 DIAGNOSIS — K59.00 CONSTIPATION, UNSPECIFIED CONSTIPATION TYPE: ICD-10-CM

## 2025-05-19 DIAGNOSIS — Z01.810 PREOP CARDIOVASCULAR EXAM: ICD-10-CM

## 2025-05-19 DIAGNOSIS — Z78.9 FAILURE OF OUTPATIENT TREATMENT: ICD-10-CM

## 2025-05-19 DIAGNOSIS — M54.50 CHRONIC LOW BACK PAIN, UNSPECIFIED BACK PAIN LATERALITY, UNSPECIFIED WHETHER SCIATICA PRESENT: Primary | ICD-10-CM

## 2025-05-19 DIAGNOSIS — I35.0 MODERATE AORTIC STENOSIS: ICD-10-CM

## 2025-05-19 DIAGNOSIS — F51.04 CHRONIC INSOMNIA: ICD-10-CM

## 2025-05-19 LAB
ALBUMIN SERPL BCP-MCNC: 4.1 G/DL (ref 3.4–5)
ALP SERPL-CCNC: 71 U/L (ref 33–136)
ALT SERPL W P-5'-P-CCNC: 14 U/L (ref 7–45)
ANION GAP SERPL CALC-SCNC: 14 MMOL/L (ref 10–20)
APPEARANCE UR: CLEAR
AST SERPL W P-5'-P-CCNC: 21 U/L (ref 9–39)
BASOPHILS # BLD AUTO: 0.04 X10*3/UL (ref 0–0.1)
BASOPHILS NFR BLD AUTO: 0.3 %
BILIRUB SERPL-MCNC: 0.9 MG/DL (ref 0–1.2)
BILIRUB UR STRIP.AUTO-MCNC: NEGATIVE MG/DL
BNP SERPL-MCNC: 890 PG/ML (ref 0–99)
BUN SERPL-MCNC: 33 MG/DL (ref 6–23)
CALCIUM SERPL-MCNC: 10 MG/DL (ref 8.6–10.3)
CHLORIDE SERPL-SCNC: 96 MMOL/L (ref 98–107)
CO2 SERPL-SCNC: 27 MMOL/L (ref 21–32)
COLOR UR: ABNORMAL
CREAT SERPL-MCNC: 0.89 MG/DL (ref 0.5–1.05)
EGFRCR SERPLBLD CKD-EPI 2021: 62 ML/MIN/1.73M*2
EOSINOPHIL # BLD AUTO: 0.02 X10*3/UL (ref 0–0.4)
EOSINOPHIL NFR BLD AUTO: 0.1 %
ERYTHROCYTE [DISTWIDTH] IN BLOOD BY AUTOMATED COUNT: 12.4 % (ref 11.5–14.5)
GLUCOSE SERPL-MCNC: 114 MG/DL (ref 74–99)
GLUCOSE UR STRIP.AUTO-MCNC: NORMAL MG/DL
HCT VFR BLD AUTO: 46.6 % (ref 36–46)
HGB BLD-MCNC: 15.8 G/DL (ref 12–16)
HOLD SPECIMEN: 293
HYALINE CASTS #/AREA URNS AUTO: ABNORMAL /LPF
IMM GRANULOCYTES # BLD AUTO: 0.08 X10*3/UL (ref 0–0.5)
IMM GRANULOCYTES NFR BLD AUTO: 0.6 % (ref 0–0.9)
KETONES UR STRIP.AUTO-MCNC: NEGATIVE MG/DL
LEUKOCYTE ESTERASE UR QL STRIP.AUTO: ABNORMAL
LYMPHOCYTES # BLD AUTO: 1.22 X10*3/UL (ref 0.8–3)
LYMPHOCYTES NFR BLD AUTO: 8.5 %
MAGNESIUM SERPL-MCNC: 1.71 MG/DL (ref 1.6–2.4)
MCH RBC QN AUTO: 31.3 PG (ref 26–34)
MCHC RBC AUTO-ENTMCNC: 33.9 G/DL (ref 32–36)
MCV RBC AUTO: 93 FL (ref 80–100)
MONOCYTES # BLD AUTO: 1.18 X10*3/UL (ref 0.05–0.8)
MONOCYTES NFR BLD AUTO: 8.2 %
NEUTROPHILS # BLD AUTO: 11.81 X10*3/UL (ref 1.6–5.5)
NEUTROPHILS NFR BLD AUTO: 82.3 %
NITRITE UR QL STRIP.AUTO: NEGATIVE
NRBC BLD-RTO: 0 /100 WBCS (ref 0–0)
PH UR STRIP.AUTO: 7 [PH]
PLATELET # BLD AUTO: 226 X10*3/UL (ref 150–450)
POTASSIUM SERPL-SCNC: 3.4 MMOL/L (ref 3.5–5.3)
PROT SERPL-MCNC: 6.9 G/DL (ref 6.4–8.2)
PROT UR STRIP.AUTO-MCNC: NEGATIVE MG/DL
RBC # BLD AUTO: 5.04 X10*6/UL (ref 4–5.2)
RBC # UR STRIP.AUTO: NEGATIVE MG/DL
RBC #/AREA URNS AUTO: ABNORMAL /HPF
SODIUM SERPL-SCNC: 134 MMOL/L (ref 136–145)
SP GR UR STRIP.AUTO: 1.01
UROBILINOGEN UR STRIP.AUTO-MCNC: NORMAL MG/DL
WBC # BLD AUTO: 14.4 X10*3/UL (ref 4.4–11.3)
WBC #/AREA URNS AUTO: ABNORMAL /HPF

## 2025-05-19 PROCEDURE — 99222 1ST HOSP IP/OBS MODERATE 55: CPT | Performed by: STUDENT IN AN ORGANIZED HEALTH CARE EDUCATION/TRAINING PROGRAM

## 2025-05-19 PROCEDURE — 99285 EMERGENCY DEPT VISIT HI MDM: CPT | Mod: 25 | Performed by: EMERGENCY MEDICINE

## 2025-05-19 PROCEDURE — 2500000005 HC RX 250 GENERAL PHARMACY W/O HCPCS: Performed by: PHYSICIAN ASSISTANT

## 2025-05-19 PROCEDURE — 80053 COMPREHEN METABOLIC PANEL: CPT

## 2025-05-19 PROCEDURE — 85025 COMPLETE CBC W/AUTO DIFF WBC: CPT

## 2025-05-19 PROCEDURE — 87086 URINE CULTURE/COLONY COUNT: CPT | Mod: GEALAB

## 2025-05-19 PROCEDURE — 2500000002 HC RX 250 W HCPCS SELF ADMINISTERED DRUGS (ALT 637 FOR MEDICARE OP, ALT 636 FOR OP/ED): Performed by: INTERNAL MEDICINE

## 2025-05-19 PROCEDURE — 83735 ASSAY OF MAGNESIUM: CPT

## 2025-05-19 PROCEDURE — 74176 CT ABD & PELVIS W/O CONTRAST: CPT | Performed by: STUDENT IN AN ORGANIZED HEALTH CARE EDUCATION/TRAINING PROGRAM

## 2025-05-19 PROCEDURE — 80048 BASIC METABOLIC PNL TOTAL CA: CPT

## 2025-05-19 PROCEDURE — 99222 1ST HOSP IP/OBS MODERATE 55: CPT | Performed by: NURSE PRACTITIONER

## 2025-05-19 PROCEDURE — 2500000004 HC RX 250 GENERAL PHARMACY W/ HCPCS (ALT 636 FOR OP/ED): Mod: JZ

## 2025-05-19 PROCEDURE — 2500000004 HC RX 250 GENERAL PHARMACY W/ HCPCS (ALT 636 FOR OP/ED): Mod: JZ | Performed by: PHYSICIAN ASSISTANT

## 2025-05-19 PROCEDURE — 96366 THER/PROPH/DIAG IV INF ADDON: CPT

## 2025-05-19 PROCEDURE — 94760 N-INVAS EAR/PLS OXIMETRY 1: CPT

## 2025-05-19 PROCEDURE — 96365 THER/PROPH/DIAG IV INF INIT: CPT | Mod: 59

## 2025-05-19 PROCEDURE — 2500000002 HC RX 250 W HCPCS SELF ADMINISTERED DRUGS (ALT 637 FOR MEDICARE OP, ALT 636 FOR OP/ED): Performed by: PHYSICIAN ASSISTANT

## 2025-05-19 PROCEDURE — 99223 1ST HOSP IP/OBS HIGH 75: CPT | Performed by: STUDENT IN AN ORGANIZED HEALTH CARE EDUCATION/TRAINING PROGRAM

## 2025-05-19 PROCEDURE — 2500000005 HC RX 250 GENERAL PHARMACY W/O HCPCS

## 2025-05-19 PROCEDURE — 81001 URINALYSIS AUTO W/SCOPE: CPT

## 2025-05-19 PROCEDURE — 99223 1ST HOSP IP/OBS HIGH 75: CPT

## 2025-05-19 PROCEDURE — 1200000002 HC GENERAL ROOM WITH TELEMETRY DAILY

## 2025-05-19 PROCEDURE — 74176 CT ABD & PELVIS W/O CONTRAST: CPT

## 2025-05-19 PROCEDURE — 97161 PT EVAL LOW COMPLEX 20 MIN: CPT | Mod: GP

## 2025-05-19 PROCEDURE — 72131 CT LUMBAR SPINE W/O DYE: CPT | Performed by: STUDENT IN AN ORGANIZED HEALTH CARE EDUCATION/TRAINING PROGRAM

## 2025-05-19 PROCEDURE — 36415 COLL VENOUS BLD VENIPUNCTURE: CPT

## 2025-05-19 PROCEDURE — 99223 1ST HOSP IP/OBS HIGH 75: CPT | Performed by: PHYSICIAN ASSISTANT

## 2025-05-19 PROCEDURE — 2500000001 HC RX 250 WO HCPCS SELF ADMINISTERED DRUGS (ALT 637 FOR MEDICARE OP): Performed by: PHYSICIAN ASSISTANT

## 2025-05-19 PROCEDURE — 96375 TX/PRO/DX INJ NEW DRUG ADDON: CPT

## 2025-05-19 PROCEDURE — 83880 ASSAY OF NATRIURETIC PEPTIDE: CPT | Performed by: PHYSICIAN ASSISTANT

## 2025-05-19 PROCEDURE — 96376 TX/PRO/DX INJ SAME DRUG ADON: CPT

## 2025-05-19 PROCEDURE — 2500000001 HC RX 250 WO HCPCS SELF ADMINISTERED DRUGS (ALT 637 FOR MEDICARE OP)

## 2025-05-19 PROCEDURE — 96372 THER/PROPH/DIAG INJ SC/IM: CPT | Performed by: PHYSICIAN ASSISTANT

## 2025-05-19 RX ORDER — LIDOCAINE 560 MG/1
1 PATCH PERCUTANEOUS; TOPICAL; TRANSDERMAL DAILY
Status: DISCONTINUED | OUTPATIENT
Start: 2025-05-19 | End: 2025-05-22 | Stop reason: HOSPADM

## 2025-05-19 RX ORDER — CALCIUM CARBONATE 200(500)MG
1 TABLET,CHEWABLE ORAL DAILY
Status: DISCONTINUED | OUTPATIENT
Start: 2025-05-19 | End: 2025-05-22 | Stop reason: HOSPADM

## 2025-05-19 RX ORDER — POTASSIUM CHLORIDE 20 MEQ/1
40 TABLET, EXTENDED RELEASE ORAL ONCE
Status: COMPLETED | OUTPATIENT
Start: 2025-05-19 | End: 2025-05-19

## 2025-05-19 RX ORDER — TAMSULOSIN HYDROCHLORIDE 0.4 MG/1
0.4 CAPSULE ORAL DAILY
Status: DISCONTINUED | OUTPATIENT
Start: 2025-05-19 | End: 2025-05-22 | Stop reason: HOSPADM

## 2025-05-19 RX ORDER — CEFTRIAXONE 1 G/50ML
1 INJECTION, SOLUTION INTRAVENOUS ONCE
Status: COMPLETED | OUTPATIENT
Start: 2025-05-19 | End: 2025-05-19

## 2025-05-19 RX ORDER — ENOXAPARIN SODIUM 100 MG/ML
40 INJECTION SUBCUTANEOUS EVERY 24 HOURS
Status: DISCONTINUED | OUTPATIENT
Start: 2025-05-19 | End: 2025-05-22 | Stop reason: HOSPADM

## 2025-05-19 RX ORDER — LOSARTAN POTASSIUM 50 MG/1
25 TABLET ORAL DAILY
Status: DISCONTINUED | OUTPATIENT
Start: 2025-05-19 | End: 2025-05-22 | Stop reason: HOSPADM

## 2025-05-19 RX ORDER — ONDANSETRON HYDROCHLORIDE 2 MG/ML
4 INJECTION, SOLUTION INTRAVENOUS EVERY 8 HOURS PRN
Status: DISCONTINUED | OUTPATIENT
Start: 2025-05-19 | End: 2025-05-22 | Stop reason: HOSPADM

## 2025-05-19 RX ORDER — OXYCODONE HYDROCHLORIDE 5 MG/1
5 TABLET ORAL EVERY 4 HOURS PRN
Refills: 0 | Status: DISCONTINUED | OUTPATIENT
Start: 2025-05-19 | End: 2025-05-20

## 2025-05-19 RX ORDER — ACETAMINOPHEN 325 MG/1
650 TABLET ORAL ONCE
Status: COMPLETED | OUTPATIENT
Start: 2025-05-19 | End: 2025-05-19

## 2025-05-19 RX ORDER — VIT C/E/ZN/COPPR/LUTEIN/ZEAXAN 250MG-90MG
250 CAPSULE ORAL DAILY
Status: DISCONTINUED | OUTPATIENT
Start: 2025-05-19 | End: 2025-05-22 | Stop reason: HOSPADM

## 2025-05-19 RX ORDER — ZOLPIDEM TARTRATE 5 MG/1
5 TABLET ORAL NIGHTLY PRN
Status: DISCONTINUED | OUTPATIENT
Start: 2025-05-19 | End: 2025-05-22 | Stop reason: HOSPADM

## 2025-05-19 RX ORDER — POLYETHYLENE GLYCOL 3350 17 G/17G
17 POWDER, FOR SOLUTION ORAL DAILY
Status: DISCONTINUED | OUTPATIENT
Start: 2025-05-19 | End: 2025-05-19

## 2025-05-19 RX ORDER — LATANOPROST 50 UG/ML
1 SOLUTION/ DROPS OPHTHALMIC NIGHTLY
Status: DISCONTINUED | OUTPATIENT
Start: 2025-05-19 | End: 2025-05-22 | Stop reason: HOSPADM

## 2025-05-19 RX ORDER — AMOXICILLIN 250 MG
2 CAPSULE ORAL 2 TIMES DAILY
Status: DISCONTINUED | OUTPATIENT
Start: 2025-05-19 | End: 2025-05-22 | Stop reason: HOSPADM

## 2025-05-19 RX ORDER — ONDANSETRON 4 MG/1
4 TABLET, FILM COATED ORAL EVERY 8 HOURS PRN
Status: DISCONTINUED | OUTPATIENT
Start: 2025-05-19 | End: 2025-05-22 | Stop reason: HOSPADM

## 2025-05-19 RX ORDER — DULOXETIN HYDROCHLORIDE 20 MG/1
20 CAPSULE, DELAYED RELEASE ORAL DAILY
Status: DISCONTINUED | OUTPATIENT
Start: 2025-05-20 | End: 2025-05-22 | Stop reason: HOSPADM

## 2025-05-19 RX ORDER — ACETAMINOPHEN 325 MG/1
650 TABLET ORAL EVERY 6 HOURS
Status: DISCONTINUED | OUTPATIENT
Start: 2025-05-19 | End: 2025-05-20

## 2025-05-19 RX ORDER — METOPROLOL SUCCINATE 25 MG/1
25 TABLET, EXTENDED RELEASE ORAL DAILY
Status: DISCONTINUED | OUTPATIENT
Start: 2025-05-20 | End: 2025-05-22 | Stop reason: HOSPADM

## 2025-05-19 RX ORDER — SODIUM CHLORIDE 9 MG/ML
50 INJECTION, SOLUTION INTRAVENOUS CONTINUOUS
Status: ACTIVE | OUTPATIENT
Start: 2025-05-19 | End: 2025-05-20

## 2025-05-19 RX ORDER — ASPIRIN 81 MG/1
81 TABLET ORAL DAILY
Status: DISCONTINUED | OUTPATIENT
Start: 2025-05-19 | End: 2025-05-22 | Stop reason: HOSPADM

## 2025-05-19 RX ORDER — TALC
3 POWDER (GRAM) TOPICAL NIGHTLY PRN
Status: DISCONTINUED | OUTPATIENT
Start: 2025-05-19 | End: 2025-05-22 | Stop reason: HOSPADM

## 2025-05-19 RX ORDER — CALCITONIN SALMON 200 [IU]/.09ML
1 SPRAY, METERED NASAL DAILY
Status: DISCONTINUED | OUTPATIENT
Start: 2025-05-19 | End: 2025-05-22 | Stop reason: HOSPADM

## 2025-05-19 RX ORDER — POLYETHYLENE GLYCOL 3350 17 G/17G
17 POWDER, FOR SOLUTION ORAL DAILY
Status: DISCONTINUED | OUTPATIENT
Start: 2025-05-19 | End: 2025-05-22 | Stop reason: HOSPADM

## 2025-05-19 RX ORDER — PANTOPRAZOLE SODIUM 40 MG/1
40 TABLET, DELAYED RELEASE ORAL
Status: DISCONTINUED | OUTPATIENT
Start: 2025-05-20 | End: 2025-05-22 | Stop reason: HOSPADM

## 2025-05-19 RX ORDER — KETOROLAC TROMETHAMINE 15 MG/ML
15 INJECTION, SOLUTION INTRAMUSCULAR; INTRAVENOUS EVERY 6 HOURS PRN
Status: DISPENSED | OUTPATIENT
Start: 2025-05-19 | End: 2025-05-21

## 2025-05-19 RX ORDER — CEFTRIAXONE 1 G/50ML
1 INJECTION, SOLUTION INTRAVENOUS EVERY 24 HOURS
Status: COMPLETED | OUTPATIENT
Start: 2025-05-20 | End: 2025-05-21

## 2025-05-19 RX ORDER — ORPHENADRINE CITRATE 30 MG/ML
60 INJECTION INTRAMUSCULAR; INTRAVENOUS ONCE
Status: COMPLETED | OUTPATIENT
Start: 2025-05-19 | End: 2025-05-19

## 2025-05-19 RX ORDER — ROSUVASTATIN CALCIUM 20 MG/1
20 TABLET, COATED ORAL NIGHTLY
Status: DISCONTINUED | OUTPATIENT
Start: 2025-05-19 | End: 2025-05-22 | Stop reason: HOSPADM

## 2025-05-19 RX ORDER — CHOLECALCIFEROL (VITAMIN D3) 25 MCG
50 TABLET ORAL DAILY
Status: DISCONTINUED | OUTPATIENT
Start: 2025-05-19 | End: 2025-05-22 | Stop reason: HOSPADM

## 2025-05-19 RX ORDER — IPRATROPIUM BROMIDE 21 UG/1
2 SPRAY, METERED NASAL 3 TIMES DAILY
Status: DISCONTINUED | OUTPATIENT
Start: 2025-05-19 | End: 2025-05-22 | Stop reason: HOSPADM

## 2025-05-19 RX ADMIN — ORPHENADRINE CITRATE 60 MG: 60 INJECTION INTRAMUSCULAR; INTRAVENOUS at 08:19

## 2025-05-19 RX ADMIN — ACETAMINOPHEN 650 MG: 325 TABLET, FILM COATED ORAL at 15:12

## 2025-05-19 RX ADMIN — LIDOCAINE 4% 1 PATCH: 40 PATCH TOPICAL at 08:19

## 2025-05-19 RX ADMIN — CEFTRIAXONE 1 G: 1 INJECTION, SOLUTION INTRAVENOUS at 10:13

## 2025-05-19 RX ADMIN — CYANOCOBALAMIN TAB 500 MCG 250 MCG: 500 TAB at 15:10

## 2025-05-19 RX ADMIN — CARBOXYMETHYLCELLULOSE SODIUM 1 DROP: 5 SOLUTION/ DROPS OPHTHALMIC at 15:09

## 2025-05-19 RX ADMIN — POLYETHYLENE GLYCOL 3350 17 G: 17 POWDER, FOR SOLUTION ORAL at 15:10

## 2025-05-19 RX ADMIN — ACETAMINOPHEN 650 MG: 325 TABLET, FILM COATED ORAL at 21:00

## 2025-05-19 RX ADMIN — POTASSIUM CHLORIDE 40 MEQ: 1500 TABLET, EXTENDED RELEASE ORAL at 15:13

## 2025-05-19 RX ADMIN — ZOLPIDEM TARTRATE 5 MG: 5 TABLET ORAL at 22:41

## 2025-05-19 RX ADMIN — CALCITONIN SALMON 1 SPRAY: 200 SPRAY, METERED NASAL at 16:16

## 2025-05-19 RX ADMIN — ROSUVASTATIN CALCIUM 20 MG: 20 TABLET, FILM COATED ORAL at 20:51

## 2025-05-19 RX ADMIN — KETOROLAC TROMETHAMINE 15 MG: 15 INJECTION, SOLUTION INTRAMUSCULAR; INTRAVENOUS at 15:10

## 2025-05-19 RX ADMIN — ACETAMINOPHEN 650 MG: 325 TABLET, FILM COATED ORAL at 08:19

## 2025-05-19 RX ADMIN — TAMSULOSIN HYDROCHLORIDE 0.4 MG: 0.4 CAPSULE ORAL at 15:12

## 2025-05-19 RX ADMIN — OXYCODONE HYDROCHLORIDE 5 MG: 5 TABLET ORAL at 20:51

## 2025-05-19 RX ADMIN — HYDROMORPHONE HYDROCHLORIDE 0.5 MG: 1 INJECTION, SOLUTION INTRAMUSCULAR; INTRAVENOUS; SUBCUTANEOUS at 10:13

## 2025-05-19 RX ADMIN — LATANOPROST 1 DROP: 50 SOLUTION OPHTHALMIC at 22:05

## 2025-05-19 RX ADMIN — KETOROLAC TROMETHAMINE 15 MG: 15 INJECTION, SOLUTION INTRAMUSCULAR; INTRAVENOUS at 20:51

## 2025-05-19 RX ADMIN — Medication 50 MCG: at 15:10

## 2025-05-19 RX ADMIN — Medication 3 MG: at 20:51

## 2025-05-19 RX ADMIN — IPRATROPIUM BROMIDE 2 SPRAY: 21 SPRAY, METERED NASAL at 16:16

## 2025-05-19 RX ADMIN — ENOXAPARIN SODIUM 40 MG: 40 INJECTION SUBCUTANEOUS at 15:11

## 2025-05-19 RX ADMIN — HYDROMORPHONE HYDROCHLORIDE 0.5 MG: 1 INJECTION, SOLUTION INTRAMUSCULAR; INTRAVENOUS; SUBCUTANEOUS at 08:19

## 2025-05-19 RX ADMIN — SENNOSIDES AND DOCUSATE SODIUM 2 TABLET: 50; 8.6 TABLET ORAL at 15:10

## 2025-05-19 RX ADMIN — ASPIRIN 81 MG: 81 TABLET, COATED ORAL at 15:09

## 2025-05-19 RX ADMIN — SODIUM CHLORIDE 50 ML/HR: 0.9 INJECTION, SOLUTION INTRAVENOUS at 15:25

## 2025-05-19 SDOH — ECONOMIC STABILITY: FOOD INSECURITY: WITHIN THE PAST 12 MONTHS, THE FOOD YOU BOUGHT JUST DIDN'T LAST AND YOU DIDN'T HAVE MONEY TO GET MORE.: NEVER TRUE

## 2025-05-19 SDOH — ECONOMIC STABILITY: FOOD INSECURITY: HOW HARD IS IT FOR YOU TO PAY FOR THE VERY BASICS LIKE FOOD, HOUSING, MEDICAL CARE, AND HEATING?: NOT VERY HARD

## 2025-05-19 SDOH — SOCIAL STABILITY: SOCIAL INSECURITY: DO YOU FEEL UNSAFE GOING BACK TO THE PLACE WHERE YOU ARE LIVING?: NO

## 2025-05-19 SDOH — SOCIAL STABILITY: SOCIAL INSECURITY: WERE YOU ABLE TO COMPLETE ALL THE BEHAVIORAL HEALTH SCREENINGS?: YES

## 2025-05-19 SDOH — SOCIAL STABILITY: SOCIAL INSECURITY: HAS ANYONE EVER THREATENED TO HURT YOUR FAMILY OR YOUR PETS?: NO

## 2025-05-19 SDOH — SOCIAL STABILITY: SOCIAL INSECURITY
WITHIN THE LAST YEAR, HAVE YOU BEEN KICKED, HIT, SLAPPED, OR OTHERWISE PHYSICALLY HURT BY YOUR PARTNER OR EX-PARTNER?: NO

## 2025-05-19 SDOH — ECONOMIC STABILITY: FOOD INSECURITY: WITHIN THE PAST 12 MONTHS, YOU WORRIED THAT YOUR FOOD WOULD RUN OUT BEFORE YOU GOT THE MONEY TO BUY MORE.: NEVER TRUE

## 2025-05-19 SDOH — SOCIAL STABILITY: SOCIAL INSECURITY
WITHIN THE LAST YEAR, HAVE YOU BEEN RAPED OR FORCED TO HAVE ANY KIND OF SEXUAL ACTIVITY BY YOUR PARTNER OR EX-PARTNER?: NO

## 2025-05-19 SDOH — ECONOMIC STABILITY: INCOME INSECURITY: IN THE PAST 12 MONTHS HAS THE ELECTRIC, GAS, OIL, OR WATER COMPANY THREATENED TO SHUT OFF SERVICES IN YOUR HOME?: NO

## 2025-05-19 SDOH — SOCIAL STABILITY: SOCIAL INSECURITY: WITHIN THE LAST YEAR, HAVE YOU BEEN AFRAID OF YOUR PARTNER OR EX-PARTNER?: NO

## 2025-05-19 SDOH — SOCIAL STABILITY: SOCIAL INSECURITY: WITHIN THE LAST YEAR, HAVE YOU BEEN HUMILIATED OR EMOTIONALLY ABUSED IN OTHER WAYS BY YOUR PARTNER OR EX-PARTNER?: NO

## 2025-05-19 SDOH — SOCIAL STABILITY: SOCIAL INSECURITY: ARE YOU OR HAVE YOU BEEN THREATENED OR ABUSED PHYSICALLY, EMOTIONALLY, OR SEXUALLY BY ANYONE?: NO

## 2025-05-19 SDOH — SOCIAL STABILITY: SOCIAL INSECURITY: DO YOU FEEL ANYONE HAS EXPLOITED OR TAKEN ADVANTAGE OF YOU FINANCIALLY OR OF YOUR PERSONAL PROPERTY?: NO

## 2025-05-19 SDOH — SOCIAL STABILITY: SOCIAL INSECURITY: HAVE YOU HAD THOUGHTS OF HARMING ANYONE ELSE?: NO

## 2025-05-19 SDOH — SOCIAL STABILITY: SOCIAL INSECURITY: ABUSE: ADULT

## 2025-05-19 SDOH — SOCIAL STABILITY: SOCIAL INSECURITY: DOES ANYONE TRY TO KEEP YOU FROM HAVING/CONTACTING OTHER FRIENDS OR DOING THINGS OUTSIDE YOUR HOME?: NO

## 2025-05-19 SDOH — SOCIAL STABILITY: SOCIAL INSECURITY: ARE THERE ANY APPARENT SIGNS OF INJURIES/BEHAVIORS THAT COULD BE RELATED TO ABUSE/NEGLECT?: NO

## 2025-05-19 ASSESSMENT — LIFESTYLE VARIABLES
HAVE PEOPLE ANNOYED YOU BY CRITICIZING YOUR DRINKING: NO
EVER FELT BAD OR GUILTY ABOUT YOUR DRINKING: NO
HOW OFTEN DO YOU HAVE A DRINK CONTAINING ALCOHOL: NEVER
SKIP TO QUESTIONS 9-10: 1
HAVE YOU EVER FELT YOU SHOULD CUT DOWN ON YOUR DRINKING: NO
AUDIT-C TOTAL SCORE: 0
TOTAL SCORE: 0
HOW OFTEN DO YOU HAVE 6 OR MORE DRINKS ON ONE OCCASION: NEVER
AUDIT-C TOTAL SCORE: 0
EVER HAD A DRINK FIRST THING IN THE MORNING TO STEADY YOUR NERVES TO GET RID OF A HANGOVER: NO
HOW MANY STANDARD DRINKS CONTAINING ALCOHOL DO YOU HAVE ON A TYPICAL DAY: PATIENT DOES NOT DRINK

## 2025-05-19 ASSESSMENT — COGNITIVE AND FUNCTIONAL STATUS - GENERAL
MOVING TO AND FROM BED TO CHAIR: A LOT
MOVING FROM LYING ON BACK TO SITTING ON SIDE OF FLAT BED WITH BEDRAILS: A LITTLE
TURNING FROM BACK TO SIDE WHILE IN FLAT BAD: A LITTLE
DAILY ACTIVITIY SCORE: 22
DRESSING REGULAR LOWER BODY CLOTHING: A LOT
MOVING FROM LYING ON BACK TO SITTING ON SIDE OF FLAT BED WITH BEDRAILS: A LOT
CLIMB 3 TO 5 STEPS WITH RAILING: A LITTLE
WALKING IN HOSPITAL ROOM: TOTAL
WALKING IN HOSPITAL ROOM: A LOT
CLIMB 3 TO 5 STEPS WITH RAILING: TOTAL
MOBILITY SCORE: 10
PATIENT BASELINE BEDBOUND: NO
STANDING UP FROM CHAIR USING ARMS: A LOT
MOVING FROM LYING ON BACK TO SITTING ON SIDE OF FLAT BED WITH BEDRAILS: A LITTLE
MOVING TO AND FROM BED TO CHAIR: A LITTLE
HELP NEEDED FOR BATHING: A LITTLE
STANDING UP FROM CHAIR USING ARMS: TOTAL
MOBILITY SCORE: 12
DRESSING REGULAR LOWER BODY CLOTHING: A LITTLE
DAILY ACTIVITIY SCORE: 19
DRESSING REGULAR UPPER BODY CLOTHING: A LITTLE
HELP NEEDED FOR BATHING: A LITTLE
STANDING UP FROM CHAIR USING ARMS: A LITTLE
MOBILITY SCORE: 18
WALKING IN HOSPITAL ROOM: A LITTLE
MOVING TO AND FROM BED TO CHAIR: TOTAL
TURNING FROM BACK TO SIDE WHILE IN FLAT BAD: A LITTLE
TURNING FROM BACK TO SIDE WHILE IN FLAT BAD: A LOT
CLIMB 3 TO 5 STEPS WITH RAILING: A LOT
TOILETING: A LITTLE

## 2025-05-19 ASSESSMENT — ACTIVITIES OF DAILY LIVING (ADL)
FEEDING YOURSELF: INDEPENDENT
GROOMING: INDEPENDENT
LACK_OF_TRANSPORTATION: NO
LACK_OF_TRANSPORTATION: NO
DRESSING YOURSELF: INDEPENDENT
ADL_ASSISTANCE: INDEPENDENT
ASSISTIVE_DEVICE: WALKER
TOILETING: INDEPENDENT
BATHING: INDEPENDENT
PATIENT'S MEMORY ADEQUATE TO SAFELY COMPLETE DAILY ACTIVITIES?: YES
HEARING - LEFT EAR: HEARING AID
ADEQUATE_TO_COMPLETE_ADL: YES
JUDGMENT_ADEQUATE_SAFELY_COMPLETE_DAILY_ACTIVITIES: YES
WALKS IN HOME: INDEPENDENT
HEARING - RIGHT EAR: HEARING AID

## 2025-05-19 ASSESSMENT — ENCOUNTER SYMPTOMS
CONSTIPATION: 1
DECREASED CONCENTRATION: 0
NUMBNESS: 0
PALPITATIONS: 0
FACIAL ASYMMETRY: 0
VOMITING: 0
UNEXPECTED WEIGHT CHANGE: 0
WOUND: 0
LIGHT-HEADEDNESS: 0
WEAKNESS: 0
HEADACHES: 0
UNEXPECTED WEIGHT CHANGE: 1
SEIZURES: 0
NAUSEA: 0
DIAPHORESIS: 0
MYALGIAS: 0
SLEEP DISTURBANCE: 0
HALLUCINATIONS: 0
APPETITE CHANGE: 1
PHOTOPHOBIA: 0
DIFFICULTY URINATING: 0
TROUBLE SWALLOWING: 0
HEMATURIA: 0
COUGH: 0
SORE THROAT: 0
CHEST TIGHTNESS: 0
ABDOMINAL PAIN: 0
FEVER: 0
DYSURIA: 0
DIZZINESS: 0
CONFUSION: 0
FLANK PAIN: 0
EYE REDNESS: 0
WHEEZING: 0
RHINORRHEA: 0
FREQUENCY: 0
SHORTNESS OF BREATH: 0
BACK PAIN: 1
DIARRHEA: 0

## 2025-05-19 ASSESSMENT — PAIN SCALES - GENERAL
PAINLEVEL_OUTOF10: 7
PAINLEVEL_OUTOF10: 8

## 2025-05-19 ASSESSMENT — COLUMBIA-SUICIDE SEVERITY RATING SCALE - C-SSRS
2. HAVE YOU ACTUALLY HAD ANY THOUGHTS OF KILLING YOURSELF?: NO
6. HAVE YOU EVER DONE ANYTHING, STARTED TO DO ANYTHING, OR PREPARED TO DO ANYTHING TO END YOUR LIFE?: NO
1. IN THE PAST MONTH, HAVE YOU WISHED YOU WERE DEAD OR WISHED YOU COULD GO TO SLEEP AND NOT WAKE UP?: NO

## 2025-05-19 ASSESSMENT — PAIN DESCRIPTION - LOCATION: LOCATION: BACK

## 2025-05-19 ASSESSMENT — PAIN DESCRIPTION - ORIENTATION: ORIENTATION: RIGHT;LEFT;LOWER

## 2025-05-19 ASSESSMENT — PAIN - FUNCTIONAL ASSESSMENT
PAIN_FUNCTIONAL_ASSESSMENT: 0-10
PAIN_FUNCTIONAL_ASSESSMENT: 0-10

## 2025-05-19 NOTE — H&P
History Of Present Illness  Neris Mccall is a 89 y.o. female with MH ADHD?, bladder cancer, CKD, CHF, chronic low back pain/L2 compression fracture with recent L3/4 epidural steroid injection presenting to ED with acute flare of chronic low back pain. Daughter in law and son provided a lot of information as patient is very [place]. She has been declining the past week or so, not eating or drinking much and having worsening back pain. Patient follows with pain management, recent epidural injection did not provide much relief. She is currently living in independent living at [facility] and family feels she needs skilled nursing care. Patient complains of low back pain, mostly on right side. She is hungry. She endorses constipation x 4 days.    In the ED, /79 HR 71 afebrile SpO2 92% on room air. Labs: glucose 114 Na 134 K 3.4 Mag 1.7 WBCs 14.4 with left shift.  leuks, 11-20 WBCs. CT L spine and abd/pelvis:  Acute T12 inferior endplate compression fracture as well as moderate left hydroureteronephrosis with 4 mm stone at distal left ureter.  She was treated with dilaudid, norflex tylenol rocephin. Recommended admission for pain management and possible urologic intervention.     Past Medical History  Medical History[1]    Surgical History  Surgical History[2]     Social History  She reports that she has never smoked. She has never used smokeless tobacco. She reports that she does not currently use alcohol. She reports that she does not use drugs.    Family History  Family History[3]     Allergies  Duloxetine, Sulfa (sulfonamide antibiotics), Atenolol, Cephalexin, Cholestyramine, Gemfibrozil, House dust, Niacin, Pravastatin, Tramadol, and Tree pollen-white susanna    Review of Systems   Constitutional:  Negative for diaphoresis, fever and unexpected weight change.   HENT:  Negative for rhinorrhea, sore throat and trouble swallowing.    Eyes:  Negative for photophobia, redness and visual disturbance.  "  Respiratory:  Negative for cough, chest tightness, shortness of breath and wheezing.    Cardiovascular:  Negative for chest pain, palpitations and leg swelling.   Gastrointestinal:  Positive for constipation. Negative for abdominal pain, diarrhea, nausea and vomiting.   Endocrine: Negative for polyuria.   Genitourinary:  Negative for difficulty urinating, dysuria, flank pain, frequency, hematuria and urgency.   Musculoskeletal:  Positive for back pain and gait problem. Negative for myalgias.   Skin:  Negative for rash and wound.   Allergic/Immunologic: Negative for immunocompromised state.   Neurological:  Negative for dizziness, seizures, syncope, facial asymmetry, weakness, light-headedness, numbness and headaches.   Psychiatric/Behavioral:  Negative for confusion, decreased concentration, hallucinations and sleep disturbance.         Physical Exam  Physical Exam  Gen: elderly, Kaw  Eyes:  EOM intact  ENT: MMM  Neck: No JVD  Respiratory: diminished at bases, otherwise clear  Cardiac: RRR, no murmurs rubs or gallops  Abdomen: distended but soft, +BS, TTP lower abdomen  MSK: TTP over spine  Extremities: no edema or cyanosis  Neuro: No focal deficits, alert and oriented x 3  Psych:  appropriate mood and behavior     Last Recorded Vitals  Blood pressure 157/79, pulse 71, temperature 36.6 °C (97.9 °F), temperature source Skin, resp. rate 16, height 1.626 m (5' 4\"), weight 58.5 kg (129 lb), SpO2 (!) 92%.    Assessment & Plan  Chronic low back pain, unspecified back pain laterality, unspecified whether sciatica present      Acute on Chronic back pain  -Acute T2 compression fracture with history of spinal stenosis, scoliosis, L2 compression fracture  -Per ED, scan reviewed by Dr. Villeda ortho-spine who recommends pain management, PT/OT and outpt follow up with PM&R  -pain management  -PT/OT  -pall med consult    Moderate left hydroureteronephrosis with abnormal UA  -2/2 4 mm stone at distal left ureter  -bladder scan " now  -daily rocephin  -follow urine culture  -urology consult  -flomax  -IVF at low rate overnight  -NPO after MN  -monitor kidney function    Constipation  -senna BID  -miralax daily  -stool count    CKD 3  -daily labs  -avoid nephrotoxins    CHF, moderate aortic stenosis, moderate CAD  -EF 57%  -CT shows bilateral pleural effusions, no hypoxia or LE edema  -check BNP  -ASA  -losartan  -metoprolol  -tele  -cardiology consult for pre-op clearance    DVT prophy  -lovenox    Dispo: Admit for UTI, obstructing stone, pain management for acute on chronic back pain, PT/OT, eLOS>48 hours or 2 Mns    I spent 50 minutes in the professional and overall care of this patient.      Reta Stuart PA-C  D/w Dr. De La Fuente       [1]   Past Medical History:  Diagnosis Date    Acute upper respiratory infection, unspecified 01/15/2018    Acute URI    Aneurysm of unspecified site (CMS-[facility])     Aneurysm    Arrhythmia 1975    Arthritis 2005    BiPAP (biphasic positive airway pressure) dependence 2023    Cataract 2005    Chronic pain disorder 1960    Coronary artery disease 2021    CPAP (continuous positive airway pressure) dependence 2014    Dependence on other enabling machines and devices     CPAP (continuous positive airway pressure) dependence    Dysphagia 2021    Fractures 1955    GERD (gastroesophageal reflux disease) 1985    No longer problem    Glaucoma 2021    Hearing aid worn 1985    Heart disease 1980    Heart failure 2019    Heart valve disease 2021    History of blood transfusion 1958    HL (hearing loss) 1970    Hypertension 1972    Low back pain 1950    Lumbar disc disease 2016    Old myocardial infarction     History of myocardial infarction    Other nail disorders 03/14/2017    Green nails    Pacemaker 2019    Personal history of diseases of the skin and subcutaneous tissue 12/14/2016    History of folliculitis    Personal history of other diseases of the circulatory system 05/16/2022    History of intermittent claudication     Personal history of other diseases of the circulatory system     History of high blood pressure    Personal history of other diseases of the musculoskeletal system and connective tissue     History of arthritis    Personal history of other diseases of the nervous system and sense organs     History of sleep apnea    Personal history of other endocrine, nutritional and metabolic disease     History of high cholesterol    Personal history of other venous thrombosis and embolism     H/O blood clots    Personal history of transient ischemic attack (TIA), and cerebral infarction without residual deficits     History of stroke    Platelet disorder (Multi) 1998    PCV    Pregnant ([facility] 1957    Scoliosis 2016    Secondary polycythemia     Polycythemia    Shingles 1996    Sleep apnea     Spinal stenosis, lumbar region with neurogenic claudication 11/01/2022    Neurogenic claudication due to lumbar spinal stenosis    Stroke (Multi) 1967    Urinary tract infection 2019    Vision loss 2017   [2]   Past Surgical History:  Procedure Laterality Date    BACK SURGERY      MILD procedure    BLEPHAROPLASTY  2002    COLONOSCOPY  2014    CORRECTION HAMMER TOE  2004    DILATION AND CURETTAGE OF UTERUS  04/20/2016    Dilation And Curettage    FRACTURE SURGERY  2015    INSERT / REPLACE / REMOVE PACEMAKER  2019    KNEE SURGERY  04/20/2016    Knee Surgery Left    ORIF WRIST FRACTURE  1955    OTHER SURGICAL HISTORY  04/20/2016    Ear Surgery    OTHER SURGICAL HISTORY  04/20/2016    Hammertoe Operation Left Toe No. ___    OTHER SURGICAL HISTORY  07/29/2022    Pacemaker insertion    ROTATOR CUFF REPAIR  04/20/2016    Rotator Cuff Repair    STAPEDECTOMY  1979    TOE SURGERY  2004    TONSILLECTOMY  04/20/2016    Tonsillectomy    UPPER GASTROINTESTINAL ENDOSCOPY  2013    VARICOSE VEIN SURGERY  04/20/2016    Varicose Vein Ligation   [3]   Family History  Problem Relation Name Age of Onset    Cancer Mother GIBSON SALINAS     Hypertension Sister  KELSIE RENTERIA     Breast cancer Sister KELSIE RENTERIA 87    Coronary artery disease Brother TIN SALINAS     Hearing loss Brother SERINA SALINAS     Vision loss Brother SERINA SALINAS     Heart disease Brother AUDREY SALINAS     Breast cancer Paternal Grandmother  30

## 2025-05-19 NOTE — PROGRESS NOTES
Physical Therapy    Physical Therapy Evaluation    Patient Name: Neris Mccall  MRN: [id number]  Department: [address]  Room: [address] [place], [place] [address]  Today's Date: 5/19/2025   Time Calculation  Start Time: 1540  Stop Time: 1551  Time Calculation (min): 11 min    Assessment/Plan   PT Assessment  PT Assessment Results: Decreased strength, Decreased endurance, Impaired balance, Decreased mobility  Rehab Prognosis: Good  Barriers to Discharge Home: Caregiver assistance, Physical needs  Caregiver Assistance: Caregiver assistance needed per identified barriers - however, level of patient's required assistance exceeds assistance available at home  Physical Needs: Intermittent mobility assistance needed, Intermittent ADL assistance needed, High falls risk due to function or environment  Evaluation/Treatment Tolerance: Patient limited by pain  Medical Staff Made Aware: Yes  Strengths: Ability to acquire knowledge, Housing layout, Support of Caregivers  Barriers to Participation: Comorbidities  End of Session Communication: Bedside nurse  Assessment Comment: Patient with limited tolerance for functional mobility at this time d/t pain in sitting. Patient is motivated to improve, supportive family. Patient would continue to benefit from MOD intensity PT intervention.  End of Session Patient Position: Bed, 3 rail up, Alarm on (son at bedside)     PT Plan  Treatment/Interventions: Bed mobility, Transfer training, Gait training, Stair training, Balance training, Strengthening, Endurance training, Therapeutic exercise  PT Plan: Ongoing PT  PT Frequency: 3 times per week  PT Discharge Recommendations: Moderate intensity level of continued care  Equipment Recommended upon Discharge: Wheeled walker (owns)  PT Recommended Transfer Status: Assist x2  PT - OK to Discharge: Yes (per PT POC)    Subjective     PT Visit Info:  PT Received On: 05/19/25  General Visit Information:  General  Reason for Referral: Impaired functional mobility; LBP, UTI  Referred  By: Virginia De La Fuente  Past Medical History Relevant to Rehab: bladder cancer, CKD, CHF, chronic low back pain/L2 compression fracture with recent L3/4 epidural steroid injection  Family/Caregiver Present: Yes (son)  Prior to Session Communication: Bedside nurse  Patient Position Received: Bed, 3 rail up, Alarm on  General Comment: Patient pleasant, cooperative and agreeable to assessment  Home Living:  Home Living  Type of Home: Independent living  Home Adaptive Equipment: Walker rolling or standard, Wheelchair-manual (Rollator)  Home Layout: One level  Home Access: Level entry  Bathroom Shower/Tub: Walk-in shower  Prior Level of Function:  Prior Function Per Pt/Caregiver Report  Level of [place]: Needs assistance with homemaking, Independent with ADLs and functional transfers  ADL Assistance: Independent  Homemaking Assistance: Needs assistance (Staff)  Ambulatory Assistance: Independent (Rollator shorter distances, w/c community)  Precautions:  Precautions  Hearing/Visual Limitations: [place]-has microphone linked to hearing aides  Medical Precautions: Fall precautions (purewick, IV, masimo)  Precautions Comment: CT: Acute T12 inferior endplate compression fracture.  Mild-to-moderate multilevel degenerative disc and facet disease  without advanced bony foraminal or canal stenosis      Date/Time Vitals Session Patient Position Pulse Resp SpO2 BP MAP (mmHg)    05/19/25 1540 --  --  63  --  95 %  --  --                 Objective   Pain:  Pain Assessment  Pain Assessment: 0-10  0-10 (Numeric) Pain Score: 8  Pain Location: Back  Pain Orientation: Mid, Lower  Pain Interventions: Repositioned, Ambulation/increased activity (RN aware and provided pain meds per patient request. Patient appears comfortable in supine, pain increases sitting EOB)  Cognition:  Cognition  Overall Cognitive Status: Within Functional Limits  Orientation Level: Oriented X4    General Assessments:    Activity Tolerance  Endurance: Tolerates less than 10  min exercise, no significant change in vital signs    Sensation  Light Touch: No apparent deficits    Strength  Strength Comments: Functionally B LE 3+/5    Coordination  Movements are Fluid and Coordinated: Yes    Postural Control  Postural Control: Within Functional Limits    Static Sitting Balance  Static Sitting-Balance Support: No upper extremity supported, Feet supported  Static Sitting-Level of Assistance: Contact guard  Dynamic Sitting Balance  Dynamic Sitting-Balance Support: No upper extremity supported, Feet supported  Dynamic Sitting-Level of Assistance: Contact guard  Dynamic Sitting-Balance: Lateral lean       Functional Assessments:       Bed Mobility  Bed Mobility: Yes  Bed Mobility 1  Bed Mobility 1: Supine to sitting, Sitting to supine  Level of Assistance 1: Minimum assistance  Bed Mobility Comments 1: Pain increased in sitting  Bed Mobility 2  Bed Mobility  2:  (repositioning in bed)  Level of Assistance 2: Maximum assistance (x 2)    Transfers  Transfer: No (unable to complete at this time d/t level of pain)    Outcome Measures:  [facility] Basic Mobility  Turning from your back to your side while in a flat bed without using bedrails: A little  Moving from lying on your back to sitting on the side of a flat bed without using bedrails: A little  Moving to and from bed to chair (including a wheelchair): Total  Standing up from a chair using your arms (e.g. wheelchair or bedside chair): Total  To walk in hospital room: Total  Climbing 3-5 steps with railing: Total  Basic Mobility - Total Score: 10    Encounter Problems       Encounter Problems (Active)       Balance       STG - Maintains static standing balance with upper extremity support x 3' CGA  (Progressing)       Start:  05/19/25    Expected End:  06/02/25               Mobility       STG - Patient will ambulate with 2WW 15' CGA  (Progressing)       Start:  05/19/25    Expected End:  06/02/25               PT Transfers       STG - Patient will  perform bed mobility independently  (Progressing)       Start:  05/19/25    Expected End:  06/02/25            STG - Patient will transfer sit to and from stand mod I  (Progressing)       Start:  05/19/25    Expected End:  06/02/25               Pain - Adult              Education Documentation  No documentation found.  Education Comments  No comments found.

## 2025-05-19 NOTE — ED PROVIDER NOTES
The patient was seen by the midlevel/resident.  I have personally saw the patient and made/approved the management plan and take responsibility for the patient management.  I reviewed the EKG's (when done) and agree with the interpretation.  I have seen and examined the patient; agree with the workup, evaluation, MDM, and diagnosis.  The care plan has been discussed with the midlevel/resident; I have reviewed the note and agree with the documented findings.     Presents with chronic back pain.  Patient has had injections and just recently had a steroid injection which did not help very much.  She has tried oral steroids and muscle relaxants and pain patches.  I saw her earlier and tried different medicines she has failed outpatient treatment.  She is also found to have UTI last time which we treated.  She is just finished her antibiotics.  She has not had a bowel movement for about a week.  She takes her oxycodone and other meds at home without improvement and cannot get around.  She is here with her family who gives some of the history and they reports she cannot manage at home at this time and would like hospitalization and possible placement.  Plan is to workup with CT scan and labs.  We will give some narcotic pain medicine try to make her more comfortable.    Found to have a kidney stone. Will treat and hospitalize.     Family came out and requested palliative care as a consideration.  Will place a consult  ED Course as of 05/19/25 1134   Mon May 19, 2025   1134 CT showed kidney stone hydroureter which may be a cause of some of her symptoms.  Plan is to hospitalize her and attempt to help her with her discomfort get urology involved. [RZ]      ED Course User Index  [RZ] Gerber Edmonds MD         Diagnoses as of 05/19/25 1134   Chronic low back pain, unspecified back pain laterality, unspecified whether sciatica present   Failure of outpatient treatment   Constipation, unspecified constipation type    Hydroureteronephrosis   Kidney stone     MD Gerber Moses MD  05/19/25 1139       Gerber Edmonds MD  05/19/25 1135

## 2025-05-19 NOTE — ED PROVIDER NOTES
Emergency Department Provider Note          History of Present Illness     CC: Back Pain (Lower back right hurts more than left side. Took an oxycodone at 0400 with no relief. States she recently had steroid injections in her spine. )     History provided by: Patient and Family Member  Limitations to History: None    HPI:   Nersi Mccall is a 89 y.o.female with PMH ADHD, BPH, bladder cancer, CKD, CHF presenting to the Emergency Department for acute flare of chronic low back pain. She says she has not gotten relief since pain management gave her steroid injections one week ago. She also visited the ED last week and reports no alleviation of her pain from that visit. She normally ambulates with a walker but finds it painful to do so. She is still reporting urinary frequency and is currently being treated with macrobid. The patient reports constipation for the last 3 days.Pt denies numbness/tingling or fever    Records Reviewed: Recent available ED and inpatient notes reviewed in EMR.    PMHx/PSHx:  Per HPI.   - has a past medical history of Acute upper respiratory infection, unspecified, Aneurysm of unspecified site (CMS-HCC), Arrhythmia, Arthritis, BiPAP (biphasic positive airway pressure) dependence, Cataract, Chronic pain disorder, Coronary artery disease, CPAP (continuous positive airway pressure) dependence, Dependence on other enabling machines and devices, Dysphagia, Fractures, GERD (gastroesophageal reflux disease), Glaucoma, Hearing aid worn, Heart disease, Heart failure, Heart valve disease, History of blood transfusion, HL (hearing loss), Hypertension, Low back pain, Lumbar disc disease, Old myocardial infarction, Other nail disorders, Pacemaker, Personal history of diseases of the skin and subcutaneous tissue, Personal history of other diseases of the circulatory system, Personal history of other diseases of the circulatory system, Personal history of other diseases of the musculoskeletal system and  connective tissue, Personal history of other diseases of the nervous system and sense organs, Personal history of other endocrine, nutritional and metabolic disease, Personal history of other venous thrombosis and embolism, Personal history of transient ischemic attack (TIA), and cerebral infarction without residual deficits, Platelet disorder (Multi), Pregnant ([facility] Scoliosis, Secondary polycythemia, Shingles, Sleep apnea, Spinal stenosis, lumbar region with neurogenic claudication, Stroke (Multi), Urinary tract infection, and Vision loss.  - has a past surgical history that includes Dilation and curettage of uterus (04/20/2016); Other surgical history (04/20/2016); Varicose vein surgery (04/20/2016); Other surgical history (04/20/2016); Rotator cuff repair (04/20/2016); Tonsillectomy (04/20/2016); Knee surgery (04/20/2016); Other surgical history (07/29/2022); Fracture surgery (2015); Insert / replace / remove pacemaker (2019); Stapedectomy (1979); Colonoscopy (2014); Upper gastrointestinal endoscopy (2013); ORIF wrist fracture (1955); Toe Surgery (2004); Blepharoplasty (2002); Correction hammer toe (2004); and Back surgery.  - has Age-related osteoporosis with current pathol fracture of vertebra, initial encounter (Multi); Osteoporosis; Aneurysm artery, renal (CMS-HCC); Arthralgia of multiple sites; Blue's esophagus; Biventricular implantable cardioverter-defibrillator (ICD) in situ; Blepharospasm; Central sleep apnea due to Cheyne-Mchugh respiration; Chronic systolic heart failure; Compression fracture of L2 lumbar vertebra (Multi); Lumbar compression fracture (Multi); Depression; DNR no code (do not resuscitate); Facial nerve paresis; Dysphagia; Ford type III hyperlipoproteinemia; GERD without esophagitis; Glaucoma; H/O polycythemia vera; Hearing impairment; History of Bell's palsy; History of heart attack; Hyperlipidemia; Hypertension; Chronic insomnia; Chronic pain syndrome; Lumbar canal  stenosis; Degenerative joint disease of cervical spine; Postmenopausal atrophic vaginitis; Mixed conductive and sensorineural hearing loss, bilateral; Nonischemic cardiomyopathy (Multi); Nontoxic multinodular goiter; LISA treated with BiPAP; Peripheral neuropathy; Thyroglossal duct cyst; Vitamin D deficiency; Zenker's diverticulum; Spinal stenosis, lumbar region, with neurogenic claudication; Spondylosis without myelopathy or radiculopathy, lumbosacral region; Secondary hyperaldosteronism (Multi); Treatment-emergent central sleep apnea; Dependence on continuous positive airway pressure ventilation; History of cerebrovascular accident; Exudative age-related macular degeneration, left eye, with active choroidal neovascularization; Stage 3a chronic kidney disease (Multi); Age-related osteoporosis with current pathological fracture of vertebra (Multi); Other idiopathic scoliosis, thoracolumbar region; Routine adult health maintenance; Chronic knee pain after total replacement of knee joint; Green nails; and Chronic low back pain, unspecified back pain laterality, unspecified whether sciatica present on their problem list.    Medications:  Current Outpatient Medications   Medication Instructions    acetaminophen (TYLENOL) 500 mg, Once daily (morning) M-F (5 days a week)    acetaminophen (TYLENOL) 650 mg, oral, Every 6 hours PRN    artificial tears, dextran-hypomel-glycerin, 0.1-0.3-0.2 % ophthalmic solution 4 times daily    ascorbic acid (vitamin C) 1,000 mg, Daily    aspirin 81 mg, Daily    calcium carb and citrate-vitD3 (Citracal-D3 Slow Release) 600 mg-12.5 mcg (500 unit) tablet extended release 1 tablet, Daily    calcium carbonate (Tums) 200 mg calcium chewable tablet 1 tablet, Daily    cholecalciferol (VITAMIN D-3) 50 mcg, Daily    cranberry extract 200 mg capsule 1 tablet, Daily    cyanocobalamin (Vitamin B-12) 50 mcg tablet 1 tablet, Daily    DULoxetine (CYMBALTA) 20 mg, oral, Daily    Eylea 2 mg, Once     furosemide (Lasix) 20 mg tablet TAKE 1 TABLET DAILY    gentamicin (Garamycin) 0.3 % ophthalmic solution Apply 1 drop under the nail and 1 drop on the nail topically twice daily and cover twice daily for 4 weeks    ibuprofen 200 mg, 4 times daily    ibuprofen 400 mg, oral, Every 6 hours PRN    ipratropium (Atrovent) 42 mcg (0.06 %) nasal spray USE 2 SPRAYS IN EACH NOSTRIL TWO TO THREE TIMES DAILY    latanoprost (Xalatan) 0.005 % ophthalmic solution 2 drops, Nightly    lidocaine (Lidoderm) 5 % patch 1 patch, transdermal, Daily, Remove & discard patch within 12 hours or as directed by MD.    losartan (COZAAR) 25 mg, oral, Daily    melatonin 5 mg, Nightly    metoprolol succinate XL (TOPROL-XL) 25 mg, oral, Daily    multivit,calc,mins/iron/folic (WOMEN'S ONE DAILY ORAL) 1 tablet, Daily    naloxone (NARCAN) 4 mg, nasal, As needed, May repeat every 2-3 minutes if needed, alternating nostrils, until medical assistance becomes available.    neomycin-polymyxin-dexAMETHasone (Maxitrol) 3.5mg/mL-10,000 unit/mL-0.1 % ophthalmic suspension INSTILL ONE DROP INTO AFFECTED EYE THREE TIMES A DAY    neomycin-polymyxin-gramicidin (Neosporin) 1.75 mg-10,000 unit-0.025mg/mL ophthalmic solution Apply 1 to 2 drops to the affected nail twice daily for green nail syndrome.    nitrofurantoin, macrocrystal-monohydrate, (Macrobid) 100 mg capsule 100 mg, oral, 2 times daily    omega-3 fatty acids-fish oil (Fish OiL) 340-1,000 mg capsule 1 capsule, Daily    omeprazole (PRILOSEC) 20 mg, oral, 2 times daily before meals, Do not crush or chew.    oxyCODONE (ROXICODONE) 5 mg, oral, Every 12 hours PRN    predniSONE (DELTASONE) 10 mg, oral, Daily, Take 4 pills once a day for 3 days then 3 pills once a day for 3 days then 2 pills once a day for 3 days then one pill once a day for 3 days then STOP    rosuvastatin (Crestor) 20 mg tablet TAKE 1 TABLET DAILY    spironolactone (ALDACTONE) 25 mg, oral, Daily    trolamine salicylate (Aspercreme) 10 % cream 1  Application, As needed    zolpidem (AMBIEN) 5 mg, oral, Nightly PRN        Allergies:  Duloxetine, Sulfa (sulfonamide antibiotics), Atenolol, Cephalexin, Cholestyramine, Gemfibrozil, House dust, Niacin, Pravastatin, Tramadol, and Tree pollen-white susanna    Social History:  - Tobacco:  reports that she has never smoked. She has never used smokeless tobacco.   - Alcohol:  reports that she does not currently use alcohol.   - Illicit Drugs:  reports no history of drug use.     ROS:  Per HPI.       Physical Exam     Triage Vitals:  T 36.6 °C (97.9 °F)  HR 71  /79  RR 16  O2 (!) 92 %      General: Awake, alert, in no acute distress  Eyes: Gaze conjugate.  No scleral icterus or injection  HENT: Normo-cephalic, atraumatic. No stridor  CV: Regular rate, regular rhythm. Radial pulses 2+ bilaterally  Resp: Breathing non-labored, speaking in full sentences.  Clear to auscultation bilaterally  GI: Soft, non-distended, diffusely tender. No rebound or guarding.  : Deferred  MSK/Extremities: No gross bony deformities. Moving all extremities. Point tenderness in lumbar spine  Skin: Warm. Appropriate color  Neuro: Alert. Oriented. Face symmetric. Speech is fluent.  Gross strength and sensation intact in b/l UE and LEs  Psych: Appropriate mood and affect          [place] Coma Scale Score: 15                    Medical Decision Making & ED Course     EKG: EKG interpreted by myself. If applicable, please see ED Course for full interpretation.    Medical Decision Making   Neris Mccall is a 89 y.o.female presenting to the Emergency Department for acute flare of chronic low back pain. She was seen in the ED for low back pain recently and has gotten spinal injections from pain management with no relief. She has failed outpatient treatment and will likely need hospitalization as she lives alone and cannot manage at home.     In the ED pain was managed with norflex, tylenol and dilaudid.    CT lumbar spine was taken to evaluate for  any structural abnormality that could be causing this acute flare. A CT abdomen pelvis was taken to workup her constipation and rule out hydronephrosis/kidney stone that could be causing her pain.    Her back pain could also be contributed by pyelnopehritis as she has had a UTI and continues to have symptoms. Her CBC revealed a white count of 14.4. A UA showed 250 leukocytes, awaiting culture.    Family was interested in palliative care and social work for placement and code status discussion. Consult placed.    Update: CT lumbar spine revealed acture T 12 inferior endplate compression fracture. CT abdomen pelvis showed Moderate left hydroureteronephrosis with an obstructing calculus distal left ureter just proximal to the UVJ. Urology consulted. Patient admitted for obstructing stone, UTI and placement for chronic low back pain.   Diagnoses as of 05/19/25 1003   Chronic low back pain, unspecified back pain laterality, unspecified whether sciatica present   Failure of outpatient treatment   Constipation, unspecified constipation type   Hydroureteronephrosis       Independent Result Review and Interpretation: Relevant laboratory and radiographic results were reviewed and independently interpreted by myself.  As necessary, they are commented on in the ED Course.    Chronic conditions affecting the patient's care: As documented above in MDM.    Social Determinants of Health which Significantly Impact Care:   None identified        Disposition   Admit    Patient admitted for obstructing stone, UTI and placement for chronic low back pain.     Pt and plan discussed with attending Dr. Edmonds.      Procedures     Procedures ? SmartLinks last updated 5/19/2025 10:03 AM        Ryan Cobos DO  Resident  05/19/25 1005

## 2025-05-19 NOTE — CONSULTS
PALLIATIVE MEDICINE CONSULT   Attending Provider: Virginia De La Fuente MD   Reason For Consult: Assistance with goals of care, complex medical decision making, code status discussion    INTRODUCTION TO PALLIATIVE MEDICINE    Met with patient, her son Sumanth, and his wife Nisa at bedside.   Service: Palliative Medicine was introduced as a specialty service for patients with serious illness to help with symptom management, improve quality of life, assist with goals of care conversations, navigate complex decision making, and provide support to patients and families. Support and empathy was provided throughout the encounter.     SUBJECTIVE    History Of Present Illness  Neris Mccall is a 89 y.o. female with past medical history of bladder cancer, CKD, CHF (PPM, patient elected to not have AICD per family report), chronic low back pain/L2 compression fracture with recent L3/4 epidural steroid injection presenting to ED with acute flare of chronic low back pain. Patient follows with pain management, recent epidural injection did not provide much relief. She is currently living in independent living at [facility] and family feels she needs skilled nursing care. CT scan in ED revealed acute T2 compression fracture with history of spinal stenosis, scoliosis, and L2 compression fracture and moderate left hydroureteronephrosis with obstructing stone. She was admitted for further evaluation and management. Per ED, Dr. Villeda ortho-spine reviewed patient's CT scans and recommends pain management, PT/OT and outpt follow up with PM&R. Urology was consulted and is following, possible procedure tomorrow 5/20/25 pending cardiology clearance. Palliative Medicine was consulted for goals of care and complex medical decision making.     History obtained from chart review including ED note, H&P, patient's daily progress notes, review of lab/test results, and discussion with primary team and bedside RN.    Allergies  Duloxetine,  Sulfa (sulfonamide antibiotics), Atenolol, Cephalexin, Cholestyramine, Gemfibrozil, House dust, Niacin, Pravastatin, Tramadol, and Tree pollen-white susanna    Past Medical History  She has a past medical history of Acute upper respiratory infection, unspecified (01/15/2018), Aneurysm of unspecified site (CMS-HCC), Arrhythmia (1975), Arthritis (2005), BiPAP (biphasic positive airway pressure) dependence (2023), Cataract (2005), Chronic pain disorder (1960), Coronary artery disease (2021), CPAP (continuous positive airway pressure) dependence (2014), Dependence on other enabling machines and devices, Dysphagia (2021), Fractures (1955), GERD (gastroesophageal reflux disease) (1985), Glaucoma (2021), Hearing aid worn (1985), Heart disease (1980), Heart failure (2019), Heart valve disease (2021), History of blood transfusion (1958), HL (hearing loss) (1970), Hypertension (1972), Low back pain (1950), Lumbar disc disease (2016), Old myocardial infarction, Other nail disorders (03/14/2017), Pacemaker (2019), Personal history of diseases of the skin and subcutaneous tissue (12/14/2016), Personal history of other diseases of the circulatory system (05/16/2022), Personal history of other diseases of the circulatory system, Personal history of other diseases of the musculoskeletal system and connective tissue, Personal history of other diseases of the nervous system and sense organs, Personal history of other endocrine, nutritional and metabolic disease, Personal history of other venous thrombosis and embolism, Personal history of transient ischemic attack (TIA), and cerebral infarction without residual deficits, Platelet disorder (Multi) (1998), Pregnant ([facility]) ([facility]), Scoliosis (2016), Secondary polycythemia, Shingles (1996), Sleep apnea, Spinal stenosis, lumbar region with neurogenic claudication (11/01/2022), Stroke (Multi) (1967), Urinary tract infection (2019), and Vision loss (2017).    She has no past medical history  of ADD (attention deficit disorder), ADHD (attention deficit hyperactivity disorder), ALKA (acute kidney injury), Anxiety, Autism ([facility]), Autoimmune disorder (Multi), Biliary disease, Bipolar disorder, Bladder cancer (Multi), BPH (benign prostatic hyperplasia), Cancer of neurologic system (Multi), Celiac disease ([facility]), Cerebral aneurysm ([facility]), Cerebral palsy, Cerebral vascular accident (Multi), Cervical cancer, Cervical disc disease, Cholelithiasis, Chronic kidney disease, Cirrhosis (Multi), CKD (chronic kidney disease), Cognitive decline, Cognitive disorder, Colon polyp, Colorectal cancer (Multi), Crohn's disease (Multi), Dementia, Diverticulosis, Dizziness, Dysfunctional uterine bleeding, Endometrial cancer (Multi), Esophageal cancer (Multi), Esophageal disease, ESRD (end stage renal disease) (Multi), Fibroid, Fibromyalgia, primary, Gastric cancer (Multi), Gender dysphoria, Gestational diabetes, Gestational hypertension ([facility]), GI (gastrointestinal bleed), Hemodialysis status, Hernia, internal, Hiatal hernia, History of peritoneal dialysis, HIV disease (Multi), Hydrocephalus, Immunocompromised, Intellectual disability, Irritable bowel syndrome, Liver disease, Lupus, Mastocytosis, MS (multiple sclerosis) (Multi), Muscular dystrophy (Multi), Myasthenia gravis, Myoneural disorder (Multi), Myotonia, VILLANUEVA (nonalcoholic steatohepatitis), Nephrolithiasis, Neuromuscular disorder (Multi), Ovarian cancer (Multi), Pancreatic cancer (Multi), Pancreatitis ([facility]), Pelvic mass, Peptic ulcer disease, Personal history of other mental and behavioral disorders, Polycystic ovarian disease, Prematurity ([facility]), Prostate cancer (Multi), PTSD (post-traumatic stress disorder), Pyelonephritis, Renal cancer (Multi), Renal disease due to hypertension, Schizophrenia, Seizure disorder (Multi), Substance addiction (Multi), Syncope, Thoracic spinal cord injury (Multi), TIA (transient ischemic attack), Ulcerative colitis,  Uterine cancer (Multi), or Vertigo.   Surgical History  She has a past surgical history that includes Dilation and curettage of uterus (04/20/2016); Other surgical history (04/20/2016); Varicose vein surgery (04/20/2016); Other surgical history (04/20/2016); Rotator cuff repair (04/20/2016); Tonsillectomy (04/20/2016); Knee surgery (04/20/2016); Other surgical history (07/29/2022); Fracture surgery (2015); Insert / replace / remove pacemaker (2019); Stapedectomy (1979); Colonoscopy (2014); Upper gastrointestinal endoscopy (2013); ORIF wrist fracture (1955); Toe Surgery (2004); Blepharoplasty (2002); Correction hammer toe (2004); and Back surgery.    Family History  Family History[1]     Social History  She reports that she has never smoked. She has never used smokeless tobacco. She reports that she does not currently use alcohol. She reports that she does not use drugs.       ADVANCE CARE PLANNING: Patient and/or family consented to a voluntary advance care planning meeting    Advance Care Planning    Serious Illness Assessment:    Perception: Patient's, son/HCPNIMESH Julio, and JUAN Paula     Life Limiting Disease/Condition: CHF; CKD     Disease Specific Counseling/Prognosis Discussed: Counseling provided on patient's current clinical condition, including diagnoses (see above), prognosis, and management plan.     Impression of Disease and Stage: Patient and family expressing understanding of overall health status.    Goals: Discussion ensued about goals for patient's medical care going forward. Actively listened to Sumanth and Nisa sharing their perspective on patient's current quality of life, disease course/progression, and symptom and treatment burden. Goals are currently survival/time based. Patient/family wish to continue with current treatment plan including hospital care. Patient and family acknowledging overall decline in patient's health and functional status over past couple months. Patient and family feel  patient can no longer care for herself independently at home and are working with [facility] for SNF placement. Plan to transition to LTC from there.     Resuscitation Assessment: Counseling provided on the benefit versus burden of CPR in the setting of patient's overall health status and age. Decision to change code status to DNR/DNI.     Advance Directives: On file.    Hospice Discussion: Family aware of benefit of hospice services if patient continues to decline and no longer wishes to seek treatment for her serious illnesses. Patient/family not interested in hospice services at this time.     All questions and concerns were addressed during encounter.   -------------------------------------------------------------------------------------------    Medication History   Current Outpatient Medications   Medication Instructions    acetaminophen (TYLENOL) 650 mg, oral, Every 6 hours PRN    artificial tears, dextran-hypomel-glycerin, 0.1-0.3-0.2 % ophthalmic solution 4 times daily    ascorbic acid (vitamin C) 1,000 mg, Daily    aspirin 81 mg, Daily    calcium carb and citrate-vitD3 (Citracal-D3 Slow Release) 600 mg-12.5 mcg (500 unit) tablet extended release 1 tablet, Daily    calcium carbonate (Tums) 200 mg calcium chewable tablet 1 tablet, Daily    cholecalciferol (VITAMIN D-3) 50 mcg, Daily    cranberry extract 200 mg capsule 1 tablet, Daily    cyanocobalamin (Vitamin B-12) 50 mcg tablet 1 tablet, Daily    DULoxetine (CYMBALTA) 20 mg, oral, Daily    Eylea 2 mg, Left Eye, Once, EVERY 10 WEEKS. NO DOSE LISTED    furosemide (Lasix) 20 mg tablet TAKE 1 TABLET DAILY    gentamicin (Garamycin) 0.3 % ophthalmic solution Apply 1 drop under the nail and 1 drop on the nail topically twice daily and cover twice daily for 4 weeks    ibuprofen 200 mg, 4 times daily    ibuprofen 400 mg, oral, Every 6 hours PRN    ipratropium (Atrovent) 42 mcg (0.06 %) nasal spray USE 2 SPRAYS IN EACH NOSTRIL TWO TO THREE TIMES DAILY    latanoprost  "(Xalatan) 0.005 % ophthalmic solution 1 drop, Nightly    lidocaine (Lidoderm) 5 % patch 1 patch, transdermal, Daily, Remove & discard patch within 12 hours or as directed by MD.    losartan (COZAAR) 25 mg, oral, Daily    melatonin 5 mg, Nightly    metoprolol succinate XL (TOPROL-XL) 25 mg, oral, Daily    multivit,calc,mins/iron/folic (WOMEN'S ONE DAILY ORAL) 1 tablet, Daily    naloxone (NARCAN) 4 mg, nasal, As needed, May repeat every 2-3 minutes if needed, alternating nostrils, until medical assistance becomes available.    neomycin-polymyxin-gramicidin (Neosporin) 1.75 mg-10,000 unit-0.025mg/mL ophthalmic solution Apply 1 to 2 drops to the affected nail twice daily for green nail syndrome.    nitrofurantoin, macrocrystal-monohydrate, (Macrobid) 100 mg capsule 100 mg, oral, 2 times daily    omega-3 fatty acids-fish oil (Fish OiL) 340-1,000 mg capsule 1 capsule, Daily    omeprazole (PRILOSEC) 20 mg, oral, 2 times daily before meals, Do not crush or chew.    oxyCODONE (ROXICODONE) 5 mg, oral, Every 12 hours PRN    predniSONE (DELTASONE) 10 mg, oral, Daily, Take 4 pills once a day for 3 days then 3 pills once a day for 3 days then 2 pills once a day for 3 days then one pill once a day for 3 days then STOP    rosuvastatin (Crestor) 20 mg tablet TAKE 1 TABLET DAILY    spironolactone (ALDACTONE) 25 mg, oral, Daily    trolamine salicylate (Aspercreme) 10 % cream 1 Application, Topical, As needed    zolpidem (AMBIEN) 5 mg, oral, Nightly PRN     Scheduled medications   Scheduled Medications[2]  Continuous medications  Continuous Medications[3]  PRN medications  PRN Medications[4]     Last Recorded Vitals  Blood pressure 155/73, pulse 61, temperature 36.8 °C (98.2 °F), temperature source Temporal, resp. rate 18, height 1.626 m (5' 4\"), weight 58.5 kg (129 lb), SpO2 92%.  7 Day Weight Change: Unable to Calculate   Body mass index is 22.14 kg/m².     Relevant Results  Lab Results   Component Value Date    WBC 14.4 (H) " 05/19/2025    HGB 15.8 05/19/2025    HCT 46.6 (H) 05/19/2025    MCV 93 05/19/2025     05/19/2025      Lab Results   Component Value Date    GLUCOSE 114 (H) 05/19/2025    CALCIUM 10.0 05/19/2025     (L) 05/19/2025    K 3.4 (L) 05/19/2025    CO2 27 05/19/2025    CL 96 (L) 05/19/2025    BUN 33 (H) 05/19/2025    CREATININE 0.89 05/19/2025      Lab Results   Component Value Date    ALT 14 05/19/2025    AST 21 05/19/2025    ALKPHOS 71 05/19/2025    BILITOT 0.9 05/19/2025      Lab Results   Component Value Date    ALBUMIN 4.1 05/19/2025     Serum creatinine: 0.89 mg/dL 05/19/25 0817  Estimated creatinine clearance: 36.3 mL/min     Relevant Imaging  CT abdomen pelvis wo IV contrast  Narrative: Interpreted By:  Reyes Lerner,   STUDY:  CT ABDOMEN PELVIS WO IV CONTRAST;  5/19/2025 8:43 am      INDICATION:  Signs/Symptoms:No BM in 4 days. Diffuse abdominal tenderness.      COMPARISON:  CT chest 10/31/2024.      ACCESSION NUMBER(S):  [id number]      ORDERING CLINICIAN:  JUVENCIO AMAYA      TECHNIQUE:  CT of the abdomen and pelvis was performed without intravenous  administration of iodinated contrast. Contiguous axial images were  obtained at 3 mm slice thickness through the abdomen and pelvis.  Coronal and sagittal reconstructions at 3 mm slice thickness were  performed.  No intravenous contrast was administered.      FINDINGS:  Please note that the evaluation of vessels, lymph nodes and organs is  limited without intravenous contrast.      LOWER CHEST:  Bibasilar atelectasis and scarring. Small bilateral pleural  effusions. Right middle lobe pulmonary nodule measuring 6 mm (series  204, image 21) unchanged relative to the comparison CT chest. Pacing  leads terminating in the right atrium, right ventricle and along the  course of the coronary sinus are partially imaged. Cardiomegaly with  left-sided hypertrophy. Coronary artery calcifications. Epicardial  pacing leads.      ABDOMEN:       LIVER:  Unremarkable.      BILE DUCTS:  No significant biliary ductal dilatation within limitation of  noncontrast technique.      GALLBLADDER:  Cholelithiasis and possible biliary sludge      PANCREAS:  Unremarkable.      SPLEEN:  Unremarkable      ADRENAL GLANDS:  Unremarkable      KIDNEYS AND URETERS:  Moderate left hydroureteronephrosis. Calcification along the course  of the distal left ureter measuring 4 mm (series 201, image 107)  concerning for distal left ureteral calculus. Left intra calyceal  nephrolithiasis measuring 1.9 x 1 cm in the interpolar collecting  system. No radiopaque right nephrolithiasis. No right hydronephrosis.  Low-attenuation exophytic interpolar left renal lesion measuring 1.9  cm is similar in size and suggestive of a simple cyst incompletely  characterized in the absence of intravenous contrast.      PERITONEUM/RETROPERITONEUM/LYMPH NODES:  No ascites or free air, no fluid collection. No enlarged mesenteric  lymph nodes.      BOWEL:  No bowel obstruction. Appendix not visualized. No pericecal  inflammation to suggest acute appendicitis. Moderate colonic stool  burden. Distal colonic diverticulosis without diverticulitis.      PELVIS:      BLADDER:  Moderately distended and thin-walled.      REPRODUCTIVE ORGANS:  Partially calcified uterine fibroids.      VESSELS:  Aorto bi-iliac atherosclerosis. Peripherally calcified structure at  the left renal hilum measuring 2 x 1.8 cm compatible with a renal  artery aneurysm. Peripherally calcified structure at the right renal  hilum measuring 1.2 x 1.1 cm suggests a renal artery aneurysm.      ABDOMINAL WALL:  Fat containing left inguinal hernia contains a short segment of  nondilated small bowel.      BONES:  Please see separately dictated report from contemporaneous CT for  detailed evaluation of the lumbar spine. Polyarticular degenerative  change.      Impression: 1. Moderate left hydroureteronephrosis with an obstructing calculus  distal  left ureter just proximal to the UVJ.  2. Solid right middle lobe pulmonary nodules unchanged in size.  3. Please see separately dictated report from contemporaneous CT for  detailed evaluation of the lumbar spine.  4. Additional chronic and/or ancillary findings detailed above.      MACRO:  None      Signed by: Reyes Lerner 5/19/2025 9:37 AM  Dictation workstation:   [id number]  CT lumbar spine retrospective reconstruction protocol  Narrative: Interpreted By:  Reyes Lerner,   STUDY:  CT LUMBAR SPINE RETROSPECTIVE RECONSTRUCTION PROTOCOL  5/19/2025 8:43  am      INDICATION:  Signs/Symptoms:Acute flare of chronic low back pain with point  tenderness          COMPARISON:  Lumbar spine CT 10/31/2024.      ACCESSION NUMBER(S):  [id number]      ORDERING CLINICIAN:  JUVENCIO AMAYA      TECHNIQUE:  Axial CT images of the lumbar spine are obtained. Axial, coronal and  sagittal reconstructions are provided for review.      FINDINGS:  Alignment:  Levoscoliosis of the thoracolumbar spine centered at L2  similar.      Vertebrae:  Acute T12 inferior endplate compression fracture with 54%  height loss. No bony retropulsion or spondylolisthesis remote L2  vertebral body compression fracture with similar height loss status  post augmentation.      Degenerative changes:      Lower Thoracic Spine: No significant bony spinal canal stenosis in  the included lower thoracic region.      T12-L1:  No significant bony spinal canal stenosis.      L1-2: Degenerative disc disease with volume loss, endplate  osteophytosis and facet arthropathy.No significant bony spinal canal  or foraminal stenosis.      L2-3: Degenerative disc disease, endplate osteophytosis and facet  arthropathy. No significant bony spinal canal or foraminal stenosis.      L3-4: Degenerative disc disease with volume loss, endplate  osteophytosis, posteriorly calcified disc bulge, and facet  arthropathy. No significant bony spinal canal or foraminal stenosis       L4-5: Degenerative disc disease with volume loss, endplate  osteophytosis, and facet arthropathy. No significant bony spinal  canal or foraminal stenosis.      L5-S1: Degenerative disc disease with volume loss, endplate  osteophytosis and facet arthropathy. No significant bony spinal canal  or foraminal stenosis.      Prevertebral/Paraspinal Soft Tissues: Unremarkable.      Additional: Please see separately dictated report from  contemporaneous CT abdomen and pelvis for additional findings.      Impression: Acute T12 inferior endplate compression fracture.  Mild-to-moderate multilevel degenerative disc and facet disease  without advanced bony foraminal or canal stenosis      MACRO:  None      Signed by: Reyes Lerner 5/19/2025 9:17 AM  Dictation workstation:   [id number]     Physical Exam:   Physical Exam  Constitutional:       General: She is not in acute distress.  HENT:      Head: Atraumatic.      Comments: Cheesh-Na      Mouth/Throat:      Mouth: Mucous membranes are moist.   Pulmonary:      Effort: Pulmonary effort is normal.   Neurological:      Mental Status: She is alert and oriented to person, place, and time.        ASSESSMENT AND PLAN    - Continue supportive care through primary team  - Chronic pain management per primary team, patient and family aware     #Goals of Care:  #Complex Medical Decision Making:  #Advance Care Planning:  - goals are survival and time based: patient and her family would like to continue with current treatment plan including hospital care with hope for patient to discharge to nursing facility skilled for rehab and then transition into long term care placement as patient can not live at home independently anymore per her/family report  - code status changed to DNR/DNI   - state DNR form completed and placed in patient's chart   - advance directives on file     Supportive Interventions: Music Therapy: referral placed     Plan of Care discussed with: Updated MD De La Fuente, ZULEYMA Knight,  [facility], and bedside RN on goals of care discussion and code status change to DNR/DNI via [employer] secure chat.     Thank you for asking Palliative Care to assist with care of this patient.  We will continue to follow  Please contact us for additional questions or concerns.    Signature and billing  Medical complexity was high level due to complexity of problems including CHF, CKD posing threat to life or function, extensive data review, interviewing patient/family, discussion with primary team and bedside RN, coordination of care, and high risk of management/treatment including patient's decision to change code status to DNR/DNI.     SIGNATURE: MONET Metzger  PAGER/CONTACT:  Contact information:  Palliative Medicine  Contact Via Epic Secure chat         [1]   Family History  Problem Relation Name Age of Onset    Cancer Mother GIBSON SALINAS     Hypertension Sister KELSIE RENTERIA     Breast cancer Sister KELSIE RENTERIA 87    Coronary artery disease Brother TIN SALINAS     Hearing loss Brother SERINA SALINAS     Vision loss Brother SERINA SALINAS     Heart disease Brother AUDREY SALINAS     Breast cancer Paternal Grandmother  30   [2] acetaminophen, 650 mg, oral, q6h  aspirin, 81 mg, oral, Daily  calcitonin (salmon), 1 spray, One Nostril, Daily  calcium carbonate, 1 tablet, oral, Daily  [START ON 5/20/2025] cefTRIAXone, 1 g, intravenous, q24h  cholecalciferol, 50 mcg, oral, Daily  cyanocobalamin, 250 mcg, oral, Daily  [START ON 5/20/2025] DULoxetine, 20 mg, oral, Daily  enoxaparin, 40 mg, subcutaneous, q24h  ipratropium, 2 spray, Each Nostril, TID  latanoprost, 1 drop, Left Eye, Nightly  lidocaine, 1 patch, transdermal, Daily  losartan, 25 mg, oral, Daily  [START ON 5/20/2025] metoprolol succinate XL, 25 mg, oral, Daily  [START ON 5/20/2025] pantoprazole, 40 mg, oral, Daily before breakfast  polyethylene glycol, 17 g, oral, Daily  potassium chloride CR, 40 mEq, oral, Once  rosuvastatin, 20 mg, oral,  Nightly  sennosides-docusate sodium, 2 tablet, oral, BID  tamsulosin, 0.4 mg, oral, Daily    [3] sodium chloride 0.9%, 50 mL/hr    [4] PRN medications: ketorolac, lubricating eye drops, melatonin, ondansetron **OR** ondansetron, oxyCODONE, trolamine salicylate

## 2025-05-19 NOTE — PROGRESS NOTES
Pharmacy Medication History Review    Neris Mccall is a 89 y.o. female admitted for Chronic low back pain, unspecified back pain laterality, unspecified whether sciatica present. Pharmacy reviewed the patient's [id number] medications and allergies for accuracy.    The list below reflectives the updated PTA list. Please review each medication in order reconciliation for additional clarification and justification.  Prior to Admission Medications   Prescriptions Last Dose Informant Patient Reported? Taking?   DULoxetine (Cymbalta) 20 mg DR capsule 5/19/2025 at  6:30 AM Self Yes Yes   Sig: Take 1 capsule (20 mg) by mouth once daily.   acetaminophen (TylenoL) 325 mg tablet 5/18/2025 Evening Self Yes Yes   Sig: Take 2 tablets (650 mg) by mouth every 6 hours if needed for mild pain (1 - 3) or fever (temp greater than 38.0 C).   aflibercept (Eylea) 2 mg/0.05 mL intra-ocular injection 3/11/2025 Self Yes Yes   Sig: Administer 0.05 mL (2 mg) into the left eye 1 time. EVERY 10 WEEKS. NO DOSE LISTED   artificial tears, dextran-hypomel-glycerin, 0.1-0.3-0.2 % ophthalmic solution 5/18/2025 Self Yes Yes   Sig: Administer into affected eye(s) 4 times a day.   ascorbic acid, vitamin C, 500 mg capsule 5/18/2025 Morning Self Yes Yes   Sig: Take 1,000 mg by mouth once daily.   aspirin 81 mg capsule 5/18/2025 Morning Self Yes Yes   Sig: Take 81 mg by mouth once daily.   calcium carb and citrate-vitD3 (Citracal-D3 Slow Release) 600 mg-12.5 mcg (500 unit) tablet extended release 5/18/2025 Morning Self Yes Yes   Sig: Take 1 tablet by mouth once daily.   calcium carbonate (Tums) 200 mg calcium chewable tablet 5/18/2025 Morning Self Yes Yes   Sig: Chew 1 tablet once daily.   cholecalciferol (Vitamin D-3) 25 MCG (1000 UT) capsule 5/18/2025 Morning Self Yes Yes   Sig: Take 2 capsules (50 mcg) by mouth once daily.   cranberry extract 200 mg capsule 5/18/2025 Morning Self Yes Yes   Sig: Take 1 tablet by mouth once daily. 30,000MG    cyanocobalamin (Vitamin B-12) 50 mcg tablet 5/18/2025 Morning Self Yes Yes   Sig: Take 1 tablet (50 mcg) by mouth once daily.   furosemide (Lasix) 20 mg tablet 5/19/2025 at  6:30 AM Self Yes Yes   Sig: TAKE 1 TABLET DAILY   gentamicin (Garamycin) 0.3 % ophthalmic solution  Self Yes Yes   Sig: Apply 1 drop under the nail and 1 drop on the nail topically twice daily and cover twice daily for 4 weeks   Patient taking differently: Apply 1 drop under the nail and 1 drop on the nail topically twice daily and cover twice daily for 4 weeks  **FOR NAIL FUNGUS NOT EYES**   ibuprofen 200 mg tablet Unknown Self Yes Yes   Sig: Take 1 tablet (200 mg) by mouth 4 times a day.   ibuprofen 400 mg tablet Unknown Self Yes Yes   Sig: Take 1 tablet (400 mg) by mouth every 6 hours if needed for moderate pain (4 - 6) for up to 7 days.   ipratropium (Atrovent) 42 mcg (0.06 %) nasal spray Unknown Self Yes Yes   Sig: USE 2 SPRAYS IN EACH NOSTRIL TWO TO THREE TIMES DAILY   latanoprost (Xalatan) 0.005 % ophthalmic solution 5/18/2025 Evening Self Yes Yes   Sig: Administer 1 drop into the left eye once daily at bedtime.   lidocaine (Lidoderm) 5 % patch 5/19/2025 Self Yes Yes   Sig: Place 1 patch over 12 hours on the skin once daily. Remove & discard patch within 12 hours or as directed by MD.   losartan (Cozaar) 25 mg tablet 5/18/2025 Evening Self Yes Yes   Sig: TAKE 1 TABLET DAILY   melatonin 5 mg capsule 5/18/2025 Evening Self Yes Yes   Sig: Take 1 capsule (5 mg) by mouth once daily at bedtime.   metoprolol succinate XL (Toprol-XL) 25 mg 24 hr tablet 5/19/2025 Morning Self Yes Yes   Sig: Take 1 tablet (25 mg) by mouth once daily.   multivit,calc,mins/iron/folic (WOMEN'S ONE DAILY ORAL) 5/18/2025 Morning Self Yes Yes   Sig: Take 1 tablet by mouth once daily.   naloxone (Narcan) 4 mg/0.1 mL nasal spray  Self Yes Yes   Sig: Administer 1 spray (4 mg) into affected nostril(s) if needed for opioid reversal. May repeat every 2-3 minutes if needed,  alternating nostrils, until medical assistance becomes available.   neomycin-polymyxin-gramicidin (Neosporin) 1.75 mg-10,000 unit-0.025mg/mL ophthalmic solution  Self Yes Yes   Sig: Apply 1 to 2 drops to the affected nail twice daily for green nail syndrome.   nitrofurantoin, macrocrystal-monohydrate, (Macrobid) 100 mg capsule 5/19/2025 Morning Self Yes Yes   Sig: Take 1 capsule (100 mg) by mouth 2 times a day for 5 days.   omega-3 fatty acids-fish oil (Fish OiL) 340-1,000 mg capsule 5/18/2025 Morning Self Yes Yes   Sig: Take 1 capsule by mouth once daily.   omeprazole (PriLOSEC) 20 mg DR capsule 5/19/2025 Morning Self Yes Yes   Sig: Take 1 capsule (20 mg) by mouth 2 times a day before meals. Do not crush or chew.   oxyCODONE (Roxicodone) 5 mg immediate release tablet 5/19/2025 at  4:00 AM Self Yes Yes   Sig: Take 1 tablet (5 mg) by mouth every 12 hours if needed for severe pain (7 - 10).   predniSONE (Deltasone) 10 mg tablet 5/19/2025 at  5:30 AM Self Yes Yes   Sig: Take 1 tablet (10 mg) by mouth once daily. Take 4 pills once a day for 3 days then 3 pills once a day for 3 days then 2 pills once a day for 3 days then one pill once a day for 3 days then STOP   rosuvastatin (Crestor) 20 mg tablet 5/18/2025 Evening Self Yes Yes   Sig: TAKE 1 TABLET DAILY   spironolactone (Aldactone) 25 mg tablet 5/19/2025 at  6:30 AM Self Yes Yes   Sig: TAKE 1 TABLET DAILY   trolamine salicylate (Aspercreme) 10 % cream Unknown Self Yes Yes   Sig: Apply 1 Application topically if needed for muscle/joint pain.   zolpidem (Ambien) 5 mg tablet 5/18/2025 Evening Self Yes Yes   Sig: Take 1 tablet (5 mg) by mouth as needed at bedtime for sleep.   Patient taking differently: Take 0.5-1 tablets (2.5-5 mg) by mouth as needed at bedtime for sleep.      Facility-Administered Medications Last Administration Doses Remaining   denosumab (Prolia) injection 60 mg None recorded 2              The list below reflectives the updated allergy list. Please  review each documented allergy for additional clarification and justification.  Allergies  Reviewed by Smith Guerrero RN on 5/19/2025        Severity Reactions Comments    Duloxetine Medium Diarrhea, Other Reaction from Community: Diarrhea    Sulfa (sulfonamide Antibiotics) Medium Other, Rash     Atenolol Low Unknown     Cephalexin Low Diarrhea     Cholestyramine Low Unknown     Gemfibrozil Low Unknown     House Dust Low Other     Niacin Low Unknown     Pravastatin Low Unknown     Tramadol Low Other, Hallucinations Reaction from Community: Hallucinations    Tree Pollen-white Maxim Low Unknown Maxim, White            Below are additional concerns with the patient's PTA list.      Jovita Rowell

## 2025-05-19 NOTE — CONSULTS
Inpatient consult to Cardiology  Consult performed by: JOHN Monet-CNP  Consult ordered by: Reta Stuart PA-C        History Of Present Illness:    Neris Mccall is a 89 y.o. female with PMH of moderate aortic stenosis (TTE 6/2024), moderate CAD of LAD and RCA (cath 8/2018), HFrEF with improved LVEF (57% TTE 6/2024), LBBB s/p CRT-p, CKD3, chronic low back pain/L2 compression fracture with recent L3/4 epidural steroid injection presenting with acute back pain. In the ED, /79 HR 71, SpO2 92% on room air. Labs significant for K 3.4, Mag 1.7, , WBCs 14.4, UA positive. CT L spine and abd/pelvis:  Acute T12 inferior endplate compression fracture as well as moderate left hydroureteronephrosis with 4 mm stone at distal left ureter. She denies chest pain, syncope, orthopnea, shortness of breath, edema. She states she has been losing weight at home.     Review of Systems   Constitutional:  Positive for appetite change and unexpected weight change.   Gastrointestinal:  Positive for constipation.   Musculoskeletal:  Positive for back pain and gait problem.   All other systems reviewed and are negative.    Last Recorded Vitals:  Vitals:    05/19/25 0900 05/19/25 1000 05/19/25 1100 05/19/25 1238   BP: 160/70 131/66 118/61 155/73   BP Location:    Left arm   Patient Position:    Lying   Pulse:    61   Resp:    18   Temp:    36.8 °C (98.2 °F)   TempSrc:    Temporal   SpO2:    92%   Weight:       Height:           Last Labs:  CBC - 5/19/2025:  8:17 AM  14.4 15.8 226    46.6      CMP - 5/19/2025:  8:17 AM  10.0 6.9 21 --- 0.9   _ 4.1 14 71      PTT - No results in last year.  _   _ _     Troponin I, High Sensitivity   Date/Time Value Ref Range Status   10/31/2024 05:25 PM 38 (H) 0 - 13 ng/L Final   10/31/2024 03:19 PM 40 (H) 0 - 13 ng/L Final     BNP   Date/Time Value Ref Range Status   10/31/2024 03:19  (H) 0 - 99 pg/mL Final   12/20/2021 08:37 PM 88 0 - 99 pg/mL Final     Comment:     .  <100 pg/mL  - Heart failure unlikely  100-299 pg/mL - Intermediate probability of acute heart  .               failure exacerbation. Correlate with clinical  .               context and patient history.    >=300 pg/mL - Heart Failure likely. Correlate with clinical  .               context and patient history.  BNP testing is performed using different testing   methodology at [facility] than at other   system hospitals. Direct result comparisons should   only be made within the same method.     12/27/2019 06:25 AM 5,045 (H) 0 - 99 pg/mL Final     Comment:     .  <100 pg/mL - Heart failure unlikely  100-299 pg/mL - Intermediate probability of acute heart  .               failure exacerbation. Correlate with clinical  .               context and patient history.    >=300 pg/mL - Heart Failure likely. Correlate with clinical  .               context and patient history.  BNP testing is performed using different testing   methodology at [facility] than at other   [facility] hospitals. Direct result comparisons should   only be made within the same method.       Hemoglobin A1C   Date/Time Value Ref Range Status   12/20/2021 08:37 PM 5.7 (A) % Final     Comment:          Diagnosis of Diabetes-Adults   Non-Diabetic: < or = 5.6%   Increased risk for developing diabetes: 5.7-6.4%   Diagnostic of diabetes: > or = 6.5%  .       Monitoring of Diabetes                Age (y)     Therapeutic Goal (%)   Adults:          >18           <7.0   Pediatrics:    13-18           <7.5                   7-12           <8.0                   0- 6            7.5-8.5   American Diabetes Association. Diabetes Care 33(S1), Jan 2010.     05/17/2021 09:43 AM 5.5 % Final     Comment:          Diagnosis of Diabetes-Adults   Non-Diabetic: < or = 5.6%   Increased risk for developing diabetes: 5.7-6.4%   Diagnostic of diabetes: > or = 6.5%  .       Monitoring of Diabetes                Age (y)     Therapeutic Goal (%)   Adults:          >18  "          <7.0   Pediatrics:    13-18           <7.5                   7-12           <8.0                   0- 6            7.5-8.5   American Diabetes Association. Diabetes Care 33(S1), Jan 2010.       LDL Calculated   Date/Time Value Ref Range Status   11/11/2024 08:25 AM 63 <=99 mg/dL Final     Comment:                                 Near   Borderline      AGE      Desirable  Optimal    High     High     Very High     0-19 Y     0 - 109     ---    110-129   >/= 130     ----    20-24 Y     0 - 119     ---    120-159   >/= 160     ----      >24 Y     0 -  99   100-129  130-159   160-189     >/=190     05/16/2024 08:20 AM 73 <=99 mg/dL Final     Comment:                                 Near   Borderline      AGE      Desirable  Optimal    High     High     Very High     0-19 Y     0 - 109     ---    110-129   >/= 130     ----    20-24 Y     0 - 119     ---    120-159   >/= 160     ----      >24 Y     0 -  99   100-129  130-159   160-189     >/=190     11/13/2023 10:54 AM 77 <=99 mg/dL Final     Comment:                                 Near   Borderline      AGE      Desirable  Optimal    High     High     Very High     0-19 Y     0 - 109     ---    110-129   >/= 130     ----    20-24 Y     0 - 119     ---    120-159   >/= 160     ----      >24 Y     0 -  99   100-129  130-159   160-189     >/=190       VLDL   Date/Time Value Ref Range Status   11/11/2024 08:25 AM 36 0 - 40 mg/dL Final   05/16/2024 08:20 AM 38 0 - 40 mg/dL Final   11/13/2023 10:54 AM 32 0 - 40 mg/dL Final      Last I/O:  No intake/output data recorded.    Past Cardiology Tests (Last 3 Years):  EKG:  ECG 12 lead   AV paced    Echo:  Transthoracic Echo (TTE) Complete 06/11/2024  CONCLUSIONS:   1. Left ventricular systolic function is normal.   2. There is reduced right ventricular systolic function.   3. Moderate aortic valve stenosis.   4. Mild aortic valve regurgitation.    Ejection Fractions:  No results found for: \"EF\"  Cath:  Left and Right " Heart Catheterization   8/3/2018  CONCLUSIONS:   1. Left main: no significant angiographic CAD.   2. LAD: 60% mid-vessel focal stenosis. Lesion is 65% by OCT, FFR = 0.82.   3. LCx: no significant angiographic CAD.   4. RCA: 40-50% diffuse mid-vessel disease.   5. RA 1mmHg, RV 28/3, PA 26/10 (18), PCWP 13mmHg.   6. Tanna CI 2.7 l/min/m2.    Stress Test:  No results found for this or any previous visit from the past 1095 days.    Cardiac Imaging:  No results found for this or any previous visit from the past 1095 days.    Device Check  3/17/2025      Past Medical History:  She has a past medical history of Acute upper respiratory infection, unspecified (01/15/2018), Aneurysm of unspecified site (CMS-HCC), Arrhythmia (1975), Arthritis (2005), BiPAP (biphasic positive airway pressure) dependence (2023), Cataract (2005), Chronic pain disorder (1960), Coronary artery disease (2021), CPAP (continuous positive airway pressure) dependence (2014), Dependence on other enabling machines and devices, Dysphagia (2021), Fractures (1955), GERD (gastroesophageal reflux disease) (1985), Glaucoma (2021), Hearing aid worn (1985), Heart disease (1980), Heart failure (2019), Heart valve disease (2021), History of blood transfusion (1958), HL (hearing loss) (1970), Hypertension (1972), Low back pain (1950), Lumbar disc disease (2016), Old myocardial infarction, Other nail disorders (03/14/2017), Pacemaker (2019), Personal history of diseases of the skin and subcutaneous tissue (12/14/2016), Personal history of other diseases of the circulatory system (05/16/2022), Personal history of other diseases of the circulatory system, Personal history of other diseases of the musculoskeletal system and connective tissue, Personal history of other diseases of the nervous system and sense organs, Personal history of other endocrine, nutritional and metabolic disease, Personal history of other venous thrombosis and embolism, Personal history of  transient ischemic attack (TIA), and cerebral infarction without residual deficits, Platelet disorder (Multi) (1998), Pregnant ([facility]) (1957), Scoliosis (2016), Secondary polycythemia, Shingles (1996), Sleep apnea, Spinal stenosis, lumbar region with neurogenic claudication (11/01/2022), Stroke (Multi) (1967), Urinary tract infection (2019), and Vision loss (2017).    She has no past medical history of ADD (attention deficit disorder), ADHD (attention deficit hyperactivity disorder), ALKA (acute kidney injury), Anxiety, Autism ([facility]), Autoimmune disorder (Multi), Biliary disease, Bipolar disorder, Bladder cancer (Multi), BPH (benign prostatic hyperplasia), Cancer of neurologic system ([facility]), Celiac disease ([facility]), Cerebral aneurysm ([facility]), Cerebral palsy, Cerebral vascular accident (Multi), Cervical cancer, Cervical disc disease, Cholelithiasis, Chronic kidney disease, Cirrhosis (Multi), CKD (chronic kidney disease), Cognitive decline, Cognitive disorder, Colon polyp, Colorectal cancer (Multi), Crohn's disease (Multi), Dementia, Diverticulosis, Dizziness, Dysfunctional uterine bleeding, Endometrial cancer (Multi), Esophageal cancer (Multi), Esophageal disease, ESRD (end stage renal disease) (Multi), Fibroid, Fibromyalgia, primary, Gastric cancer (Multi), Gender dysphoria, Gestational diabetes, Gestational hypertension ([facility]), GI (gastrointestinal bleed), Hemodialysis status, Hernia, internal, Hiatal hernia, History of peritoneal dialysis, HIV disease (Multi), Hydrocephalus, Immunocompromised, Intellectual disability, Irritable bowel syndrome, Liver disease, Lupus, Mastocytosis, MS (multiple sclerosis) (Multi), Muscular dystrophy (Multi), Myasthenia gravis, Myoneural disorder (Multi), Myotonia, VILLANUEVA (nonalcoholic steatohepatitis), Nephrolithiasis, Neuromuscular disorder (Multi), Ovarian cancer (Multi), Pancreatic cancer (Multi), Pancreatitis ([facility]), Pelvic mass, Peptic ulcer disease, Personal history  of other mental and behavioral disorders, Polycystic ovarian disease, Prematurity ([facility]), Prostate cancer (Multi), PTSD (post-traumatic stress disorder), Pyelonephritis, Renal cancer (Multi), Renal disease due to hypertension, Schizophrenia, Seizure disorder (Multi), Substance addiction (Multi), Syncope, Thoracic spinal cord injury (Multi), TIA (transient ischemic attack), Ulcerative colitis, Uterine cancer (Multi), or Vertigo.    Past Surgical History:  She has a past surgical history that includes Dilation and curettage of uterus (04/20/2016); Other surgical history (04/20/2016); Varicose vein surgery (04/20/2016); Other surgical history (04/20/2016); Rotator cuff repair (04/20/2016); Tonsillectomy (04/20/2016); Knee surgery (04/20/2016); Other surgical history (07/29/2022); Fracture surgery (2015); Insert / replace / remove pacemaker (2019); Stapedectomy (1979); Colonoscopy (2014); Upper gastrointestinal endoscopy (2013); ORIF wrist fracture (1955); Toe Surgery (2004); Blepharoplasty (2002); Correction hammer toe (2004); and Back surgery.      Social History:  She reports that she has never smoked. She has never used smokeless tobacco. She reports that she does not currently use alcohol. She reports that she does not use drugs.    Family History:  Family History[1]     Allergies:  Duloxetine, Sulfa (sulfonamide antibiotics), Atenolol, Cephalexin, Cholestyramine, Gemfibrozil, House dust, Niacin, Pravastatin, Tramadol, and Tree pollen-white susanna    Inpatient Medications:  Scheduled Medications[2]  PRN Medications[3]  Continuous Medications[4]  Outpatient Medications:  Current Outpatient Medications   Medication Instructions    acetaminophen (TYLENOL) 650 mg, oral, Every 6 hours PRN    artificial tears, dextran-hypomel-glycerin, 0.1-0.3-0.2 % ophthalmic solution 4 times daily    ascorbic acid (vitamin C) 1,000 mg, Daily    aspirin 81 mg, Daily    calcium carb and citrate-vitD3 (Citracal-D3 Slow Release) 600  mg-12.5 mcg (500 unit) tablet extended release 1 tablet, Daily    calcium carbonate (Tums) 200 mg calcium chewable tablet 1 tablet, Daily    cholecalciferol (VITAMIN D-3) 50 mcg, Daily    cranberry extract 200 mg capsule 1 tablet, Daily    cyanocobalamin (Vitamin B-12) 50 mcg tablet 1 tablet, Daily    DULoxetine (CYMBALTA) 20 mg, oral, Daily    Eylea 2 mg, Left Eye, Once, EVERY 10 WEEKS. NO DOSE LISTED    furosemide (Lasix) 20 mg tablet TAKE 1 TABLET DAILY    gentamicin (Garamycin) 0.3 % ophthalmic solution Apply 1 drop under the nail and 1 drop on the nail topically twice daily and cover twice daily for 4 weeks    ibuprofen 200 mg, 4 times daily    ibuprofen 400 mg, oral, Every 6 hours PRN    ipratropium (Atrovent) 42 mcg (0.06 %) nasal spray USE 2 SPRAYS IN EACH NOSTRIL TWO TO THREE TIMES DAILY    latanoprost (Xalatan) 0.005 % ophthalmic solution 1 drop, Nightly    lidocaine (Lidoderm) 5 % patch 1 patch, transdermal, Daily, Remove & discard patch within 12 hours or as directed by MD.    losartan (COZAAR) 25 mg, oral, Daily    melatonin 5 mg, Nightly    metoprolol succinate XL (TOPROL-XL) 25 mg, oral, Daily    multivit,calc,mins/iron/folic (WOMEN'S ONE DAILY ORAL) 1 tablet, Daily    naloxone (NARCAN) 4 mg, nasal, As needed, May repeat every 2-3 minutes if needed, alternating nostrils, until medical assistance becomes available.    neomycin-polymyxin-gramicidin (Neosporin) 1.75 mg-10,000 unit-0.025mg/mL ophthalmic solution Apply 1 to 2 drops to the affected nail twice daily for green nail syndrome.    nitrofurantoin, macrocrystal-monohydrate, (Macrobid) 100 mg capsule 100 mg, oral, 2 times daily    omega-3 fatty acids-fish oil (Fish OiL) 340-1,000 mg capsule 1 capsule, Daily    omeprazole (PRILOSEC) 20 mg, oral, 2 times daily before meals, Do not crush or chew.    oxyCODONE (ROXICODONE) 5 mg, oral, Every 12 hours PRN    predniSONE (DELTASONE) 10 mg, oral, Daily, Take 4 pills once a day for 3 days then 3 pills once a  day for 3 days then 2 pills once a day for 3 days then one pill once a day for 3 days then STOP    rosuvastatin (Crestor) 20 mg tablet TAKE 1 TABLET DAILY    spironolactone (ALDACTONE) 25 mg, oral, Daily    trolamine salicylate (Aspercreme) 10 % cream 1 Application, Topical, As needed    zolpidem (AMBIEN) 5 mg, oral, Nightly PRN     Physical Exam  Vitals reviewed.   Constitutional:       Appearance: Normal appearance. She is ill-appearing.   HENT:      Head: Normocephalic and atraumatic.   Neck:      Vascular: No carotid bruit or JVD.   Cardiovascular:      Rate and Rhythm: Normal rate and regular rhythm.      Pulses: Normal pulses.      Heart sounds: Murmur heard.      Systolic murmur is present with a grade of 3/6.   Pulmonary:      Effort: Pulmonary effort is normal.      Breath sounds: Normal breath sounds.   Chest:      Chest wall: No tenderness.   Abdominal:      General: Abdomen is flat. Bowel sounds are normal.      Palpations: Abdomen is soft.   Skin:     General: Skin is warm and dry.   Neurological:      General: No focal deficit present.      Mental Status: She is alert and oriented to person, place, and time. Mental status is at baseline.   Psychiatric:         Mood and Affect: Mood normal.         Behavior: Behavior normal.            Assessment/Plan   Assessment  89 y.o. female with PMH of moderate aortic stenosis (TTE 6/2024), moderate CAD of LAD and RCA (cath 8/2018), HFrEF with improved LVEF (57% TTE 6/2024), LBBB s/p CRT-p, CKD3, chronic low back pain/L2 compression fracture with recent L3/4 epidural steroid injection presenting with acute back pain. In the ED, /79 HR 71, SpO2 92% on room air. Labs significant for K 3.4, Mag 1.7, , WBCs 14.4, UA positive. CT L spine and abd/pelvis:  Acute T12 inferior endplate compression fracture as well as moderate left hydroureteronephrosis with 4 mm stone at distal left ureter. She denies chest pain, syncope, orthopnea, shortness of breath, edema.  She states she has been losing weight at home. Currently on room air.     Acute on Chronic back pain   Moderate left hydroureteronephrosis  Uretal stone  Moderate aortic stenosis (TTE 6/2024)   Moderate CAD of LAD and RCA (cath 8/2018)  Chronic systolic HFrEF with improved LVEF (57% TTE 6/2024), compensated  LBBB s/p CRT-p  CKD3  Preoperative evaluation    Plan  -EKG ordered  -TTE for structural and functional assessment  -RCRI 1  -Acceptable risk to proceed with surgery if needed  -Continue aspirin 81 mg daily, rosuvastatin 20 mg daily, losartan 25 mg daily, Toprol 25 mg daily  -Urology consult, palliative care evaluation pending     Code Status:  Full Code          Rishabh Barnett, APRN-CNP         [1]   Family History  Problem Relation Name Age of Onset    Cancer Mother GIBSON SALINAS     Hypertension Sister KELSIE RENTERIA     Breast cancer Sister KELSIE RENTERIA 87    Coronary artery disease Brother TIN SALINAS     Hearing loss Brother SERINA RITA     Vision loss Brother SERINA RITA     Heart disease Brother AUDREY SALINAS     Breast cancer Paternal Grandmother  30   [2]   Scheduled medications   Medication Dose Route Frequency    acetaminophen  650 mg oral q6h    calcitonin (salmon)  1 spray One Nostril Daily    lidocaine  1 patch transdermal Daily    potassium chloride CR  40 mEq oral Once    tamsulosin  0.4 mg oral Daily   [3]   PRN medications   Medication    ketorolac    oxyCODONE   [4]   Continuous Medications   Medication Dose Last Rate

## 2025-05-19 NOTE — ED TRIAGE NOTES
Patient here for back pain Lower back right hurts more than left side. Took an oxycodone at 0400 with no relief. States she recently had steroid injections in her spine.

## 2025-05-19 NOTE — CONSULTS
"Reason For Consult  Moderate left hydroureteronephrosis with an obstructing calculus distal left ureter just proximal to the UVJ.     History Of Present Illness  Neris Mccall is a 89 y.o. female with past medical history significant for bladder cancer, CKD, CHF, chronic low back pain /L2 compression fracture with recent L3/4 epidural steroid injection. She states she developed acute on chronic low mid back pain that radiates to the right side shortly after a steroid injection on 5/8. Pain became severe on 5/15 prompting her to come to the ED for eval. She was treated symptomatically with pain meds and clinically improved. Urine showed a questionable UTI and she was discharged home with keflex. She states pain relief was temporary prompting her to return to the ED 5/19 for further workup. She states pain feels similar to her chronic pain but is more severe. Denies associated nausea, vomiting, fever or chills. Reports chronic periods of difficulty urinating alternating with \"gushes\" of urine. Otherwise no urological complaints. Does state she has some left flank discomfort when coughing. Denies previous history of kidney stones. Does not have a urologist.     In the ED, patient hemodynamically stable, afebrile. WBC 14.4, BUN 33. Creat WNL. UA does not appear infected. CT a/p significant for moderate left hydroureteronephrosis with an obstructing calculus distal left ureter just proximal to the UVJ measuring 4 mm and a left intra calyceal nephrolithiasis measuring 1.9 x 1 cm in there interpolar collecting system. Patient admitted to medicine with urology consult for further recommendations.      Past Medical History  She has a past medical history of Acute upper respiratory infection, unspecified (01/15/2018), Aneurysm of unspecified site (CMS-[facility]), Arrhythmia (1975), Arthritis (2005), BiPAP (biphasic positive airway pressure) dependence (2023), Cataract (2005), Chronic pain disorder (1960), Coronary artery disease " (2021), CPAP (continuous positive airway pressure) dependence (2014), Dependence on other enabling machines and devices, Dysphagia (2021), Fractures (1955), GERD (gastroesophageal reflux disease) (1985), Glaucoma (2021), Hearing aid worn (1985), Heart disease (1980), Heart failure (2019), Heart valve disease (2021), History of blood transfusion (1958), HL (hearing loss) (1970), Hypertension (1972), Low back pain (1950), Lumbar disc disease (2016), Old myocardial infarction, Other nail disorders (03/14/2017), Pacemaker (2019), Personal history of diseases of the skin and subcutaneous tissue (12/14/2016), Personal history of other diseases of the circulatory system (05/16/2022), Personal history of other diseases of the circulatory system, Personal history of other diseases of the musculoskeletal system and connective tissue, Personal history of other diseases of the nervous system and sense organs, Personal history of other endocrine, nutritional and metabolic disease, Personal history of other venous thrombosis and embolism, Personal history of transient ischemic attack (TIA), and cerebral infarction without residual deficits, Platelet disorder (Multi) (1998), Pregnant ([facility]) (1957), Scoliosis (2016), Secondary polycythemia, Shingles (1996), Sleep apnea, Spinal stenosis, lumbar region with neurogenic claudication (11/01/2022), Stroke (Multi) (1967), Urinary tract infection (2019), and Vision loss (2017).    She has no past medical history of ADD (attention deficit disorder), ADHD (attention deficit hyperactivity disorder), ALKA (acute kidney injury), Anxiety, Autism ([facility]), Autoimmune disorder (Multi), Biliary disease, Bipolar disorder, Bladder cancer (Multi), BPH (benign prostatic hyperplasia), Cancer of neurologic system ([facility]), Celiac disease ([facility]), Cerebral aneurysm ([facility]), Cerebral palsy, Cerebral vascular accident (Multi), Cervical cancer, Cervical disc disease, Cholelithiasis, Chronic kidney  disease, Cirrhosis (Multi), CKD (chronic kidney disease), Cognitive decline, Cognitive disorder, Colon polyp, Colorectal cancer (Multi), Crohn's disease (Multi), Dementia, Diverticulosis, Dizziness, Dysfunctional uterine bleeding, Endometrial cancer (Multi), Esophageal cancer (Multi), Esophageal disease, ESRD (end stage renal disease) (Multi), Fibroid, Fibromyalgia, primary, Gastric cancer (Multi), Gender dysphoria, Gestational diabetes, Gestational hypertension ([facility]), GI (gastrointestinal bleed), Hemodialysis status, Hernia, internal, Hiatal hernia, History of peritoneal dialysis, HIV disease (Multi), Hydrocephalus, Immunocompromised, Intellectual disability, Irritable bowel syndrome, Liver disease, Lupus, Mastocytosis, MS (multiple sclerosis) (Multi), Muscular dystrophy (Multi), Myasthenia gravis, Myoneural disorder (Multi), Myotonia, VILLANUEVA (nonalcoholic steatohepatitis), Nephrolithiasis, Neuromuscular disorder (Multi), Ovarian cancer (Multi), Pancreatic cancer (Multi), Pancreatitis ([facility]), Pelvic mass, Peptic ulcer disease, Personal history of other mental and behavioral disorders, Polycystic ovarian disease, Prematurity ([facility]), Prostate cancer (Multi), PTSD (post-traumatic stress disorder), Pyelonephritis, Renal cancer (Multi), Renal disease due to hypertension, Schizophrenia, Seizure disorder (Multi), Substance addiction (Multi), Syncope, Thoracic spinal cord injury (Multi), TIA (transient ischemic attack), Ulcerative colitis, Uterine cancer (Multi), or Vertigo.    Surgical History  She has a past surgical history that includes Dilation and curettage of uterus (04/20/2016); Other surgical history (04/20/2016); Varicose vein surgery (04/20/2016); Other surgical history (04/20/2016); Rotator cuff repair (04/20/2016); Tonsillectomy (04/20/2016); Knee surgery (04/20/2016); Other surgical history (07/29/2022); Fracture surgery (2015); Insert / replace / remove pacemaker (2019); Stapedectomy (1979);  "Colonoscopy (2014); Upper gastrointestinal endoscopy (2013); ORIF wrist fracture (1955); Toe Surgery (2004); Blepharoplasty (2002); Correction hammer toe (2004); and Back surgery.     Social History  She reports that she has never smoked. She has never used smokeless tobacco. She reports that she does not currently use alcohol. She reports that she does not use drugs.    Family History  Family History[1]     Allergies  Duloxetine, Sulfa (sulfonamide antibiotics), Atenolol, Cephalexin, Cholestyramine, Gemfibrozil, House dust, Niacin, Pravastatin, Tramadol, and Tree pollen-white susanna     Physical Exam  Physical Exam  Constitutional:       General: She is not in acute distress.  Cardiovascular:      Rate and Rhythm: Normal rate and regular rhythm.   Pulmonary:      Effort: Pulmonary effort is normal.   Genitourinary:     Comments: No CVA tenderness  Skin:     General: Skin is warm and dry.   Neurological:      Mental Status: She is alert and oriented to person, place, and time.            Last Recorded Vitals  Blood pressure 155/73, pulse 61, temperature 36.8 °C (98.2 °F), temperature source Temporal, resp. rate 18, height 1.626 m (5' 4\"), weight 58.5 kg (129 lb), SpO2 92%.    Relevant Results  Results for orders placed or performed during the hospital encounter of 05/19/25 (from the past 24 hours)   Urinalysis with Reflex Culture and Microscopic   Result Value Ref Range    Color, Urine Light-Yellow Light-Yellow, Yellow, Dark-Yellow    Appearance, Urine Clear Clear    Specific Gravity, Urine 1.011 1.005 - 1.035    pH, Urine 7.0 5.0, 5.5, 6.0, 6.5, 7.0, 7.5, 8.0    Protein, Urine NEGATIVE NEGATIVE, 10 (TRACE), 20 (TRACE) mg/dL    Glucose, Urine Normal Normal mg/dL    Blood, Urine NEGATIVE NEGATIVE mg/dL    Ketones, Urine NEGATIVE NEGATIVE mg/dL    Bilirubin, Urine NEGATIVE NEGATIVE mg/dL    Urobilinogen, Urine Normal Normal mg/dL    Nitrite, Urine NEGATIVE NEGATIVE    Leukocyte Esterase, Urine 250 Gianna/uL (A) NEGATIVE "   Extra Urine Gray Tube   Result Value Ref Range    Extra Tube 293    Microscopic Only, Urine   Result Value Ref Range    WBC, Urine 11-20 (A) 1-5, NONE /HPF    RBC, Urine 3-5 NONE, 1-2, 3-5 /HPF    Hyaline Casts, Urine OCCASIONAL (A) NONE /LPF   CBC and Auto Differential   Result Value Ref Range    WBC 14.4 (H) 4.4 - 11.3 x10*3/uL    nRBC 0.0 0.0 - 0.0 /100 WBCs    RBC 5.04 4.00 - 5.20 x10*6/uL    Hemoglobin 15.8 12.0 - 16.0 g/dL    Hematocrit 46.6 (H) 36.0 - 46.0 %    MCV 93 80 - 100 fL    MCH 31.3 26.0 - 34.0 pg    MCHC 33.9 32.0 - 36.0 g/dL    RDW 12.4 11.5 - 14.5 %    Platelets 226 150 - 450 x10*3/uL    Neutrophils % 82.3 40.0 - 80.0 %    Immature Granulocytes %, Automated 0.6 0.0 - 0.9 %    Lymphocytes % 8.5 13.0 - 44.0 %    Monocytes % 8.2 2.0 - 10.0 %    Eosinophils % 0.1 0.0 - 6.0 %    Basophils % 0.3 0.0 - 2.0 %    Neutrophils Absolute 11.81 (H) 1.60 - 5.50 x10*3/uL    Immature Granulocytes Absolute, Automated 0.08 0.00 - 0.50 x10*3/uL    Lymphocytes Absolute 1.22 0.80 - 3.00 x10*3/uL    Monocytes Absolute 1.18 (H) 0.05 - 0.80 x10*3/uL    Eosinophils Absolute 0.02 0.00 - 0.40 x10*3/uL    Basophils Absolute 0.04 0.00 - 0.10 x10*3/uL   Comprehensive metabolic panel   Result Value Ref Range    Glucose 114 (H) 74 - 99 mg/dL    Sodium 134 (L) 136 - 145 mmol/L    Potassium 3.4 (L) 3.5 - 5.3 mmol/L    Chloride 96 (L) 98 - 107 mmol/L    Bicarbonate 27 21 - 32 mmol/L    Anion Gap 14 10 - 20 mmol/L    Urea Nitrogen 33 (H) 6 - 23 mg/dL    Creatinine 0.89 0.50 - 1.05 mg/dL    eGFR 62 >60 mL/min/1.73m*2    Calcium 10.0 8.6 - 10.3 mg/dL    Albumin 4.1 3.4 - 5.0 g/dL    Alkaline Phosphatase 71 33 - 136 U/L    Total Protein 6.9 6.4 - 8.2 g/dL    AST 21 9 - 39 U/L    Bilirubin, Total 0.9 0.0 - 1.2 mg/dL    ALT 14 7 - 45 U/L   Magnesium   Result Value Ref Range    Magnesium 1.71 1.60 - 2.40 mg/dL   B-type natriuretic peptide   Result Value Ref Range     (H) 0 - 99 pg/mL     *Note: Due to a large number of results  and/or encounters for the requested time period, some results have not been displayed. A complete set of results can be found in Results Review.     CT abdomen pelvis wo IV contrast  Result Date: 5/19/2025  Interpreted By:  Reyes Lerner, STUDY: CT ABDOMEN PELVIS WO IV CONTRAST;  5/19/2025 8:43 am   INDICATION: Signs/Symptoms:No BM in 4 days. Diffuse abdominal tenderness.   COMPARISON: CT chest 10/31/2024.   ACCESSION NUMBER(S): [id number]   ORDERING CLINICIAN: JUVENCIO AMAYA   TECHNIQUE: CT of the abdomen and pelvis was performed without intravenous administration of iodinated contrast. Contiguous axial images were obtained at 3 mm slice thickness through the abdomen and pelvis. Coronal and sagittal reconstructions at 3 mm slice thickness were performed.  No intravenous contrast was administered.   FINDINGS: Please note that the evaluation of vessels, lymph nodes and organs is limited without intravenous contrast.   LOWER CHEST: Bibasilar atelectasis and scarring. Small bilateral pleural effusions. Right middle lobe pulmonary nodule measuring 6 mm (series 204, image 21) unchanged relative to the comparison CT chest. Pacing leads terminating in the right atrium, right ventricle and along the course of the coronary sinus are partially imaged. Cardiomegaly with left-sided hypertrophy. Coronary artery calcifications. Epicardial pacing leads.   ABDOMEN:   LIVER: Unremarkable.   BILE DUCTS: No significant biliary ductal dilatation within limitation of noncontrast technique.   GALLBLADDER: Cholelithiasis and possible biliary sludge   PANCREAS: Unremarkable.   SPLEEN: Unremarkable   ADRENAL GLANDS: Unremarkable   KIDNEYS AND URETERS: Moderate left hydroureteronephrosis. Calcification along the course of the distal left ureter measuring 4 mm (series 201, image 107) concerning for distal left ureteral calculus. Left intra calyceal nephrolithiasis measuring 1.9 x 1 cm in the interpolar collecting system. No radiopaque  right nephrolithiasis. No right hydronephrosis. Low-attenuation exophytic interpolar left renal lesion measuring 1.9 cm is similar in size and suggestive of a simple cyst incompletely characterized in the absence of intravenous contrast.   PERITONEUM/RETROPERITONEUM/LYMPH NODES: No ascites or free air, no fluid collection. No enlarged mesenteric lymph nodes.   BOWEL: No bowel obstruction. Appendix not visualized. No pericecal inflammation to suggest acute appendicitis. Moderate colonic stool burden. Distal colonic diverticulosis without diverticulitis.   PELVIS:   BLADDER: Moderately distended and thin-walled.   REPRODUCTIVE ORGANS: Partially calcified uterine fibroids.   VESSELS: Aorto bi-iliac atherosclerosis. 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 Peripherally calcified structure at the right renal hilum measuring 1.2 x 1.1 cm suggests a renal artery aneurysm.   ABDOMINAL WALL: Fat containing left inguinal hernia contains a short segment of nondilated small bowel.   BONES: Please see separately dictated report from contemporaneous CT for detailed evaluation of the lumbar spine. Polyarticular degenerative change.       1. Moderate left hydroureteronephrosis with an obstructing calculus distal left ureter just proximal to the UVJ. 2. Solid right middle lobe pulmonary nodules unchanged in size. 3. Please see separately dictated report from contemporaneous CT for detailed evaluation of the lumbar spine. 4. Additional chronic and/or ancillary findings detailed above.   MACRO: None   Signed by: Reyes Lerner 5/19/2025 9:37 AM Dictation workstation:   [id number]    CT lumbar spine retrospective reconstruction protocol  Result Date: 5/19/2025  Interpreted By:  Reyes Lerner, STUDY: CT LUMBAR SPINE RETROSPECTIVE RECONSTRUCTION PROTOCOL  5/19/2025 8:43 am   INDICATION: Signs/Symptoms:Acute flare of chronic low back pain with point tenderness      COMPARISON: Lumbar spine CT 10/31/2024.   ACCESSION NUMBER(S): [id number]   ORDERING CLINICIAN: JUVENCIO AMAYA   TECHNIQUE: Axial CT images of the lumbar spine are obtained. Axial, coronal and sagittal reconstructions are provided for review.   FINDINGS: Alignment:  Levoscoliosis of the thoracolumbar spine centered at L2 similar.   Vertebrae:  Acute T12 inferior endplate compression fracture with 54% height loss. No bony retropulsion or spondylolisthesis remote L2 vertebral body compression fracture with similar height loss status post augmentation.   Degenerative changes:   Lower Thoracic Spine: No significant bony spinal canal stenosis in the included lower thoracic region.   T12-L1:  No significant bony spinal canal stenosis.   L1-2: Degenerative disc disease with volume loss, endplate osteophytosis and facet arthropathy.No significant bony spinal canal or foraminal stenosis.   L2-3: Degenerative disc disease, endplate osteophytosis and facet arthropathy. No significant bony spinal canal or foraminal stenosis.   L3-4: Degenerative disc disease with volume loss, endplate osteophytosis, posteriorly calcified disc bulge, and facet arthropathy. No significant bony spinal canal or foraminal stenosis   L4-5: Degenerative disc disease with volume loss, endplate osteophytosis, and facet arthropathy. No significant bony spinal canal or foraminal stenosis.   L5-S1: Degenerative disc disease with volume loss, endplate osteophytosis and facet arthropathy. No significant bony spinal canal or foraminal stenosis.   Prevertebral/Paraspinal Soft Tissues: Unremarkable.   Additional: Please see separately dictated report from contemporaneous CT abdomen and pelvis for additional findings.       Acute T12 inferior endplate compression fracture. Mild-to-moderate multilevel degenerative disc and facet disease without advanced bony foraminal or canal stenosis   MACRO: None   Signed by: Reyes Lerner 5/19/2025 9:17 AM Dictation  workstation:   [id number]         Assessment/Plan     This is a 89 year old female admitted for acute on chronic low back pain and moderate left hydroureteronephrosis with an obstructing calculus distal left ureter just proximal to the UVJ.     Recommendations:  - imaging and labs reviewed  - continue with conservative management for now, hopeful patient will pass stone without intervention given size  - would require cardiac clearance if surgical intervention is required  - symptom management per primary team    Patient discussed with Dr. Art DUBON spent 30 minutes in the professional and overall care of this patient.      Ryanne Mcgill, APRN-CNP         [1]   Family History  Problem Relation Name Age of Onset    Cancer Mother GIBSON SALINAS     Hypertension Sister KELSIE RENTERIA     Breast cancer Sister KELSIE RENTERIA 87    Coronary artery disease Brother TIN SALINAS     Hearing loss Brother SERINA SALINAS     Vision loss Brother SERINA SALINAS     Heart disease Brother AUDREY SALINAS     Breast cancer Paternal Grandmother  30

## 2025-05-19 NOTE — PROGRESS NOTES
05/19/25 1002   Discharge Planning   Living Arrangements Alone  (from [facility])   Support Systems Children   Assistance Needed A&OX4 ([place] and has roxanna for hearing aid) independent with ADLs with rollator and wheelchair for long distances; room air baseline and currently room air; PCP Hazel Dueñas CNP   Type of Residence Private residence   Number of Stairs to Enter Residence 0   Number of Stairs Within Residence 0   Do you have animals or pets at home? Yes   Type of Animals or Pets 1 cat   Who is requesting discharge planning? Provider   Expected Discharge Disposition SNF  (Patient and family agree that patient will need SNF and then transition to either ICF or AL and preference is [facility]. Patient could transition to SNF as observation or inpatient with insurance authorization)   Does the patient need discharge transport arranged? Yes   RoundTrip coordination needed? Yes   Has discharge transport been arranged? No   Financial Resource Strain   How hard is it for you to pay for the very basics like food, housing, medical care, and heating? Not hard   Housing Stability   In the last 12 months, was there a time when you were not able to pay the mortgage or rent on time? N   In the past 12 months, how many times have you moved where you were living? 0   At any time in the past 12 months, were you homeless or living in a shelter (including now)? N   Transportation Needs   In the past 12 months, has lack of transportation kept you from medical appointments or from getting medications? no   In the past 12 months, has lack of transportation kept you from meetings, work, or from getting things needed for daily living? No   Intensity of Service   Intensity of Service 0-30 min     05/19/2025 1005am  Spoke with patient and patient's family bedside in ED. Patient and family agree patient unable to return to [place] independent living at time of dc. Pt and son Vitor requesting [facility] for  skilled with transition then likely to AL or ICF after skilled time. Pt could transition to SNF as observation or inpatient with insurance precert       05/19/25 1331   Discharge Planning   Expected Discharge Disposition SNF  ([facility] is out of network with insurance. Made patient son aware and gave him SNF list for new preference)   Financial Resource Strain   How hard is it for you to pay for the very basics like food, housing, medical care, and heating? Not very      05/19/25 1400   Discharge Planning   Expected Discharge Disposition SNF  (Spoke with son Vitor fuentes. new Sn[facility] pref 1)[facility]. Referrals sent for review)

## 2025-05-20 ENCOUNTER — APPOINTMENT (OUTPATIENT)
Dept: CARDIOLOGY | Facility: HOSPITAL | Age: OVER 89
End: 2025-05-20
Payer: MEDICARE

## 2025-05-20 LAB
ALBUMIN SERPL BCP-MCNC: 3.8 G/DL (ref 3.4–5)
ALP SERPL-CCNC: 66 U/L (ref 33–136)
ALT SERPL W P-5'-P-CCNC: 11 U/L (ref 7–45)
ANION GAP SERPL CALC-SCNC: 14 MMOL/L (ref 10–20)
AORTIC VALVE MEAN GRADIENT: 18 MMHG
AORTIC VALVE PEAK VELOCITY: 2.66 M/S
AST SERPL W P-5'-P-CCNC: 19 U/L (ref 9–39)
AV PEAK GRADIENT: 28 MMHG
AVA (PEAK VEL): 0.84 CM2
AVA (VTI): 0.76 CM2
BACTERIA UR CULT: NORMAL
BASOPHILS # BLD AUTO: 0.04 X10*3/UL (ref 0–0.1)
BASOPHILS NFR BLD AUTO: 0.3 %
BILIRUB SERPL-MCNC: 1 MG/DL (ref 0–1.2)
BUN SERPL-MCNC: 46 MG/DL (ref 6–23)
CALCIUM SERPL-MCNC: 10.1 MG/DL (ref 8.6–10.3)
CHLORIDE SERPL-SCNC: 98 MMOL/L (ref 98–107)
CO2 SERPL-SCNC: 28 MMOL/L (ref 21–32)
CREAT SERPL-MCNC: 1.12 MG/DL (ref 0.5–1.05)
EGFRCR SERPLBLD CKD-EPI 2021: 47 ML/MIN/1.73M*2
EJECTION FRACTION APICAL 4 CHAMBER: 49.2
EJECTION FRACTION: 48 %
EOSINOPHIL # BLD AUTO: 0.05 X10*3/UL (ref 0–0.4)
EOSINOPHIL NFR BLD AUTO: 0.4 %
ERYTHROCYTE [DISTWIDTH] IN BLOOD BY AUTOMATED COUNT: 12.6 % (ref 11.5–14.5)
GLUCOSE SERPL-MCNC: 98 MG/DL (ref 74–99)
HCT VFR BLD AUTO: 47 % (ref 36–46)
HGB BLD-MCNC: 15.9 G/DL (ref 12–16)
IMM GRANULOCYTES # BLD AUTO: 0.07 X10*3/UL (ref 0–0.5)
IMM GRANULOCYTES NFR BLD AUTO: 0.6 % (ref 0–0.9)
LEFT ATRIUM VOLUME AREA LENGTH INDEX BSA: 19.1 ML/M2
LEFT VENTRICULAR OUTFLOW TRACT DIAMETER: 2 CM
LYMPHOCYTES # BLD AUTO: 2.52 X10*3/UL (ref 0.8–3)
LYMPHOCYTES NFR BLD AUTO: 20.6 %
MCH RBC QN AUTO: 31.7 PG (ref 26–34)
MCHC RBC AUTO-ENTMCNC: 33.8 G/DL (ref 32–36)
MCV RBC AUTO: 94 FL (ref 80–100)
MONOCYTES # BLD AUTO: 1.39 X10*3/UL (ref 0.05–0.8)
MONOCYTES NFR BLD AUTO: 11.4 %
NEUTROPHILS # BLD AUTO: 8.14 X10*3/UL (ref 1.6–5.5)
NEUTROPHILS NFR BLD AUTO: 66.7 %
NRBC BLD-RTO: 0 /100 WBCS (ref 0–0)
PLATELET # BLD AUTO: 220 X10*3/UL (ref 150–450)
POTASSIUM SERPL-SCNC: 4.1 MMOL/L (ref 3.5–5.3)
PROT SERPL-MCNC: 6.5 G/DL (ref 6.4–8.2)
RBC # BLD AUTO: 5.01 X10*6/UL (ref 4–5.2)
RIGHT VENTRICLE FREE WALL PEAK S': 11.1 CM/S
RIGHT VENTRICLE PEAK SYSTOLIC PRESSURE: 14.7 MMHG
SODIUM SERPL-SCNC: 136 MMOL/L (ref 136–145)
WBC # BLD AUTO: 12.2 X10*3/UL (ref 4.4–11.3)

## 2025-05-20 PROCEDURE — 84075 ASSAY ALKALINE PHOSPHATASE: CPT | Performed by: STUDENT IN AN ORGANIZED HEALTH CARE EDUCATION/TRAINING PROGRAM

## 2025-05-20 PROCEDURE — 93306 TTE W/DOPPLER COMPLETE: CPT | Performed by: STUDENT IN AN ORGANIZED HEALTH CARE EDUCATION/TRAINING PROGRAM

## 2025-05-20 PROCEDURE — 99233 SBSQ HOSP IP/OBS HIGH 50: CPT | Performed by: NURSE PRACTITIONER

## 2025-05-20 PROCEDURE — 2500000004 HC RX 250 GENERAL PHARMACY W/ HCPCS (ALT 636 FOR OP/ED): Mod: JZ | Performed by: PHYSICIAN ASSISTANT

## 2025-05-20 PROCEDURE — 2500000001 HC RX 250 WO HCPCS SELF ADMINISTERED DRUGS (ALT 637 FOR MEDICARE OP): Performed by: PHYSICIAN ASSISTANT

## 2025-05-20 PROCEDURE — 99232 SBSQ HOSP IP/OBS MODERATE 35: CPT | Performed by: NURSE PRACTITIONER

## 2025-05-20 PROCEDURE — 2500000002 HC RX 250 W HCPCS SELF ADMINISTERED DRUGS (ALT 637 FOR MEDICARE OP, ALT 636 FOR OP/ED): Performed by: PHYSICIAN ASSISTANT

## 2025-05-20 PROCEDURE — 2500000005 HC RX 250 GENERAL PHARMACY W/O HCPCS

## 2025-05-20 PROCEDURE — 36415 COLL VENOUS BLD VENIPUNCTURE: CPT | Performed by: STUDENT IN AN ORGANIZED HEALTH CARE EDUCATION/TRAINING PROGRAM

## 2025-05-20 PROCEDURE — 97165 OT EVAL LOW COMPLEX 30 MIN: CPT | Mod: GO

## 2025-05-20 PROCEDURE — 92610 EVALUATE SWALLOWING FUNCTION: CPT | Mod: GN

## 2025-05-20 PROCEDURE — 2500000002 HC RX 250 W HCPCS SELF ADMINISTERED DRUGS (ALT 637 FOR MEDICARE OP, ALT 636 FOR OP/ED): Performed by: INTERNAL MEDICINE

## 2025-05-20 PROCEDURE — 99231 SBSQ HOSP IP/OBS SF/LOW 25: CPT | Performed by: NURSE PRACTITIONER

## 2025-05-20 PROCEDURE — 2500000004 HC RX 250 GENERAL PHARMACY W/ HCPCS (ALT 636 FOR OP/ED): Mod: JZ | Performed by: NURSE PRACTITIONER

## 2025-05-20 PROCEDURE — 93306 TTE W/DOPPLER COMPLETE: CPT

## 2025-05-20 PROCEDURE — 1200000002 HC GENERAL ROOM WITH TELEMETRY DAILY

## 2025-05-20 PROCEDURE — 92526 ORAL FUNCTION THERAPY: CPT | Mod: GN

## 2025-05-20 PROCEDURE — 2500000001 HC RX 250 WO HCPCS SELF ADMINISTERED DRUGS (ALT 637 FOR MEDICARE OP): Performed by: NURSE PRACTITIONER

## 2025-05-20 PROCEDURE — 85025 COMPLETE CBC W/AUTO DIFF WBC: CPT | Performed by: STUDENT IN AN ORGANIZED HEALTH CARE EDUCATION/TRAINING PROGRAM

## 2025-05-20 PROCEDURE — 2500000005 HC RX 250 GENERAL PHARMACY W/O HCPCS: Performed by: PHYSICIAN ASSISTANT

## 2025-05-20 RX ORDER — OXYCODONE HYDROCHLORIDE 5 MG/1
5 TABLET ORAL EVERY 4 HOURS PRN
Refills: 0 | Status: DISCONTINUED | OUTPATIENT
Start: 2025-05-20 | End: 2025-05-21

## 2025-05-20 RX ORDER — NALOXONE HYDROCHLORIDE 0.4 MG/ML
0.2 INJECTION, SOLUTION INTRAMUSCULAR; INTRAVENOUS; SUBCUTANEOUS EVERY 5 MIN PRN
Status: DISCONTINUED | OUTPATIENT
Start: 2025-05-20 | End: 2025-05-22 | Stop reason: HOSPADM

## 2025-05-20 RX ORDER — SODIUM CHLORIDE 9 MG/ML
50 INJECTION, SOLUTION INTRAVENOUS CONTINUOUS
Status: ACTIVE | OUTPATIENT
Start: 2025-05-20 | End: 2025-05-21

## 2025-05-20 RX ORDER — ACETAMINOPHEN 325 MG/1
975 TABLET ORAL EVERY 8 HOURS
Status: DISCONTINUED | OUTPATIENT
Start: 2025-05-20 | End: 2025-05-22 | Stop reason: HOSPADM

## 2025-05-20 RX ORDER — METHOCARBAMOL 500 MG/1
1000 TABLET, FILM COATED ORAL EVERY 8 HOURS SCHEDULED
Status: DISCONTINUED | OUTPATIENT
Start: 2025-05-20 | End: 2025-05-22 | Stop reason: HOSPADM

## 2025-05-20 RX ORDER — OXYCODONE HYDROCHLORIDE 10 MG/1
10 TABLET ORAL EVERY 4 HOURS PRN
Refills: 0 | Status: DISCONTINUED | OUTPATIENT
Start: 2025-05-20 | End: 2025-05-21

## 2025-05-20 RX ORDER — SYRING-NEEDL,DISP,INSUL,0.3 ML 29 G X1/2"
296 SYRINGE, EMPTY DISPOSABLE MISCELLANEOUS ONCE
Status: COMPLETED | OUTPATIENT
Start: 2025-05-20 | End: 2025-05-20

## 2025-05-20 RX ADMIN — METHOCARBAMOL 1000 MG: 500 TABLET ORAL at 15:33

## 2025-05-20 RX ADMIN — ACETAMINOPHEN 975 MG: 325 TABLET, FILM COATED ORAL at 21:58

## 2025-05-20 RX ADMIN — LATANOPROST 1 DROP: 50 SOLUTION OPHTHALMIC at 22:19

## 2025-05-20 RX ADMIN — CALCITONIN SALMON 1 SPRAY: 200 SPRAY, METERED NASAL at 09:18

## 2025-05-20 RX ADMIN — SENNOSIDES AND DOCUSATE SODIUM 2 TABLET: 50; 8.6 TABLET ORAL at 09:16

## 2025-05-20 RX ADMIN — IPRATROPIUM BROMIDE 2 SPRAY: 21 SPRAY, METERED NASAL at 15:28

## 2025-05-20 RX ADMIN — DULOXETINE HYDROCHLORIDE 20 MG: 20 CAPSULE, DELAYED RELEASE ORAL at 09:15

## 2025-05-20 RX ADMIN — ROSUVASTATIN CALCIUM 20 MG: 20 TABLET, FILM COATED ORAL at 22:18

## 2025-05-20 RX ADMIN — ACETAMINOPHEN 650 MG: 325 TABLET, FILM COATED ORAL at 11:05

## 2025-05-20 RX ADMIN — Medication 3 L/MIN: at 23:20

## 2025-05-20 RX ADMIN — ASPIRIN 81 MG: 81 TABLET, COATED ORAL at 09:16

## 2025-05-20 RX ADMIN — Medication 50 MCG: at 09:15

## 2025-05-20 RX ADMIN — LOSARTAN POTASSIUM 25 MG: 50 TABLET, FILM COATED ORAL at 21:55

## 2025-05-20 RX ADMIN — CYANOCOBALAMIN TAB 500 MCG 250 MCG: 500 TAB at 09:16

## 2025-05-20 RX ADMIN — SODIUM CHLORIDE 50 ML/HR: 9 INJECTION, SOLUTION INTRAVENOUS at 15:12

## 2025-05-20 RX ADMIN — OXYCODONE HYDROCHLORIDE 5 MG: 5 TABLET ORAL at 05:02

## 2025-05-20 RX ADMIN — ZOLPIDEM TARTRATE 5 MG: 5 TABLET ORAL at 22:24

## 2025-05-20 RX ADMIN — CALCIUM CARBONATE 1 TABLET: 500 TABLET, CHEWABLE ORAL at 08:53

## 2025-05-20 RX ADMIN — LIDOCAINE 4% 1 PATCH: 40 PATCH TOPICAL at 09:15

## 2025-05-20 RX ADMIN — ENOXAPARIN SODIUM 40 MG: 40 INJECTION SUBCUTANEOUS at 13:39

## 2025-05-20 RX ADMIN — POLYETHYLENE GLYCOL 3350 17 G: 17 POWDER, FOR SOLUTION ORAL at 09:15

## 2025-05-20 RX ADMIN — IPRATROPIUM BROMIDE 2 SPRAY: 21 SPRAY, METERED NASAL at 21:58

## 2025-05-20 RX ADMIN — KETOROLAC TROMETHAMINE 15 MG: 15 INJECTION, SOLUTION INTRAMUSCULAR; INTRAVENOUS at 05:02

## 2025-05-20 RX ADMIN — OXYCODONE HYDROCHLORIDE 5 MG: 5 TABLET ORAL at 09:16

## 2025-05-20 RX ADMIN — METHOCARBAMOL 1000 MG: 500 TABLET ORAL at 21:55

## 2025-05-20 RX ADMIN — MAJOR MAGNESIUM CITRATE ORAL SOLUTION - LEMON 296 ML: 1.75 LIQUID ORAL at 15:28

## 2025-05-20 RX ADMIN — CEFTRIAXONE 1 G: 1 INJECTION, SOLUTION INTRAVENOUS at 09:20

## 2025-05-20 RX ADMIN — TAMSULOSIN HYDROCHLORIDE 0.4 MG: 0.4 CAPSULE ORAL at 09:15

## 2025-05-20 RX ADMIN — ACETAMINOPHEN 650 MG: 325 TABLET, FILM COATED ORAL at 05:02

## 2025-05-20 RX ADMIN — Medication 3 L/MIN: at 20:45

## 2025-05-20 RX ADMIN — OXYCODONE HYDROCHLORIDE 10 MG: 10 TABLET ORAL at 15:46

## 2025-05-20 RX ADMIN — Medication 2 L/MIN: at 16:16

## 2025-05-20 RX ADMIN — IPRATROPIUM BROMIDE 2 SPRAY: 21 SPRAY, METERED NASAL at 09:18

## 2025-05-20 RX ADMIN — KETOROLAC TROMETHAMINE 15 MG: 15 INJECTION, SOLUTION INTRAMUSCULAR; INTRAVENOUS at 11:05

## 2025-05-20 RX ADMIN — METOPROLOL SUCCINATE 25 MG: 25 TABLET, EXTENDED RELEASE ORAL at 09:16

## 2025-05-20 ASSESSMENT — COGNITIVE AND FUNCTIONAL STATUS - GENERAL
TURNING FROM BACK TO SIDE WHILE IN FLAT BAD: A LOT
CLIMB 3 TO 5 STEPS WITH RAILING: A LOT
DRESSING REGULAR LOWER BODY CLOTHING: A LOT
CLIMB 3 TO 5 STEPS WITH RAILING: A LOT
PERSONAL GROOMING: A LITTLE
TOILETING: TOTAL
DAILY ACTIVITIY SCORE: 15
MOVING TO AND FROM BED TO CHAIR: A LOT
MOVING TO AND FROM BED TO CHAIR: A LOT
DRESSING REGULAR UPPER BODY CLOTHING: A LITTLE
DAILY ACTIVITIY SCORE: 19
DAILY ACTIVITIY SCORE: 14
HELP NEEDED FOR BATHING: A LITTLE
DRESSING REGULAR LOWER BODY CLOTHING: A LOT
TOILETING: A LITTLE
DRESSING REGULAR UPPER BODY CLOTHING: A LOT
WALKING IN HOSPITAL ROOM: A LOT
MOBILITY SCORE: 12
WALKING IN HOSPITAL ROOM: A LOT
TOILETING: A LOT
HELP NEEDED FOR BATHING: A LOT
DRESSING REGULAR LOWER BODY CLOTHING: TOTAL
PERSONAL GROOMING: A LITTLE
TURNING FROM BACK TO SIDE WHILE IN FLAT BAD: A LOT
MOVING FROM LYING ON BACK TO SITTING ON SIDE OF FLAT BED WITH BEDRAILS: A LOT
DRESSING REGULAR UPPER BODY CLOTHING: A LITTLE
MOBILITY SCORE: 12
MOVING FROM LYING ON BACK TO SITTING ON SIDE OF FLAT BED WITH BEDRAILS: A LOT
STANDING UP FROM CHAIR USING ARMS: A LOT
HELP NEEDED FOR BATHING: A LOT
STANDING UP FROM CHAIR USING ARMS: A LOT

## 2025-05-20 ASSESSMENT — PAIN SCALES - GENERAL
PAINLEVEL_OUTOF10: 7
PAINLEVEL_OUTOF10: 0 - NO PAIN
PAINLEVEL_OUTOF10: 6
PAINLEVEL_OUTOF10: 0 - NO PAIN
PAINLEVEL_OUTOF10: 5 - MODERATE PAIN
PAINLEVEL_OUTOF10: 5 - MODERATE PAIN
PAINLEVEL_OUTOF10: 7
PAINLEVEL_OUTOF10: 4

## 2025-05-20 ASSESSMENT — PAIN - FUNCTIONAL ASSESSMENT
PAIN_FUNCTIONAL_ASSESSMENT: 0-10

## 2025-05-20 ASSESSMENT — ACTIVITIES OF DAILY LIVING (ADL)
ADL_ASSISTANCE: INDEPENDENT
BATHING_ASSISTANCE: MODERATE

## 2025-05-20 NOTE — PROGRESS NOTES
Speech-Language Pathology    Speech-Language Pathology Clinical Swallow Evaluation    Patient Name: Neirs Mccall  MRN: [id number]  : 1935  Today's Date: 25  Start Time: 1435  Stop Time: 1458  Time Calculation (min): 23 min      ASSESSMENT  Impressions:  Functional oral phase, possible pharyngeal phase, and esophageal dysphagia based on clinical swallow evaluation and review of recent esophagram. Pt would benefit from skilled ST to minimize aspiration risk and ensure ongoing safety with the least restrictive diet.  Prognosis: Good    PLAN    RECOMMENDATIONS:  Is MBSS recommended? Yes; MBSS is recommended in order to objectively assess for aspiration risk, safest/least restrictive diet, and any effective compensatory strategies.     **Pt declined inpatient MBSS at this time due to current discomfort and plans for further evaluation after discharge from acute care.**    Solid consistency: Regular (IDDSI level 7)  Liquid consistency: Thin (IDDSI 0)  Medication administration: Whole in puree    Compensatory swallow strategies:  - Upright positioning for all PO intake  - Remain upright for >30 min after meals  - Slow rate of intake  - Small bites  - Small sips  - Reflux precautions.  Reflux precautions: HOB >30 degrees at all times, fully upright to 90 degrees for PO intake and remain upright for 60 minutes afterward, slow rate, smaller/more frequent meals/snacks, stop eating at first sign of satiety.    Recommended frequency/duration:  Skilled SLP services recommended: Yes  Frequency: 1x/week  Duration: x1 follow-up visit to ensure ongoing safety with current diet  Discharge recommendation: Recommend LOW intensity ST upon DC in order to ensure safety with least restrictive diet.  Treatment/Interventions: Assess diet tolerance  Strengths: Cognition, Family/caregiver support, and Anticipated fair or good medical prognosis  Barriers to participation in tx: N/A    Goals (start date 2025):  By the end of the  "session today, the patient and her son will demonstrate comprehension of appropriate reflux precautions for maximizing swallowing safety.    Status: Education and review completed. GOAL ACHIEVED.   Progress this date: yes      SUBJECTIVE    PMHx relevant to rehab:   The patient has a history of esophageal dysphagia with patulous esophagus demonstrated on esophagram completed at this facility on 3/26/25.     She complained to nursing that she had difficulty eating dry foods last evening and a swallowing evaluation was ordered.     Pt and her son report that the ENT recommend Gaviscon prior to bedtime for reflux and then referred the patient to Dr. Vargas for further evaluation of swallowing.     Chief complaint: Pt was admitted on 5/19/25 due to   Chief Complaint   Patient presents with    Back Pain     Lower back right hurts more than left side. Took an oxycodone at 0400 with no relief. States she recently had steroid injections in her spine.    . She was found to have Chronic low back pain, unspecified back pain laterality, unspecified whether sciatica present.    Relevant imaging results:  Esophagram 3/26/25  IMPRESSION:  Suboptimal examination due to limitation in patient positioning.  Within this limitation, there is patulous esophagus as described  above. However there is no definite fluoroscopic evidence of  flow-limiting stenosis or obstruction.    General Visit Information:  SLP Received On: 05/20/25  Patient Class: Inpatient  Living Environment: Home  Ordering Physician: Dr. De La Fuente  Reason for Referral: dysphagia for solids`  Prior to Session Communication: Bedside nurse    RN cleared pt to participate in session and reported that the patient complained of difficulty swallowing dry foods.     Pt reported preference for ordering Boost/ Ensure and \"soft foods like mashed potatoes\" since she has been here. She endorses having constipation as well.  The patient reported the sensation of food sticking in her mid " chest at times. In addition she admitted to choking on water when she is drinking it and also when she is not drinking.     [employer] Diet: Regular/thin  Current Diet : regular, thin    Status at time of evaluation:  Pain Assessment  Pain Assessment: 0-10  0-10 (Numeric) Pain Score: 0 - No pain  Pain Location: Back  Pain Orientation: Mid, Lower  Patient Entered Pain Score: 7  Pain Interventions: Medication (See MAR)  Response to Interventions: Decrease in pain    Pt was alert, pleasant, cooperative, and attentive for session.  Orientation: Ox4  Ability to follow functional commands: WFL  Nutritional status: Appears well-nourished/no concerns    Respiratory status: Room air  Baseline Vocal Quality: Normal  Volitional Cough: Strong  Volitional Swallow: Within Functional Limits  Patient positioning: Upright as close as possible to 90 degrees, but partially reclined      OBJECTIVE  Clinical swallow evaluation completed and consisted of interview (family assisted), oral motor assessment, and PO trials (3 ounce water challenge).    ORAL PHASE: Dentition in good condition. Oral mucosa were pink, moist, and free of obvious lesions. Lingual strength and ROM were functional. Labial strength/ROM were diminished on the left due to remote [place]' palsy. Labial seal was adequate. Mastication of regular solids was not tested due to pt preference.  A/P transit and oral clearance were wfl.    PHARYNGEAL PHASE: Laryngeal elevation was visualized or palpated with all trials, however adequacy of hyolaryngeal elevation/excursion cannot be determined at bedside. Gentle throat clear  noted after the trial.   Was 3oz challenge administered: Yes; pt drank 3oz of thin liquid in one attempt, without breaking, which resulted in immediate weak throat clear.     Treatment/Education:  Results and recommendations were relayed to: Patient, Family, and Bedside nurse  Education provided: Yes   Learner: Patient and Family   Barriers to learning:  Acuteness of illness barrier   Method of teaching: Verbal and Written   Topic: role of ST, results of assessment, recommended diet modifications, recommendation for MBSS, recommended safe swallow strategies, and swallow anatomy/physiology   Outcome of teaching: Pt/family demonstrated good understanding  Treatment provided: Yes

## 2025-05-20 NOTE — PROGRESS NOTES
Subjective Data:  Denies chest pain or dyspnea    Objective Data:  Last Recorded Vitals:  Vitals:    05/20/25 0449 05/20/25 0450 05/20/25 0845 05/20/25 1152   BP: 167/79 167/79 132/74 116/65   BP Location:  Left arm Right arm Right arm   Patient Position:   Lying Lying   Pulse: 65 64 69 63   Resp:  16 18 18   Temp: 36.8 °C (98.2 °F) 36.8 °C (98.2 °F) 36 °C (96.8 °F) 36.7 °C (98.1 °F)   TempSrc:  Temporal Temporal Temporal   SpO2: 94% 95% 92% 90%   Weight:       Height:           Last Labs:  CBC - 5/20/2025:  6:29 AM  12.2 15.9 220    47.0      CMP - 5/20/2025:  6:29 AM  10.1 6.5 19 --- 1.0   _ 3.8 11 66      PTT - No results in last year.  _   _ _     TROPHS   Date/Time Value Ref Range Status   10/31/2024 05:25 PM 38 0 - 13 ng/L Final   10/31/2024 03:19 PM 40 0 - 13 ng/L Final     BNP   Date/Time Value Ref Range Status   05/19/2025 08:17  0 - 99 pg/mL Final   10/31/2024 03:19  0 - 99 pg/mL Final     HGBA1C   Date/Time Value Ref Range Status   12/20/2021 08:37 PM 5.7 % Final     Comment:          Diagnosis of Diabetes-Adults   Non-Diabetic: < or = 5.6%   Increased risk for developing diabetes: 5.7-6.4%   Diagnostic of diabetes: > or = 6.5%  .       Monitoring of Diabetes                Age (y)     Therapeutic Goal (%)   Adults:          >18           <7.0   Pediatrics:    13-18           <7.5                   7-12           <8.0                   0- 6            7.5-8.5   American Diabetes Association. Diabetes Care 33(S1), Jan 2010.     05/17/2021 09:43 AM 5.5 % Final     Comment:          Diagnosis of Diabetes-Adults   Non-Diabetic: < or = 5.6%   Increased risk for developing diabetes: 5.7-6.4%   Diagnostic of diabetes: > or = 6.5%  .       Monitoring of Diabetes                Age (y)     Therapeutic Goal (%)   Adults:          >18           <7.0   Pediatrics:    13-18           <7.5                   7-12           <8.0                   0- 6            7.5-8.5   American Diabetes Association.  Diabetes Care 33(S1), Jan 2010.       LDLCALC   Date/Time Value Ref Range Status   11/11/2024 08:25 AM 63 <=99 mg/dL Final     Comment:                                 Near   Borderline      AGE      Desirable  Optimal    High     High     Very High     0-19 Y     0 - 109     ---    110-129   >/= 130     ----    20-24 Y     0 - 119     ---    120-159   >/= 160     ----      >24 Y     0 -  99   100-129  130-159   160-189     >/=190     05/16/2024 08:20 AM 73 <=99 mg/dL Final     Comment:                                 Near   Borderline      AGE      Desirable  Optimal    High     High     Very High     0-19 Y     0 - 109     ---    110-129   >/= 130     ----    20-24 Y     0 - 119     ---    120-159   >/= 160     ----      >24 Y     0 -  99   100-129  130-159   160-189     >/=190     11/13/2023 10:54 AM 77 <=99 mg/dL Final     Comment:                                 Near   Borderline      AGE      Desirable  Optimal    High     High     Very High     0-19 Y     0 - 109     ---    110-129   >/= 130     ----    20-24 Y     0 - 119     ---    120-159   >/= 160     ----      >24 Y     0 -  99   100-129  130-159   160-189     >/=190       VLDL   Date/Time Value Ref Range Status   11/11/2024 08:25 AM 36 0 - 40 mg/dL Final   05/16/2024 08:20 AM 38 0 - 40 mg/dL Final   11/13/2023 10:54 AM 32 0 - 40 mg/dL Final      Last I/O:  I/O last 3 completed shifts:  In: 100 (1.7 mL/kg) [P.O.:100]  Out: 300 (5.1 mL/kg) [Urine:300 (0.1 mL/kg/hr)]  Weight: 58.5 kg     Past Cardiology Tests (Last 3 Years):  EKG:  ECG 12 lead   AV paced    Echo:  Transthoracic Echo Complete 05/20/2025  CONCLUSIONS:   1. Poorly visualized anatomical structures due to suboptimal image quality.   2. The left ventricular systolic function is mildly decreased, with a visually estimated ejection fraction of 45-50%.   3. Spectral Doppler shows a Grade I (impaired relaxation pattern) of left ventricular diastolic filling with normal left atrial filling  pressure.   4. Abnormal septal motion consistent with left bundle branch block.   5. Moderate to severe aortic valve stenosis.   6. Mild to moderate aortic valve regurgitation.    Transthoracic Echo (TTE) Complete 06/11/2024  CONCLUSIONS:   1. Left ventricular systolic function is normal.   2. There is reduced right ventricular systolic function.   3. Moderate aortic valve stenosis.   4. Mild aortic valve regurgitation.    Ejection Fractions:  EF   Date/Time Value Ref Range Status   05/20/2025 08:43 AM 48 %      Cath:  Left and Right Heart Catheterization   8/3/2018  CONCLUSIONS:   1. Left main: no significant angiographic CAD.   2. LAD: 60% mid-vessel focal stenosis. Lesion is 65% by OCT, FFR = 0.82.   3. LCx: no significant angiographic CAD.   4. RCA: 40-50% diffuse mid-vessel disease.   5. RA 1mmHg, RV 28/3, PA 26/10 (18), PCWP 13mmHg.   6. Tanna CI 2.7 l/min/m2.    Stress Test:  No results found for this or any previous visit from the past 1095 days.    Cardiac Imaging:  No results found for this or any previous visit from the past 1095 days.      Inpatient Medications:  Scheduled Medications[1]  PRN Medications[2]  Continuous Medications[3]      Physical Exam  Vitals reviewed.   Constitutional:       Appearance: Normal appearance. She is normal weight.   HENT:      Head: Normocephalic and atraumatic.   Neck:      Vascular: No carotid bruit or JVD.   Cardiovascular:      Rate and Rhythm: Normal rate and regular rhythm.      Pulses: Normal pulses.      Heart sounds: Murmur heard.      Systolic murmur is present with a grade of 3/6.   Pulmonary:      Effort: Pulmonary effort is normal.      Breath sounds: Normal breath sounds.   Chest:      Chest wall: No tenderness.   Abdominal:      General: Abdomen is flat. Bowel sounds are normal.      Palpations: Abdomen is soft.   Skin:     General: Skin is warm and dry.   Neurological:      General: No focal deficit present.      Mental Status: She is alert and oriented to  person, place, and time. Mental status is at baseline.   Psychiatric:         Mood and Affect: Mood normal.         Behavior: Behavior normal.            Assessment/Plan   Assessment  89 y.o. female with PMH of moderate aortic stenosis (TTE 6/2024), moderate CAD of LAD and RCA (cath 8/2018), HFrEF with improved LVEF (57% TTE 6/2024), LBBB s/p CRT-p, CKD3, chronic low back pain/L2 compression fracture with recent L3/4 epidural steroid injection presenting with acute back pain. In the ED, /79 HR 71, SpO2 92% on room air. Labs significant for K 3.4, Mag 1.7, , WBCs 14.4, UA positive. CT L spine and abd/pelvis:  Acute T12 inferior endplate compression fracture as well as moderate left hydroureteronephrosis with 4 mm stone at distal left ureter. She denies chest pain, syncope, orthopnea, shortness of breath, edema. Currently on room air.      Acute on chronic back pain   Moderate left hydroureteronephrosis  Uretal stone  Moderate to severe aortic stenosis   Moderate CAD of LAD and RCA (cath 8/2018)  Chronic systolic HFrEF with improved LVEF (57% TTE 6/2024), compensated  LBBB s/p CRT-p  CKD3  Preoperative evaluation     Plan  -TTE with LVEF 45-50%, moderate to severe AS  -Per urology, continue with conservative management for now   -RCRI 1 - acceptable risk to proceed with surgery if needed  -Continue aspirin 81 mg daily, rosuvastatin 20 mg daily, losartan 25 mg daily, Toprol 25 mg daily  -OP follow up as previously scheduled, recommend repeat TTE in evaluation of aortic stenosis  -Will sign off     Peripheral IV Left;Posterior Forearm (Active)   Site Assessment Clean;Dry;Intact 05/20/25 0851   Dressing Status Clean;Dry;Occlusive 05/20/25 0851   Number of days:        Code Status:  DNR and No Intubation          JOHN Monet-RAZA       [1]   Scheduled medications   Medication Dose Route Frequency    acetaminophen  650 mg oral q6h    aspirin  81 mg oral Daily    calcitonin (salmon)  1 spray One  Nostril Daily    calcium carbonate  1 tablet oral Daily    cefTRIAXone  1 g intravenous q24h    cholecalciferol  50 mcg oral Daily    cyanocobalamin  250 mcg oral Daily    DULoxetine  20 mg oral Daily    enoxaparin  40 mg subcutaneous q24h    ipratropium  2 spray Each Nostril TID    latanoprost  1 drop Left Eye Nightly    lidocaine  1 patch transdermal Daily    losartan  25 mg oral Daily    metoprolol succinate XL  25 mg oral Daily    pantoprazole  40 mg oral Daily before breakfast    polyethylene glycol  17 g oral Daily    rosuvastatin  20 mg oral Nightly    sennosides-docusate sodium  2 tablet oral BID    tamsulosin  0.4 mg oral Daily   [2]   PRN medications   Medication    ketorolac    lubricating eye drops    melatonin    methyl salicylate-menthol    ondansetron    Or    ondansetron    oxyCODONE    oxygen    zolpidem   [3]   Continuous Medications   Medication Dose Last Rate    sodium chloride 0.9%  50 mL/hr 50 mL/hr (05/19/25 4228)

## 2025-05-20 NOTE — PROGRESS NOTES
Occupational Therapy    Evaluation    Patient Name: Neris Mccall  MRN: [id number]  Department: [address]  Room: [address] [place] [address]  Today's Date: 5/20/2025  Time Calculation  Start Time: 1511  Stop Time: 1522  Time Calculation (min): 11 min    Assessment  IP OT Assessment  OT Assessment: Pt presents with decreased ADL performance, decreased functional mobility, decreased endurance. Recommend continued skilled OT at a mod intensity to maximize pt safety and independence after discharge.  Prognosis: Good  Barriers to Discharge Home: Caregiver assistance, Physical needs  Caregiver Assistance: Caregiver assistance needed per identified barriers - however, level of patient's required assistance exceeds assistance available at home  Physical Needs: 24hr mobility assistance needed, 24hr ADL assistance needed  Evaluation/Treatment Tolerance: Patient limited by pain  Medical Staff Made Aware: Yes  End of Session Communication: Bedside nurse  End of Session Patient Position: Bed, 3 rail up, Alarm on  Plan:  Treatment Interventions: ADL retraining, Functional transfer training, Endurance training, UE strengthening/ROM  OT Frequency: 3 times per week  OT Discharge Recommendations: Moderate intensity level of continued care  Equipment Recommended upon Discharge: Wheeled walker  OT Recommended Transfer Status: Assist of 1  OT - OK to Discharge: Yes (per OT POC)    Subjective   Current Problem:  1. Chronic low back pain, unspecified back pain laterality, unspecified whether sciatica present        2. Failure of outpatient treatment        3. Constipation, unspecified constipation type        4. Hydroureteronephrosis        5. Kidney stone        6. Preop cardiovascular exam  Transthoracic Echo Complete    Transthoracic Echo Complete      7. Moderate aortic stenosis  Transthoracic Echo Complete    Transthoracic Echo Complete        OT Visit Info:  OT Received On: 05/20/25  General Visit Info:  General  Reason for Referral: 89 yo female referred  to OT for impaired ADLs/mobility  Referred By: Virginia De La Fuente  Past Medical History Relevant to Rehab: bladder cancer, CKD, CHF, chronic low back pain/L2 compression fracture with recent L3/4 epidural steroid injection  Family/Caregiver Present: Yes  Caregiver Feedback: son present, able to observe session  Prior to Session Communication: Bedside nurse  Patient Position Received: Bed, 3 rail up, Alarm on  General Comment: Patient pleasant, cooperative and agreeable to assessment with encouragement  Precautions:  Hearing/Visual Limitations: [place]-has microphone linked to hearing aides  Medical Precautions: Fall precautions (purewick, IV, masimo)  Precautions Comment: CT: Acute T12 inferior endplate compression fracture.  Mild-to-moderate multilevel degenerative disc and facet disease  without advanced bony foraminal or canal stenosis           Pain:  Pain Assessment  Pain Assessment: 0-10  0-10 (Numeric) Pain Score: 6  Pain Type: Acute pain  Pain Location: Back  Pain Interventions: Repositioned, Ambulation/increased activity  Response to Interventions: Increase in pain (RN aware)    Objective   Cognition:  Overall Cognitive Status: Within Functional Limits  Arousal/Alertness: Appropriate responses to stimuli  Orientation Level: Oriented X4           Home Living:  Type of Home: Independent living  Home Adaptive Equipment: Walker rolling or standard, Wheelchair-manual (rollator)  Home Layout: One level  Home Access: Level entry  Bathroom Shower/Tub: Walk-in shower   Prior Function:  Level of [place]: Independent with ADLs and functional transfers, Needs assistance with homemaking  ADL Assistance: Independent  Homemaking Assistance: Needs assistance (staff assist)  Ambulatory Assistance: Independent (Rollator shorter distances, w/c community)     ADL:  Eating Assistance: Independent  Grooming Assistance: Stand by  Bathing Assistance: Moderate  UE Dressing Assistance: Stand by  LE Dressing Assistance: Maximal  Toileting  Assistance with Device: Maximal  Functional Assistance: Minimal  ADL Comments: Dependent to don socks, other ADLs anticipated  Activity Tolerance:  Endurance: Tolerates less than 10 min exercise, no significant change in vital signs  Bed Mobility/Transfers: Bed Mobility  Bed Mobility: Yes  Bed Mobility 1  Bed Mobility 1: Supine to sitting, Sitting to supine  Level of Assistance 1: Minimum assistance    Transfers  Transfer: Yes  Transfer 1  Transfer From 1: Sit to  Transfer to 1: Stand  Technique 1: Sit to stand, Stand to sit  Transfer Device 1: Walker  Transfer Level of Assistance 1: Moderate assistance      Functional Mobility:  Functional Mobility  Functional Mobility Performed: Yes  Functional Mobility 1  Surface 1: Level tile  Device 1: Rolling walker  Assistance 1: Contact guard  Comments 1: Ambulated functional distance in room ~20' with good balance, impaired endurance  Sitting Balance:  Static Sitting Balance  Static Sitting-Balance Support: Feet supported  Static Sitting-Level of Assistance: Contact guard  Standing Balance:  Static Standing Balance  Static Standing-Balance Support: Bilateral upper extremity supported  Static Standing-Level of Assistance: Minimum assistance    Strength:  Strength Comments: BUE 3/5, unable to tolerate resistance     Coordination:  Movements are Fluid and Coordinated: Yes   Hand Function:  Hand Function  Gross Grasp: Functional  Coordination: Functional  Extremities: RUE   RUE : Within Functional Limits and LUE   LUE: Within Functional Limits    Outcome Measures: [facility] Daily Activity  Putting on and taking off regular lower body clothing: Total  Bathing (including washing, rinsing, drying): A lot  Putting on and taking off regular upper body clothing: A little  Toileting, which includes using toilet, bedpan or urinal: Total  Taking care of personal grooming such as brushing teeth: A little  Eating Meals: None  Daily Activity - Total Score: 14      Education Documentation  Body  Mechanics, taught by Eliu Enrique OT at 5/20/2025  3:34 PM.  Learner: Patient  Readiness: Acceptance  Method: Explanation  Response: Verbalizes Understanding    Precautions, taught by Eliu Enrique OT at 5/20/2025  3:34 PM.  Learner: Patient  Readiness: Acceptance  Method: Explanation  Response: Verbalizes Understanding    ADL Training, taught by Eliu Enrique OT at 5/20/2025  3:34 PM.  Learner: Patient  Readiness: Acceptance  Method: Explanation  Response: Verbalizes Understanding    Education Comments  No comments found.      Goals:   Encounter Problems       Encounter Problems (Active)       OT Goals       Pt will complete [id number] transfers using LRD in preparation for ADLs with SBA        Start:  05/20/25    Expected End:  06/03/25            Pt will tolerate 10min stand during functional task completion with no more than 1 rest break in order to increase endurance for functional task completion.        Start:  05/20/25    Expected End:  06/03/25            Pt will demo LE ADL completion with min A using AE if needed.        Start:  05/20/25    Expected End:  06/03/25            Pt will increase static/dynamic standing balance to Good to increase safety and independence with functional task completion.         Start:  05/20/25    Expected End:  06/03/25            Pt will demo and/or verbalize 2-3 energy conservation techniques to incorporate into functional mobility or ADL to improve performance and increase independence.        Start:  05/20/25    Expected End:  06/03/25

## 2025-05-20 NOTE — PROGRESS NOTES
Neris Gastelum is a 89 y.o. female on day 1 of admission presenting with Chronic low back pain, unspecified back pain laterality, unspecified whether sciatica present.      Subjective   Complains of low back pain       Objective     Last Recorded Vitals  /68 (BP Location: Right arm, Patient Position: Lying)   Pulse 81   Temp 36.5 °C (97.7 °F) (Temporal)   Resp 18   Wt 58.5 kg (129 lb)   SpO2 90%   Intake/Output last 3 Shifts:    Intake/Output Summary (Last 24 hours) at 5/20/2025 1802  Last data filed at 5/20/2025 1342  Gross per 24 hour   Intake 170 ml   Output 300 ml   Net -130 ml       Admission Weight  Weight: 58.5 kg (129 lb) (05/19/25 0722)    Daily Weight  05/19/25 : 58.5 kg (129 lb)    Image Results  Transthoracic Echo Complete     [facility], [address] [place] [address]                Tel [phone] and Fax [phone]    TRANSTHORACIC ECHOCARDIOGRAM REPORT       Patient Name:       NERIS GASTELUM        Reading Physician:    [id number] Martel MD  Study Date:         5/20/2025           Ordering Provider:    [id number] SHAUN TENA  MRN/PID:            [id number]            Fellow:  Accession#:         [id number]        Nurse:  Date of Birth/Age:  1935 / 89 years Sonographer:          Angeles Hayward RDCS  Gender assigned at  F                   Additional Staff:  Birth:  Height:             162.56 cm           Admit Date:           5/19/2025  Weight:             58.51 kg            Admission Status:     Inpatient -                                                                Routine  BSA / BMI:          1.62 m2 / 22.14     Encounter#:           [id number]                      kg/m2  Blood Pressure:     155/73 mmHg         Department Location:  [facility]                                                                 Invasive    Study Type:    TRANSTHORACIC ECHO (TTE) COMPLETE  Diagnosis/ICD: Encounter for preprocedural cardiovascular examination-Z01.810;                 Nonrheumatic aortic (valve) stenosis-I35.0  Indication:    Pre-op exam, AS  CPT Code:      Echo Complete w Full Doppler-[id number]    Patient History:  Pertinent History: CHF and CKD.    Study Detail: The following Echo studies were performed: 2D, M-Mode, Doppler and                color flow. Technically challenging study due to patient lying in                supine position. The patient was awake.       PHYSICIAN INTERPRETATION:  Left Ventricle: The left ventricular systolic function is mildly decreased, with a visually estimated ejection fraction of 45-50%. The left ventricular cavity size was not assessed. Abnormal (paradoxical) septal motion, consistent with left bundle branch block. Spectral Doppler shows a Grade I (impaired relaxation pattern) of left ventricular diastolic filling with normal left atrial filling pressure.  Left Atrium: The left atrial size was not well visualized.  Right Ventricle: The right ventricle was not well visualized. Right ventricular systolic function not assessed.  Right Atrium: The right atrial size was not well visualized.  Aortic Valve: The aortic valve was not well visualized. There is evidence of moderate to severe aortic valve stenosis. The aortic valve dimensionless index is 0.24. There is mild to moderate aortic valve regurgitation. The peak instantaneous gradient of the aortic valve is 28 mmHg. The mean gradient of the aortic valve is 18 mmHg.  Mitral Valve: The mitral valve is mild to moderately thickened. There is mild to moderate mitral annular calcification. The peak instantaneous gradient of the mitral valve is 10 mmHg. There is trace to mild mitral valve regurgitation.  Tricuspid Valve: The tricuspid valve was not well visualized. Tricuspid regurgitation was not assessed.  The right ventricular systolic pressure is unable to be estimated.  Pulmonic Valve: The pulmonic valve is not well visualized. The pulmonic valve regurgitation was not well visualized.  Pericardium: There is no pericardial effusion noted.  Aorta: The aortic root was not well visualized.  Systemic Veins: The inferior vena cava appears normal in size, with IVC inspiratory collapse greater than 50%.       CONCLUSIONS:   1. Poorly visualized anatomical structures due to suboptimal image quality.   2. The left ventricular systolic function is mildly decreased, with a visually estimated ejection fraction of 45-50%.   3. Spectral Doppler shows a Grade I (impaired relaxation pattern) of left ventricular diastolic filling with normal left atrial filling pressure.   4. Abnormal septal motion consistent with left bundle branch block.   5. Moderate to severe aortic valve stenosis.   6. Mild to moderate aortic valve regurgitation.    QUANTITATIVE DATA SUMMARY:     2D MEASUREMENTS:          Normal Ranges:  LVEDV Index:     31 ml/m2       LEFT ATRIUM:                  Normal Ranges:  LA Vol A4C:        31.2 ml    (22+/-6mL/m2)  LA Vol A2C:        29.4 ml  LA Vol BP:         31.0 ml  LA Vol Index A4C:  19.2ml/m2  LA Vol Index A2C:  18.1 ml/m2  LA Vol Index BP:   19.1 ml/m2  LA Area A4C:       13.1 cm2  LA Area A2C:       12.4 cm2  LA Major Axis A4C: 4.7 cm  LA Major Axis A2C: 4.5 cm  LA Vol A4C:        28.9 ml  LA Vol A2C:        28.0 ml  LA Vol Index BSA:  17.5 ml/m2       AORTA MEASUREMENTS:         Normal Ranges:  Ao Sinus, d:        3.13 cm (2.1-3.5cm)       LV SYSTOLIC FUNCTION:                       Normal Ranges:  EF-A4C View:    49 % (>=55%)  EF-Visual:      48 %  LV EF Reported: 48 %       LV DIASTOLIC FUNCTION:          Normal Ranges:  MV Peak E:             0.51 m/s (0.7-1.2 m/s)       MITRAL VALVE:           Normal Ranges:  MV Vmax:      1.60 m/s  (<=1.3m/s)  MV peak PG:   10.2 mmHg (<5mmHg)  MV mean P.0 mmHg   (<2mmHg)       AORTIC VALVE:                      Normal Ranges:  AoV Vmax:                2.66 m/s  (<=1.7m/s)  AoV Peak P.3 mmHg (<20mmHg)  AoV Mean P.0 mmHg (1.7-11.5mmHg)  LVOT Max Kem:            0.71 m/s  (<=1.1m/s)  AoV VTI:                 56.60 cm  (18-25cm)  LVOT VTI:                13.70 cm  LVOT Diameter:           2.00 cm   (1.8-2.4cm)  AoV Area, VTI:           0.76 cm2  (2.5-5.5cm2)  AoV Area,Vmax:           0.84 cm2  (2.5-4.5cm2)  AoV Dimensionless Index: 0.24       AORTIC INSUFFICIENCY:  AI Vmax:       2.53 m/s  AI Half-time:  4341 msec  AI Decel Rate: 17.10 cm/s2       RIGHT VENTRICLE:  RV s' 0.11 m/s       TRICUSPID VALVE/RVSP:          Normal Ranges:  Peak TR Velocity:     1.71 m/s  RV Syst Pressure:     15 mmHg  (< 30mmHg)       PULMONIC VALVE:          Normal Ranges:  PV Max Kem:     0.6 m/s  (0.6-0.9m/s)  PV Max P.5 mmHg       [id number] Shamar Martel MD  Electronically signed on 2025 at 9:53:57 AM       ** Final **      Physical Exam    Relevant Results                  Acute on Chronic back pain  -Acute T12 compression fracture with history of spinal stenosis, scoliosis, L2 compression fracture  -Per ED, scan reviewed by Dr. Villeda ortho-spine who recommends pain management, PT/OT and outpt follow up with PM&R  -pain management with scheduled Tylenol and Robaxin, lidocaine patch, and PRN oxycodone  -Dc the Toradol tonight  -PT/OT  -pall med consult     Moderate left hydroureteronephrosis with abnormal UA  -2/2 4 mm stone at distal left ureter  -bladder scan now  -daily rocephin  -follow urine culture  -urology consult  -flomax  -IVF at low rate overnight  -No surgery recommended at this time.  -monitor kidney function     Constipation  -senna BID  -miralax daily  -stool count  -Adding enema and mag citrate     CKD 3  -daily labs  -avoid nephrotoxins     CHF, moderate aortic stenosis, moderate CAD  -EF 57%  -CT shows bilateral pleural effusions, no  hypoxia or LE edema  -check BNP  -ASA  -losartan  -metoprolol  -tele  -cardiology consult for pre-op clearance     DVT prophy  -lovenox                     JOHN Scott-CNP

## 2025-05-20 NOTE — PROGRESS NOTES
"Neirs Mccall is a 89 y.o. female on day 1 of admission presenting with Chronic low back pain, unspecified back pain laterality, unspecified whether sciatica present.    Subjective   Patient reports pain initially right sided and mid back. Feels pain has traveled to her left side today. Otherwise without acute complaints.        Objective     Physical Exam  Constitutional:       General: She is not in acute distress.  Cardiovascular:      Rate and Rhythm: Normal rate.   Pulmonary:      Effort: Pulmonary effort is normal.   Musculoskeletal:      Comments: Tenderness on palpation over lower back   Skin:     General: Skin is warm and dry.   Neurological:      Mental Status: She is alert and oriented to person, place, and time.         Last Recorded Vitals  Blood pressure 116/65, pulse 63, temperature 36.7 °C (98.1 °F), temperature source Temporal, resp. rate 18, height 1.626 m (5' 4\"), weight 58.5 kg (129 lb), SpO2 90%.  Intake/Output last 3 Shifts:  I/O last 3 completed shifts:  In: 100 (1.7 mL/kg) [P.O.:100]  Out: 300 (5.1 mL/kg) [Urine:300 (0.1 mL/kg/hr)]  Weight: 58.5 kg     Relevant Results               Results for orders placed or performed during the hospital encounter of 05/19/25 (from the past 24 hours)   Comprehensive Metabolic Panel   Result Value Ref Range    Glucose 98 74 - 99 mg/dL    Sodium 136 136 - 145 mmol/L    Potassium 4.1 3.5 - 5.3 mmol/L    Chloride 98 98 - 107 mmol/L    Bicarbonate 28 21 - 32 mmol/L    Anion Gap 14 10 - 20 mmol/L    Urea Nitrogen 46 (H) 6 - 23 mg/dL    Creatinine 1.12 (H) 0.50 - 1.05 mg/dL    eGFR 47 (L) >60 mL/min/1.73m*2    Calcium 10.1 8.6 - 10.3 mg/dL    Albumin 3.8 3.4 - 5.0 g/dL    Alkaline Phosphatase 66 33 - 136 U/L    Total Protein 6.5 6.4 - 8.2 g/dL    AST 19 9 - 39 U/L    Bilirubin, Total 1.0 0.0 - 1.2 mg/dL    ALT 11 7 - 45 U/L   CBC and Auto Differential   Result Value Ref Range    WBC 12.2 (H) 4.4 - 11.3 x10*3/uL    nRBC 0.0 0.0 - 0.0 /100 WBCs    RBC 5.01 4.00 - " 5.20 x10*6/uL    Hemoglobin 15.9 12.0 - 16.0 g/dL    Hematocrit 47.0 (H) 36.0 - 46.0 %    MCV 94 80 - 100 fL    MCH 31.7 26.0 - 34.0 pg    MCHC 33.8 32.0 - 36.0 g/dL    RDW 12.6 11.5 - 14.5 %    Platelets 220 150 - 450 x10*3/uL    Neutrophils % 66.7 40.0 - 80.0 %    Immature Granulocytes %, Automated 0.6 0.0 - 0.9 %    Lymphocytes % 20.6 13.0 - 44.0 %    Monocytes % 11.4 2.0 - 10.0 %    Eosinophils % 0.4 0.0 - 6.0 %    Basophils % 0.3 0.0 - 2.0 %    Neutrophils Absolute 8.14 (H) 1.60 - 5.50 x10*3/uL    Immature Granulocytes Absolute, Automated 0.07 0.00 - 0.50 x10*3/uL    Lymphocytes Absolute 2.52 0.80 - 3.00 x10*3/uL    Monocytes Absolute 1.39 (H) 0.05 - 0.80 x10*3/uL    Eosinophils Absolute 0.05 0.00 - 0.40 x10*3/uL    Basophils Absolute 0.04 0.00 - 0.10 x10*3/uL   Transthoracic Echo Complete   Result Value Ref Range    AV pk ira 2.66 m/s    AV mn grad 18 mmHg    LVOT diam 2.00 cm    LA vol index A/L 19.1 ml/m2    LV EF 48 %    RV free wall pk S' 11.10 cm/s    RVSP 14.7 mmHg    Aortic Valve Area by Continuity of VTI 0.76 cm2    Aortic Valve Area by Continuity of Peak Velocity 0.84 cm2    AV pk grad 28 mmHg    LV A4C EF 49.2      *Note: Due to a large number of results and/or encounters for the requested time period, some results have not been displayed. A complete set of results can be found in Results Review.                      Assessment & Plan  Chronic low back pain, unspecified back pain laterality, unspecified whether sciatica present    This is a 89 year old female admitted for acute on chronic low back pain and moderate left hydroureteronephrosis with an obstructing calculus distal left ureter just proximal to the UVJ.      Recommendations:  - imaging and labs reviewed  - continue with conservative management for now, hopeful patient will pass stone without intervention given size  - cleared by cardiology if surgical intervention is indicated  - symptom management per primary team     Patient discussed  with Dr. Cordova      I spent 15 minutes in the professional and overall care of this patient.      Ryanne Mcgill, APRN-CNP

## 2025-05-20 NOTE — CARE PLAN
The patient's goals for the shift include      The clinical goals for the shift include Pt will be safe throughout shift.      Problem: Pain - Adult  Goal: Verbalizes/displays adequate comfort level or baseline comfort level  Outcome: Progressing     Problem: Safety - Adult  Goal: Free from fall injury  Outcome: Progressing     Problem: Discharge Planning  Goal: Discharge to home or other facility with appropriate resources  Outcome: Progressing     Problem: Chronic Conditions and Co-morbidities  Goal: Patient's chronic conditions and co-morbidity symptoms are monitored and maintained or improved  Outcome: Progressing     Problem: Nutrition  Goal: Nutrient intake appropriate for maintaining nutritional needs  Outcome: Progressing

## 2025-05-20 NOTE — PROGRESS NOTES
05/20/25 1110   Discharge Planning   Living Arrangements Alone  (From [facility])   Support Systems Children   Assistance Needed A&OX4 ([place] and has roxanna for hearing aid) independent with ADLs with rollator and wheelchair for long distances; room air baseline and currently room air; PCP Hazel Dueñas CNP   Type of Residence Private residence   Number of Stairs to Enter Residence 0   Number of Stairs Within Residence 0   Do you have animals or pets at home? Yes   Type of Animals or Pets 1 cat   Who is requesting discharge planning? Provider   Home or Post Acute Services Post acute facilities (Rehab/SNF/etc)   Type of Post Acute Facility Services Skilled nursing   Expected Discharge Disposition SNF  (Spoke with pt & son, Vitor. [facility] can accept pt, but shared room, family has questions for facility. Facility will reach out. [facility] can accept also, familys concerned about distance from their home. Son would like to further discuss with pt & wife.)   Does the patient need discharge transport arranged? Yes   RoundTrip coordination needed? Yes   Has discharge transport been arranged? No   Patient Choice   Provider Choice list and CMS website (https://medicare.gov/care-compare#search) for post-acute Quality and Resource Measure Data were provided and reviewed with: Family;Patient   Patient / Family choosing to utilize agency / facility established prior to hospitalization No   Stroke Family Assessment   Stroke Family Assessment Needed No   Intensity of Service   Intensity of Service 0-30 min        05/20/25 1118   Discharge Planning   Expected Discharge Disposition SNF  (Patient's son Vitor requesting referral be sent to [facility]. Referral sent to [facility], awaiting response.)        05/20/25 1209   Discharge Planning   Expected Discharge Disposition SNF  ([facility] able to accept patient. Awaiting OT evaluation and will start pre-cert. Patient and patient's son Vitor made aware.)         05/20/25 1537   Discharge Planning   Expected Discharge Disposition SNF  (OT evaluated patient and recommend MOD. Direct Submit Team asked to start pre-cert for patient to discharge to [facility].)        05/20/25 1537   Discharge Planning   Expected Discharge Disposition SNF  (Precert to [facility] started 3/20/25 at 3:48 PM and currently pending.)

## 2025-05-21 ENCOUNTER — APPOINTMENT (OUTPATIENT)
Dept: RADIOLOGY | Facility: HOSPITAL | Age: OVER 89
End: 2025-05-21
Payer: MEDICARE

## 2025-05-21 LAB
ALBUMIN SERPL BCP-MCNC: 3.3 G/DL (ref 3.4–5)
ALP SERPL-CCNC: 55 U/L (ref 33–136)
ALT SERPL W P-5'-P-CCNC: 10 U/L (ref 7–45)
ANION GAP SERPL CALC-SCNC: 13 MMOL/L (ref 10–20)
AST SERPL W P-5'-P-CCNC: 16 U/L (ref 9–39)
BASOPHILS # BLD AUTO: 0.05 X10*3/UL (ref 0–0.1)
BASOPHILS NFR BLD AUTO: 0.4 %
BILIRUB SERPL-MCNC: 0.6 MG/DL (ref 0–1.2)
BUN SERPL-MCNC: 48 MG/DL (ref 6–23)
CALCIUM SERPL-MCNC: 9.2 MG/DL (ref 8.6–10.3)
CHLORIDE SERPL-SCNC: 102 MMOL/L (ref 98–107)
CO2 SERPL-SCNC: 25 MMOL/L (ref 21–32)
CREAT SERPL-MCNC: 0.86 MG/DL (ref 0.5–1.05)
EGFRCR SERPLBLD CKD-EPI 2021: 64 ML/MIN/1.73M*2
EOSINOPHIL # BLD AUTO: 0.11 X10*3/UL (ref 0–0.4)
EOSINOPHIL NFR BLD AUTO: 1 %
ERYTHROCYTE [DISTWIDTH] IN BLOOD BY AUTOMATED COUNT: 12.7 % (ref 11.5–14.5)
GLUCOSE SERPL-MCNC: 97 MG/DL (ref 74–99)
HCT VFR BLD AUTO: 42.4 % (ref 36–46)
HGB BLD-MCNC: 14 G/DL (ref 12–16)
IMM GRANULOCYTES # BLD AUTO: 0.05 X10*3/UL (ref 0–0.5)
IMM GRANULOCYTES NFR BLD AUTO: 0.4 % (ref 0–0.9)
LYMPHOCYTES # BLD AUTO: 2.33 X10*3/UL (ref 0.8–3)
LYMPHOCYTES NFR BLD AUTO: 20.8 %
MCH RBC QN AUTO: 31.3 PG (ref 26–34)
MCHC RBC AUTO-ENTMCNC: 33 G/DL (ref 32–36)
MCV RBC AUTO: 95 FL (ref 80–100)
MONOCYTES # BLD AUTO: 1.07 X10*3/UL (ref 0.05–0.8)
MONOCYTES NFR BLD AUTO: 9.6 %
NEUTROPHILS # BLD AUTO: 7.57 X10*3/UL (ref 1.6–5.5)
NEUTROPHILS NFR BLD AUTO: 67.8 %
NRBC BLD-RTO: 0 /100 WBCS (ref 0–0)
PLATELET # BLD AUTO: 210 X10*3/UL (ref 150–450)
POTASSIUM SERPL-SCNC: 4 MMOL/L (ref 3.5–5.3)
PROT SERPL-MCNC: 5.4 G/DL (ref 6.4–8.2)
RBC # BLD AUTO: 4.47 X10*6/UL (ref 4–5.2)
SODIUM SERPL-SCNC: 136 MMOL/L (ref 136–145)
WBC # BLD AUTO: 11.2 X10*3/UL (ref 4.4–11.3)

## 2025-05-21 PROCEDURE — 1200000002 HC GENERAL ROOM WITH TELEMETRY DAILY

## 2025-05-21 PROCEDURE — 2500000005 HC RX 250 GENERAL PHARMACY W/O HCPCS

## 2025-05-21 PROCEDURE — 2500000001 HC RX 250 WO HCPCS SELF ADMINISTERED DRUGS (ALT 637 FOR MEDICARE OP): Performed by: PHYSICIAN ASSISTANT

## 2025-05-21 PROCEDURE — 2500000005 HC RX 250 GENERAL PHARMACY W/O HCPCS: Performed by: FAMILY MEDICINE

## 2025-05-21 PROCEDURE — 80053 COMPREHEN METABOLIC PANEL: CPT | Performed by: STUDENT IN AN ORGANIZED HEALTH CARE EDUCATION/TRAINING PROGRAM

## 2025-05-21 PROCEDURE — 36415 COLL VENOUS BLD VENIPUNCTURE: CPT | Performed by: STUDENT IN AN ORGANIZED HEALTH CARE EDUCATION/TRAINING PROGRAM

## 2025-05-21 PROCEDURE — 2500000004 HC RX 250 GENERAL PHARMACY W/ HCPCS (ALT 636 FOR OP/ED): Mod: JZ | Performed by: NURSE PRACTITIONER

## 2025-05-21 PROCEDURE — 99233 SBSQ HOSP IP/OBS HIGH 50: CPT | Performed by: NURSE PRACTITIONER

## 2025-05-21 PROCEDURE — 85025 COMPLETE CBC W/AUTO DIFF WBC: CPT | Performed by: STUDENT IN AN ORGANIZED HEALTH CARE EDUCATION/TRAINING PROGRAM

## 2025-05-21 PROCEDURE — 2500000005 HC RX 250 GENERAL PHARMACY W/O HCPCS: Performed by: PHYSICIAN ASSISTANT

## 2025-05-21 PROCEDURE — 94760 N-INVAS EAR/PLS OXIMETRY 1: CPT

## 2025-05-21 PROCEDURE — 2500000002 HC RX 250 W HCPCS SELF ADMINISTERED DRUGS (ALT 637 FOR MEDICARE OP, ALT 636 FOR OP/ED): Performed by: PHYSICIAN ASSISTANT

## 2025-05-21 PROCEDURE — 2500000001 HC RX 250 WO HCPCS SELF ADMINISTERED DRUGS (ALT 637 FOR MEDICARE OP): Performed by: NURSE PRACTITIONER

## 2025-05-21 PROCEDURE — 99232 SBSQ HOSP IP/OBS MODERATE 35: CPT | Performed by: NURSE PRACTITIONER

## 2025-05-21 PROCEDURE — 2500000004 HC RX 250 GENERAL PHARMACY W/ HCPCS (ALT 636 FOR OP/ED): Mod: JZ | Performed by: PHYSICIAN ASSISTANT

## 2025-05-21 RX ORDER — KETOROLAC TROMETHAMINE 15 MG/ML
15 INJECTION, SOLUTION INTRAMUSCULAR; INTRAVENOUS EVERY 6 HOURS PRN
Status: DISCONTINUED | OUTPATIENT
Start: 2025-05-21 | End: 2025-05-22 | Stop reason: HOSPADM

## 2025-05-21 RX ORDER — SODIUM CHLORIDE 9 MG/ML
50 INJECTION, SOLUTION INTRAVENOUS CONTINUOUS
Status: DISCONTINUED | OUTPATIENT
Start: 2025-05-21 | End: 2025-05-22 | Stop reason: HOSPADM

## 2025-05-21 RX ORDER — OXYCODONE HYDROCHLORIDE 5 MG/1
5 TABLET ORAL EVERY 4 HOURS PRN
Refills: 0 | Status: DISCONTINUED | OUTPATIENT
Start: 2025-05-21 | End: 2025-05-22 | Stop reason: HOSPADM

## 2025-05-21 RX ORDER — TALC
6 POWDER (GRAM) TOPICAL NIGHTLY PRN
Status: DISCONTINUED | OUTPATIENT
Start: 2025-05-21 | End: 2025-05-22 | Stop reason: HOSPADM

## 2025-05-21 RX ADMIN — CYANOCOBALAMIN TAB 500 MCG 250 MCG: 500 TAB at 08:52

## 2025-05-21 RX ADMIN — Medication 5 L/MIN: at 15:00

## 2025-05-21 RX ADMIN — CEFTRIAXONE 1 G: 1 INJECTION, SOLUTION INTRAVENOUS at 08:52

## 2025-05-21 RX ADMIN — METOPROLOL SUCCINATE 25 MG: 25 TABLET, EXTENDED RELEASE ORAL at 08:52

## 2025-05-21 RX ADMIN — LATANOPROST 1 DROP: 50 SOLUTION OPHTHALMIC at 21:56

## 2025-05-21 RX ADMIN — DULOXETINE HYDROCHLORIDE 20 MG: 20 CAPSULE, DELAYED RELEASE ORAL at 08:52

## 2025-05-21 RX ADMIN — Medication 3 L/MIN: at 15:59

## 2025-05-21 RX ADMIN — SODIUM CHLORIDE 50 ML/HR: 9 INJECTION, SOLUTION INTRAVENOUS at 12:52

## 2025-05-21 RX ADMIN — Medication 3 L/MIN: at 01:47

## 2025-05-21 RX ADMIN — KETOROLAC TROMETHAMINE 15 MG: 15 INJECTION, SOLUTION INTRAMUSCULAR; INTRAVENOUS at 18:45

## 2025-05-21 RX ADMIN — Medication 50 MCG: at 08:52

## 2025-05-21 RX ADMIN — METHOCARBAMOL 1000 MG: 500 TABLET ORAL at 22:08

## 2025-05-21 RX ADMIN — CALCIUM CARBONATE 1 TABLET: 500 TABLET, CHEWABLE ORAL at 08:52

## 2025-05-21 RX ADMIN — CALCITONIN SALMON 1 SPRAY: 200 SPRAY, METERED NASAL at 08:57

## 2025-05-21 RX ADMIN — ACETAMINOPHEN 975 MG: 325 TABLET, FILM COATED ORAL at 12:48

## 2025-05-21 RX ADMIN — Medication 3 L/MIN: at 22:55

## 2025-05-21 RX ADMIN — OXYCODONE HYDROCHLORIDE 10 MG: 10 TABLET ORAL at 07:41

## 2025-05-21 RX ADMIN — Medication 3 MG: at 21:55

## 2025-05-21 RX ADMIN — LIDOCAINE 4% 1 PATCH: 40 PATCH TOPICAL at 08:57

## 2025-05-21 RX ADMIN — ENOXAPARIN SODIUM 40 MG: 40 INJECTION SUBCUTANEOUS at 14:19

## 2025-05-21 RX ADMIN — IPRATROPIUM BROMIDE 2 SPRAY: 21 SPRAY, METERED NASAL at 08:57

## 2025-05-21 RX ADMIN — ASPIRIN 81 MG: 81 TABLET, COATED ORAL at 08:52

## 2025-05-21 RX ADMIN — KETOROLAC TROMETHAMINE 15 MG: 15 INJECTION, SOLUTION INTRAMUSCULAR; INTRAVENOUS at 09:08

## 2025-05-21 RX ADMIN — LOSARTAN POTASSIUM 25 MG: 50 TABLET, FILM COATED ORAL at 21:55

## 2025-05-21 RX ADMIN — ACETAMINOPHEN 975 MG: 325 TABLET, FILM COATED ORAL at 21:55

## 2025-05-21 RX ADMIN — PANTOPRAZOLE SODIUM 40 MG: 40 TABLET, DELAYED RELEASE ORAL at 06:17

## 2025-05-21 RX ADMIN — ROSUVASTATIN CALCIUM 20 MG: 20 TABLET, FILM COATED ORAL at 21:55

## 2025-05-21 RX ADMIN — METHOCARBAMOL 1000 MG: 500 TABLET ORAL at 14:19

## 2025-05-21 RX ADMIN — IPRATROPIUM BROMIDE 2 SPRAY: 21 SPRAY, METERED NASAL at 14:20

## 2025-05-21 RX ADMIN — TAMSULOSIN HYDROCHLORIDE 0.4 MG: 0.4 CAPSULE ORAL at 08:52

## 2025-05-21 RX ADMIN — METHOCARBAMOL 1000 MG: 500 TABLET ORAL at 06:17

## 2025-05-21 RX ADMIN — Medication 3 L/MIN: at 04:27

## 2025-05-21 RX ADMIN — IPRATROPIUM BROMIDE 2 SPRAY: 21 SPRAY, METERED NASAL at 21:56

## 2025-05-21 ASSESSMENT — PAIN SCALES - GENERAL
PAINLEVEL_OUTOF10: 5 - MODERATE PAIN
PAINLEVEL_OUTOF10: 6
PAINLEVEL_OUTOF10: 6
PAINLEVEL_OUTOF10: 0 - NO PAIN
PAINLEVEL_OUTOF10: 0 - NO PAIN
PAINLEVEL_OUTOF10: 7

## 2025-05-21 ASSESSMENT — COGNITIVE AND FUNCTIONAL STATUS - GENERAL
STANDING UP FROM CHAIR USING ARMS: A LOT
TOILETING: A LOT
HELP NEEDED FOR BATHING: A LOT
WALKING IN HOSPITAL ROOM: A LOT
MOVING FROM LYING ON BACK TO SITTING ON SIDE OF FLAT BED WITH BEDRAILS: A LOT
DAILY ACTIVITIY SCORE: 15
DRESSING REGULAR LOWER BODY CLOTHING: A LOT
PERSONAL GROOMING: A LITTLE
TURNING FROM BACK TO SIDE WHILE IN FLAT BAD: A LOT
CLIMB 3 TO 5 STEPS WITH RAILING: A LOT
MOBILITY SCORE: 12
DRESSING REGULAR UPPER BODY CLOTHING: A LOT
MOVING TO AND FROM BED TO CHAIR: A LOT

## 2025-05-21 ASSESSMENT — PAIN - FUNCTIONAL ASSESSMENT
PAIN_FUNCTIONAL_ASSESSMENT: 0-10

## 2025-05-21 NOTE — PROGRESS NOTES
Neris Mccall is a 89 y.o. female on day 2 of admission presenting with Chronic low back pain, unspecified back pain laterality, unspecified whether sciatica present.    Subjective   Pt admitted with lower back pain and left hydroureteronephrosis with obstructing stone in left ureter      Pt was confused last night with ambien.   She still c/o left back pain and abd pain. The 5 mg oxycodone did not help with her pain but with 10 mg oxycodone she had somnolence and desaturation.    Was constipated for 6 d, had laxatives and enemas yesterday and overnight and has had multiple diarrhea episodes.  No fever or chills.   No CP or SOB. No dizziness or light headedness.   No n/v. Tolerating PO.    Son Sumanth at bedside.     Objective     Physical Exam  Vitals reviewed.   Constitutional:       General: She is not in acute distress.     Appearance: Normal appearance. She is normal weight. She is not ill-appearing, toxic-appearing or diaphoretic.   HENT:      Head: Normocephalic and atraumatic.      Ears:      Comments: Hard of hearing     Mouth/Throat:      Mouth: Mucous membranes are moist.   Eyes:      General: No scleral icterus.        Right eye: No discharge.         Left eye: No discharge.      Conjunctiva/sclera: Conjunctivae normal.   Cardiovascular:      Rate and Rhythm: Normal rate and regular rhythm.      Pulses: Normal pulses.      Heart sounds: Murmur (3/6 throughout) heard.      No friction rub. No gallop.   Pulmonary:      Effort: Pulmonary effort is normal. No respiratory distress.      Breath sounds: Normal breath sounds. No stridor. No wheezing, rhonchi or rales.   Chest:      Chest wall: No tenderness.   Abdominal:      General: Bowel sounds are normal. There is distension.      Palpations: Abdomen is soft.      Tenderness: There is abdominal tenderness. There is left CVA tenderness. There is no right CVA tenderness.   Skin:     General: Skin is warm and dry.      Capillary Refill: Capillary refill takes  "less than 2 seconds.   Neurological:      Mental Status: She is alert and oriented to person, place, and time.      Motor: Weakness (genrealized) present.   Psychiatric:         Speech: Speech is delayed.         Behavior: Behavior is cooperative.         Thought Content: Thought content normal.         Judgment: Judgment normal.         Last Recorded Vitals  Blood pressure 146/76, pulse 77, temperature 36.2 °C (97.2 °F), temperature source Temporal, resp. rate 18, height 1.626 m (5' 4\"), weight 58.5 kg (129 lb), SpO2 96%.  Intake/Output last 3 Shifts:  I/O last 3 completed shifts:  In: 1210 (20.7 mL/kg) [P.O.:420; I.V.:740 (12.6 mL/kg); IV Piggyback:50]  Out: 300 (5.1 mL/kg) [Urine:300 (0.1 mL/kg/hr)]  Weight: 58.5 kg     Relevant Results    Transthoracic Echo Complete  Result Date: 5/20/2025   [facility], [address] [place] [address]               Tel [phone] and Fax [phone] TRANSTHORACIC ECHOCARDIOGRAM REPORT  Patient Name:       JEANNETTE ORIANA        [facility] Physician:    [id number] Shamar Martel MD Study Date:         5/20/2025           Ordering Provider:    [id number] SHAUN TENA MRN/PID:            [id number]            Fellow: Accession#:         [id number]        Nurse: Date of Birth/Age:  1935 / 89 years Sonographer:          Angeles Hayward RDCS Gender assigned at  F                   Additional Staff: Birth: Height:             162.56 cm           Admit Date:           5/19/2025 Weight:             58.51 kg            Admission Status:     Inpatient -                                                               Routine BSA / BMI:          1.62 m2 / 22.14     Encounter#:           [id number]                     kg/m2 Blood Pressure:     155/73 mmHg         Department Location:  [facility]" [facility] Non                                                               Invasive Study Type:    TRANSTHORACIC ECHO (TTE) COMPLETE Diagnosis/ICD: Encounter for preprocedural cardiovascular examination-Z01.810;                Nonrheumatic aortic (valve) stenosis-I35.0 Indication:    Pre-op exam, AS CPT Code:      Echo Complete w Full [id number] Patient History: Pertinent History: CHF and CKD. Study Detail: The following Echo studies were performed: 2D, M-Mode, Doppler and               color flow. Technically challenging study due to patient lying in               supine position. The patient was awake.  PHYSICIAN INTERPRETATION: Left Ventricle: The left ventricular systolic function is mildly decreased, with a visually estimated ejection fraction of 45-50%. The left ventricular cavity size was not assessed. Abnormal (paradoxical) septal motion, consistent with left bundle branch block. Spectral Doppler shows a Grade I (impaired relaxation pattern) of left ventricular diastolic filling with normal left atrial filling pressure. Left Atrium: The left atrial size was not well visualized. Right Ventricle: The right ventricle was not well visualized. Right ventricular systolic function not assessed. Right Atrium: The right atrial size was not well visualized. Aortic Valve: The aortic valve was not well visualized. There is evidence of moderate to severe aortic valve stenosis. The aortic valve dimensionless index is 0.24. There is mild to moderate aortic valve regurgitation. The peak instantaneous gradient of the aortic valve is 28 mmHg. The mean gradient of the aortic valve is 18 mmHg. Mitral Valve: The mitral valve is mild to moderately thickened. There is mild to moderate mitral annular calcification. The peak instantaneous gradient of the mitral valve is 10 mmHg. There is trace to mild mitral valve regurgitation. Tricuspid Valve: The tricuspid valve was not well visualized. Tricuspid regurgitation was not assessed.  The right ventricular systolic pressure is unable to be estimated. Pulmonic Valve: The pulmonic valve is not well visualized. The pulmonic valve regurgitation was not well visualized. Pericardium: There is no pericardial effusion noted. Aorta: The aortic root was not well visualized. Systemic Veins: The inferior vena cava appears normal in size, with IVC inspiratory collapse greater than 50%.  CONCLUSIONS:  1. Poorly visualized anatomical structures due to suboptimal image quality.  2. The left ventricular systolic function is mildly decreased, with a visually estimated ejection fraction of 45-50%.  3. Spectral Doppler shows a Grade I (impaired relaxation pattern) of left ventricular diastolic filling with normal left atrial filling pressure.  4. Abnormal septal motion consistent with left bundle branch block.  5. Moderate to severe aortic valve stenosis.  6. Mild to moderate aortic valve regurgitation. QUANTITATIVE DATA SUMMARY:  2D MEASUREMENTS:          Normal Ranges: LVEDV Index:     31 ml/m2  LEFT ATRIUM:                  Normal Ranges: LA Vol A4C:        31.2 ml    (22+/-6mL/m2) LA Vol A2C:        29.4 ml LA Vol BP:         31.0 ml LA Vol Index A4C:  19.2ml/m2 LA Vol Index A2C:  18.1 ml/m2 LA Vol Index BP:   19.1 ml/m2 LA Area A4C:       13.1 cm2 LA Area A2C:       12.4 cm2 LA Major Axis A4C: 4.7 cm LA Major Axis A2C: 4.5 cm LA Vol A4C:        28.9 ml LA Vol A2C:        28.0 ml LA Vol Index BSA:  17.5 ml/m2  AORTA MEASUREMENTS:         Normal Ranges: Ao Sinus, d:        3.13 cm (2.1-3.5cm)  LV SYSTOLIC FUNCTION:                      Normal Ranges: EF-A4C View:    49 % (>=55%) EF-Visual:      48 % LV EF Reported: 48 %  LV DIASTOLIC FUNCTION:          Normal Ranges: MV Peak E:             0.51 m/s (0.7-1.2 m/s)  MITRAL VALVE:           Normal Ranges: MV Vmax:      1.60 m/s  (<=1.3m/s) MV peak PG:   10.2 mmHg (<5mmHg) MV mean P.0 mmHg  (<2mmHg)  AORTIC VALVE:                      Normal Ranges: AoV Vmax:                 2.66 m/s  (<=1.7m/s) AoV Peak P.3 mmHg (<20mmHg) AoV Mean P.0 mmHg (1.7-11.5mmHg) LVOT Max Kem:            0.71 m/s  (<=1.1m/s) AoV VTI:                 56.60 cm  (18-25cm) LVOT VTI:                13.70 cm LVOT Diameter:           2.00 cm   (1.8-2.4cm) AoV Area, VTI:           0.76 cm2  (2.5-5.5cm2) AoV Area,Vmax:           0.84 cm2  (2.5-4.5cm2) AoV Dimensionless Index: 0.24  AORTIC INSUFFICIENCY: AI Vmax:       2.53 m/s AI Half-time:  4341 msec AI Decel Rate: 17.10 cm/s2  RIGHT VENTRICLE: RV s' 0.11 m/s  TRICUSPID VALVE/RVSP:          Normal Ranges: Peak TR Velocity:     1.71 m/s RV Syst Pressure:     15 mmHg  (< 30mmHg)  PULMONIC VALVE:          Normal Ranges: PV Max Kem:     0.6 m/s  (0.6-0.9m/s) PV Max P.5 mmHg  [id number] Shamar Martel MD Electronically signed on 2025 at 9:53:57 AM  ** Final **     CT abdomen pelvis wo IV contrast  Result Date: 2025  Interpreted By:  Reyes Lerner, STUDY: CT ABDOMEN PELVIS WO IV CONTRAST;  2025 8:43 am   INDICATION: Signs/Symptoms:No BM in 4 days. Diffuse abdominal tenderness.   COMPARISON: CT chest 10/31/2024.   ACCESSION NUMBER(S): [id number]   ORDERING CLINICIAN: JUVENCIO AMAYA   TECHNIQUE: CT of the abdomen and pelvis was performed without intravenous administration of iodinated contrast. Contiguous axial images were obtained at 3 mm slice thickness through the abdomen and pelvis. Coronal and sagittal reconstructions at 3 mm slice thickness were performed.  No intravenous contrast was administered.   FINDINGS: Please note that the evaluation of vessels, lymph nodes and organs is limited without intravenous contrast.   LOWER CHEST: Bibasilar atelectasis and scarring. Small bilateral pleural effusions. Right middle lobe pulmonary nodule measuring 6 mm (series 204, image 21) unchanged relative to the comparison CT chest. Pacing leads terminating in the right atrium, right ventricle and along the course of  the coronary sinus are partially imaged. Cardiomegaly with left-sided hypertrophy. Coronary artery calcifications. Epicardial pacing leads.   ABDOMEN:   LIVER: Unremarkable.   BILE DUCTS: No significant biliary ductal dilatation within limitation of noncontrast technique.   GALLBLADDER: Cholelithiasis and possible biliary sludge   PANCREAS: Unremarkable.   SPLEEN: Unremarkable   ADRENAL GLANDS: Unremarkable   KIDNEYS AND URETERS: Moderate left hydroureteronephrosis. Calcification along the course of the distal left ureter measuring 4 mm (series 201, image 107) concerning for distal left ureteral calculus. Left intra calyceal nephrolithiasis measuring 1.9 x 1 cm in the interpolar collecting system. No radiopaque right nephrolithiasis. No right hydronephrosis. Low-attenuation exophytic interpolar left renal lesion measuring 1.9 cm is similar in size and suggestive of a simple cyst incompletely characterized in the absence of intravenous contrast.   PERITONEUM/RETROPERITONEUM/LYMPH NODES: No ascites or free air, no fluid collection. No enlarged mesenteric lymph nodes.   BOWEL: No bowel obstruction. Appendix not visualized. No pericecal inflammation to suggest acute appendicitis. Moderate colonic stool burden. Distal colonic diverticulosis without diverticulitis.   PELVIS:   BLADDER: Moderately distended and thin-walled.   REPRODUCTIVE ORGANS: Partially calcified uterine fibroids.   VESSELS: Aorto bi-iliac atherosclerosis. 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 Peripherally calcified structure at the right renal hilum measuring 1.2 x 1.1 cm suggests a renal artery aneurysm.   ABDOMINAL WALL: Fat containing left inguinal hernia contains a short segment of nondilated small bowel.   BONES: Please see separately dictated report from contemporaneous CT for detailed evaluation of the lumbar spine. Polyarticular degenerative change.       1.  Moderate left hydroureteronephrosis with an obstructing calculus distal left ureter just proximal to the UVJ. 2. Solid right middle lobe pulmonary nodules unchanged in size. 3. Please see separately dictated report from contemporaneous CT for detailed evaluation of the lumbar spine. 4. Additional chronic and/or ancillary findings detailed above.   MACRO: None   Signed by: Reyes Lerner 5/19/2025 9:37 AM Dictation workstation:   [id number]    CT lumbar spine retrospective reconstruction protocol  Result Date: 5/19/2025  Interpreted By:  Reyes Lerner, STUDY: CT LUMBAR SPINE RETROSPECTIVE RECONSTRUCTION PROTOCOL  5/19/2025 8:43 am   INDICATION: Signs/Symptoms:Acute flare of chronic low back pain with point tenderness     COMPARISON: Lumbar spine CT 10/31/2024.   ACCESSION NUMBER(S): [id number]   ORDERING CLINICIAN: JUVENCIO AMAYA   TECHNIQUE: Axial CT images of the lumbar spine are obtained. Axial, coronal and sagittal reconstructions are provided for review.   FINDINGS: Alignment:  Levoscoliosis of the thoracolumbar spine centered at L2 similar.   Vertebrae:  Acute T12 inferior endplate compression fracture with 54% height loss. No bony retropulsion or spondylolisthesis remote L2 vertebral body compression fracture with similar height loss status post augmentation.   Degenerative changes:   Lower Thoracic Spine: No significant bony spinal canal stenosis in the included lower thoracic region.   T12-L1:  No significant bony spinal canal stenosis.   L1-2: Degenerative disc disease with volume loss, endplate osteophytosis and facet arthropathy.No significant bony spinal canal or foraminal stenosis.   L2-3: Degenerative disc disease, endplate osteophytosis and facet arthropathy. No significant bony spinal canal or foraminal stenosis.   L3-4: Degenerative disc disease with volume loss, endplate osteophytosis, posteriorly calcified disc bulge, and facet arthropathy. No significant bony spinal canal or foraminal  stenosis   L4-5: Degenerative disc disease with volume loss, endplate osteophytosis, and facet arthropathy. No significant bony spinal canal or foraminal stenosis.   L5-S1: Degenerative disc disease with volume loss, endplate osteophytosis and facet arthropathy. No significant bony spinal canal or foraminal stenosis.   Prevertebral/Paraspinal Soft Tissues: Unremarkable.   Additional: Please see separately dictated report from contemporaneous CT abdomen and pelvis for additional findings.       Acute T12 inferior endplate compression fracture. Mild-to-moderate multilevel degenerative disc and facet disease without advanced bony foraminal or canal stenosis   MACRO: None   Signed by: Reyes Lerner 5/19/2025 9:17 AM Dictation workstation:   [id number]    Epidural Steroid Injection  Result Date: 5/8/2025  Table formatting from the original result was not included. Procedure Epidural Steroid Injection Indication Lumbar stenosis with neurogenic claudication Medications lidocaine PF (Xylocaine) 5 mg/mL (0.5 %) injection 13 mL methylPREDNISolone acetate (DEPO-Medrol) injection 40 mg iohexol (OMNIPaque) 240 mg iodine/mL solution 5 mL sodium chloride (PF) 0.9% solution 5 mL sodium bicarbonate injection 1 mEq (Totals for administrations occurring from 1026 to 1136 on 05/08/25) Preprocedure A history and physical has been performed, and patient medication allergies have been reviewed. The patient's tolerance of previous anesthesia has been reviewed. The risks and benefits of the procedure and the sedation options and risks were discussed with the patient. All questions were answered and informed consent obtained. Details of the Procedure History reviewed patient interviewed and examined.  Risks and benefits of procedure discussed patient agreed to proceed and consent was signed.  A second identification was done in the operating room.  With the patient in the prone position and under IV sedation the back was prepped and draped  in a sterile fashion.  Under fluoroscopic guidance L3-L4  interspace was identified.  Lidocaine 0.5% was used for skin and tissue infiltration.  An 18-gauge 3.5 inch Touhy needle was inserted.  Multiple attempts to access the epidural space failed.  Level switched to L4-L5 interspace.  An 18-gauge 3 and half inch Touhy needle was inserted into the epidural space under fluoroscopic guidance in AP and contralateral oblique view and with loss-of-resistance technique.  IV contrast showed adequate epidural spread without any vascular or intrathecal uptake confirmed in AP and lateral views..  Depo-Medrol 40 mg mixed with lidocaine 0.5% a total of 5 cc was injected.  Washout view showed good epidural spread in the AP view. Needle removed Band-Aid applied.  Patient tolerated the procedure without any problems and was transferred to the recovery room in stable condition.  Procedure Provider Rajeev Fiore MD Procedure Location [address] [place] [address] Referring Provider [place] [address] [address] pain management  Result Date: 5/8/2025  These images are not reportable by radiology and will not be interpreted by  Radiologists.      Scheduled medications  Scheduled Medications[1]  Continuous medications  Continuous Medications[2]  PRN medications  PRN Medications[3]  Results for orders placed or performed during the hospital encounter of 05/19/25 (from the past 24 hours)   Transthoracic Echo Complete   Result Value Ref Range    AV pk ira 2.66 m/s    AV mn grad 18 mmHg    LVOT diam 2.00 cm    LA vol index A/L 19.1 ml/m2    LV EF 48 %    RV free wall pk S' 11.10 cm/s    RVSP 14.7 mmHg    Aortic Valve Area by Continuity of VTI 0.76 cm2    Aortic Valve Area by Continuity of Peak Velocity 0.84 cm2    AV pk grad 28 mmHg    LV A4C EF 49.2    CBC and Auto Differential   Result Value Ref Range    WBC 11.2 4.4 - 11.3 x10*3/uL    nRBC  0.0 0.0 - 0.0 /100 WBCs    RBC 4.47 4.00 - 5.20 x10*6/uL    Hemoglobin 14.0 12.0 - 16.0 g/dL    Hematocrit 42.4 36.0 - 46.0 %    MCV 95 80 - 100 fL    MCH 31.3 26.0 - 34.0 pg    MCHC 33.0 32.0 - 36.0 g/dL    RDW 12.7 11.5 - 14.5 %    Platelets 210 150 - 450 x10*3/uL    Neutrophils % 67.8 40.0 - 80.0 %    Immature Granulocytes %, Automated 0.4 0.0 - 0.9 %    Lymphocytes % 20.8 13.0 - 44.0 %    Monocytes % 9.6 2.0 - 10.0 %    Eosinophils % 1.0 0.0 - 6.0 %    Basophils % 0.4 0.0 - 2.0 %    Neutrophils Absolute 7.57 (H) 1.60 - 5.50 x10*3/uL    Immature Granulocytes Absolute, Automated 0.05 0.00 - 0.50 x10*3/uL    Lymphocytes Absolute 2.33 0.80 - 3.00 x10*3/uL    Monocytes Absolute 1.07 (H) 0.05 - 0.80 x10*3/uL    Eosinophils Absolute 0.11 0.00 - 0.40 x10*3/uL    Basophils Absolute 0.05 0.00 - 0.10 x10*3/uL     *Note: Due to a large number of results and/or encounters for the requested time period, some results have not been displayed. A complete set of results can be found in Results Review.                            Assessment & Plan  Chronic low back pain, unspecified back pain laterality, unspecified whether sciatica present      This is a 89 year old female admitted for acute on chronic low back pain and moderate left hydroureteronephrosis with an obstructing calculus distal left ureter just proximal to the UVJ.   - imaging reviewed. CT 5/19 with moderate left hydroureteronephrosis with obstructing calculus in distal left ureter just proximal to UVJ. Consider repeat US tomorrow if no stone passes in urine.  - labs reviewed kidney function improved. CBC stable. Urine culture with no growth. Cont to monitor labs.   - regular soft diet.  - cont medical management for left hydronephrosis and obstructing stone.   - cleared by cardiology if surgical intervention is necessary.   - recommend kat-colace 2 BID, cont upon discharge.  - recommend miralax daily, cont upon discharge.  - remainder of care per primary.   -  urology will cont to follow pt.       I spent 35 minutes in the professional and overall care of this patient.    Plan of care discussed with Dr. Cordova.     Capri Gipson, APRN-CNP           [1] acetaminophen, 975 mg, oral, q8h  aspirin, 81 mg, oral, Daily  calcitonin (salmon), 1 spray, One Nostril, Daily  calcium carbonate, 1 tablet, oral, Daily  cefTRIAXone, 1 g, intravenous, q24h  cholecalciferol, 50 mcg, oral, Daily  cyanocobalamin, 250 mcg, oral, Daily  DULoxetine, 20 mg, oral, Daily  enoxaparin, 40 mg, subcutaneous, q24h  ipratropium, 2 spray, Each Nostril, TID  latanoprost, 1 drop, Left Eye, Nightly  lidocaine, 1 patch, transdermal, Daily  losartan, 25 mg, oral, Daily  methocarbamol, 1,000 mg, oral, q8h JELENA  metoprolol succinate XL, 25 mg, oral, Daily  pantoprazole, 40 mg, oral, Daily before breakfast  polyethylene glycol, 17 g, oral, Daily  rosuvastatin, 20 mg, oral, Nightly  sennosides-docusate sodium, 2 tablet, oral, BID  tamsulosin, 0.4 mg, oral, Daily    [2] sodium chloride 0.9%, 50 mL/hr, Last Rate: 50 mL/hr (05/21/25 0127)    [3] PRN medications: ketorolac, lubricating eye drops, melatonin, methyl salicylate-menthol, naloxone, ondansetron **OR** ondansetron, oxyCODONE, oxyCODONE, oxygen, oxygen, [Held by provider] zolpidem

## 2025-05-21 NOTE — PROGRESS NOTES
05/21/25 1159   Discharge Planning   Living Arrangements Alone  (from [facility] [place])   Support Systems Children   Assistance Needed A&Ox4 ([place] but has a [place] for a hearing aide), independent with ADLs, utilizes a rollator and wheelchair for long distances. Room air at baseline. PCP Hazel Dueñas CNP   Type of Residence Private residence   Number of Stairs to Enter Residence 0   Number of Stairs Within Residence 0   Do you have animals or pets at home? Yes   Type of Animals or Pets 1 cat   Home or Post Acute Services Post acute facilities (Rehab/SNF/etc)   Type of Post Acute Facility Services Skilled nursing   Expected Discharge Disposition SNF  ([facility], auth obtained and good till 5/26. Pt's son updated via phone. Pt's son states that dc on Friday would not be good for the pt because he is leaving the country and his sister is flying in from [place] Friday evening.)   Does the patient need discharge transport arranged? Yes   RoundTrip coordination needed? Yes   Has discharge transport been arranged? No

## 2025-05-21 NOTE — CARE PLAN
The patient's goals for the shift include      The clinical goals for the shift include Pt will remain safe throughout shift.      Problem: Pain - Adult  Goal: Verbalizes/displays adequate comfort level or baseline comfort level  Outcome: Progressing     Problem: Safety - Adult  Goal: Free from fall injury  Outcome: Progressing     Problem: Discharge Planning  Goal: Discharge to home or other facility with appropriate resources  Outcome: Progressing     Problem: Chronic Conditions and Co-morbidities  Goal: Patient's chronic conditions and co-morbidity symptoms are monitored and maintained or improved  Outcome: Progressing     Problem: Nutrition  Goal: Nutrient intake appropriate for maintaining nutritional needs  Outcome: Progressing

## 2025-05-21 NOTE — PROGRESS NOTES
Neris Gastelum is a 89 y.o. female on day 2 of admission presenting with Chronic low back pain, unspecified back pain laterality, unspecified whether sciatica present.      Subjective   Complains of low back pain       Objective     Last Recorded Vitals  /76 (BP Location: Right arm, Patient Position: Lying)   Pulse 77   Temp 36.2 °C (97.2 °F) (Temporal)   Resp 18   Wt 58.5 kg (129 lb)   SpO2 93%   Intake/Output last 3 Shifts:    Intake/Output Summary (Last 24 hours) at 5/21/2025 1455  Last data filed at 5/21/2025 1049  Gross per 24 hour   Intake 1190 ml   Output --   Net 1190 ml       Admission Weight  Weight: 58.5 kg (129 lb) (05/19/25 0722)    Daily Weight  05/19/25 : 58.5 kg (129 lb)    Image Results  Transthoracic Echo Complete     [facility], [address] [place] [address]                Tel [phone] and Fax [phone]    TRANSTHORACIC ECHOCARDIOGRAM REPORT       Patient Name:       NERIS GASTELUM        Reading Physician:    [id number] Martel MD  Study Date:         5/20/2025           Ordering Provider:    [id number] SHAUN TENA  MRN/PID:            [id number]            Fellow:  Accession#:         [id number]        Nurse:  Date of Birth/Age:  1935 / 89 years Sonographer:          Angeles Hayward RDCS  Gender assigned at  F                   Additional Staff:  Birth:  Height:             162.56 cm           Admit Date:           5/19/2025  Weight:             58.51 kg            Admission Status:     Inpatient -                                                                Routine  BSA / BMI:          1.62 m2 / 22.14     Encounter#:           [id number]                      kg/m2  Blood Pressure:     155/73 mmHg         Department Location:  [facility]                                                                 Invasive    Study Type:    TRANSTHORACIC ECHO (TTE) COMPLETE  Diagnosis/ICD: Encounter for preprocedural cardiovascular examination-Z01.810;                 Nonrheumatic aortic (valve) stenosis-I35.0  Indication:    Pre-op exam, AS  CPT Code:      Echo Complete w Full Doppler-[id number]    Patient History:  Pertinent History: CHF and CKD.    Study Detail: The following Echo studies were performed: 2D, M-Mode, Doppler and                color flow. Technically challenging study due to patient lying in                supine position. The patient was awake.       PHYSICIAN INTERPRETATION:  Left Ventricle: The left ventricular systolic function is mildly decreased, with a visually estimated ejection fraction of 45-50%. The left ventricular cavity size was not assessed. Abnormal (paradoxical) septal motion, consistent with left bundle branch block. Spectral Doppler shows a Grade I (impaired relaxation pattern) of left ventricular diastolic filling with normal left atrial filling pressure.  Left Atrium: The left atrial size was not well visualized.  Right Ventricle: The right ventricle was not well visualized. Right ventricular systolic function not assessed.  Right Atrium: The right atrial size was not well visualized.  Aortic Valve: The aortic valve was not well visualized. There is evidence of moderate to severe aortic valve stenosis. The aortic valve dimensionless index is 0.24. There is mild to moderate aortic valve regurgitation. The peak instantaneous gradient of the aortic valve is 28 mmHg. The mean gradient of the aortic valve is 18 mmHg.  Mitral Valve: The mitral valve is mild to moderately thickened. There is mild to moderate mitral annular calcification. The peak instantaneous gradient of the mitral valve is 10 mmHg. There is trace to mild mitral valve regurgitation.  Tricuspid Valve: The tricuspid valve was not well visualized. Tricuspid regurgitation was not assessed. The  right ventricular systolic pressure is unable to be estimated.  Pulmonic Valve: The pulmonic valve is not well visualized. The pulmonic valve regurgitation was not well visualized.  Pericardium: There is no pericardial effusion noted.  Aorta: The aortic root was not well visualized.  Systemic Veins: The inferior vena cava appears normal in size, with IVC inspiratory collapse greater than 50%.       CONCLUSIONS:   1. Poorly visualized anatomical structures due to suboptimal image quality.   2. The left ventricular systolic function is mildly decreased, with a visually estimated ejection fraction of 45-50%.   3. Spectral Doppler shows a Grade I (impaired relaxation pattern) of left ventricular diastolic filling with normal left atrial filling pressure.   4. Abnormal septal motion consistent with left bundle branch block.   5. Moderate to severe aortic valve stenosis.   6. Mild to moderate aortic valve regurgitation.    QUANTITATIVE DATA SUMMARY:     2D MEASUREMENTS:          Normal Ranges:  LVEDV Index:     31 ml/m2       LEFT ATRIUM:                  Normal Ranges:  LA Vol A4C:        31.2 ml    (22+/-6mL/m2)  LA Vol A2C:        29.4 ml  LA Vol BP:         31.0 ml  LA Vol Index A4C:  19.2ml/m2  LA Vol Index A2C:  18.1 ml/m2  LA Vol Index BP:   19.1 ml/m2  LA Area A4C:       13.1 cm2  LA Area A2C:       12.4 cm2  LA Major Axis A4C: 4.7 cm  LA Major Axis A2C: 4.5 cm  LA Vol A4C:        28.9 ml  LA Vol A2C:        28.0 ml  LA Vol Index BSA:  17.5 ml/m2       AORTA MEASUREMENTS:         Normal Ranges:  Ao Sinus, d:        3.13 cm (2.1-3.5cm)       LV SYSTOLIC FUNCTION:                       Normal Ranges:  EF-A4C View:    49 % (>=55%)  EF-Visual:      48 %  LV EF Reported: 48 %       LV DIASTOLIC FUNCTION:          Normal Ranges:  MV Peak E:             0.51 m/s (0.7-1.2 m/s)       MITRAL VALVE:           Normal Ranges:  MV Vmax:      1.60 m/s  (<=1.3m/s)  MV peak PG:   10.2 mmHg (<5mmHg)  MV mean P.0 mmHg   (<2mmHg)       AORTIC VALVE:                      Normal Ranges:  AoV Vmax:                2.66 m/s  (<=1.7m/s)  AoV Peak P.3 mmHg (<20mmHg)  AoV Mean P.0 mmHg (1.7-11.5mmHg)  LVOT Max Kem:            0.71 m/s  (<=1.1m/s)  AoV VTI:                 56.60 cm  (18-25cm)  LVOT VTI:                13.70 cm  LVOT Diameter:           2.00 cm   (1.8-2.4cm)  AoV Area, VTI:           0.76 cm2  (2.5-5.5cm2)  AoV Area,Vmax:           0.84 cm2  (2.5-4.5cm2)  AoV Dimensionless Index: 0.24       AORTIC INSUFFICIENCY:  AI Vmax:       2.53 m/s  AI Half-time:  4341 msec  AI Decel Rate: 17.10 cm/s2       RIGHT VENTRICLE:  RV s' 0.11 m/s       TRICUSPID VALVE/RVSP:          Normal Ranges:  Peak TR Velocity:     1.71 m/s  RV Syst Pressure:     15 mmHg  (< 30mmHg)       PULMONIC VALVE:          Normal Ranges:  PV Max Kem:     0.6 m/s  (0.6-0.9m/s)  PV Max P.5 mmHg       [id number] Shamar Martel MD  Electronically signed on 2025 at 9:53:57 AM       ** Final **      Physical Exam  Abdominal:      Tenderness: There is no abdominal tenderness.   Musculoskeletal:         General: Tenderness present. No signs of injury.      Lumbar back: Bony tenderness present.   Neurological:      Mental Status: She is alert and oriented to person, place, and time.      Comments: [facility]   Psychiatric:         Behavior: Behavior normal.         Relevant Results      Results for orders placed or performed during the hospital encounter of 25 (from the past 24 hours)   Comprehensive Metabolic Panel   Result Value Ref Range    Glucose 97 74 - 99 mg/dL    Sodium 136 136 - 145 mmol/L    Potassium 4.0 3.5 - 5.3 mmol/L    Chloride 102 98 - 107 mmol/L    Bicarbonate 25 21 - 32 mmol/L    Anion Gap 13 10 - 20 mmol/L    Urea Nitrogen 48 (H) 6 - 23 mg/dL    Creatinine 0.86 0.50 - 1.05 mg/dL    eGFR 64 >60 mL/min/1.73m*2    Calcium 9.2 8.6 - 10.3 mg/dL    Albumin 3.3 (L) 3.4 - 5.0 g/dL    Alkaline Phosphatase 55 33 - 136 U/L     Total Protein 5.4 (L) 6.4 - 8.2 g/dL    AST 16 9 - 39 U/L    Bilirubin, Total 0.6 0.0 - 1.2 mg/dL    ALT 10 7 - 45 U/L   CBC and Auto Differential   Result Value Ref Range    WBC 11.2 4.4 - 11.3 x10*3/uL    nRBC 0.0 0.0 - 0.0 /100 WBCs    RBC 4.47 4.00 - 5.20 x10*6/uL    Hemoglobin 14.0 12.0 - 16.0 g/dL    Hematocrit 42.4 36.0 - 46.0 %    MCV 95 80 - 100 fL    MCH 31.3 26.0 - 34.0 pg    MCHC 33.0 32.0 - 36.0 g/dL    RDW 12.7 11.5 - 14.5 %    Platelets 210 150 - 450 x10*3/uL    Neutrophils % 67.8 40.0 - 80.0 %    Immature Granulocytes %, Automated 0.4 0.0 - 0.9 %    Lymphocytes % 20.8 13.0 - 44.0 %    Monocytes % 9.6 2.0 - 10.0 %    Eosinophils % 1.0 0.0 - 6.0 %    Basophils % 0.4 0.0 - 2.0 %    Neutrophils Absolute 7.57 (H) 1.60 - 5.50 x10*3/uL    Immature Granulocytes Absolute, Automated 0.05 0.00 - 0.50 x10*3/uL    Lymphocytes Absolute 2.33 0.80 - 3.00 x10*3/uL    Monocytes Absolute 1.07 (H) 0.05 - 0.80 x10*3/uL    Eosinophils Absolute 0.11 0.00 - 0.40 x10*3/uL    Basophils Absolute 0.05 0.00 - 0.10 x10*3/uL     *Note: Due to a large number of results and/or encounters for the requested time period, some results have not been displayed. A complete set of results can be found in Results Review.                Acute on Chronic back pain  -Acute T12 compression fracture with history of spinal stenosis, scoliosis, L2 compression fracture  -Per ED, scan reviewed by Dr. Villeda ortho-spine who recommends pain management, PT/OT and outpt follow up with PM&R  -pain management with scheduled Tylenol and Robaxin, lidocaine patch, and PRN oxycodone  -Oxycodone decreased this morning after hypoxia  -PT/OT rec moderate intensity   -Palliative care consult for pain management due to chronic back pain and/or renal calculus. Palliative care recommends OP follow up.      Moderate left hydroureteronephrosis   4 mm stone at distal left ureter  Pyuria  -Bladder scan, PRN  -Daily rocephin  -UC contaminated  -Flomax continued  -Urology  following. No surgery recommended at this time. Will likely order US in the morning if the stone does not pass.  -Monitor kidney function     Constipation- resolved  -senna BID  -miralax daily  -stool count  -Added enema and mag citrate     CKD 3  -daily labs  -avoid nephrotoxins     CHF, moderate aortic stenosis, moderate CAD  -EF 57%  -CT shows bilateral pleural effusions, no hypoxia or LE edema  -check BNP  -ASA  -losartan  -metoprolol  -tele     DVT prophy  -lovenox    Dispo: Will discharge to SNF tomorrow 5/22/25, if urology clears the patient. Family is leaving the country on Friday.                  Cristin Bermudez, APRN-CNP

## 2025-05-21 NOTE — NURSING NOTE
1930: assumed pt care    5-21-25/0113: pt on BIPAP with oxygen bleeding into the BIPAP machine, her oxygen dropping to about 83-88%, let respiratory and Nisa Whelan NP know. Pt also is delirious, confused. Ambien discontinued.     5-21-25/0125: respiratory therapists in room trying to get pt's oxygen level to come back up while on the BIPAP, pt seems to have better oxygen readings while on 3L NC oxygen over BIPAP. Nisa Whelan NP aware, chest xray and ABG ordered.     5-21-25/0138: pt was repositioned in bed that helped her oxygen to come back up on BIPAP, Pt now 95% on BIPAP with 3L NC oxygen bleeding into machine. Nisa Whelan NP aware, chest xray and ABG were cancelled.

## 2025-05-22 ENCOUNTER — APPOINTMENT (OUTPATIENT)
Dept: RADIOLOGY | Facility: HOSPITAL | Age: OVER 89
End: 2025-05-22
Payer: MEDICARE

## 2025-05-22 VITALS
SYSTOLIC BLOOD PRESSURE: 114 MMHG | HEART RATE: 71 BPM | BODY MASS INDEX: 22.02 KG/M2 | RESPIRATION RATE: 20 BRPM | TEMPERATURE: 97.9 F | HEIGHT: 64 IN | WEIGHT: 129 LBS | DIASTOLIC BLOOD PRESSURE: 63 MMHG | OXYGEN SATURATION: 91 %

## 2025-05-22 PROBLEM — G89.29 CHRONIC LOW BACK PAIN, UNSPECIFIED BACK PAIN LATERALITY, UNSPECIFIED WHETHER SCIATICA PRESENT: Status: RESOLVED | Noted: 2025-05-19 | Resolved: 2025-05-22

## 2025-05-22 PROBLEM — M54.50 CHRONIC LOW BACK PAIN, UNSPECIFIED BACK PAIN LATERALITY, UNSPECIFIED WHETHER SCIATICA PRESENT: Status: RESOLVED | Noted: 2025-05-19 | Resolved: 2025-05-22

## 2025-05-22 LAB
ALBUMIN SERPL BCP-MCNC: 3.4 G/DL (ref 3.4–5)
ALP SERPL-CCNC: 63 U/L (ref 33–136)
ALT SERPL W P-5'-P-CCNC: 10 U/L (ref 7–45)
ANION GAP SERPL CALC-SCNC: 13 MMOL/L (ref 10–20)
AST SERPL W P-5'-P-CCNC: 17 U/L (ref 9–39)
BASOPHILS # BLD AUTO: 0.06 X10*3/UL (ref 0–0.1)
BASOPHILS NFR BLD AUTO: 0.6 %
BILIRUB SERPL-MCNC: 0.7 MG/DL (ref 0–1.2)
BUN SERPL-MCNC: 45 MG/DL (ref 6–23)
CALCIUM SERPL-MCNC: 9.7 MG/DL (ref 8.6–10.3)
CHLORIDE SERPL-SCNC: 103 MMOL/L (ref 98–107)
CO2 SERPL-SCNC: 27 MMOL/L (ref 21–32)
CREAT SERPL-MCNC: 0.96 MG/DL (ref 0.5–1.05)
EGFRCR SERPLBLD CKD-EPI 2021: 56 ML/MIN/1.73M*2
EOSINOPHIL # BLD AUTO: 0.36 X10*3/UL (ref 0–0.4)
EOSINOPHIL NFR BLD AUTO: 3.6 %
ERYTHROCYTE [DISTWIDTH] IN BLOOD BY AUTOMATED COUNT: 12.6 % (ref 11.5–14.5)
GLUCOSE SERPL-MCNC: 96 MG/DL (ref 74–99)
HCT VFR BLD AUTO: 42.7 % (ref 36–46)
HGB BLD-MCNC: 13.9 G/DL (ref 12–16)
IMM GRANULOCYTES # BLD AUTO: 0.04 X10*3/UL (ref 0–0.5)
IMM GRANULOCYTES NFR BLD AUTO: 0.4 % (ref 0–0.9)
LYMPHOCYTES # BLD AUTO: 2.09 X10*3/UL (ref 0.8–3)
LYMPHOCYTES NFR BLD AUTO: 20.9 %
MCH RBC QN AUTO: 31.6 PG (ref 26–34)
MCHC RBC AUTO-ENTMCNC: 32.6 G/DL (ref 32–36)
MCV RBC AUTO: 97 FL (ref 80–100)
MONOCYTES # BLD AUTO: 0.96 X10*3/UL (ref 0.05–0.8)
MONOCYTES NFR BLD AUTO: 9.6 %
NEUTROPHILS # BLD AUTO: 6.51 X10*3/UL (ref 1.6–5.5)
NEUTROPHILS NFR BLD AUTO: 64.9 %
NRBC BLD-RTO: 0 /100 WBCS (ref 0–0)
PLATELET # BLD AUTO: 225 X10*3/UL (ref 150–450)
POTASSIUM SERPL-SCNC: 4.3 MMOL/L (ref 3.5–5.3)
PROT SERPL-MCNC: 5.6 G/DL (ref 6.4–8.2)
RBC # BLD AUTO: 4.4 X10*6/UL (ref 4–5.2)
SODIUM SERPL-SCNC: 139 MMOL/L (ref 136–145)
WBC # BLD AUTO: 10 X10*3/UL (ref 4.4–11.3)

## 2025-05-22 PROCEDURE — 2500000004 HC RX 250 GENERAL PHARMACY W/ HCPCS (ALT 636 FOR OP/ED): Mod: JZ | Performed by: NURSE PRACTITIONER

## 2025-05-22 PROCEDURE — 2500000005 HC RX 250 GENERAL PHARMACY W/O HCPCS

## 2025-05-22 PROCEDURE — 2500000005 HC RX 250 GENERAL PHARMACY W/O HCPCS: Performed by: FAMILY MEDICINE

## 2025-05-22 PROCEDURE — 2500000001 HC RX 250 WO HCPCS SELF ADMINISTERED DRUGS (ALT 637 FOR MEDICARE OP): Performed by: NURSE PRACTITIONER

## 2025-05-22 PROCEDURE — 76770 US EXAM ABDO BACK WALL COMP: CPT

## 2025-05-22 PROCEDURE — 97535 SELF CARE MNGMENT TRAINING: CPT | Mod: GO,CO

## 2025-05-22 PROCEDURE — 74018 RADEX ABDOMEN 1 VIEW: CPT | Performed by: RADIOLOGY

## 2025-05-22 PROCEDURE — 2500000001 HC RX 250 WO HCPCS SELF ADMINISTERED DRUGS (ALT 637 FOR MEDICARE OP): Performed by: PHYSICIAN ASSISTANT

## 2025-05-22 PROCEDURE — 99239 HOSP IP/OBS DSCHRG MGMT >30: CPT | Performed by: NURSE PRACTITIONER

## 2025-05-22 PROCEDURE — 85025 COMPLETE CBC W/AUTO DIFF WBC: CPT | Performed by: STUDENT IN AN ORGANIZED HEALTH CARE EDUCATION/TRAINING PROGRAM

## 2025-05-22 PROCEDURE — 76770 US EXAM ABDO BACK WALL COMP: CPT | Performed by: INTERNAL MEDICINE

## 2025-05-22 PROCEDURE — 2500000002 HC RX 250 W HCPCS SELF ADMINISTERED DRUGS (ALT 637 FOR MEDICARE OP, ALT 636 FOR OP/ED): Performed by: PHYSICIAN ASSISTANT

## 2025-05-22 PROCEDURE — 36415 COLL VENOUS BLD VENIPUNCTURE: CPT | Performed by: STUDENT IN AN ORGANIZED HEALTH CARE EDUCATION/TRAINING PROGRAM

## 2025-05-22 PROCEDURE — 74018 RADEX ABDOMEN 1 VIEW: CPT

## 2025-05-22 PROCEDURE — 99232 SBSQ HOSP IP/OBS MODERATE 35: CPT | Performed by: NURSE PRACTITIONER

## 2025-05-22 PROCEDURE — 80053 COMPREHEN METABOLIC PANEL: CPT | Performed by: STUDENT IN AN ORGANIZED HEALTH CARE EDUCATION/TRAINING PROGRAM

## 2025-05-22 RX ORDER — AMOXICILLIN 250 MG
2 CAPSULE ORAL 2 TIMES DAILY
Start: 2025-05-22 | End: 2025-06-21

## 2025-05-22 RX ORDER — KETOROLAC TROMETHAMINE 10 MG/1
10 TABLET, FILM COATED ORAL EVERY 6 HOURS PRN
Start: 2025-05-22 | End: 2025-05-27

## 2025-05-22 RX ORDER — IBUPROFEN 600 MG/1
600 TABLET, FILM COATED ORAL EVERY 6 HOURS SCHEDULED
Status: DISCONTINUED | OUTPATIENT
Start: 2025-05-22 | End: 2025-05-22

## 2025-05-22 RX ORDER — FAMOTIDINE 20 MG/1
40 TABLET, FILM COATED ORAL DAILY
Status: DISCONTINUED | OUTPATIENT
Start: 2025-05-22 | End: 2025-05-22 | Stop reason: HOSPADM

## 2025-05-22 RX ORDER — POLYETHYLENE GLYCOL 3350 17 G/17G
17 POWDER, FOR SOLUTION ORAL DAILY PRN
Start: 2025-05-22 | End: 2025-06-21

## 2025-05-22 RX ORDER — KETOROLAC TROMETHAMINE 10 MG/1
10 TABLET, FILM COATED ORAL ONCE
Status: COMPLETED | OUTPATIENT
Start: 2025-05-22 | End: 2025-05-22

## 2025-05-22 RX ORDER — METHOCARBAMOL 1000 MG/1
1000 TABLET, FILM COATED ORAL 4 TIMES DAILY
Start: 2025-05-22 | End: 2025-05-29

## 2025-05-22 RX ORDER — OXYCODONE HYDROCHLORIDE 5 MG/1
5 TABLET ORAL EVERY 4 HOURS PRN
Qty: 15 TABLET | Refills: 0 | Status: SHIPPED | OUTPATIENT
Start: 2025-05-22 | End: 2025-05-25

## 2025-05-22 RX ORDER — TAMSULOSIN HYDROCHLORIDE 0.4 MG/1
0.4 CAPSULE ORAL DAILY
Start: 2025-05-23 | End: 2025-06-22

## 2025-05-22 RX ORDER — CALCITONIN SALMON 200 [IU]/.09ML
1 SPRAY, METERED NASAL DAILY
Start: 2025-05-23

## 2025-05-22 RX ADMIN — ASPIRIN 81 MG: 81 TABLET, COATED ORAL at 08:43

## 2025-05-22 RX ADMIN — KETOROLAC TROMETHAMINE 15 MG: 15 INJECTION, SOLUTION INTRAMUSCULAR; INTRAVENOUS at 08:37

## 2025-05-22 RX ADMIN — ACETAMINOPHEN 975 MG: 325 TABLET, FILM COATED ORAL at 11:40

## 2025-05-22 RX ADMIN — FAMOTIDINE 40 MG: 20 TABLET, FILM COATED ORAL at 14:10

## 2025-05-22 RX ADMIN — CYANOCOBALAMIN TAB 500 MCG 250 MCG: 500 TAB at 08:43

## 2025-05-22 RX ADMIN — TAMSULOSIN HYDROCHLORIDE 0.4 MG: 0.4 CAPSULE ORAL at 08:43

## 2025-05-22 RX ADMIN — Medication 3 L/MIN: at 06:20

## 2025-05-22 RX ADMIN — ACETAMINOPHEN 975 MG: 325 TABLET, FILM COATED ORAL at 04:49

## 2025-05-22 RX ADMIN — KETOROLAC TROMETHAMINE 10 MG: 10 TABLET, FILM COATED ORAL at 14:10

## 2025-05-22 RX ADMIN — METHOCARBAMOL 1000 MG: 500 TABLET ORAL at 14:10

## 2025-05-22 RX ADMIN — PANTOPRAZOLE SODIUM 40 MG: 40 TABLET, DELAYED RELEASE ORAL at 06:30

## 2025-05-22 RX ADMIN — DULOXETINE HYDROCHLORIDE 20 MG: 20 CAPSULE, DELAYED RELEASE ORAL at 08:43

## 2025-05-22 RX ADMIN — SODIUM CHLORIDE 50 ML/HR: 0.9 INJECTION, SOLUTION INTRAVENOUS at 09:46

## 2025-05-22 RX ADMIN — Medication 50 MCG: at 08:43

## 2025-05-22 RX ADMIN — LIDOCAINE 4% 1 PATCH: 40 PATCH TOPICAL at 08:43

## 2025-05-22 RX ADMIN — IPRATROPIUM BROMIDE 2 SPRAY: 21 SPRAY, METERED NASAL at 08:47

## 2025-05-22 RX ADMIN — METOPROLOL SUCCINATE 25 MG: 25 TABLET, EXTENDED RELEASE ORAL at 08:43

## 2025-05-22 RX ADMIN — IPRATROPIUM BROMIDE 2 SPRAY: 21 SPRAY, METERED NASAL at 14:12

## 2025-05-22 RX ADMIN — KETOROLAC TROMETHAMINE 15 MG: 15 INJECTION, SOLUTION INTRAMUSCULAR; INTRAVENOUS at 00:54

## 2025-05-22 RX ADMIN — METHOCARBAMOL 1000 MG: 500 TABLET ORAL at 06:30

## 2025-05-22 RX ADMIN — SODIUM CHLORIDE 50 ML/HR: 0.9 INJECTION, SOLUTION INTRAVENOUS at 07:04

## 2025-05-22 ASSESSMENT — COGNITIVE AND FUNCTIONAL STATUS - GENERAL
DRESSING REGULAR UPPER BODY CLOTHING: A LITTLE
HELP NEEDED FOR BATHING: A LOT
MOBILITY SCORE: 10
MOVING TO AND FROM BED TO CHAIR: A LOT
HELP NEEDED FOR BATHING: A LOT
STANDING UP FROM CHAIR USING ARMS: A LOT
MOVING FROM LYING ON BACK TO SITTING ON SIDE OF FLAT BED WITH BEDRAILS: A LOT
TOILETING: TOTAL
WALKING IN HOSPITAL ROOM: TOTAL
TURNING FROM BACK TO SIDE WHILE IN FLAT BAD: A LOT
DRESSING REGULAR LOWER BODY CLOTHING: A LOT
CLIMB 3 TO 5 STEPS WITH RAILING: TOTAL
TOILETING: A LOT
PERSONAL GROOMING: A LITTLE
DAILY ACTIVITIY SCORE: 16
DRESSING REGULAR UPPER BODY CLOTHING: A LOT
DAILY ACTIVITIY SCORE: 15
DRESSING REGULAR LOWER BODY CLOTHING: A LOT

## 2025-05-22 ASSESSMENT — PAIN SCALES - GENERAL
PAINLEVEL_OUTOF10: 5 - MODERATE PAIN
PAINLEVEL_OUTOF10: 6
PAINLEVEL_OUTOF10: 0 - NO PAIN
PAINLEVEL_OUTOF10: 3
PAINLEVEL_OUTOF10: 2

## 2025-05-22 ASSESSMENT — PAIN DESCRIPTION - LOCATION: LOCATION: BACK

## 2025-05-22 ASSESSMENT — PAIN - FUNCTIONAL ASSESSMENT
PAIN_FUNCTIONAL_ASSESSMENT: 0-10

## 2025-05-22 ASSESSMENT — PAIN DESCRIPTION - ORIENTATION: ORIENTATION: LOWER

## 2025-05-22 ASSESSMENT — ACTIVITIES OF DAILY LIVING (ADL): HOME_MANAGEMENT_TIME_ENTRY: 15

## 2025-05-22 NOTE — PROGRESS NOTES
05/22/25 1437   Discharge Planning   Expected Discharge Disposition SNF  (dc 5/22 to [facility], [facility] made aware to send discharge documents. Transport confirmed for 1530. Pt's son, Sumanth, updated and is agreeable.)   Does the patient need discharge transport arranged? Yes   RoundTrip coordination needed? Yes   Has discharge transport been arranged? Yes   What day is the transport expected? 05/22/25   What time is the transport expected? 1530

## 2025-05-22 NOTE — DISCHARGE INSTRUCTIONS
You have a kidney stone in the left side and this should pass on its own.   When the stone is moving there may be increased pain in the left back.   You can use heat 15 minutes at a time to help.   You can use tylenol 1000 mg twice a day for pain.   Be sure to hydrate well.   Avoid caffeine.   Follow up with urology as needed for kidney stones. [phone] for Department of Urology at     Hold the ASA while taking the Toradol.

## 2025-05-22 NOTE — PROGRESS NOTES
Physical Therapy                 Therapy Communication Note    Patient Name: Neris Mccall  MRN: [id number]  Department: [address]  Room: [address] [place], [place] [address]  Today's Date: 5/22/2025     Discipline: Physical Therapy    Missed Visit: PT Missed Visit: Yes     Missed Visit Reason: Missed Visit Reason: Other (Comment) (Pt. cleared for PT Tx by RN who was doubtful pt. would feel up to participating.  Spoke with pt and son who politely declined tx at this time d/t pain in back, exhaustion from not sleeping well last night and medical testing.  RN made aware.  No PT tx)    Missed Time: Attempt  0958    Comment:

## 2025-05-22 NOTE — CARE PLAN
The clinical goals for the shift include decrease in pain      Problem: Pain - Adult  Goal: Verbalizes/displays adequate comfort level or baseline comfort level  Outcome: Progressing

## 2025-05-22 NOTE — PROGRESS NOTES
Neris Mccall is a 89 y.o. female on day 3 of admission presenting with Chronic low back pain, unspecified back pain laterality, unspecified whether sciatica present.    Subjective   Pt admitted with lower back pain and left hydroureteronephrosis with obstructing stone in left ureter        multiple diarrhea episodes since enemas and laxatives.  Still c/o abd discomfort and back pain.  Back pain is across lower back, both sides and radiates into both legs. C/o neuropathy into both legs L>R and leg weakness, not sure if one side is weaker than the other.   No fever or chills.   No CP or SOB. No dizziness or light headedness.   No n/v. Tolerating PO.  No stone seen in strained urine.  Denies hematuria and denies dysuria. Has been urinating without diff. Nursing has been monitoring with bladder scans and PVR has been <100 ml     Objective     Physical Exam  Vitals reviewed.   Constitutional:       General: She is not in acute distress.     Appearance: Normal appearance. She is normal weight. She is not ill-appearing, toxic-appearing or diaphoretic.   HENT:      Head: Normocephalic and atraumatic.      Ears:      Comments: Hard of hearing     Mouth/Throat:      Mouth: Mucous membranes are moist.   Eyes:      General: No scleral icterus.        Right eye: No discharge.         Left eye: No discharge.      Conjunctiva/sclera: Conjunctivae normal.   Cardiovascular:      Rate and Rhythm: Normal rate and regular rhythm.      Pulses: Normal pulses.      Heart sounds: Murmur (3/6 throughout) heard.      No friction rub. No gallop.   Pulmonary:      Effort: Pulmonary effort is normal. No respiratory distress.      Breath sounds: Normal breath sounds. No stridor. No wheezing, rhonchi or rales.   Chest:      Chest wall: No tenderness.   Abdominal:      General: Bowel sounds are normal. There is no distension.      Palpations: Abdomen is soft.      Tenderness: There is abdominal tenderness (RLQ, LLQ). There is left CVA tenderness.  "There is no right CVA tenderness.   Skin:     General: Skin is warm and dry.      Capillary Refill: Capillary refill takes less than 2 seconds.   Neurological:      Mental Status: She is alert and oriented to person, place, and time.      Motor: Weakness (genrealized) present.   Psychiatric:         Speech: Speech is delayed.         Behavior: Behavior is cooperative.         Thought Content: Thought content normal.         Judgment: Judgment normal.         Last Recorded Vitals  Blood pressure 154/75, pulse 66, temperature 36.2 °C (97.2 °F), temperature source Temporal, resp. rate 20, height 1.626 m (5' 4\"), weight 58.5 kg (129 lb), SpO2 98%.  Intake/Output last 3 Shifts:  I/O last 3 completed shifts:  In: 2350.8 (40.2 mL/kg) [P.O.:720; I.V.:1580.8 (27 mL/kg); IV Piggyback:50]  Out: - (0 mL/kg)   Weight: 58.5 kg     Relevant Results    Transthoracic Echo Complete  Result Date: 5/20/2025   [facility], [address] [place] [address]               Tel [phone] and Fax [phone] TRANSTHORACIC ECHOCARDIOGRAM REPORT  Patient Name:       JEANNETTE Mccrary Physician:    [id number] Shamar Martel MD Study Date:         5/20/2025           Ordering Provider:    [id number] SHAUN TENA MRN/PID:            [id number]            Fellow: Accession#:         [id number]        Nurse: Date of Birth/Age:  1935 / 89 years Sonographer:          Angeles Hayward                                                               [facility] Gender assigned at  F                   Additional Staff: Birth: Height:             162.56 cm           Admit Date:           5/19/2025 Weight:             58.51 kg            Admission Status:     Inpatient -                                                               Routine BSA / BMI:          1.62 m2 / 22.14     Encounter#:           [id number]  "                    kg/m2 Blood Pressure:     155/73 mmHg         Department Location:  [facility]                                                               Invasive Study Type:    TRANSTHORACIC ECHO (TTE) COMPLETE Diagnosis/ICD: Encounter for preprocedural cardiovascular examination-Z01.810;                Nonrheumatic aortic (valve) stenosis-I35.0 Indication:    Pre-op exam, AS CPT Code:      Echo Complete w Full Doppler-[id number] Patient History: Pertinent History: CHF and CKD. Study Detail: The following Echo studies were performed: 2D, M-Mode, Doppler and               color flow. Technically challenging study due to patient lying in               supine position. The patient was awake.  PHYSICIAN INTERPRETATION: Left Ventricle: The left ventricular systolic function is mildly decreased, with a visually estimated ejection fraction of 45-50%. The left ventricular cavity size was not assessed. Abnormal (paradoxical) septal motion, consistent with left bundle branch block. Spectral Doppler shows a Grade I (impaired relaxation pattern) of left ventricular diastolic filling with normal left atrial filling pressure. Left Atrium: The left atrial size was not well visualized. Right Ventricle: The right ventricle was not well visualized. Right ventricular systolic function not assessed. Right Atrium: The right atrial size was not well visualized. Aortic Valve: The aortic valve was not well visualized. There is evidence of moderate to severe aortic valve stenosis. The aortic valve dimensionless index is 0.24. There is mild to moderate aortic valve regurgitation. The peak instantaneous gradient of the aortic valve is 28 mmHg. The mean gradient of the aortic valve is 18 mmHg. Mitral Valve: The mitral valve is mild to moderately thickened. There is mild to moderate mitral annular calcification. The peak instantaneous gradient of the mitral valve is 10 mmHg. There is trace to mild mitral valve regurgitation. Tricuspid  Valve: The tricuspid valve was not well visualized. Tricuspid regurgitation was not assessed. The right ventricular systolic pressure is unable to be estimated. Pulmonic Valve: The pulmonic valve is not well visualized. The pulmonic valve regurgitation was not well visualized. Pericardium: There is no pericardial effusion noted. Aorta: The aortic root was not well visualized. Systemic Veins: The inferior vena cava appears normal in size, with IVC inspiratory collapse greater than 50%.  CONCLUSIONS:  1. Poorly visualized anatomical structures due to suboptimal image quality.  2. The left ventricular systolic function is mildly decreased, with a visually estimated ejection fraction of 45-50%.  3. Spectral Doppler shows a Grade I (impaired relaxation pattern) of left ventricular diastolic filling with normal left atrial filling pressure.  4. Abnormal septal motion consistent with left bundle branch block.  5. Moderate to severe aortic valve stenosis.  6. Mild to moderate aortic valve regurgitation. QUANTITATIVE DATA SUMMARY:  2D MEASUREMENTS:          Normal Ranges: LVEDV Index:     31 ml/m2  LEFT ATRIUM:                  Normal Ranges: LA Vol A4C:        31.2 ml    (22+/-6mL/m2) LA Vol A2C:        29.4 ml LA Vol BP:         31.0 ml LA Vol Index A4C:  19.2ml/m2 LA Vol Index A2C:  18.1 ml/m2 LA Vol Index BP:   19.1 ml/m2 LA Area A4C:       13.1 cm2 LA Area A2C:       12.4 cm2 LA Major Axis A4C: 4.7 cm LA Major Axis A2C: 4.5 cm LA Vol A4C:        28.9 ml LA Vol A2C:        28.0 ml LA Vol Index BSA:  17.5 ml/m2  AORTA MEASUREMENTS:         Normal Ranges: Ao Sinus, d:        3.13 cm (2.1-3.5cm)  LV SYSTOLIC FUNCTION:                      Normal Ranges: EF-A4C View:    49 % (>=55%) EF-Visual:      48 % LV EF Reported: 48 %  LV DIASTOLIC FUNCTION:          Normal Ranges: MV Peak E:             0.51 m/s (0.7-1.2 m/s)  MITRAL VALVE:           Normal Ranges: MV Vmax:      1.60 m/s  (<=1.3m/s) MV peak PG:   10.2 mmHg (<5mmHg) MV  mean P.0 mmHg  (<2mmHg)  AORTIC VALVE:                      Normal Ranges: AoV Vmax:                2.66 m/s  (<=1.7m/s) AoV Peak P.3 mmHg (<20mmHg) AoV Mean P.0 mmHg (1.7-11.5mmHg) LVOT Max Kem:            0.71 m/s  (<=1.1m/s) AoV VTI:                 56.60 cm  (18-25cm) LVOT VTI:                13.70 cm LVOT Diameter:           2.00 cm   (1.8-2.4cm) AoV Area, VTI:           0.76 cm2  (2.5-5.5cm2) AoV Area,Vmax:           0.84 cm2  (2.5-4.5cm2) AoV Dimensionless Index: 0.24  AORTIC INSUFFICIENCY: AI Vmax:       2.53 m/s AI Half-time:  4341 msec AI Decel Rate: 17.10 cm/s2  RIGHT VENTRICLE: RV s' 0.11 m/s  TRICUSPID VALVE/RVSP:          Normal Ranges: Peak TR Velocity:     1.71 m/s RV Syst Pressure:     15 mmHg  (< 30mmHg)  PULMONIC VALVE:          Normal Ranges: PV Max Kem:     0.6 m/s  (0.6-0.9m/s) PV Max P.5 mmHg  [id number] Shamar Martel MD Electronically signed on 2025 at 9:53:57 AM  ** Final **     CT abdomen pelvis wo IV contrast  Result Date: 2025  Interpreted By:  Reyes Lerner, STUDY: CT ABDOMEN PELVIS WO IV CONTRAST;  2025 8:43 am   INDICATION: Signs/Symptoms:No BM in 4 days. Diffuse abdominal tenderness.   COMPARISON: CT chest 10/31/2024.   ACCESSION NUMBER(S): [id number]   ORDERING CLINICIAN: JUVENCIO AMAYA   TECHNIQUE: CT of the abdomen and pelvis was performed without intravenous administration of iodinated contrast. Contiguous axial images were obtained at 3 mm slice thickness through the abdomen and pelvis. Coronal and sagittal reconstructions at 3 mm slice thickness were performed.  No intravenous contrast was administered.   FINDINGS: Please note that the evaluation of vessels, lymph nodes and organs is limited without intravenous contrast.   LOWER CHEST: Bibasilar atelectasis and scarring. Small bilateral pleural effusions. Right middle lobe pulmonary nodule measuring 6 mm (series 204, image 21) unchanged relative to the comparison CT  chest. Pacing leads terminating in the right atrium, right ventricle and along the course of the coronary sinus are partially imaged. Cardiomegaly with left-sided hypertrophy. Coronary artery calcifications. Epicardial pacing leads.   ABDOMEN:   LIVER: Unremarkable.   BILE DUCTS: No significant biliary ductal dilatation within limitation of noncontrast technique.   GALLBLADDER: Cholelithiasis and possible biliary sludge   PANCREAS: Unremarkable.   SPLEEN: Unremarkable   ADRENAL GLANDS: Unremarkable   KIDNEYS AND URETERS: Moderate left hydroureteronephrosis. Calcification along the course of the distal left ureter measuring 4 mm (series 201, image 107) concerning for distal left ureteral calculus. Left intra calyceal nephrolithiasis measuring 1.9 x 1 cm in the interpolar collecting system. No radiopaque right nephrolithiasis. No right hydronephrosis. Low-attenuation exophytic interpolar left renal lesion measuring 1.9 cm is similar in size and suggestive of a simple cyst incompletely characterized in the absence of intravenous contrast.   PERITONEUM/RETROPERITONEUM/LYMPH NODES: No ascites or free air, no fluid collection. No enlarged mesenteric lymph nodes.   BOWEL: No bowel obstruction. Appendix not visualized. No pericecal inflammation to suggest acute appendicitis. Moderate colonic stool burden. Distal colonic diverticulosis without diverticulitis.   PELVIS:   BLADDER: Moderately distended and thin-walled.   REPRODUCTIVE ORGANS: Partially calcified uterine fibroids.   VESSELS: Aorto bi-iliac atherosclerosis. 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 Peripherally calcified structure at the right renal hilum measuring 1.2 x 1.1 cm suggests a renal artery aneurysm.   ABDOMINAL WALL: Fat containing left inguinal hernia contains a short segment of nondilated small bowel.   BONES: Please see separately dictated report from contemporaneous CT  for detailed evaluation of the lumbar spine. Polyarticular degenerative change.       1. Moderate left hydroureteronephrosis with an obstructing calculus distal left ureter just proximal to the UVJ. 2. Solid right middle lobe pulmonary nodules unchanged in size. 3. Please see separately dictated report from contemporaneous CT for detailed evaluation of the lumbar spine. 4. Additional chronic and/or ancillary findings detailed above.   MACRO: None   Signed by: Reyes Lerner 5/19/2025 9:37 AM Dictation workstation:   [id number]    CT lumbar spine retrospective reconstruction protocol  Result Date: 5/19/2025  Interpreted By:  Reyes Lerner, STUDY: CT LUMBAR SPINE RETROSPECTIVE RECONSTRUCTION PROTOCOL  5/19/2025 8:43 am   INDICATION: Signs/Symptoms:Acute flare of chronic low back pain with point tenderness     COMPARISON: Lumbar spine CT 10/31/2024.   ACCESSION NUMBER(S): [id number]   ORDERING CLINICIAN: JUVENCIO AMAYA   TECHNIQUE: Axial CT images of the lumbar spine are obtained. Axial, coronal and sagittal reconstructions are provided for review.   FINDINGS: Alignment:  Levoscoliosis of the thoracolumbar spine centered at L2 similar.   Vertebrae:  Acute T12 inferior endplate compression fracture with 54% height loss. No bony retropulsion or spondylolisthesis remote L2 vertebral body compression fracture with similar height loss status post augmentation.   Degenerative changes:   Lower Thoracic Spine: No significant bony spinal canal stenosis in the included lower thoracic region.   T12-L1:  No significant bony spinal canal stenosis.   L1-2: Degenerative disc disease with volume loss, endplate osteophytosis and facet arthropathy.No significant bony spinal canal or foraminal stenosis.   L2-3: Degenerative disc disease, endplate osteophytosis and facet arthropathy. No significant bony spinal canal or foraminal stenosis.   L3-4: Degenerative disc disease with volume loss, endplate osteophytosis, posteriorly  calcified disc bulge, and facet arthropathy. No significant bony spinal canal or foraminal stenosis   L4-5: Degenerative disc disease with volume loss, endplate osteophytosis, and facet arthropathy. No significant bony spinal canal or foraminal stenosis.   L5-S1: Degenerative disc disease with volume loss, endplate osteophytosis and facet arthropathy. No significant bony spinal canal or foraminal stenosis.   Prevertebral/Paraspinal Soft Tissues: Unremarkable.   Additional: Please see separately dictated report from contemporaneous CT abdomen and pelvis for additional findings.       Acute T12 inferior endplate compression fracture. Mild-to-moderate multilevel degenerative disc and facet disease without advanced bony foraminal or canal stenosis   MACRO: None   Signed by: Reyes Lerner 5/19/2025 9:17 AM Dictation workstation:   [id number]    Epidural Steroid Injection  Result Date: 5/8/2025  Table formatting from the original result was not included. Procedure Epidural Steroid Injection Indication Lumbar stenosis with neurogenic claudication Medications lidocaine PF (Xylocaine) 5 mg/mL (0.5 %) injection 13 mL methylPREDNISolone acetate (DEPO-Medrol) injection 40 mg iohexol (OMNIPaque) 240 mg iodine/mL solution 5 mL sodium chloride (PF) 0.9% solution 5 mL sodium bicarbonate injection 1 mEq (Totals for administrations occurring from 1026 to 1136 on 05/08/25) Preprocedure A history and physical has been performed, and patient medication allergies have been reviewed. The patient's tolerance of previous anesthesia has been reviewed. The risks and benefits of the procedure and the sedation options and risks were discussed with the patient. All questions were answered and informed consent obtained. Details of the Procedure History reviewed patient interviewed and examined.  Risks and benefits of procedure discussed patient agreed to proceed and consent was signed.  A second identification was done in the operating room.   With the patient in the prone position and under IV sedation the back was prepped and draped in a sterile fashion.  Under fluoroscopic guidance L3-L4  interspace was identified.  Lidocaine 0.5% was used for skin and tissue infiltration.  An 18-gauge 3.5 inch Touhy needle was inserted.  Multiple attempts to access the epidural space failed.  Level switched to L4-L5 interspace.  An 18-gauge 3 and half inch Touhy needle was inserted into the epidural space under fluoroscopic guidance in AP and contralateral oblique view and with loss-of-resistance technique.  IV contrast showed adequate epidural spread without any vascular or intrathecal uptake confirmed in AP and lateral views..  Depo-Medrol 40 mg mixed with lidocaine 0.5% a total of 5 cc was injected.  Washout view showed good epidural spread in the AP view. Needle removed Band-Aid applied.  Patient tolerated the procedure without any problems and was transferred to the recovery room in stable condition.  Procedure Provider Rajeev Fiore MD Procedure Location [address] [place] [address] Referring Provider Raje[place] [id number] [address] pain management  Result Date: 5/8/2025  These images are not reportable by radiology and will not be interpreted by  Radiologists.      Scheduled medications  Scheduled Medications[1]  Continuous medications  Continuous Medications[2]  PRN medications  PRN Medications[3]  Results for orders placed or performed during the hospital encounter of 05/19/25 (from the past 24 hours)   Comprehensive Metabolic Panel   Result Value Ref Range    Glucose 96 74 - 99 mg/dL    Sodium 139 136 - 145 mmol/L    Potassium 4.3 3.5 - 5.3 mmol/L    Chloride 103 98 - 107 mmol/L    Bicarbonate 27 21 - 32 mmol/L    Anion Gap 13 10 - 20 mmol/L    Urea Nitrogen 45 (H) 6 - 23 mg/dL    Creatinine 0.96 0.50 - 1.05 mg/dL    eGFR 56 (L) >60 mL/min/1.73m*2    Calcium 9.7  8.6 - 10.3 mg/dL    Albumin 3.4 3.4 - 5.0 g/dL    Alkaline Phosphatase 63 33 - 136 U/L    Total Protein 5.6 (L) 6.4 - 8.2 g/dL    AST 17 9 - 39 U/L    Bilirubin, Total 0.7 0.0 - 1.2 mg/dL    ALT 10 7 - 45 U/L   CBC and Auto Differential   Result Value Ref Range    WBC 10.0 4.4 - 11.3 x10*3/uL    nRBC 0.0 0.0 - 0.0 /100 WBCs    RBC 4.40 4.00 - 5.20 x10*6/uL    Hemoglobin 13.9 12.0 - 16.0 g/dL    Hematocrit 42.7 36.0 - 46.0 %    MCV 97 80 - 100 fL    MCH 31.6 26.0 - 34.0 pg    MCHC 32.6 32.0 - 36.0 g/dL    RDW 12.6 11.5 - 14.5 %    Platelets 225 150 - 450 x10*3/uL    Neutrophils % 64.9 40.0 - 80.0 %    Immature Granulocytes %, Automated 0.4 0.0 - 0.9 %    Lymphocytes % 20.9 13.0 - 44.0 %    Monocytes % 9.6 2.0 - 10.0 %    Eosinophils % 3.6 0.0 - 6.0 %    Basophils % 0.6 0.0 - 2.0 %    Neutrophils Absolute 6.51 (H) 1.60 - 5.50 x10*3/uL    Immature Granulocytes Absolute, Automated 0.04 0.00 - 0.50 x10*3/uL    Lymphocytes Absolute 2.09 0.80 - 3.00 x10*3/uL    Monocytes Absolute 0.96 (H) 0.05 - 0.80 x10*3/uL    Eosinophils Absolute 0.36 0.00 - 0.40 x10*3/uL    Basophils Absolute 0.06 0.00 - 0.10 x10*3/uL     *Note: Due to a large number of results and/or encounters for the requested time period, some results have not been displayed. A complete set of results can be found in Results Review.                            Assessment & Plan  Chronic low back pain, unspecified back pain laterality, unspecified whether sciatica present      This is a 89 year old female admitted for acute on chronic low back pain and moderate left hydroureteronephrosis with an obstructing calculus distal left ureter just proximal to the UVJ.   - imaging reviewed. CT 5/19 with moderate left hydroureteronephrosis with obstructing calculus in distal left ureter just proximal to UVJ.   - KUB and renal US ordered for today. These are both stable.   - labs reviewed kidney function with slight strain today compared to yesterday. CBC stable and neutrophils  down trending. Urine culture with no growth. Cont to monitor labs.   - regular soft diet.  - cont medical management for left hydronephrosis and stone. Expect this to pass on its own as it is 4 mm.   - no acute urologic surgical intervention.   - recommend akt-colace 2 BID, cont upon discharge.  - recommend miralax daily, cont upon discharge.  - remainder of care per primary.   - urology will sign off on pt. Pt is stable for discharge from urologic perspective. Pt can follow up with urology as needed for kidney stone.      I spent 35 minutes in the professional and overall care of this patient.    Plan of care discussed with Dr. Cordova.     Capri Gipson, APRN-CNP           [1] acetaminophen, 975 mg, oral, q8h  aspirin, 81 mg, oral, Daily  calcitonin (salmon), 1 spray, One Nostril, Daily  calcium carbonate, 1 tablet, oral, Daily  cholecalciferol, 50 mcg, oral, Daily  cyanocobalamin, 250 mcg, oral, Daily  DULoxetine, 20 mg, oral, Daily  enoxaparin, 40 mg, subcutaneous, q24h  ipratropium, 2 spray, Each Nostril, TID  latanoprost, 1 drop, Left Eye, Nightly  lidocaine, 1 patch, transdermal, Daily  losartan, 25 mg, oral, Daily  methocarbamol, 1,000 mg, oral, q8h JELENA  metoprolol succinate XL, 25 mg, oral, Daily  pantoprazole, 40 mg, oral, Daily before breakfast  polyethylene glycol, 17 g, oral, Daily  rosuvastatin, 20 mg, oral, Nightly  sennosides-docusate sodium, 2 tablet, oral, BID  tamsulosin, 0.4 mg, oral, Daily     [2] sodium chloride 0.9%, 50 mL/hr, Last Rate: 50 mL/hr (05/22/25 0704)     [3] PRN medications: ketorolac, lubricating eye drops, melatonin, melatonin, methyl salicylate-menthol, naloxone, ondansetron **OR** ondansetron, oxyCODONE, oxygen, oxygen, [Held by provider] zolpidem     Never

## 2025-05-22 NOTE — PROGRESS NOTES
05/22/25 0900   Discharge Planning   Assistance Needed Per CNP: Awaiting on the US and KUB results prior to DC to SNF. Once medically cleared patient will DC to [facility]. Pre-cert was approved and is good until 5/26.

## 2025-05-22 NOTE — DISCHARGE SUMMARY
Discharge Diagnosis  Acute T12 fracture   Kidney stone    Issues Requiring Follow-Up  Acute T12 fracture   Kidney stone    Discharge Meds     Medication List      START taking these medications     calcitonin (salmon) 200 unit/actuation nasal spray; Commonly known as:   Miacalcin; Administer 1 spray into one nostril once daily.; Start taking   on: May 23, 2025   ketorolac 10 mg tablet; Commonly known as: Toradol; Take 1 tablet (10   mg) by mouth every 6 hours if needed for moderate pain (4 - 6) for up to 5   days.   methocarbamoL 1,000 mg tablet; Take 1,000 mg by mouth 4 times a day for   7 days.   polyethylene glycol 17 gram packet; Commonly known as: Glycolax,   Miralax; Take 17 g by mouth once daily as needed (constipation).   sennosides-docusate sodium 8.6-50 mg tablet; Commonly known as:   Cherie-Colace; Take 2 tablets by mouth 2 times a day.   tamsulosin 0.4 mg 24 hr capsule; Commonly known as: Flomax; Take 1   capsule (0.4 mg) by mouth once daily. Do not crush, chew, or split.; Start   taking on: May 23, 2025     CONTINUE taking these medications     acetaminophen 325 mg tablet; Commonly known as: TylenoL; Take 2 tablets   (650 mg) by mouth every 6 hours if needed for mild pain (1 - 3) or fever   (temp greater than 38.0 C).   artificial tears (dextran-hypomel-glycerin) 0.1-0.3-0.2 % ophthalmic   solution   ascorbic acid (vitamin C) 500 mg capsule   aspirin 81 mg capsule   cholecalciferol 25 mcg (1,000 units) capsule; Commonly known as: Vitamin   D-3   Citracal-D3 Slow Release 600 mg-12.5 mcg (500 unit) tablet extended   release; Generic drug: calcium carb, citrate-vit D3   cranberry extract 200 mg capsule   cyanocobalamin 50 mcg tablet; Commonly known as: Vitamin B-12   DULoxetine 20 mg DR capsule; Commonly known as: Cymbalta; Take 1 capsule   (20 mg) by mouth once daily.   Eylea 2 mg/0.05 mL intra-ocular injection; Generic drug: aflibercept   Fish OiL 340-1,000 mg capsule; Generic drug: omega-3 fatty  acids-fish   oil   furosemide 20 mg tablet; Commonly known as: Lasix; TAKE 1 TABLET DAILY   gentamicin 0.3 % ophthalmic solution; Commonly known as: Garamycin;   Apply 1 drop under the nail and 1 drop on the nail topically twice daily   and cover twice daily for 4 weeks   * ibuprofen 200 mg tablet   * ibuprofen 400 mg tablet; Take 1 tablet (400 mg) by mouth every 6 hours   if needed for moderate pain (4 - 6) for up to 7 days.   ipratropium 42 mcg (0.06 %) nasal spray; Commonly known as: Atrovent;   USE 2 SPRAYS IN EACH NOSTRIL TWO TO THREE TIMES DAILY   latanoprost 0.005 % ophthalmic solution; Commonly known as: Xalatan   lidocaine 5 % patch; Commonly known as: Lidoderm; Place 1 patch over 12   hours on the skin once daily. Remove & discard patch within 12 hours or as   directed by MD.   losartan 25 mg tablet; Commonly known as: Cozaar; TAKE 1 TABLET DAILY   melatonin 5 mg capsule   metoprolol succinate XL 25 mg 24 hr tablet; Commonly known as:   Toprol-XL; Take 1 tablet (25 mg) by mouth once daily.   naloxone 4 mg/0.1 mL nasal spray; Commonly known as: Narcan; Administer   1 spray (4 mg) into affected nostril(s) if needed for opioid reversal. May   repeat every 2-3 minutes if needed, alternating nostrils, until medical   assistance becomes available.   neomycin-polymyxin-gramicidin 1.75 mg-10,000 unit-0.025mg/mL ophthalmic   solution; Commonly known as: Neosporin; Apply 1 to 2 drops to the affected   nail twice daily for green nail syndrome.   omeprazole 20 mg DR capsule; Commonly known as: PriLOSEC; Take 1 capsule   (20 mg) by mouth 2 times a day before meals. Do not crush or chew.   oxyCODONE 5 mg immediate release tablet; Commonly known as: Roxicodone;   Take 1 tablet (5 mg) by mouth every 12 hours if needed for severe pain (7   - 10).   predniSONE 10 mg tablet; Commonly known as: Deltasone; Take 1 tablet (10   mg) by mouth once daily. Take 4 pills once a day for 3 days then 3 pills   once a day for 3 days then  2 pills once a day for 3 days then one pill   once a day for 3 days then STOP   rosuvastatin 20 mg tablet; Commonly known as: Crestor; TAKE 1 TABLET   DAILY   spironolactone 25 mg tablet; Commonly known as: Aldactone; TAKE 1 TABLET   DAILY   trolamine salicylate 10 % cream; Commonly known as: Aspercreme   Tums 500 mg (200 mg elemental) chewable tablet; Generic drug: calcium   carbonate   WOMEN'S ONE DAILY ORAL  * This list has 2 medication(s) that are the same as other medications   prescribed for you. Read the directions carefully, and ask your doctor or   other care provider to review them with you.       Test Results Pending At Discharge  Pending Labs       No current pending labs.            Hospital Course    Neris Mccall is a 89 y.o. female with MH ADHD?, bladder cancer, CKD, CHF, chronic low back pain/L2 compression fracture with recent L3/4 epidural steroid injection presenting to ED with acute flare of chronic low back pain.      Acute on Chronic back pain  -Acute T12 compression fracture with history of spinal stenosis, scoliosis, L2 compression fracture  -Per ED, scan reviewed by Dr. Villeda ortho-spine who recommends pain management, PT/OT and outpt follow up with PM&R  -pain management with scheduled Tylenol and Robaxin, lidocaine patch, and PRN oxycodone  -Oxycodone decreased due to hypoxia  -Pharmacy recommends continuing Toradol PO, PRN for up to 5 days. If she still requires NSAID after 5 days, add motrin.    -PT/OT rec moderate intensity. Patient discharged to SNF,   -Palliative care consult for pain management due to chronic back pain and/or renal calculus. Palliative care recommends OP follow up.   Moderate left hydroureteronephrosis   4 mm stone at distal left ureter  Pyuria  -Treated with ceftriaxone  -UC contaminated  -Flomax continued  -Urology following. No surgery recommended at this time. Will likely order US in the morning if the stone does not pass.  -Monitor kidney  function  Constipation- resolved  -senna BID  -miralax daily  -stool count  -Added enema and mag citrate  CKD 3  -daily labs  -avoid nephrotoxins  CHF, moderate aortic stenosis, moderate CAD  -EF 57%  -CT shows bilateral pleural effusions, no hypoxia or LE edema  -check BNP  -ASA  -losartan  -metoprolol    Discharge time >30 minutes    Pertinent Physical Exam At Time of Discharge  Physical Exam  Constitutional:       Comments: [place]   Cardiovascular:      Rate and Rhythm: Regular rhythm.   Pulmonary:      Effort: Pulmonary effort is normal.      Breath sounds: Normal breath sounds.   Abdominal:      Tenderness: There is right CVA tenderness and left CVA tenderness.   Musculoskeletal:      Cervical back: Bony tenderness present.   Neurological:      Mental Status: She is alert.   Psychiatric:         Mood and Affect: Mood normal.         Behavior: Behavior normal.         Thought Content: Thought content normal.         Judgment: Judgment normal.         Outpatient Follow-Up  Future Appointments   Date Time Provider Department Center   6/18/2025 10:00 AM Jing Vargas MD [id number] [facility]   6/20/2025  2:20 PM Hazel Dueñas APRN-CNP [id number] [facility]   9/11/2025  1:00 PM [facility] GEANIC1 [facility]   9/17/2025 12:00 PM Mercedes Agudelo PA-C [id number] [facility]   9/17/2025 12:30 PM INF 00 GEAUGA SCCGEAINF [facility]   11/4/2025  2:15 PM GEA CT 1 GEACT [facility]   11/10/2025  1:40 PM Nina Hazel APRN-CNP [id number] [facility]   2/11/2026 11:30 AM Jackie Lyn MD [id number] [facility]         Cristin Bermudez APRN-CNP

## 2025-05-22 NOTE — PROGRESS NOTES
Occupational Therapy    Occupational Therapy Treatment    Name: Neris Mccall  MRN: [id number]  Department: [address]  Room: [address] [place] [address]  Date: 05/22/25  Time Calculation  Start Time: 1043  Stop Time: 1058  Time Calculation (min): 15 min    Assessment:  OT Assessment: Pt continues to present with decreased ADL performance, decreased functional mobility, decreased endurance. Recommend continued skilled OT at a mod intensity to maximize pt safety and independence after discharge.  Prognosis: Good  Barriers to Discharge Home: Caregiver assistance, Physical needs  Caregiver Assistance: Caregiver assistance needed per identified barriers - however, level of patient's required assistance exceeds assistance available at home  Physical Needs: 24hr mobility assistance needed, 24hr ADL assistance needed  Evaluation/Treatment Tolerance: Patient limited by pain  Medical Staff Made Aware: Yes  End of Session Communication: Bedside nurse, PCT/NA/CTA (Discussed improved O2 sats and difficulty with pain limiting activity in course of session. Nurse requested leave O2 off with stable O2 sats.)  End of Session Patient Position: Bed, 3 rail up, Alarm on  Plan:  Treatment Interventions: ADL retraining, Functional transfer training, Endurance training, UE strengthening/ROM  OT Frequency: 3 times per week  OT Discharge Recommendations: Moderate intensity level of continued care  Equipment Recommended upon Discharge: Wheeled walker  OT Recommended Transfer Status: Assist of 1  OT - OK to Discharge: Yes (In accord with OT POC)    Subjective     OT Visit Info:  OT Received On: 05/22/25  General:  General  Reason for Referral: 89 yo female referred to OT for impaired ADLs/mobility  Referred By: Virginia De La Fuente  Past Medical History Relevant to Rehab: bladder cancer, CKD, CHF, chronic low back pain/L2 compression fracture with recent L3/4 epidural steroid injection  Family/Caregiver Present: Yes  Caregiver Feedback: Son present over course of  session  Prior to Session Communication: Bedside nurse  Preferred Learning Style: verbal  General Comment: Pt participated in treatment with encouragement. Treatment limited to tolerance.  Precautions:  Hearing/Visual Limitations: [place]-has microphone linked to hearing aides  Medical Precautions: Fall precautions (Purewick external catheter, IV, 3 liters O2 removed in course of treatment with O2 sats remaining mid-90's and left off at nurse request.)  Precautions Comment: CT: Acute T12 inferior endplate compression fracture. Mild-to-moderate multilevel degenerative disc and facet disease without advanced bony foraminal or canal stenosis     Date/Time Vitals Session Patient Position Pulse Resp SpO2 BP MAP (mmHg)    05/22/25 1132 --  --  71  20  91 %  114/63  80                Pain Assessment:  Pain Assessment  Pain Assessment: 0-10  0-10 (Numeric) Pain Score: 3  Pain Type: Acute pain  Pain Location: Back  Pain Orientation: Lower  Pain Interventions: Repositioned, Ambulation/increased activity  Response to Interventions: Content/relaxed, Relief, Resting quietly    Objective   Cognition:  Overall Cognitive Status: Within Functional Limits  Arousal/Alertness: Appropriate responses to stimuli  Activities of Daily Living: LE Dressing  LE Dressing: Yes  LE Dressing Adaptive Equipment: Reacher, Dressing stick, Sock aide  Sock Level of Assistance: Moderate assistance  LE Dressing Where Assessed: Edge of bed  LE Dressing Comments: Increased time on task, assist with pulling socks onto sock aide and pulling up with assist. Problem solving cues for effective use of tools.     Bed Mobility/Transfers: Bed Mobility  Bed Mobility: Yes  Bed Mobility 1  Bed Mobility 1: Supine to sitting, Sitting to supine  Level of Assistance 1: Minimum assistance  Bed Mobility Comments 1: Increased pain with transition to sitting position and sitting position with transion to supine.    Transfers  Transfer: Yes  Transfer 1  Transfer From 1: Bed  to  Transfer to 1: Stand  Technique 1: Sit to stand, Stand to sit  Transfer Device 1: Walker  Transfer Level of Assistance 1: Minimum assistance  Trials/Comments 1: Verbal cues needed for safe transition hands to/from device.    Sitting Balance:  Static Sitting Balance  Static Sitting-Balance Support: Feet supported  Static Sitting-Level of Assistance: Contact guard    Outcome Measures:  [facility] Daily Activity  Putting on and taking off regular lower body clothing: A lot  Bathing (including washing, rinsing, drying): A lot  Putting on and taking off regular upper body clothing: A little  Toileting, which includes using toilet, bedpan or urinal: Total  Taking care of personal grooming such as brushing teeth: A little  Eating Meals: None  Daily Activity - Total Score: 15    Education Documentation  Body Mechanics, taught by LUIS E Gann at 5/22/2025 11:36 AM.  Learner: Patient  Readiness: Acceptance  Method: Explanation, Demonstration  Response: Verbalizes Understanding, Demonstrated Understanding, Needs Reinforcement  Comment: Pt educated in effective Spinal precautions and safety including device safety, effective body mechs, use of adaptive techs and tools for self-care, and non-med pain mgmt. Pt compliant to instruction in course of session. Cont reinforcement is indicated.    Precautions, taught by LUIS E Gann at 5/22/2025 11:36 AM.  Learner: Patient  Readiness: Acceptance  Method: Explanation, Demonstration  Response: Verbalizes Understanding, Demonstrated Understanding, Needs Reinforcement  Comment: Pt educated in effective Spinal precautions and safety including device safety, effective body mechs, use of adaptive techs and tools for self-care, and non-med pain mgmt. Pt compliant to instruction in course of session. Cont reinforcement is indicated.    ADL Training, taught by LUIS E Gann at 5/22/2025 11:36 AM.  Learner: Patient  Readiness: Acceptance  Method: Explanation,  Demonstration  Response: Verbalizes Understanding, Demonstrated Understanding, Needs Reinforcement  Comment: Pt educated in effective Spinal precautions and safety including device safety, effective body mechs, use of adaptive techs and tools for self-care, and non-med pain mgmt. Pt compliant to instruction in course of session. Cont reinforcement is indicated.    Goals:  Encounter Problems       Encounter Problems (Active)       OT Goals       Pt will complete [id number] transfers using LRD in preparation for ADLs with SBA  (Progressing)       Start:  05/20/25    Expected End:  06/03/25            Pt will tolerate 10min stand during functional task completion with no more than 1 rest break in order to increase endurance for functional task completion.  (Progressing)       Start:  05/20/25    Expected End:  06/03/25            Pt will demo LE ADL completion with min A using AE if needed.  (Progressing)       Start:  05/20/25    Expected End:  06/03/25            Pt will increase static/dynamic standing balance to Good to increase safety and independence with functional task completion.   (Progressing)       Start:  05/20/25    Expected End:  06/03/25            Pt will demo and/or verbalize 2-3 energy conservation techniques to incorporate into functional mobility or ADL to improve performance and increase independence.  (Progressing)       Start:  05/20/25    Expected End:  06/03/25

## 2025-05-23 ENCOUNTER — LAB REQUISITION (OUTPATIENT)
Dept: LAB | Facility: HOSPITAL | Age: OVER 89
End: 2025-05-23
Payer: MEDICARE

## 2025-05-23 DIAGNOSIS — I10 ESSENTIAL (PRIMARY) HYPERTENSION: ICD-10-CM

## 2025-05-23 DIAGNOSIS — I50.9 HEART FAILURE, UNSPECIFIED: ICD-10-CM

## 2025-05-23 LAB
ANION GAP SERPL CALC-SCNC: 13 MMOL/L (ref 10–20)
BUN SERPL-MCNC: 32 MG/DL (ref 6–23)
CALCIUM SERPL-MCNC: 9.1 MG/DL (ref 8.6–10.3)
CHLORIDE SERPL-SCNC: 106 MMOL/L (ref 98–107)
CO2 SERPL-SCNC: 23 MMOL/L (ref 21–32)
CREAT SERPL-MCNC: 0.92 MG/DL (ref 0.5–1.05)
EGFRCR SERPLBLD CKD-EPI 2021: 59 ML/MIN/1.73M*2
ERYTHROCYTE [DISTWIDTH] IN BLOOD BY AUTOMATED COUNT: 12.5 % (ref 11.5–14.5)
GLUCOSE SERPL-MCNC: 83 MG/DL (ref 74–99)
HCT VFR BLD AUTO: 40.2 % (ref 36–46)
HGB BLD-MCNC: 13.2 G/DL (ref 12–16)
MCH RBC QN AUTO: 31.9 PG (ref 26–34)
MCHC RBC AUTO-ENTMCNC: 32.8 G/DL (ref 32–36)
MCV RBC AUTO: 97 FL (ref 80–100)
NRBC BLD-RTO: 0 /100 WBCS (ref 0–0)
PLATELET # BLD AUTO: 208 X10*3/UL (ref 150–450)
POTASSIUM SERPL-SCNC: 4.3 MMOL/L (ref 3.5–5.3)
RBC # BLD AUTO: 4.14 X10*6/UL (ref 4–5.2)
SODIUM SERPL-SCNC: 138 MMOL/L (ref 136–145)
WBC # BLD AUTO: 9.7 X10*3/UL (ref 4.4–11.3)

## 2025-05-23 PROCEDURE — 80048 BASIC METABOLIC PNL TOTAL CA: CPT | Mod: OUT | Performed by: INTERNAL MEDICINE

## 2025-05-23 PROCEDURE — 85027 COMPLETE CBC AUTOMATED: CPT | Mod: OUT | Performed by: INTERNAL MEDICINE

## 2025-05-23 PROCEDURE — 36415 COLL VENOUS BLD VENIPUNCTURE: CPT | Mod: OUT | Performed by: INTERNAL MEDICINE

## 2025-05-28 ENCOUNTER — NURSING HOME VISIT (OUTPATIENT)
Dept: POST ACUTE CARE | Facility: EXTERNAL LOCATION | Age: OVER 89
End: 2025-05-28
Payer: MEDICARE

## 2025-05-28 DIAGNOSIS — S32.020G CLOSED COMPRESSION FRACTURE OF L2 LUMBAR VERTEBRA WITH DELAYED HEALING, SUBSEQUENT ENCOUNTER: ICD-10-CM

## 2025-05-28 DIAGNOSIS — S22.080D COMPRESSION FRACTURE OF T12 VERTEBRA WITH ROUTINE HEALING: ICD-10-CM

## 2025-05-28 DIAGNOSIS — N18.31 STAGE 3A CHRONIC KIDNEY DISEASE (MULTI): ICD-10-CM

## 2025-05-28 DIAGNOSIS — K21.9 GERD WITHOUT ESOPHAGITIS: ICD-10-CM

## 2025-05-28 DIAGNOSIS — F32.A DEPRESSION, UNSPECIFIED DEPRESSION TYPE: ICD-10-CM

## 2025-05-28 DIAGNOSIS — E55.9 VITAMIN D DEFICIENCY: ICD-10-CM

## 2025-05-28 DIAGNOSIS — N20.0 KIDNEY STONE: ICD-10-CM

## 2025-05-28 DIAGNOSIS — N13.30 HYDROURETERONEPHROSIS: ICD-10-CM

## 2025-05-28 DIAGNOSIS — I10 PRIMARY HYPERTENSION: ICD-10-CM

## 2025-05-28 DIAGNOSIS — R53.81 PHYSICAL DECONDITIONING: ICD-10-CM

## 2025-05-28 DIAGNOSIS — M54.50 ACUTE ON CHRONIC LOW BACK PAIN: Primary | ICD-10-CM

## 2025-05-28 DIAGNOSIS — I50.22 CHRONIC SYSTOLIC HEART FAILURE: ICD-10-CM

## 2025-05-28 DIAGNOSIS — M80.08XD AGE-RELATED OSTEOPOROSIS WITH CURRENT PATHOLOGICAL FRACTURE OF VERTEBRA WITH ROUTINE HEALING, SUBSEQUENT ENCOUNTER: ICD-10-CM

## 2025-05-28 DIAGNOSIS — K59.00 CONSTIPATION, UNSPECIFIED CONSTIPATION TYPE: ICD-10-CM

## 2025-05-28 DIAGNOSIS — M48.062 SPINAL STENOSIS, LUMBAR REGION, WITH NEUROGENIC CLAUDICATION: ICD-10-CM

## 2025-05-28 DIAGNOSIS — R53.1 WEAKNESS: ICD-10-CM

## 2025-05-28 DIAGNOSIS — G89.29 ACUTE ON CHRONIC LOW BACK PAIN: Primary | ICD-10-CM

## 2025-05-28 DIAGNOSIS — H40.9 GLAUCOMA, UNSPECIFIED GLAUCOMA TYPE, UNSPECIFIED LATERALITY: ICD-10-CM

## 2025-05-28 PROCEDURE — 99310 SBSQ NF CARE HIGH MDM 45: CPT | Performed by: NURSE PRACTITIONER

## 2025-05-28 NOTE — LETTER
Patient: Neris Mccall  : 1935    Encounter Date: 2025    Chief Complaint:   SNF F/U  -Acute T12 fracture  -Kidney stone  -Physical deconditioning/weakness    HPI:   89 year-old female with PMH of bladder cancer, CKD, CHF, chronic low back pain, L2 compression fracture with recent L3/4 epidural steroid injection, presenting to the ER on 25 with c/o acute on chronic low back pain. Hospital course:    Acute on chronic back pain-imaging showed acute T12 compression fracture, Hx of spinal stenosis, scoliosis, and L2 compression fracture, ortho-spine reviewed images and recommended pain management, PT/OT, and OP F/U with PMR, pain mgmt (scheduled Tylenol and Robaxin, lido patch, oxycodone prn), oxycodone decreased due to hypoxia, pharmacy recommending toradol prn x 5 days, motrin prn after toradol course completes, PT/OT recommending SNF, palliative care F/U after discharge for pain mgmt  Moderate left hydroureteronephrosis/4 mm stone at distal left ureter/pyuria-treated with CTX, UC contaminated, flomax continued, urology consult, no surgery recommended, monitor kidney function  Constipation-senna BID, miralax daily, enema and mag citrate added    Pt. was HDS and discharged to [facility] on 25. Today, patient reports low back pain, worse when up and improved when lying down. She denies any muscle spasms or radiation of pain. She denies dizziness, SOB, cough, or chest pain. She denies constipation or dysuria. Staff report no clinical concerns.     ROS:    As above in HPI. Otherwise, all other systems have been reviewed and are negative for complaint.    Medications reviewed and verified in NH chart.     Problem List[1]     Medical History[2]    Surgical History[3]    Family History[4]    Tobacco Use History[5]    Social History     Substance and Sexual Activity   Alcohol Use Not Currently    Comment: 1 glass of wine in 3 months       Social History     Substance and Sexual Activity    Drug Use Never       Allergies[6]     Vital Signs:   132/62-70-18-97.9-96% on RA    Physical Exam:  General: Sitting up in bed in NAD, alert   Head/Face: NCAT, symmetrical  Eyes: PERRLA, no injection, no discharge  ENT: Hearing not impaired, ears without scars or lesions, nasal mucosa and turbinates pink, septum midline, lips pink and moist  Neck: Supple, symmetrical  Respiratory: CTA without adventitious sounds, respirations even and nonlabored without use of accessory muscles, good air exchange  Cardio: RRR, + systolic murmur, normal S1S2, no edema, pedal pulses 3+/4 bilaterally  Chest/Breast: Symmetrical  GI: BS x 4, normoactive, non-distended, abd round and soft, no masses or tenderness  : No suprapubic tenderness or distention  MSK: Gait not assessed, joints with full ROM without pain or contractures, + generalized weakness  Skin: Skin warm and dry, no induration  Neurologic: Cranial nerves II through XII intact, superficial touch and pain sensation intact  Psychiatric: Alert to person, place, and time, calm and cooperative     Results/Data:   5/23/25: BUN 32, Cr 0.92, GFR 59, CBC unremarkable     Assessment/Plan:  Acute on chronic back pain/T12 compression fracture/Hx spinal stenosis/scoliosis/L2 compression fracture-s/p L3/4 epidural steroid injection, s/p Toradol (end date 5/27), c/w prednisone taper (end date 6/4), lidocaine patch, robaxin 1 gm QID (extend end date to 6/4), and oxycodone prn for severe pain, add Tylenol ES 1000 mg Q 8 hours and Ibuprofen 500 mg Q 6 hours prn for mild-mod pain, c/w PT/OT, F/U with PMR and palliative care after discharge  Physical deconditioning/weakness-c/w PT/OT, safety and fall precautions  Constipation-encourage fluids and fiber, increase mobility as tolerated, c/w senna plus  Moderate left hydroureteronephrosis/4 mm stone at distal left ureter-s/p IV CTX, c/w flomax, encourage fluids, c/w cranberry, monitor kidney function, F/U with urology prn   CKD Stage III-avoid  nephrotoxic drugs, renally dose medications as able, monitor BMP  CHF/CAD/aortic stenosis/HTN/HLD-c/w ASA, lasix, spironolactone, crestor, losartan, metoprolol, and omega-3, monitor BP and HR, F/U with cardiologist as scheduled  Rhinorrhea-c/w atrovent nasal spray  Vit D deficiency/osteoporosis-WB exercises as tolerated, c/w calcium/D supplement and calcitonin spray, patient received Prolia injections as OP  Glaucoma/dry eye syndrome-c/w artificial years and latanoprost  Vitamin deficiency-c/w Vit B12 and MVI  GERD-c/w PPI and Tums  Insomnia-c/w melatonin  Depression-c/w duloxetine    Orders:  Stop date for methocarbamol changed to 6/4   Tylenol ES 1000 mg PO Q 8 hours for low back pain   Ibuprofen 400 mg PO Q 6 hours prn for mild to moderate pain      Code Status:   DNR-CCA    Time spent reviewing patient's chart on the unit (PMH, PSH, FH, Social Hx AND progress and/or consult notes, labs, and radiology results): 30 minutes  Time spent interviewing and/or examining the patient: 13 minutes  Time spent writing note on the unit: 5 minutes  Time spent reviewing POC w/ patient, family, and/or staff: 5 minutes   Total visit time: 53 minutes. Greater than 50% of time was spent on counseling and/or coordination of care of the patient. Start time: 1:58 PM, End time: 2:51 PM.        Electronically Signed By: MONET Hernandez   8/6/25 10:22 AM       [1]  Patient Active Problem List  Diagnosis   • Age-related osteoporosis with current pathol fracture of vertebra, initial encounter (Multi)   • Osteoporosis   • Aneurysm artery, renal   • Arthralgia of multiple sites   • Blue's esophagus   • Biventricular implantable cardioverter-defibrillator (ICD) in situ   • Blepharospasm   • Central sleep apnea due to Cheyne-Mchugh respiration   • Chronic systolic heart failure   • Depression   • DNR no code (do not resuscitate)   • Facial nerve paresis   • Dysphagia   • Ford type III hyperlipoproteinemia   • GERD without  esophagitis   • Glaucoma   • H/O polycythemia vera   • Hearing impairment   • History of Bell's palsy   • History of heart attack   • Hyperlipidemia   • Hypertension   • Chronic insomnia   • Chronic pain syndrome   • Lumbar canal stenosis   • Degenerative joint disease of cervical spine   • Postmenopausal atrophic vaginitis   • Mixed conductive and sensorineural hearing loss, bilateral   • Nonischemic cardiomyopathy (Multi)   • Nontoxic multinodular goiter   • LISA treated with BiPAP   • Peripheral neuropathy   • Thyroglossal duct cyst   • Vitamin D deficiency   • Zenker's diverticulum   • Spinal stenosis, lumbar region, with neurogenic claudication   • Spondylosis without myelopathy or radiculopathy, lumbosacral region   • Secondary hyperaldosteronism (Multi)   • Treatment-emergent central sleep apnea   • Dependence on continuous positive airway pressure ventilation   • History of cerebrovascular accident   • Exudative age-related macular degeneration, left eye, with active choroidal neovascularization   • Stage 3a chronic kidney disease (Multi)   • Age-related osteoporosis with current pathological fracture of vertebra (Multi)   • Other idiopathic scoliosis, thoracolumbar region   • Routine general medical examination at a health care facility   • Chronic knee pain after total replacement of knee joint   • Green nails   • Compression fracture of T12 vertebra with routine healing   • Hydronephrosis of left kidney   • Kidney stone   [2]  Past Medical History:  Diagnosis Date   • Acute upper respiratory infection, unspecified 01/15/2018    Acute URI   • Aneurysm of unspecified site     Aneurysm   • Arrhythmia 1975   • Arthritis 2005   • BiPAP (biphasic positive airway pressure) dependence 2023   • Cataract 2005   • Chronic pain disorder 1960   • Coronary artery disease 2021   • CPAP (continuous positive airway pressure) dependence 2014   • Dependence on other enabling machines and devices     CPAP (continuous positive  airway pressure) dependence   • Dysphagia 2021   • Fractures 1955   • GERD (gastroesophageal reflux disease) 1985    No longer problem   • Glaucoma 2021   • Hearing aid worn 1985   • Heart disease 1980   • Heart failure 2019   • Heart valve disease 2021   • History of blood transfusion 1958   • HL (hearing loss) 1970   • Hypertension 1972   • Kidney stone 6/23/2025   • Low back pain 1950   • Lumbar disc disease 2016   • Old myocardial infarction     History of myocardial infarction   • Other nail disorders 03/14/2017    Green nails   • Pacemaker 2019   • Personal history of diseases of the skin and subcutaneous tissue 12/14/2016    History of folliculitis   • Personal history of other diseases of the circulatory system 05/16/2022    History of intermittent claudication   • Personal history of other diseases of the circulatory system     History of high blood pressure   • Personal history of other diseases of the musculoskeletal system and connective tissue     History of arthritis   • Personal history of other diseases of the nervous system and sense organs     History of sleep apnea   • Personal history of other endocrine, nutritional and metabolic disease     History of high cholesterol   • Personal history of other venous thrombosis and embolism     H/O blood clots   • Personal history of transient ischemic attack (TIA), and cerebral infarction without residual deficits     History of stroke   • Platelet disorder (Multi) 1998    PCV   • Pregnant ([facility]   • Scoliosis 2016   • Secondary polycythemia     Polycythemia   • Shingles 1996   • Sleep apnea    • Spinal stenosis, lumbar region with neurogenic claudication 11/01/2022    Neurogenic claudication due to lumbar spinal stenosis   • Stroke (Multi) 1967   • Urinary tract infection 2019   • Vision loss 2017   [3]  Past Surgical History:  Procedure Laterality Date   • BACK SURGERY      MILD procedure   • BLEPHAROPLASTY  2002   • COLONOSCOPY  2014   •  CORRECTION HAMMER TOE  2004   • DILATION AND CURETTAGE OF UTERUS  04/20/2016    Dilation And Curettage   • FRACTURE SURGERY  2015   • INSERT / REPLACE / REMOVE PACEMAKER  2019   • KNEE SURGERY  04/20/2016    Knee Surgery Left   • ORIF WRIST FRACTURE  1955   • OTHER SURGICAL HISTORY  04/20/2016    Ear Surgery   • OTHER SURGICAL HISTORY  04/20/2016    Hammertoe Operation Left Toe No. ___   • OTHER SURGICAL HISTORY  07/29/2022    Pacemaker insertion   • ROTATOR CUFF REPAIR  04/20/2016    Rotator Cuff Repair   • STAPEDECTOMY  1979   • TOE SURGERY  2004   • TONSILLECTOMY  04/20/2016    Tonsillectomy   • UPPER GASTROINTESTINAL ENDOSCOPY  2013   • VARICOSE VEIN SURGERY  04/20/2016    Varicose Vein Ligation   [4]  Family History  Problem Relation Name Age of Onset   • Cancer Mother GIBSON SALINAS    • Hypertension Sister KELSIE RENTERIA    • Breast cancer Sister KELSIE RENTERIA 87   • Coronary artery disease Brother TIN SALINAS    • Hearing loss Brother SERINA SALINAS    • Vision loss Brother SERINA SALINAS    • Heart disease Brother AUDREY SALINAS    • Breast cancer Paternal Grandmother  30   [5]  Social History  Tobacco Use   Smoking Status Never   Smokeless Tobacco Never   Tobacco Comments    Never smoked but exposed to it.   [6]  Allergies  Allergen Reactions   • Duloxetine Diarrhea and Other     Reaction from Community: Diarrhea   • Sulfa (Sulfonamide Antibiotics) Other and Rash   • Atenolol Unknown   • Cephalexin Diarrhea   • Cholestyramine Unknown   • Gemfibrozil Unknown   • House Dust Other   • Niacin Unknown   • Pravastatin Unknown   • Tramadol Other and Hallucinations     Reaction from Community: Hallucinations   • Tree Pollen-White Maxim Unknown     Maxim, White

## 2025-05-29 ENCOUNTER — LAB REQUISITION (OUTPATIENT)
Dept: LAB | Facility: HOSPITAL | Age: OVER 89
End: 2025-05-29
Payer: MEDICARE

## 2025-05-29 ENCOUNTER — NURSING HOME VISIT (OUTPATIENT)
Dept: POST ACUTE CARE | Facility: EXTERNAL LOCATION | Age: OVER 89
End: 2025-05-29
Payer: MEDICARE

## 2025-05-29 DIAGNOSIS — I10 ESSENTIAL (PRIMARY) HYPERTENSION: ICD-10-CM

## 2025-05-29 DIAGNOSIS — I10 PRIMARY HYPERTENSION: ICD-10-CM

## 2025-05-29 DIAGNOSIS — Z00.00 ROUTINE GENERAL MEDICAL EXAMINATION AT A HEALTH CARE FACILITY: ICD-10-CM

## 2025-05-29 DIAGNOSIS — N18.9 CHRONIC KIDNEY DISEASE, UNSPECIFIED: ICD-10-CM

## 2025-05-29 DIAGNOSIS — S22.080D COMPRESSION FRACTURE OF T12 VERTEBRA WITH ROUTINE HEALING: ICD-10-CM

## 2025-05-29 DIAGNOSIS — N20.0 KIDNEY STONE: ICD-10-CM

## 2025-05-29 DIAGNOSIS — Z78.9 NURSING HOME RESIDENT: ICD-10-CM

## 2025-05-29 DIAGNOSIS — N13.30 HYDRONEPHROSIS OF LEFT KIDNEY: ICD-10-CM

## 2025-05-29 DIAGNOSIS — M48.062 SPINAL STENOSIS, LUMBAR REGION, WITH NEUROGENIC CLAUDICATION: ICD-10-CM

## 2025-05-29 DIAGNOSIS — N18.31 STAGE 3A CHRONIC KIDNEY DISEASE (MULTI): ICD-10-CM

## 2025-05-29 LAB
ANION GAP SERPL CALC-SCNC: 13 MMOL/L (ref 10–20)
BUN SERPL-MCNC: 28 MG/DL (ref 6–23)
CALCIUM SERPL-MCNC: 9.5 MG/DL (ref 8.6–10.3)
CHLORIDE SERPL-SCNC: 100 MMOL/L (ref 98–107)
CO2 SERPL-SCNC: 24 MMOL/L (ref 21–32)
CREAT SERPL-MCNC: 0.83 MG/DL (ref 0.5–1.05)
EGFRCR SERPLBLD CKD-EPI 2021: 67 ML/MIN/1.73M*2
ERYTHROCYTE [DISTWIDTH] IN BLOOD BY AUTOMATED COUNT: 12.7 % (ref 11.5–14.5)
GLUCOSE SERPL-MCNC: 83 MG/DL (ref 74–99)
HCT VFR BLD AUTO: 40.6 % (ref 36–46)
HGB BLD-MCNC: 13.9 G/DL (ref 12–16)
MCH RBC QN AUTO: 32 PG (ref 26–34)
MCHC RBC AUTO-ENTMCNC: 34.2 G/DL (ref 32–36)
MCV RBC AUTO: 93 FL (ref 80–100)
NRBC BLD-RTO: 0 /100 WBCS (ref 0–0)
PLATELET # BLD AUTO: 261 X10*3/UL (ref 150–450)
POTASSIUM SERPL-SCNC: 4.1 MMOL/L (ref 3.5–5.3)
RBC # BLD AUTO: 4.35 X10*6/UL (ref 4–5.2)
SODIUM SERPL-SCNC: 133 MMOL/L (ref 136–145)
WBC # BLD AUTO: 11.9 X10*3/UL (ref 4.4–11.3)

## 2025-05-29 PROCEDURE — 80048 BASIC METABOLIC PNL TOTAL CA: CPT | Mod: OUT | Performed by: INTERNAL MEDICINE

## 2025-05-29 PROCEDURE — 85027 COMPLETE CBC AUTOMATED: CPT | Mod: OUT | Performed by: INTERNAL MEDICINE

## 2025-05-29 PROCEDURE — 99308 SBSQ NF CARE LOW MDM 20: CPT | Performed by: INTERNAL MEDICINE

## 2025-05-29 PROCEDURE — 36415 COLL VENOUS BLD VENIPUNCTURE: CPT | Mod: OUT | Performed by: INTERNAL MEDICINE

## 2025-05-29 NOTE — LETTER
Patient: Neris Mccall  : 1935    Encounter Date: 2025    Name: Neris Mccall  : 1935  MRN: [id number]  Visit Date: 2025  Chief Complaint: HISTORY AND PHYSICAL    HPI: 89 y/o female with MH ADHD?, bladder cancer, CKD, CHF, chronic low back pain/L2 compression fracture with recent L3/4 epidural steroid injection presenting to ED with acute flare of chronic low back pain. Acute T12 compression fracture with history of spinal stenosis, scoliosis, L2 compression fracture Per ED, scan reviewed by Dr. Villeda ortho-spine who recommends pain management, PT/OT and outpt follow up with PM&R. Pain management with scheduled Tylenol and Robaxin, lidocaine patch, and PRN oxycodone. Pharmacy recommends continuing Toradol PO, PRN for up to 5 days. If she still requires NSAID after 5 days, add motrin. PT/OT rec moderate intensity. Patient discharged to SNF, Moderate left hydroureteronephrosis, 4 mm stone at distal left ureter, Pyuria Treated with ceftriaxone. Palliative care consult for pain management due to chronic back pain and/or renal calculus. Palliative care recommends OP follow up. Once stabilized, pt was brought to [facility] on 2025 for ongoing med mgt and therapy services.      Subjective: Seen and examined today. Denies N/V/D/C/CP. No fever or chills. Reviewed hospitalization with pt. Questions answered with understanding verbalized. Nursing team report no issues.     The patient was counseled regarding diagnostic results, instructions for management, risk factor reductions, prognosis, patient and family education, impressions, risks and benefits of treatment options and importance of compliance with treatment. I have reviewed nursing notes since my last visit and document any significant changes Reviewed orders, medications, Labs. Reviewed chart looking at current medications, treatments, labs, x-rays etc.     ROS:  As above in subjective. Otherwise, all other systems have been reviewed and are  negative for complaint.    Medications:  Medications reviewed and verified in NH chart.     Medical History[1]    Surgical History[2]    Family History[3]    Social History     Tobacco Use   • Smoking status: Never   • Smokeless tobacco: Never   • Tobacco comments:     Never smoked but exposed to it.   Substance Use Topics   • Alcohol use: Not Currently     Comment: 1 glass of wine in 3 months       Allergies[4]     Vital Signs:   Vital Signs were reviewed in nursing home documentation.    Physical Exam  Vitals reviewed. Exam conducted with a chaperone present.   Constitutional:       Appearance: Normal appearance. She is well-developed.   HENT:      Head: Normocephalic.      Right Ear: External ear normal.      Left Ear: External ear normal.      Nose: Nose normal.      Mouth/Throat:      Lips: Pink.      Mouth: Mucous membranes are moist.   Eyes:      General: Lids are normal.      Pupils: Pupils are equal, round, and reactive to light.   Neck:      Trachea: Trachea normal.   Cardiovascular:      Rate and Rhythm: Normal rate and regular rhythm.      Heart sounds: Normal heart sounds.   Pulmonary:      Effort: Pulmonary effort is normal.      Breath sounds: Decreased breath sounds present.   Abdominal:      General: Bowel sounds are normal.      Palpations: Abdomen is soft.   Musculoskeletal:      Thoracic back: Tenderness present.      Lumbar back: Tenderness present.   Skin:     General: Skin is warm and moist.      Findings: Bruising present.   Neurological:      General: No focal deficit present.      Mental Status: She is alert and oriented to person, place, and time. Mental status is at baseline.      Motor: Weakness present.   Psychiatric:         Attention and Perception: Attention normal.         Mood and Affect: Mood normal.         Speech: Speech normal.         Behavior: Behavior is cooperative.         Thought Content: Thought content normal.         Cognition and Memory: Cognition normal.          Judgment: Judgment normal.       Lab Results   Component Value Date    WBC 7.8 06/11/2025    HGB 13.2 06/11/2025    HCT 40.0 06/11/2025     06/11/2025    CHOL 138 11/11/2024    TRIG 180 (H) 11/11/2024    HDL 39.3 11/11/2024    ALT 10 05/22/2025    AST 17 05/22/2025     06/11/2025    K 4.8 06/11/2025     06/11/2025    CREATININE 0.97 06/11/2025    BUN 41 (H) 06/11/2025    CO2 27 06/11/2025    TSH 1.54 11/11/2024    INR 1.0 05/26/2020    HGBA1C 5.7 (A) 12/20/2021     Results were reviewed and addressed accordingly. Lab Results were also reviewed in [facility].     Provider Impression:   Acute on Chronic back pain  #Acute T12 compression fracture with history of spinal stenosis, scoliosis, L2 compression fracture  - Per ED, scan reviewed by Dr. Villeda ortho-spine who recommends pain management, PT/OT and outpt follow up with PM&R  - pain management with scheduled Tylenol and Robaxin, lidocaine patch, and PRN oxycodone  - PT/OT rec moderate intensity. Patient discharged to SNF,   - Palliative care consulted for pain management due to chronic back pain and/or renal calculus. Palliative care recommends OP follow up.     Moderate left hydroureteronephrosis, 4 mm stone at distal left ureter, Pyuria  - Treated with ceftriaxone  - UC contaminated  - Flomax continued  - Urology followed. No surgery recommended at this time. US if s/sx persist.  - Monitor kidney function    CKD 3  - check labs qwk and PRN  - avoid nephrotoxins    CHF, moderate aortic stenosis, moderate CAD  - EF 57%  - CT shows bilateral pleural effusions, no hypoxia or LE edema  - ASA, losartan, metoprolol    Constipation Prevention  - c/w stool softeners and laxatives PRN  - monitor BM's    Gastric Protection  - c/w PPI    Code Status  - Full Code  ----------------  Written by Farheen Wilcox RN, acting as a scribe for Dr. Villarreal. This note accurately reflects the work and decisions made by Dr. Villarreal.     I, Dr. Villarreal, attest all medical record  entries made by the scribe were under my direction and were personally dictated by me. I have reviewed the chart and agree that the record accurately reflects my performance of the history, physical exam, and assessment and plan.         Electronically Signed By: Faisal Mock MD   6/24/25 12:34 PM       [1]  Past Medical History:  Diagnosis Date   • Acute upper respiratory infection, unspecified 01/15/2018    Acute URI   • Aneurysm of unspecified site     Aneurysm   • Arrhythmia 1975   • Arthritis 2005   • BiPAP (biphasic positive airway pressure) dependence 2023   • Cataract 2005   • Chronic pain disorder 1960   • Coronary artery disease 2021   • CPAP (continuous positive airway pressure) dependence 2014   • Dependence on other enabling machines and devices     CPAP (continuous positive airway pressure) dependence   • Dysphagia 2021   • Fractures 1955   • GERD (gastroesophageal reflux disease) 1985    No longer problem   • Glaucoma 2021   • Hearing aid worn 1985   • Heart disease 1980   • Heart failure 2019   • Heart valve disease 2021   • History of blood transfusion 1958   • HL (hearing loss) 1970   • Hypertension 1972   • Low back pain 1950   • Lumbar disc disease 2016   • Old myocardial infarction     History of myocardial infarction   • Other nail disorders 03/14/2017    Green nails   • Pacemaker 2019   • Personal history of diseases of the skin and subcutaneous tissue 12/14/2016    History of folliculitis   • Personal history of other diseases of the circulatory system 05/16/2022    History of intermittent claudication   • Personal history of other diseases of the circulatory system     History of high blood pressure   • Personal history of other diseases of the musculoskeletal system and connective tissue     History of arthritis   • Personal history of other diseases of the nervous system and sense organs     History of sleep apnea   • Personal history of other endocrine, nutritional and metabolic  disease     History of high cholesterol   • Personal history of other venous thrombosis and embolism     H/O blood clots   • Personal history of transient ischemic attack (TIA), and cerebral infarction without residual deficits     History of stroke   • Platelet disorder (Multi) 1998    PCV   • Pregnant ([facility] 1957   • Scoliosis 2016   • Secondary polycythemia     Polycythemia   • Shingles 1996   • Sleep apnea    • Spinal stenosis, lumbar region with neurogenic claudication 11/01/2022    Neurogenic claudication due to lumbar spinal stenosis   • Stroke (Multi) 1967   • Urinary tract infection 2019   • Vision loss 2017   [2]  Past Surgical History:  Procedure Laterality Date   • BACK SURGERY      MILD procedure   • BLEPHAROPLASTY  2002   • COLONOSCOPY  2014   • CORRECTION HAMMER TOE  2004   • DILATION AND CURETTAGE OF UTERUS  04/20/2016    Dilation And Curettage   • FRACTURE SURGERY  2015   • INSERT / REPLACE / REMOVE PACEMAKER  2019   • KNEE SURGERY  04/20/2016    Knee Surgery Left   • ORIF WRIST FRACTURE  1955   • OTHER SURGICAL HISTORY  04/20/2016    Ear Surgery   • OTHER SURGICAL HISTORY  04/20/2016    Hammertoe Operation Left Toe No. ___   • OTHER SURGICAL HISTORY  07/29/2022    Pacemaker insertion   • ROTATOR CUFF REPAIR  04/20/2016    Rotator Cuff Repair   • STAPEDECTOMY  1979   • TOE SURGERY  2004   • TONSILLECTOMY  04/20/2016    Tonsillectomy   • UPPER GASTROINTESTINAL ENDOSCOPY  2013   • VARICOSE VEIN SURGERY  04/20/2016    Varicose Vein Ligation   [3]  Family History  Problem Relation Name Age of Onset   • Cancer Mother GIBSON SALINAS    • Hypertension Sister KELSIE RENTERIA    • Breast cancer Sister KELSIE RENTERIA 87   • Coronary artery disease Brother TIN SALINAS    • Hearing loss Brother SERINA SALINAS    • Vision loss Brother SERINA SALINAS    • Heart disease Brother AUDREY SALINAS    • Breast cancer Paternal Grandmother  30   [4]  Allergies  Allergen Reactions   • Duloxetine Diarrhea and Other     Reaction  from Community: Diarrhea   • Sulfa (Sulfonamide Antibiotics) Other and Rash   • Atenolol Unknown   • Cephalexin Diarrhea   • Cholestyramine Unknown   • Gemfibrozil Unknown   • House Dust Other   • Niacin Unknown   • Pravastatin Unknown   • Tramadol Other and Hallucinations     Reaction from Community: Hallucinations   • Tree Pollen-White Maxim Unknown     Amxim, White

## 2025-06-04 ENCOUNTER — LAB REQUISITION (OUTPATIENT)
Dept: LAB | Facility: HOSPITAL | Age: OVER 89
End: 2025-06-04
Payer: MEDICARE

## 2025-06-04 ENCOUNTER — NURSING HOME VISIT (OUTPATIENT)
Dept: POST ACUTE CARE | Facility: EXTERNAL LOCATION | Age: OVER 89
End: 2025-06-04
Payer: MEDICARE

## 2025-06-04 DIAGNOSIS — R53.1 WEAKNESS: ICD-10-CM

## 2025-06-04 DIAGNOSIS — R53.81 PHYSICAL DECONDITIONING: ICD-10-CM

## 2025-06-04 DIAGNOSIS — M80.08XD AGE-RELATED OSTEOPOROSIS WITH CURRENT PATHOLOGICAL FRACTURE OF VERTEBRA WITH ROUTINE HEALING, SUBSEQUENT ENCOUNTER: ICD-10-CM

## 2025-06-04 DIAGNOSIS — M54.50 ACUTE ON CHRONIC LOW BACK PAIN: Primary | ICD-10-CM

## 2025-06-04 DIAGNOSIS — N20.0 KIDNEY STONE: ICD-10-CM

## 2025-06-04 DIAGNOSIS — F32.A DEPRESSION, UNSPECIFIED DEPRESSION TYPE: ICD-10-CM

## 2025-06-04 DIAGNOSIS — K21.9 GERD WITHOUT ESOPHAGITIS: ICD-10-CM

## 2025-06-04 DIAGNOSIS — Z79.899 OTHER LONG TERM (CURRENT) DRUG THERAPY: ICD-10-CM

## 2025-06-04 DIAGNOSIS — M48.062 SPINAL STENOSIS, LUMBAR REGION, WITH NEUROGENIC CLAUDICATION: ICD-10-CM

## 2025-06-04 DIAGNOSIS — E55.9 VITAMIN D DEFICIENCY: ICD-10-CM

## 2025-06-04 DIAGNOSIS — S22.080D COMPRESSION FRACTURE OF T12 VERTEBRA WITH ROUTINE HEALING: ICD-10-CM

## 2025-06-04 DIAGNOSIS — G89.29 ACUTE ON CHRONIC LOW BACK PAIN: Primary | ICD-10-CM

## 2025-06-04 LAB
ANION GAP SERPL CALC-SCNC: 13 MMOL/L (ref 10–20)
BUN SERPL-MCNC: 45 MG/DL (ref 6–23)
CALCIUM SERPL-MCNC: 10.1 MG/DL (ref 8.6–10.3)
CHLORIDE SERPL-SCNC: 102 MMOL/L (ref 98–107)
CO2 SERPL-SCNC: 26 MMOL/L (ref 21–32)
CREAT SERPL-MCNC: 1 MG/DL (ref 0.5–1.05)
EGFRCR SERPLBLD CKD-EPI 2021: 54 ML/MIN/1.73M*2
ERYTHROCYTE [DISTWIDTH] IN BLOOD BY AUTOMATED COUNT: 13.2 % (ref 11.5–14.5)
GLUCOSE SERPL-MCNC: 82 MG/DL (ref 74–99)
HCT VFR BLD AUTO: 42.6 % (ref 36–46)
HGB BLD-MCNC: 14.7 G/DL (ref 12–16)
MCH RBC QN AUTO: 32.2 PG (ref 26–34)
MCHC RBC AUTO-ENTMCNC: 34.5 G/DL (ref 32–36)
MCV RBC AUTO: 93 FL (ref 80–100)
NRBC BLD-RTO: 0 /100 WBCS (ref 0–0)
PLATELET # BLD AUTO: 259 X10*3/UL (ref 150–450)
POTASSIUM SERPL-SCNC: 3.9 MMOL/L (ref 3.5–5.3)
RBC # BLD AUTO: 4.57 X10*6/UL (ref 4–5.2)
SODIUM SERPL-SCNC: 137 MMOL/L (ref 136–145)
WBC # BLD AUTO: 11 X10*3/UL (ref 4.4–11.3)

## 2025-06-04 PROCEDURE — 99309 SBSQ NF CARE MODERATE MDM 30: CPT | Performed by: NURSE PRACTITIONER

## 2025-06-04 PROCEDURE — 36415 COLL VENOUS BLD VENIPUNCTURE: CPT | Mod: OUT | Performed by: INTERNAL MEDICINE

## 2025-06-04 PROCEDURE — 85027 COMPLETE CBC AUTOMATED: CPT | Mod: OUT | Performed by: INTERNAL MEDICINE

## 2025-06-04 PROCEDURE — 80048 BASIC METABOLIC PNL TOTAL CA: CPT | Mod: OUT | Performed by: INTERNAL MEDICINE

## 2025-06-05 ENCOUNTER — NURSING HOME VISIT (OUTPATIENT)
Dept: POST ACUTE CARE | Facility: EXTERNAL LOCATION | Age: OVER 89
End: 2025-06-05
Payer: MEDICARE

## 2025-06-05 DIAGNOSIS — Z78.9 NURSING HOME RESIDENT: ICD-10-CM

## 2025-06-05 DIAGNOSIS — S22.080D COMPRESSION FRACTURE OF T12 VERTEBRA WITH ROUTINE HEALING: ICD-10-CM

## 2025-06-05 DIAGNOSIS — N13.30 HYDRONEPHROSIS OF LEFT KIDNEY: ICD-10-CM

## 2025-06-05 DIAGNOSIS — Z00.00 ROUTINE GENERAL MEDICAL EXAMINATION AT A HEALTH CARE FACILITY: ICD-10-CM

## 2025-06-05 DIAGNOSIS — I10 PRIMARY HYPERTENSION: ICD-10-CM

## 2025-06-05 DIAGNOSIS — N18.31 STAGE 3A CHRONIC KIDNEY DISEASE (MULTI): ICD-10-CM

## 2025-06-05 DIAGNOSIS — M48.062 SPINAL STENOSIS, LUMBAR REGION, WITH NEUROGENIC CLAUDICATION: ICD-10-CM

## 2025-06-05 DIAGNOSIS — N20.0 KIDNEY STONE: ICD-10-CM

## 2025-06-05 PROCEDURE — 99308 SBSQ NF CARE LOW MDM 20: CPT | Performed by: INTERNAL MEDICINE

## 2025-06-06 ENCOUNTER — DOCUMENTATION (OUTPATIENT)
Dept: PAIN MEDICINE | Facility: CLINIC | Age: OVER 89
End: 2025-06-06
Payer: MEDICARE

## 2025-06-06 ENCOUNTER — TELEPHONE (OUTPATIENT)
Dept: PRIMARY CARE | Facility: CLINIC | Age: OVER 89
End: 2025-06-06
Payer: MEDICARE

## 2025-06-06 NOTE — TELEPHONE ENCOUNTER
Kavya, the PT's Daughter is calling with concern. The PT is currently in a nurse facility for pain from a kidney stone and a minor fracture.    The daughter stated that it sounds like that she will be discharged either today or tomorrow hopefully. She states that after the PT is discharged she will be at home for a little but, but then will be sent to assisted living.     The daughter states that the oxy 5 mg every hour is not working and that the PT pain is always above a 5 on a scale and would really appreciate a call back on what to do.

## 2025-06-06 NOTE — PROGRESS NOTES
Pt daughter called . Pt is currently in a SNF after hospital admission for back pain. Pt found to have kidney stone as well as acute T12 compression fracture. Evaluated by multiple specialties while in hospital including spine surgeon and urology. Recommended conservative care as outpt,no interventions planned. Daughter reports pt currently taking oxycodone 5mg qid with minimal relief. Dr Villarreal just prescribed Fentanyl patch 25mcg but it has not arrived at SNF pharmacy yet. Daughter concerned about discharge plan. Hazel Dueñas CNP has been prescribing oxycodone 5mg for pt. [facility]RS reviewed. 60 tabs dispensed 5/20. Pt still has at home. Daughter told I will discuss with Dr Fiore recommendations but will most likely need to get pt in office after she is discharged to be evaluated to determine source of pain and treatment plan. Daughter describes pain as very low back and fracture is at T12.

## 2025-06-10 ENCOUNTER — DOCUMENTATION (OUTPATIENT)
Dept: PAIN MEDICINE | Facility: CLINIC | Age: OVER 89
End: 2025-06-10
Payer: MEDICARE

## 2025-06-10 NOTE — PROGRESS NOTES
Discussed daughters concern with Dr Fiore. He does not recommend Fentanyl Patch. I called Kavya and she reports Dr Villarreal has already started and pt is tolerating without side effects but it is not providing much relief. Advised to follow up in office after discharged from SNF if pain still not well controlled. Dr Villarreal is managing meds while pt in facility

## 2025-06-11 ENCOUNTER — LAB REQUISITION (OUTPATIENT)
Dept: LAB | Facility: HOSPITAL | Age: OVER 89
End: 2025-06-11
Payer: MEDICARE

## 2025-06-11 DIAGNOSIS — N18.9 CHRONIC KIDNEY DISEASE, UNSPECIFIED: ICD-10-CM

## 2025-06-11 DIAGNOSIS — I13.0 HYPERTENSIVE HEART AND CHRONIC KIDNEY DISEASE WITH HEART FAILURE AND STAGE 1 THROUGH STAGE 4 CHRONIC KIDNEY DISEASE, OR UNSPECIFIED CHRONIC KIDNEY DISEASE: ICD-10-CM

## 2025-06-11 DIAGNOSIS — I10 ESSENTIAL (PRIMARY) HYPERTENSION: ICD-10-CM

## 2025-06-11 LAB
ANION GAP SERPL CALC-SCNC: 13 MMOL/L (ref 10–20)
BUN SERPL-MCNC: 41 MG/DL (ref 6–23)
CALCIUM SERPL-MCNC: 9.9 MG/DL (ref 8.6–10.3)
CHLORIDE SERPL-SCNC: 104 MMOL/L (ref 98–107)
CO2 SERPL-SCNC: 27 MMOL/L (ref 21–32)
CREAT SERPL-MCNC: 0.97 MG/DL (ref 0.5–1.05)
EGFRCR SERPLBLD CKD-EPI 2021: 56 ML/MIN/1.73M*2
ERYTHROCYTE [DISTWIDTH] IN BLOOD BY AUTOMATED COUNT: 13.3 % (ref 11.5–14.5)
GLUCOSE SERPL-MCNC: 90 MG/DL (ref 74–99)
HCT VFR BLD AUTO: 40 % (ref 36–46)
HGB BLD-MCNC: 13.2 G/DL (ref 12–16)
MCH RBC QN AUTO: 31.7 PG (ref 26–34)
MCHC RBC AUTO-ENTMCNC: 33 G/DL (ref 32–36)
MCV RBC AUTO: 96 FL (ref 80–100)
NRBC BLD-RTO: 0 /100 WBCS (ref 0–0)
PLATELET # BLD AUTO: 186 X10*3/UL (ref 150–450)
POTASSIUM SERPL-SCNC: 4.8 MMOL/L (ref 3.5–5.3)
RBC # BLD AUTO: 4.16 X10*6/UL (ref 4–5.2)
SODIUM SERPL-SCNC: 139 MMOL/L (ref 136–145)
WBC # BLD AUTO: 7.8 X10*3/UL (ref 4.4–11.3)

## 2025-06-11 PROCEDURE — 80048 BASIC METABOLIC PNL TOTAL CA: CPT | Mod: OUT | Performed by: INTERNAL MEDICINE

## 2025-06-11 PROCEDURE — 85027 COMPLETE CBC AUTOMATED: CPT | Mod: OUT | Performed by: INTERNAL MEDICINE

## 2025-06-11 PROCEDURE — 36415 COLL VENOUS BLD VENIPUNCTURE: CPT | Mod: OUT | Performed by: INTERNAL MEDICINE

## 2025-06-13 ENCOUNTER — TELEPHONE (OUTPATIENT)
Dept: PRIMARY CARE | Facility: CLINIC | Age: OVER 89
End: 2025-06-13
Payer: MEDICARE

## 2025-06-13 ENCOUNTER — PATIENT OUTREACH (OUTPATIENT)
Dept: PRIMARY CARE | Facility: CLINIC | Age: OVER 89
End: 2025-06-13
Payer: MEDICARE

## 2025-06-13 DIAGNOSIS — R10.9 LEFT FLANK PAIN: ICD-10-CM

## 2025-06-13 DIAGNOSIS — M25.50 ARTHRALGIA OF MULTIPLE SITES: ICD-10-CM

## 2025-06-13 RX ORDER — CELECOXIB 200 MG/1
200 CAPSULE ORAL DAILY
Qty: 10 CAPSULE | Refills: 0 | Status: SHIPPED | OUTPATIENT
Start: 2025-06-13 | End: 2025-06-23

## 2025-06-13 RX ORDER — TIZANIDINE 2 MG/1
2 TABLET ORAL EVERY 6 HOURS PRN
Qty: 30 TABLET | Refills: 0 | Status: SHIPPED | OUTPATIENT
Start: 2025-06-13 | End: 2025-06-23

## 2025-06-13 NOTE — PROGRESS NOTES
Discharge Facility: [facility] (SNF)  Discharge Diagnosis: COLLAPSED VERTEBRA, NOT ELSEWHERE CLASSIFIED, THORACIC REGION, SUBSEQUENT ENCOUNTER FOR FRACTURE WITH ROUTINE HEALING  Admission Date: 5/22/25  Discharge Date: 6/12/25    PCP Appointment Date: Pt. Plans to follow up with Dr Lilian MD at [facility]     Specialist Appointment Date: 6/18/25 ENT  Hospital Encounter and Summary Linked: ED to Hosp-Admission (Discharged) with Krystian Dimas, DO; Virginia De La Fuente MD (05/19/2025)   Discharge Summary by Cristin Bermudez APRN-CNP (05/22/2025 15:09)   SNF: [facility]   See discharge assessment below for further details    Wrap Up  Wrap Up Additional Comments: Pt. Son reports that pt. Has made the decision to move into [facility]. Pt. Plans to see Dr. Quintana at the [facility] if that is okay. Message routed to the office staff to get confirmation. Over all son states that pt is doing well. Denies further questions/concerns at this time. This callers contact information for non-emergent questions/concerns. (6/13/2025  2:30 PM)    Engagement  Call Start Time: -- (Call completed with Son) (6/13/2025  2:30 PM)    Medications  Medications reviewed with patient/caregiver?: Yes (6/13/2025  2:30 PM)  Is the patient having any side effects they believe may be caused by any medication additions or changes?: No (6/13/2025  2:30 PM)  Does the patient have all medications ordered at discharge?: Yes (6/13/2025  2:30 PM)  Care Management Interventions: No intervention needed (6/13/2025  2:30 PM)  Prescription Comments: RX being managed at [facility] (6/13/2025  2:30 PM)  Is the patient taking all medications as directed (includes completed medication regime)?: Yes (6/13/2025  2:30 PM)  Medication Comments: Pt. son denies any questions/concerns during this TCM call. (6/13/2025  2:30 PM)    Appointments  Care Management Interventions: Advised patient to make appointment (6/13/2025  2:30 PM)  Has the patient kept scheduled  appointments due by today?: Not applicable (6/13/2025  2:30 PM)    Self Management  What is the home health agency?: Unsure if there is home care (6/13/2025  2:30 PM)  Has home health visited the patient within 72 hours of discharge?: Unsure (6/13/2025  2:30 PM)  What Durable Medical Equipment (DME) was ordered?: n/a (6/13/2025  2:30 PM)    Patient Teaching  Does the patient have access to their discharge instructions?: Yes (6/13/2025  2:30 PM)  Care Management Interventions: Reviewed instructions with patient (6/13/2025  2:30 PM)  What is the patient's perception of their health status since discharge?: Improving (6/13/2025  2:30 PM)  Is the patient/caregiver able to teach back the hierarchy of who to call/visit for symptoms/problems? PCP, Specialist, Home Health nurse, Urgent Care, ED, 911: Yes (6/13/2025  2:30 PM)  Patient/Caregiver Education Comments: Pt. son denies any concerns (6/13/2025  2:30 PM)

## 2025-06-13 NOTE — TELEPHONE ENCOUNTER
The daughter of the PT states that yesterday the PT was discharged from [facility]. At [facility] the PT was given Fentanyl and was not allowed to take it with her when she left the center to go to home health. The daughter is requesting a new request for a 10 day supply, as she stated preferably today.    The daughter would like a call back if this can be done.

## 2025-06-18 ENCOUNTER — HOME VISIT (OUTPATIENT)
Dept: POST ACUTE CARE | Facility: EXTERNAL LOCATION | Age: OVER 89
End: 2025-06-18
Payer: MEDICARE

## 2025-06-18 ENCOUNTER — APPOINTMENT (OUTPATIENT)
Dept: OTOLARYNGOLOGY | Facility: HOSPITAL | Age: OVER 89
End: 2025-06-18
Payer: MEDICARE

## 2025-06-18 DIAGNOSIS — I42.8 NONISCHEMIC CARDIOMYOPATHY (MULTI): Primary | ICD-10-CM

## 2025-06-18 DIAGNOSIS — N18.31 STAGE 3A CHRONIC KIDNEY DISEASE (MULTI): ICD-10-CM

## 2025-06-18 DIAGNOSIS — S22.080D COMPRESSION FRACTURE OF T12 VERTEBRA WITH ROUTINE HEALING: ICD-10-CM

## 2025-06-18 DIAGNOSIS — M80.08XA AGE-RELATED OSTEOPOROSIS WITH CURRENT PATHOL FRACTURE OF VERTEBRA, INITIAL ENCOUNTER (MULTI): ICD-10-CM

## 2025-06-18 DIAGNOSIS — E26.1 SECONDARY HYPERALDOSTERONISM (MULTI): ICD-10-CM

## 2025-06-18 DIAGNOSIS — M80.08XD AGE-RELATED OSTEOPOROSIS WITH CURRENT PATHOLOGICAL FRACTURE OF VERTEBRA WITH ROUTINE HEALING, SUBSEQUENT ENCOUNTER: ICD-10-CM

## 2025-06-19 ENCOUNTER — TELEPHONE (OUTPATIENT)
Dept: PRIMARY CARE | Facility: CLINIC | Age: OVER 89
End: 2025-06-19
Payer: MEDICARE

## 2025-06-19 VITALS
RESPIRATION RATE: 18 BRPM | OXYGEN SATURATION: 95 % | DIASTOLIC BLOOD PRESSURE: 56 MMHG | SYSTOLIC BLOOD PRESSURE: 98 MMHG | TEMPERATURE: 98.5 F | HEART RATE: 69 BPM

## 2025-06-19 PROBLEM — S32.020A COMPRESSION FRACTURE OF L2 LUMBAR VERTEBRA (MULTI): Status: RESOLVED | Noted: 2023-05-15 | Resolved: 2025-06-19

## 2025-06-19 PROBLEM — S22.080D COMPRESSION FRACTURE OF T12 VERTEBRA WITH ROUTINE HEALING: Status: ACTIVE | Noted: 2025-06-19

## 2025-06-19 PROBLEM — S32.000A LUMBAR COMPRESSION FRACTURE (MULTI): Status: RESOLVED | Noted: 2023-05-15 | Resolved: 2025-06-19

## 2025-06-19 ASSESSMENT — ENCOUNTER SYMPTOMS
APPETITE CHANGE: 0
NAUSEA: 0
UNEXPECTED WEIGHT CHANGE: 0

## 2025-06-19 NOTE — TELEPHONE ENCOUNTER
----- Message from [employer] sent at 6/13/2025  2:29 PM EDT -----  Regarding: TCM/ Pt. question  Pt. Son reports that pt. Has made the decision to move into [facility]. Pt. Plans to see Dr. Quintana at the AL if that is okay.     Thank you

## 2025-06-19 NOTE — PROGRESS NOTES
Subjective   Patient ID: Neris Mccall is a 89 y.o. female who is assisted living/ home patient being seen and evaluated for multiple medical problems.    HPI   89-year-old female  Past medical history significant for chronic systolic heart failure with an EF of 45 to 50%, moderate to severe aortic stenosis, hypertension, ASCVD with history of MI, ICD placement, chronic pain syndrome, CKD 3  Recent T12 inferior endplate compression fracture with mild to moderate multilevel degenerative disc and facet disease diagnosed 5/19/2025    Review of Systems   Constitutional:  Negative for appetite change and unexpected weight change.   Eyes:  Negative for visual disturbance.   Gastrointestinal:  Negative for nausea.       Objective   BP 98/56   Pulse 69   Temp 36.9 °C (98.5 °F)   Resp 18   SpO2 95%     Physical Exam  Constitutional:       General: She is not in acute distress.     Appearance: Normal appearance.   HENT:      Head: Normocephalic and atraumatic.   Eyes:      Conjunctiva/sclera: Conjunctivae normal.   Cardiovascular:      Rate and Rhythm: Normal rate and regular rhythm.   Pulmonary:      Effort: Pulmonary effort is normal.      Breath sounds: Normal breath sounds.   Abdominal:      Palpations: Abdomen is soft.   Musculoskeletal:      Cervical back: Neck supple.   Lymphadenopathy:      Cervical: No cervical adenopathy.   Neurological:      Mental Status: She is alert.         Assessment/Plan   Problem List Items Addressed This Visit           ICD-10-CM    Age-related osteoporosis with current pathol fracture of vertebra, initial encounter (Multi) M80.08XA    Nonischemic cardiomyopathy (Multi) - Primary I42.8    Treated and controlled         Secondary hyperaldosteronism (Multi) E26.1    Stage 3a chronic kidney disease (Multi) N18.31    Monitor         Age-related osteoporosis with current pathological fracture of vertebra (Multi) M80.08XA    Compression fracture of T12 vertebra with routine healing S22.080D     Patient says her pain level is varying too much with oxycodone every 6 hours I would like the fentanyl patch started again and says 25 mcg was working although at the end not so well          Will switch patient back to fentanyl patch and discussed risk benefits of respiratory depression and death  Like her son is present and has a number of questions which are answered  Review of record and labs completed  Recheck 3 months sooner if any issues arise

## 2025-06-19 NOTE — ASSESSMENT & PLAN NOTE
Patient says her pain level is varying too much with oxycodone every 6 hours I would like the fentanyl patch started again and says 25 mcg was working although at the end not so well

## 2025-06-20 ENCOUNTER — APPOINTMENT (OUTPATIENT)
Dept: PRIMARY CARE | Facility: CLINIC | Age: OVER 89
End: 2025-06-20
Payer: MEDICARE

## 2025-06-20 DIAGNOSIS — M80.08XA AGE-RELATED OSTEOPOROSIS WITH CURRENT PATHOL FRACTURE OF VERTEBRA, INITIAL ENCOUNTER (MULTI): Primary | ICD-10-CM

## 2025-06-20 RX ORDER — FENTANYL 25 UG/1
1 PATCH TRANSDERMAL
Qty: 1 PATCH | Refills: 0 | Status: SHIPPED | OUTPATIENT
Start: 2025-06-20 | End: 2025-06-21

## 2025-06-20 NOTE — PROGRESS NOTES
Spoke with patient's primary care provider at nursing facility who requested that fentanyl patch be resumed for patient comfort.

## 2025-06-23 ENCOUNTER — HOSPITAL ENCOUNTER (OUTPATIENT)
Dept: CARDIOLOGY | Facility: CLINIC | Age: OVER 89
Discharge: HOME | End: 2025-06-23
Payer: MEDICARE

## 2025-06-23 DIAGNOSIS — Z95.0 PRESENCE OF CARDIAC PACEMAKER: ICD-10-CM

## 2025-06-23 DIAGNOSIS — R10.9 LEFT FLANK PAIN: ICD-10-CM

## 2025-06-23 DIAGNOSIS — M25.50 ARTHRALGIA OF MULTIPLE SITES: ICD-10-CM

## 2025-06-23 DIAGNOSIS — I44.2 ATRIOVENTRICULAR BLOCK, COMPLETE (MULTI): ICD-10-CM

## 2025-06-23 DIAGNOSIS — M80.08XA AGE-RELATED OSTEOPOROSIS WITH CURRENT PATHOL FRACTURE OF VERTEBRA, INITIAL ENCOUNTER (MULTI): ICD-10-CM

## 2025-06-23 PROBLEM — N20.0 KIDNEY STONE: Status: ACTIVE | Noted: 2025-06-23

## 2025-06-23 PROBLEM — N13.30 HYDRONEPHROSIS OF LEFT KIDNEY: Status: ACTIVE | Noted: 2025-06-23

## 2025-06-23 PROCEDURE — 93296 REM INTERROG EVL PM/IDS: CPT

## 2025-06-23 PROCEDURE — 93294 REM INTERROG EVL PM/LDLS PM: CPT | Performed by: STUDENT IN AN ORGANIZED HEALTH CARE EDUCATION/TRAINING PROGRAM

## 2025-06-23 RX ORDER — FENTANYL 25 UG/1
1 PATCH TRANSDERMAL
Qty: 10 PATCH | Refills: 0 | Status: SHIPPED | OUTPATIENT
Start: 2025-06-23 | End: 2025-07-23

## 2025-06-23 RX ORDER — TIZANIDINE 2 MG/1
2 TABLET ORAL EVERY 6 HOURS PRN
Qty: 90 TABLET | Refills: 1 | Status: SHIPPED | OUTPATIENT
Start: 2025-06-23 | End: 2025-06-24

## 2025-06-23 RX ORDER — CELECOXIB 200 MG/1
200 CAPSULE ORAL DAILY
Qty: 90 CAPSULE | Refills: 1 | Status: SHIPPED | OUTPATIENT
Start: 2025-06-23 | End: 2025-06-24

## 2025-06-23 NOTE — PROGRESS NOTES
Name: Neris Mccall  : 1935  MRN: [id number]  Visit Date: 2025  Chief Complaint: Weekly SNF Physician Visit    HPI: 89 y/o female with MH ADHD?, bladder cancer, CKD, CHF, chronic low back pain/L2 compression fracture with recent L3/4 epidural steroid injection presenting to ED with acute flare of chronic low back pain. Acute T12 compression fracture with history of spinal stenosis, scoliosis, L2 compression fracture Per ED, scan reviewed by Dr. Villeda ortho-spine who recommends pain management, PT/OT and outpt follow up with PM&R. Pain management with scheduled Tylenol and Robaxin, lidocaine patch, and PRN oxycodone. Pharmacy recommends continuing Toradol PO, PRN for up to 5 days. If she still requires NSAID after 5 days, add motrin. PT/OT rec moderate intensity. Patient discharged to SNF, Moderate left hydroureteronephrosis, 4 mm stone at distal left ureter, Pyuria Treated with ceftriaxone. Palliative care consult for pain management due to chronic back pain and/or renal calculus. Palliative care recommends OP follow up. Once stabilized, pt was brought to [facility] on 2025 for ongoing med mgt and therapy services.      Subjective: Seen and examined today. Denies N/V/D/C/CP. No fever or chills. Having some pain 5 out of 10 Asking for antiemetic medication. Nursing team report no issues.     The patient was counseled regarding diagnostic results, instructions for management, risk factor reductions, prognosis, patient and family education, impressions, risks and benefits of treatment options and importance of compliance with treatment. I have reviewed nursing notes since my last visit and document any significant changes Reviewed orders, medications, Labs. Reviewed chart looking at current medications, treatments, labs, x-rays etc.     ROS:  As above in subjective. Otherwise, all other systems have been reviewed and are negative for complaint.    Medications:  Medications reviewed and verified in NH chart.      Physical Exam  Vitals reviewed. Exam conducted with a chaperone present.   Constitutional:       Appearance: Normal appearance. She is well-developed.   HENT:      Head: Normocephalic.      Right Ear: External ear normal.      Left Ear: External ear normal.      Nose: Nose normal.      Mouth/Throat:      Lips: Pink.      Mouth: Mucous membranes are moist.   Eyes:      General: Lids are normal.      Pupils: Pupils are equal, round, and reactive to light.   Neck:      Trachea: Trachea normal.   Cardiovascular:      Rate and Rhythm: Normal rate and regular rhythm.      Heart sounds: Normal heart sounds.   Pulmonary:      Effort: Pulmonary effort is normal.      Breath sounds: Decreased breath sounds present.   Abdominal:      General: Bowel sounds are normal.      Palpations: Abdomen is soft.   Musculoskeletal:      Thoracic back: Tenderness present.      Lumbar back: Tenderness present.   Skin:     General: Skin is warm and moist.      Findings: Bruising present.   Neurological:      General: No focal deficit present.      Mental Status: She is alert and oriented to person, place, and time. Mental status is at baseline.      Motor: Weakness present.   Psychiatric:         Attention and Perception: Attention normal.         Mood and Affect: Mood normal.         Speech: Speech normal.         Behavior: Behavior is cooperative.         Thought Content: Thought content normal.         Cognition and Memory: Cognition normal.         Judgment: Judgment normal.       Lab Results   Component Value Date    WBC 7.8 06/11/2025    HGB 13.2 06/11/2025    HCT 40.0 06/11/2025     06/11/2025    CHOL 138 11/11/2024    TRIG 180 (H) 11/11/2024    HDL 39.3 11/11/2024    ALT 10 05/22/2025    AST 17 05/22/2025     06/11/2025    K 4.8 06/11/2025     06/11/2025    CREATININE 0.97 06/11/2025    BUN 41 (H) 06/11/2025    CO2 27 06/11/2025    TSH 1.54 11/11/2024    INR 1.0 05/26/2020    HGBA1C 5.7 (A) 12/20/2021      Results were reviewed and addressed accordingly. Lab Results were also reviewed in [facility].     Provider Impression:   Acute on Chronic back pain  #Acute T12 compression fracture with history of spinal stenosis, scoliosis, L2 compression fracture  - Per ED, scan reviewed by Dr. Villeda ortho-spine who recommends pain management, PT/OT and outpt follow up with PM&R  - pain management with scheduled Tylenol and Robaxin, lidocaine patch, and PRN oxycodone  - PT/OT rec moderate intensity. Patient discharged to SNF,   - Palliative care consulted for pain management due to chronic back pain and/or renal calculus. Palliative care recommends OP follow up.     Moderate left hydroureteronephrosis, 4 mm stone at distal left ureter, Pyuria  - Treated with ceftriaxone  - UC contaminated  - Flomax continued  - Urology followed. No surgery recommended at this time. US if s/sx persist.  - Monitor kidney function    CKD 3  - check labs qwk and PRN  - avoid nephrotoxins    CHF, moderate aortic stenosis, moderate CAD  - EF 57%  - CT shows bilateral pleural effusions, no hypoxia or LE edema  - ASA, losartan, metoprolol    Constipation Prevention  - c/w stool softeners and laxatives PRN  - monitor BM's    Gastric Protection  - c/w PPI    Code Status  - Full Code  ----------------  Written by Farheen Wilcox RN, acting as a scribe for Dr. Villarreal. This note accurately reflects the work and decisions made by Dr. Villarreal.     I, Dr. Villarreal, attest all medical record entries made by the scribe were under my direction and were personally dictated by me. I have reviewed the chart and agree that the record accurately reflects my performance of the history, physical exam, and assessment and plan.

## 2025-06-23 NOTE — PROGRESS NOTES
Name: Neris Mccall  : 1935  MRN: [id number]  Visit Date: 2025  Chief Complaint: HISTORY AND PHYSICAL    HPI: 89 y/o female with MH ADHD?, bladder cancer, CKD, CHF, chronic low back pain/L2 compression fracture with recent L3/4 epidural steroid injection presenting to ED with acute flare of chronic low back pain. Acute T12 compression fracture with history of spinal stenosis, scoliosis, L2 compression fracture Per ED, scan reviewed by Dr. Villeda ortho-spine who recommends pain management, PT/OT and outpt follow up with PM&R. Pain management with scheduled Tylenol and Robaxin, lidocaine patch, and PRN oxycodone. Pharmacy recommends continuing Toradol PO, PRN for up to 5 days. If she still requires NSAID after 5 days, add motrin. PT/OT rec moderate intensity. Patient discharged to SNF, Moderate left hydroureteronephrosis, 4 mm stone at distal left ureter, Pyuria Treated with ceftriaxone. Palliative care consult for pain management due to chronic back pain and/or renal calculus. Palliative care recommends OP follow up. Once stabilized, pt was brought to [facility] on 2025 for ongoing med mgt and therapy services.      Subjective: Seen and examined today. Denies N/V/D/C/CP. No fever or chills. Reviewed hospitalization with pt. Questions answered with understanding verbalized. Nursing team report no issues.     The patient was counseled regarding diagnostic results, instructions for management, risk factor reductions, prognosis, patient and family education, impressions, risks and benefits of treatment options and importance of compliance with treatment. I have reviewed nursing notes since my last visit and document any significant changes Reviewed orders, medications, Labs. Reviewed chart looking at current medications, treatments, labs, x-rays etc.     ROS:  As above in subjective. Otherwise, all other systems have been reviewed and are negative for complaint.    Medications:  Medications reviewed and verified  in NH chart.     Medical History[1]    Surgical History[2]    Family History[3]    Social History     Tobacco Use    Smoking status: Never    Smokeless tobacco: Never    Tobacco comments:     Never smoked but exposed to it.   Substance Use Topics    Alcohol use: Not Currently     Comment: 1 glass of wine in 3 months       Allergies[4]     Vital Signs:   Vital Signs were reviewed in nursing home documentation.    Physical Exam  Vitals reviewed. Exam conducted with a chaperone present.   Constitutional:       Appearance: Normal appearance. She is well-developed.   HENT:      Head: Normocephalic.      Right Ear: External ear normal.      Left Ear: External ear normal.      Nose: Nose normal.      Mouth/Throat:      Lips: Pink.      Mouth: Mucous membranes are moist.   Eyes:      General: Lids are normal.      Pupils: Pupils are equal, round, and reactive to light.   Neck:      Trachea: Trachea normal.   Cardiovascular:      Rate and Rhythm: Normal rate and regular rhythm.      Heart sounds: Normal heart sounds.   Pulmonary:      Effort: Pulmonary effort is normal.      Breath sounds: Decreased breath sounds present.   Abdominal:      General: Bowel sounds are normal.      Palpations: Abdomen is soft.   Musculoskeletal:      Thoracic back: Tenderness present.      Lumbar back: Tenderness present.   Skin:     General: Skin is warm and moist.      Findings: Bruising present.   Neurological:      General: No focal deficit present.      Mental Status: She is alert and oriented to person, place, and time. Mental status is at baseline.      Motor: Weakness present.   Psychiatric:         Attention and Perception: Attention normal.         Mood and Affect: Mood normal.         Speech: Speech normal.         Behavior: Behavior is cooperative.         Thought Content: Thought content normal.         Cognition and Memory: Cognition normal.         Judgment: Judgment normal.       Lab Results   Component Value Date    WBC 7.8  06/11/2025    HGB 13.2 06/11/2025    HCT 40.0 06/11/2025     06/11/2025    CHOL 138 11/11/2024    TRIG 180 (H) 11/11/2024    HDL 39.3 11/11/2024    ALT 10 05/22/2025    AST 17 05/22/2025     06/11/2025    K 4.8 06/11/2025     06/11/2025    CREATININE 0.97 06/11/2025    BUN 41 (H) 06/11/2025    CO2 27 06/11/2025    TSH 1.54 11/11/2024    INR 1.0 05/26/2020    HGBA1C 5.7 (A) 12/20/2021     Results were reviewed and addressed accordingly. Lab Results were also reviewed in [facility].     Provider Impression:   Acute on Chronic back pain  #Acute T12 compression fracture with history of spinal stenosis, scoliosis, L2 compression fracture  - Per ED, scan reviewed by Dr. Villeda ortho-spine who recommends pain management, PT/OT and outpt follow up with PM&R  - pain management with scheduled Tylenol and Robaxin, lidocaine patch, and PRN oxycodone  - PT/OT rec moderate intensity. Patient discharged to SNF,   - Palliative care consulted for pain management due to chronic back pain and/or renal calculus. Palliative care recommends OP follow up.     Moderate left hydroureteronephrosis, 4 mm stone at distal left ureter, Pyuria  - Treated with ceftriaxone  - UC contaminated  - Flomax continued  - Urology followed. No surgery recommended at this time. US if s/sx persist.  - Monitor kidney function    CKD 3  - check labs qwk and PRN  - avoid nephrotoxins    CHF, moderate aortic stenosis, moderate CAD  - EF 57%  - CT shows bilateral pleural effusions, no hypoxia or LE edema  - ASA, losartan, metoprolol    Constipation Prevention  - c/w stool softeners and laxatives PRN  - monitor BM's    Gastric Protection  - c/w PPI    Code Status  - Full Code  ----------------  Written by Farheen Wilcox RN, acting as a scribe for Dr. Villarreal. This note accurately reflects the work and decisions made by Dr. Villarreal.     I, Dr. iVllarreal, attest all medical record entries made by the scribe were under my direction and were personally dictated  by me. I have reviewed the chart and agree that the record accurately reflects my performance of the history, physical exam, and assessment and plan.          [1]   Past Medical History:  Diagnosis Date    Acute upper respiratory infection, unspecified 01/15/2018    Acute URI    Aneurysm of unspecified site     Aneurysm    Arrhythmia 1975    Arthritis 2005    BiPAP (biphasic positive airway pressure) dependence 2023    Cataract 2005    Chronic pain disorder 1960    Coronary artery disease 2021    CPAP (continuous positive airway pressure) dependence 2014    Dependence on other enabling machines and devices     CPAP (continuous positive airway pressure) dependence    Dysphagia 2021    Fractures 1955    GERD (gastroesophageal reflux disease) 1985    No longer problem    Glaucoma 2021    Hearing aid worn 1985    Heart disease 1980    Heart failure 2019    Heart valve disease 2021    History of blood transfusion 1958    HL (hearing loss) 1970    Hypertension 1972    Low back pain 1950    Lumbar disc disease 2016    Old myocardial infarction     History of myocardial infarction    Other nail disorders 03/14/2017    Green nails    Pacemaker 2019    Personal history of diseases of the skin and subcutaneous tissue 12/14/2016    History of folliculitis    Personal history of other diseases of the circulatory system 05/16/2022    History of intermittent claudication    Personal history of other diseases of the circulatory system     History of high blood pressure    Personal history of other diseases of the musculoskeletal system and connective tissue     History of arthritis    Personal history of other diseases of the nervous system and sense organs     History of sleep apnea    Personal history of other endocrine, nutritional and metabolic disease     History of high cholesterol    Personal history of other venous thrombosis and embolism     H/O blood clots    Personal history of transient ischemic attack (TIA), and  cerebral infarction without residual deficits     History of stroke    Platelet disorder (Multi) 1998    PCV    Pregnant ([facility] 1957    Scoliosis 2016    Secondary polycythemia     Polycythemia    Shingles 1996    Sleep apnea     Spinal stenosis, lumbar region with neurogenic claudication 11/01/2022    Neurogenic claudication due to lumbar spinal stenosis    Stroke (Multi) 1967    Urinary tract infection 2019    Vision loss 2017   [2]   Past Surgical History:  Procedure Laterality Date    BACK SURGERY      MILD procedure    BLEPHAROPLASTY  2002    COLONOSCOPY  2014    CORRECTION HAMMER TOE  2004    DILATION AND CURETTAGE OF UTERUS  04/20/2016    Dilation And Curettage    FRACTURE SURGERY  2015    INSERT / REPLACE / REMOVE PACEMAKER  2019    KNEE SURGERY  04/20/2016    Knee Surgery Left    ORIF WRIST FRACTURE  1955    OTHER SURGICAL HISTORY  04/20/2016    Ear Surgery    OTHER SURGICAL HISTORY  04/20/2016    Hammertoe Operation Left Toe No. ___    OTHER SURGICAL HISTORY  07/29/2022    Pacemaker insertion    ROTATOR CUFF REPAIR  04/20/2016    Rotator Cuff Repair    STAPEDECTOMY  1979    TOE SURGERY  2004    TONSILLECTOMY  04/20/2016    Tonsillectomy    UPPER GASTROINTESTINAL ENDOSCOPY  2013    VARICOSE VEIN SURGERY  04/20/2016    Varicose Vein Ligation   [3]   Family History  Problem Relation Name Age of Onset    Cancer Mother GIBSON SALINAS     Hypertension Sister KELSIE RENTERIA     Breast cancer Sister KELSIE RENTERIA 87    Coronary artery disease Brother TIN SALINAS     Hearing loss Brother SERINA SALINAS     Vision loss Brother SERINA RITA     Heart disease Brother AUDREY SALINAS     Breast cancer Paternal Grandmother  30   [4]   Allergies  Allergen Reactions    Duloxetine Diarrhea and Other     Reaction from Community: Diarrhea    Sulfa (Sulfonamide Antibiotics) Other and Rash    Atenolol Unknown    Cephalexin Diarrhea    Cholestyramine Unknown    Gemfibrozil Unknown    House Dust Other    Niacin Unknown    Pravastatin  Unknown    Tramadol Other and Hallucinations     Reaction from Community: Hallucinations    Tree Pollen-White Maxim Unknown     Maxim, White

## 2025-06-24 DIAGNOSIS — M25.50 ARTHRALGIA OF MULTIPLE SITES: ICD-10-CM

## 2025-06-24 DIAGNOSIS — R10.9 LEFT FLANK PAIN: ICD-10-CM

## 2025-06-24 RX ORDER — TIZANIDINE 2 MG/1
2 TABLET ORAL EVERY 6 HOURS PRN
Qty: 360 TABLET | Refills: 1 | Status: SHIPPED | OUTPATIENT
Start: 2025-06-24

## 2025-06-24 RX ORDER — CELECOXIB 200 MG/1
200 CAPSULE ORAL DAILY
Qty: 90 CAPSULE | Refills: 1 | Status: SHIPPED | OUTPATIENT
Start: 2025-06-24

## 2025-06-27 ENCOUNTER — PATIENT OUTREACH (OUTPATIENT)
Dept: PRIMARY CARE | Facility: CLINIC | Age: OVER 89
End: 2025-06-27
Payer: MEDICARE

## 2025-06-27 NOTE — PROGRESS NOTES
Two attempts were made to reach patient within two business days after discharge. Left voicemail with contact information for patient to call back with any non-emergent questions or concerns.    Pt. Is seeing Dr. Quintana at [facility].

## 2025-06-30 ENCOUNTER — TELEPHONE (OUTPATIENT)
Dept: PRIMARY CARE | Facility: CLINIC | Age: OVER 89
End: 2025-06-30
Payer: MEDICARE

## 2025-06-30 NOTE — TELEPHONE ENCOUNTER
Radha, [facility], states the pt's son had reported Dr LANDEROS had written an order for hospice.  I do not see this in the system.  Can the order, and the following Med Records (H/P, MAR, progress notes, cancer dx (if any), and the scans and blood work for the cancer.  If the order is being written, these can be sent to fax # [phone].

## 2025-07-07 DIAGNOSIS — I42.8 NON-ISCHEMIC CARDIOMYOPATHY (MULTI): ICD-10-CM

## 2025-07-07 RX ORDER — FUROSEMIDE 20 MG/1
20 TABLET ORAL DAILY
Qty: 90 TABLET | Refills: 1 | Status: SHIPPED | OUTPATIENT
Start: 2025-07-07

## 2025-07-11 DIAGNOSIS — J31.0 CHRONIC RHINITIS: Primary | ICD-10-CM

## 2025-07-11 RX ORDER — FLUTICASONE PROPIONATE 50 MCG
2 SPRAY, SUSPENSION (ML) NASAL DAILY
Qty: 16 G | Refills: 2 | Status: SHIPPED | OUTPATIENT
Start: 2025-07-11 | End: 2026-07-11

## 2025-08-06 NOTE — PROGRESS NOTES
Chief Complaint:   SNF F/U  -Acute T12 fracture  -Kidney stone  -Physical deconditioning/weakness    HPI:   89 year-old female with PMH of bladder cancer, CKD, CHF, chronic low back pain, L2 compression fracture with recent L3/4 epidural steroid injection, presenting to the ER on 5/19/25 with c/o acute on chronic low back pain. Hospital course:    Acute on chronic back pain-imaging showed acute T12 compression fracture, Hx of spinal stenosis, scoliosis, and L2 compression fracture, ortho-spine reviewed images and recommended pain management, PT/OT, and OP F/U with PMR, pain mgmt (scheduled Tylenol and Robaxin, lido patch, oxycodone prn), oxycodone decreased due to hypoxia, pharmacy recommending toradol prn x 5 days, motrin prn after toradol course completes, PT/OT recommending SNF, palliative care F/U after discharge for pain mgmt  Moderate left hydroureteronephrosis/4 mm stone at distal left ureter/pyuria-treated with CTX, UC contaminated, flomax continued, urology consult, no surgery recommended, monitor kidney function  Constipation-senna BID, miralax daily, enema and mag citrate added    Pt. was HDS and discharged to [facility] on 5/22/25. Today, patient reports low back pain, worse when up and improved when lying down. She denies any muscle spasms or radiation of pain. She denies dizziness, SOB, cough, or chest pain. She denies constipation or dysuria. Staff report no clinical concerns.     ROS:    As above in HPI. Otherwise, all other systems have been reviewed and are negative for complaint.    Medications reviewed and verified in NH chart.     Problem List[1]     Medical History[2]    Surgical History[3]    Family History[4]    Tobacco Use History[5]    Social History     Substance and Sexual Activity   Alcohol Use Not Currently    Comment: 1 glass of wine in 3 months       Social History     Substance and Sexual Activity   Drug Use Never       Allergies[6]     Vital Signs:    132/62-70-18-97.9-96% on RA    Physical Exam:  General: Sitting up in bed in NAD, alert   Head/Face: NCAT, symmetrical  Eyes: PERRLA, no injection, no discharge  ENT: Hearing not impaired, ears without scars or lesions, nasal mucosa and turbinates pink, septum midline, lips pink and moist  Neck: Supple, symmetrical  Respiratory: CTA without adventitious sounds, respirations even and nonlabored without use of accessory muscles, good air exchange  Cardio: RRR, + systolic murmur, normal S1S2, no edema, pedal pulses 3+/4 bilaterally  Chest/Breast: Symmetrical  GI: BS x 4, normoactive, non-distended, abd round and soft, no masses or tenderness  : No suprapubic tenderness or distention  MSK: Gait not assessed, joints with full ROM without pain or contractures, + generalized weakness  Skin: Skin warm and dry, no induration  Neurologic: Cranial nerves II through XII intact, superficial touch and pain sensation intact  Psychiatric: Alert to person, place, and time, calm and cooperative     Results/Data:   5/23/25: BUN 32, Cr 0.92, GFR 59, CBC unremarkable     Assessment/Plan:  Acute on chronic back pain/T12 compression fracture/Hx spinal stenosis/scoliosis/L2 compression fracture-s/p L3/4 epidural steroid injection, s/p Toradol (end date 5/27), c/w prednisone taper (end date 6/4), lidocaine patch, robaxin 1 gm QID (extend end date to 6/4), and oxycodone prn for severe pain, add Tylenol ES 1000 mg Q 8 hours and Ibuprofen 500 mg Q 6 hours prn for mild-mod pain, c/w PT/OT, F/U with PMR and palliative care after discharge  Physical deconditioning/weakness-c/w PT/OT, safety and fall precautions  Constipation-encourage fluids and fiber, increase mobility as tolerated, c/w senna plus  Moderate left hydroureteronephrosis/4 mm stone at distal left ureter-s/p IV CTX, c/w flomax, encourage fluids, c/w cranberry, monitor kidney function, F/U with urology prn   CKD Stage III-avoid nephrotoxic drugs, renally dose medications as able,  monitor BMP  CHF/CAD/aortic stenosis/HTN/HLD-c/w ASA, lasix, spironolactone, crestor, losartan, metoprolol, and omega-3, monitor BP and HR, F/U with cardiologist as scheduled  Rhinorrhea-c/w atrovent nasal spray  Vit D deficiency/osteoporosis-WB exercises as tolerated, c/w calcium/D supplement and calcitonin spray, patient received Prolia injections as OP  Glaucoma/dry eye syndrome-c/w artificial years and latanoprost  Vitamin deficiency-c/w Vit B12 and MVI  GERD-c/w PPI and Tums  Insomnia-c/w melatonin  Depression-c/w duloxetine    Orders:  Stop date for methocarbamol changed to 6/4   Tylenol ES 1000 mg PO Q 8 hours for low back pain   Ibuprofen 400 mg PO Q 6 hours prn for mild to moderate pain      Code Status:   DNR-CCA    Time spent reviewing patient's chart on the unit (PMH, PSH, FH, Social Hx AND progress and/or consult notes, labs, and radiology results): 30 minutes  Time spent interviewing and/or examining the patient: 13 minutes  Time spent writing note on the unit: 5 minutes  Time spent reviewing POC w/ patient, family, and/or staff: 5 minutes   Total visit time: 53 minutes. Greater than 50% of time was spent on counseling and/or coordination of care of the patient. Start time: 1:58 PM, End time: 2:51 PM.         [1]   Patient Active Problem List  Diagnosis    Age-related osteoporosis with current pathol fracture of vertebra, initial encounter (Multi)    Osteoporosis    Aneurysm artery, renal    Arthralgia of multiple sites    Blue's esophagus    Biventricular implantable cardioverter-defibrillator (ICD) in situ    Blepharospasm    Central sleep apnea due to Cheyne-Mchugh respiration    Chronic systolic heart failure    Depression    DNR no code (do not resuscitate)    Facial nerve paresis    Dysphagia    Ford type III hyperlipoproteinemia    GERD without esophagitis    Glaucoma    H/O polycythemia vera    Hearing impairment    History of Bell's palsy    History of heart attack    Hyperlipidemia     Hypertension    Chronic insomnia    Chronic pain syndrome    Lumbar canal stenosis    Degenerative joint disease of cervical spine    Postmenopausal atrophic vaginitis    Mixed conductive and sensorineural hearing loss, bilateral    Nonischemic cardiomyopathy (Multi)    Nontoxic multinodular goiter    LISA treated with BiPAP    Peripheral neuropathy    Thyroglossal duct cyst    Vitamin D deficiency    Zenker's diverticulum    Spinal stenosis, lumbar region, with neurogenic claudication    Spondylosis without myelopathy or radiculopathy, lumbosacral region    Secondary hyperaldosteronism (Multi)    Treatment-emergent central sleep apnea    Dependence on continuous positive airway pressure ventilation    History of cerebrovascular accident    Exudative age-related macular degeneration, left eye, with active choroidal neovascularization    Stage 3a chronic kidney disease (Multi)    Age-related osteoporosis with current pathological fracture of vertebra (Multi)    Other idiopathic scoliosis, thoracolumbar region    Routine general medical examination at a health care facility    Chronic knee pain after total replacement of knee joint    Green nails    Compression fracture of T12 vertebra with routine healing    Hydronephrosis of left kidney    Kidney stone   [2]   Past Medical History:  Diagnosis Date    Acute upper respiratory infection, unspecified 01/15/2018    Acute URI    Aneurysm of unspecified site     Aneurysm    Arrhythmia 1975    Arthritis 2005    BiPAP (biphasic positive airway pressure) dependence 2023    Cataract 2005    Chronic pain disorder 1960    Coronary artery disease 2021    CPAP (continuous positive airway pressure) dependence 2014    Dependence on other enabling machines and devices     CPAP (continuous positive airway pressure) dependence    Dysphagia 2021    Fractures 1955    GERD (gastroesophageal reflux disease) 1985    No longer problem    Glaucoma 2021    Hearing aid worn 1985    Heart  disease 1980    Heart failure 2019    Heart valve disease 2021    History of blood transfusion 1958    HL (hearing loss) 1970    Hypertension 1972    Kidney stone 6/23/2025    Low back pain 1950    Lumbar disc disease 2016    Old myocardial infarction     History of myocardial infarction    Other nail disorders 03/14/2017    Green nails    Pacemaker 2019    Personal history of diseases of the skin and subcutaneous tissue 12/14/2016    History of folliculitis    Personal history of other diseases of the circulatory system 05/16/2022    History of intermittent claudication    Personal history of other diseases of the circulatory system     History of high blood pressure    Personal history of other diseases of the musculoskeletal system and connective tissue     History of arthritis    Personal history of other diseases of the nervous system and sense organs     History of sleep apnea    Personal history of other endocrine, nutritional and metabolic disease     History of high cholesterol    Personal history of other venous thrombosis and embolism     H/O blood clots    Personal history of transient ischemic attack (TIA), and cerebral infarction without residual deficits     History of stroke    Platelet disorder (Multi) 1998    PCV    Pregnant ([facility] 1957    Scoliosis 2016    Secondary polycythemia     Polycythemia    Shingles 1996    Sleep apnea     Spinal stenosis, lumbar region with neurogenic claudication 11/01/2022    Neurogenic claudication due to lumbar spinal stenosis    Stroke (Multi) 1967    Urinary tract infection 2019    Vision loss 2017   [3]   Past Surgical History:  Procedure Laterality Date    BACK SURGERY      MILD procedure    BLEPHAROPLASTY  2002    COLONOSCOPY  2014    CORRECTION HAMMER TOE  2004    DILATION AND CURETTAGE OF UTERUS  04/20/2016    Dilation And Curettage    FRACTURE SURGERY  2015    INSERT / REPLACE / REMOVE PACEMAKER  2019    KNEE SURGERY  04/20/2016    Knee Surgery Left    ORIF  WRIST FRACTURE  1955    OTHER SURGICAL HISTORY  04/20/2016    Ear Surgery    OTHER SURGICAL HISTORY  04/20/2016    Hammertoe Operation Left Toe No. ___    OTHER SURGICAL HISTORY  07/29/2022    Pacemaker insertion    ROTATOR CUFF REPAIR  04/20/2016    Rotator Cuff Repair    STAPEDECTOMY  1979    TOE SURGERY  2004    TONSILLECTOMY  04/20/2016    Tonsillectomy    UPPER GASTROINTESTINAL ENDOSCOPY  2013    VARICOSE VEIN SURGERY  04/20/2016    Varicose Vein Ligation   [4]   Family History  Problem Relation Name Age of Onset    Cancer Mother GIBSON SALINAS     Hypertension Sister KELSIE RENTERIA     Breast cancer Sister KELSIE RENTERIA 87    Coronary artery disease Brother TIN SALINAS     Hearing loss Brother SERINA SALINAS     Vision loss Brother SERINA SALINAS     Heart disease Brother AUDREY SALINAS     Breast cancer Paternal Grandmother  30   [5]   Social History  Tobacco Use   Smoking Status Never   Smokeless Tobacco Never   Tobacco Comments    Never smoked but exposed to it.   [6]   Allergies  Allergen Reactions    Duloxetine Diarrhea and Other     Reaction from Community: Diarrhea    Sulfa (Sulfonamide Antibiotics) Other and Rash    Atenolol Unknown    Cephalexin Diarrhea    Cholestyramine Unknown    Gemfibrozil Unknown    House Dust Other    Niacin Unknown    Pravastatin Unknown    Tramadol Other and Hallucinations     Reaction from Community: Hallucinations    Tree Pollen-White Maxim Unknown     Maxim, White

## 2025-08-09 DIAGNOSIS — J31.0 CHRONIC RHINITIS: ICD-10-CM

## 2025-08-11 RX ORDER — FLUTICASONE PROPIONATE 50 MCG
1 SPRAY, SUSPENSION (ML) NASAL DAILY
Qty: 18.2 ML | Refills: 3 | Status: SHIPPED | OUTPATIENT
Start: 2025-08-11

## 2025-08-18 ENCOUNTER — TELEPHONE (OUTPATIENT)
Dept: PRIMARY CARE | Facility: CLINIC | Age: OVER 89
End: 2025-08-18
Payer: MEDICARE

## 2025-09-03 ENCOUNTER — HOME VISIT (OUTPATIENT)
Dept: POST ACUTE CARE | Facility: EXTERNAL LOCATION | Age: OVER 89
End: 2025-09-03
Payer: MEDICARE

## 2025-09-03 DIAGNOSIS — I42.8 NONISCHEMIC CARDIOMYOPATHY (MULTI): ICD-10-CM

## 2025-09-03 DIAGNOSIS — I50.22 CHRONIC SYSTOLIC HEART FAILURE: Primary | ICD-10-CM

## 2025-09-03 DIAGNOSIS — M48.062 SPINAL STENOSIS, LUMBAR REGION, WITH NEUROGENIC CLAUDICATION: ICD-10-CM

## 2025-09-03 PROCEDURE — 99349 HOME/RES VST EST MOD MDM 40: CPT | Performed by: FAMILY MEDICINE

## 2025-09-04 VITALS
SYSTOLIC BLOOD PRESSURE: 98 MMHG | BODY MASS INDEX: 20.01 KG/M2 | RESPIRATION RATE: 20 BRPM | HEART RATE: 78 BPM | DIASTOLIC BLOOD PRESSURE: 56 MMHG | WEIGHT: 116.6 LBS

## 2025-09-04 ASSESSMENT — ENCOUNTER SYMPTOMS
VOMITING: 0
PALPITATIONS: 0
CONSTIPATION: 0
UNEXPECTED WEIGHT CHANGE: 0

## 2025-09-17 ENCOUNTER — APPOINTMENT (OUTPATIENT)
Dept: HEMATOLOGY/ONCOLOGY | Facility: CLINIC | Age: OVER 89
End: 2025-09-17
Payer: MEDICARE

## (undated) DEVICE — STRIP, SKIN CLOSURE, STERI STRIP, REINFORCED, 0.5 X 4 IN

## (undated) DEVICE — KIT, VERTOS, MILD DEVICES

## (undated) DEVICE — NEEDLE, HYPODERMIC, NEEDLE PRO, 25G X 1.5, ORANGE

## (undated) DEVICE — SPONGE GAUZE, XRAY RFD, 8X4 12 PLY

## (undated) DEVICE — Device

## (undated) DEVICE — DRAPE, SHEET, LAPAROTOMY, W/ISO-BAC, W/ARMBOARD COVERS, 98 X 122 IN, DISPOSABLE, LF, STERILE

## (undated) DEVICE — DRAPE, SHEET, UTILITY, NON ABSORBENT, 18 X 26 IN, LF

## (undated) DEVICE — NEEDLE, HYPODERMIC, MONOJECT, 27 G X 1.5 IN

## (undated) DEVICE — DRAPE, C-ARMOR KIT

## (undated) DEVICE — TISSUE ADHESIVE, PREMIERPRO EXOFIN, PRECISION PEN HV, 1.0ML

## (undated) DEVICE — SOLUTION, IRRIGATION, STERILE WATER, 1000 ML, POUR BOTTLE

## (undated) DEVICE — SOLUTION, IRRIGATION, SODIUM CHLORIDE 0.9%, 1000 ML, POUR BOTTLE

## (undated) DEVICE — SYRINGE, 10 CC, LUER LOCK

## (undated) DEVICE — DRAPE, SHEET, U, W/ADHESIVE STRIP, IMPERVIOUS, 60 X 70 IN, DISPOSABLE, LF, STERILE

## (undated) DEVICE — GLOVE, SURGICAL, PROTEXIS PI , 8.0, PF, LF

## (undated) DEVICE — DRAPE PACK, BASIC VI, AURORA, 50 X 90IN

## (undated) DEVICE — GLOVE, SURGICAL, PROTEXIS PI , 7.0, PF, LF

## (undated) DEVICE — DRAPE, SHEET, FAN FOLDED, HALF, 44 X 58 IN, DISPOSABLE, LF, STERILE

## (undated) DEVICE — MARKER, SURGICAL, SKIN, REG TIP, W/ RULER & LABELS

## (undated) DEVICE — SYRINGE, 10 CC, SLIP TIP

## (undated) DEVICE — GLOVE, SURGICAL, PROTEXIS PI BLUE W/NEUTHERA, 7.5, PF, LF

## (undated) DEVICE — NEEDLE, SPINAL, 22 G X 3.5 IN, BLACK HUB

## (undated) DEVICE — BLOCK, FOAM, NEEDLE POP -N-COUNT, 1840, BLACK

## (undated) DEVICE — SLEEVE, VASO PRESS, CALF GARMENT, MEDIUM, GREEN

## (undated) DEVICE — TOWEL PACK, STERILE, 4/PACK, BLUE

## (undated) DEVICE — DRAPE, INCISE, ANTIMICROBIAL, IOBAN 2, LARGE, 17 X 23 IN, DISPOSABLE, STERILE

## (undated) DEVICE — STRIP, SKIN CLOSURE, COMPOUND BENZION TINCTURE 0.6ML

## (undated) DEVICE — SYRINGE, 60 CC, IRRIGATION, BULB, CONTRO-BULB, PAPER POUCH

## (undated) DEVICE — APPLICATOR, CHLORAPREP, W/ORANGE TINT, 26ML